# Patient Record
Sex: FEMALE | Race: BLACK OR AFRICAN AMERICAN | Employment: OTHER | URBAN - METROPOLITAN AREA
[De-identification: names, ages, dates, MRNs, and addresses within clinical notes are randomized per-mention and may not be internally consistent; named-entity substitution may affect disease eponyms.]

---

## 2017-01-12 ENCOUNTER — OFFICE VISIT (OUTPATIENT)
Dept: INTERNAL MEDICINE CLINIC | Age: 82
End: 2017-01-12

## 2017-01-12 VITALS
TEMPERATURE: 96.5 F | RESPIRATION RATE: 20 BRPM | HEIGHT: 62 IN | DIASTOLIC BLOOD PRESSURE: 50 MMHG | HEART RATE: 47 BPM | SYSTOLIC BLOOD PRESSURE: 143 MMHG | BODY MASS INDEX: 27.79 KG/M2 | OXYGEN SATURATION: 97 % | WEIGHT: 151 LBS

## 2017-01-12 DIAGNOSIS — J40 BRONCHITIS: Primary | ICD-10-CM

## 2017-01-12 NOTE — PROGRESS NOTES
Patient is in the office today for a urgent care follow up. Do you have an Advance Directive yes - will bring copy      1. Have you been to the ER, urgent care clinic since your last visit? Hospitalized since your last visit? yes, [facility]     2. Have you seen or consulted any other health care providers outside of the [address] [place] [address] since your last visit? Include any pap smears or colon screening.  No

## 2017-01-12 NOTE — PATIENT INSTRUCTIONS
Cough: Care Instructions  Your Care Instructions  A cough is your body's response to something that bothers your throat or airways. Many things can cause a cough. You might cough because of a cold or the flu, bronchitis, or asthma. Smoking, postnasal drip, allergies, and stomach acid that backs up into your throat also can cause coughs. A cough is a symptom, not a disease. Most coughs stop when the cause, such as a cold, goes away. You can take a few steps at home to cough less and feel better. Follow-up care is a key part of your treatment and safety. Be sure to make and go to all appointments, and call your doctor if you are having problems. It's also a good idea to know your test results and keep a list of the medicines you take. How can you care for yourself at home? · Drink lots of water and other fluids. This helps thin the mucus and soothes a dry or sore throat. Honey or lemon juice in hot water or tea may ease a dry cough. · Take cough medicine as directed by your doctor. · Prop up your head on pillows to help you breathe and ease a dry cough. · Try cough drops to soothe a dry or sore throat. Cough drops don't stop a cough. Medicine-flavored cough drops are no better than candy-flavored drops or hard candy. · Do not smoke. Avoid secondhand smoke. If you need help quitting, talk to your doctor about stop-smoking programs and medicines. These can increase your chances of quitting for good. When should you call for help? Call 911 anytime you think you may need emergency care. For example, call if:  · You have severe trouble breathing. Call your doctor now or seek immediate medical care if:  · You cough up blood. · You have new or worse trouble breathing. · You have a new or higher fever. · You have a new rash.   Watch closely for changes in your health, and be sure to contact your doctor if:  · You cough more deeply or more often, especially if you notice more mucus or a change in the color of your mucus. · You have new symptoms, such as a sore throat, an earache, or sinus pain. · You do not get better as expected. Where can you learn more? Go to http://jason-tiera.info/. Enter D279 in the search box to learn more about \"Cough: Care Instructions. \"  Current as of: May 27, 2016  Content Version: 11.1  © 6698-4925 [employer]. Care instructions adapted under license by [employer] (which disclaims liability or warranty for this information). If you have questions about a medical condition or this instruction, always ask your healthcare professional. [address] any warranty or liability for your use of this information.

## 2017-01-12 NOTE — PROGRESS NOTES
Nelly Moses is a 80 y.o.  female and presents with ED Follow-up ([facility])      SUBJECTIVE:  Pt was treated for bronchitis at [facility] with doxycyline. She had chest congestion but minimal cough. She is feeling better after finishing her antibiotics and needs no further medications            Current Outpatient Prescriptions   Medication Sig    hydrALAZINE (APRESOLINE) 50 mg tablet Take 1 Tab by mouth three (3) times daily.  pravastatin (PRAVACHOL) 40 mg tablet     glipiZIDE SR (GLUCOTROL) 2.5 mg CR tablet TAKE 1 TABLET BY MOUTH EVERY DAY    montelukast (SINGULAIR) 10 mg tablet Take 1 Tab by mouth daily.  guaiFENesin (ORGANIDIN) 400 mg tablet Take 1 Tab by mouth nightly as needed for Congestion. Indications: COUGH    chlorthalidone (HYGROTEN) 25 mg tablet Take 1 Tab by mouth daily.  atenolol (TENORMIN) 25 mg tablet Take 0.5 Tabs by mouth daily.  amLODIPine (NORVASC) 10 mg tablet TAKE 1 TABLET BY MOUTH EVERY DAY    losartan (COZAAR) 100 mg tablet TAKE 1 TABLET BY MOUTH DAILY.  COMBIGAN 0.2-0.5 % drop ophthalmic solution Administer 1 Drop to both eyes every twelve (12) hours.  RESTASIS 0.05 % ophthalmic emulsion Administer 1 Drop to both eyes every twelve (12) hours.  ascorbic acid (VITAMIN C) 500 mg tablet Take 1,000 mg by mouth daily.  CALCIUM PO Take 1,000 mg by mouth two (2) times a day.  Blood-Glucose Meter monitoring kit by Does Not Apply route. Use daily as directed    glucose blood VI test strips (BLOOD GLUCOSE TEST) strip by Does Not Apply route. Use daily as directed    Lancets Misc by Does Not Apply route. Use daily as directed    alcohol swabs (ALCOHOL PREP PADS) PadM 1 Dose by Apply Externally route daily as needed (Glucose testing).  nitroglycerin (NITROSTAT) 0.4 mg SL tablet 0.4 mg by SubLINGual route every five (5) minutes as needed.  Cholecalciferol, Vitamin D3, (VITAMIN D) 1,000 unit Cap Take 2,000 Units by mouth daily.     MULTIVITAMINS PO Take 1 Tab by mouth daily.  aspirin 81 mg chewable tablet Take 81 mg by mouth daily.  sulfanilamide (AVC) 15 % vaginal cream Insert 1 Applicator into vagina daily.  alendronate (FOSAMAX) 35 mg tablet Take  by mouth every seven (7) days.  IBUPROFEN (ADVIL PO) Take 1 Tab by mouth as needed. No current facility-administered medications for this visit. OBJECTIVE:  alert, well appearing, and in no distress  Visit Vitals    /50 (BP 1 Location: Left arm, BP Patient Position: Sitting)    Pulse (!) 47    Temp 96.5 °F (35.8 °C) (Oral)    Resp 20    Ht 5' 2\" (1.575 m)    Wt 151 lb (68.5 kg)    SpO2 97%    BMI 27.62 kg/m2      well developed and well nourished  Chest - clear to auscultation, no wheezes, rales or rhonchi, symmetric air entry  Heart - normal rate, regular rhythm, normal S1, S2, no murmurs, rubs, clicks or gallops      Assessment/Plan      ICD-10-CM ICD-9-CM    1. Bronchitis J40 490 Resolved after treating with doxycycline from urgent care. Follow-up Disposition:  Return if symptoms worsen or fail to improve. Reviewed plan of care. Patient has provided input and agrees with goals.

## 2017-01-12 NOTE — MR AVS SNAPSHOT
Visit Information Date & Time Provider Department Dept. Phone Encounter #  
 1/12/2017 11:45 AM Citlalli De Jesus MD [facility] [place] [facility] [address] [id number] Follow-up Instructions Return if symptoms worsen or fail to improve. Your Appointments 3/6/2017  8:20 AM  
LAB with Citlalli De Jesus MD  
[facility] (--) Appt Note: 4 mo f/u lab [address] [address]  
[phone]  
  
   
 [address] [place] [address],[address] [address]  
  
    
 3/13/2017 11:30 AM  
ROUTINE CARE with Citlalli De Jesus MD  
[facility] (--) Appt Note: 4 mo f/u  
 [address] [address]  
[phone]  
  
   
 [address] [place] [address],[address] [address]  
  
    
 4/10/2017  3:00 PM  
Follow Up with Rosibel Rollins DO [facility] ([facility]) Appt Note: 4-6 month f/u  
 [facility] [address] [address] [address] [phone] [address] [place] [address] [address] Upcoming Health Maintenance Date Due DTaP/Tdap/Td series (1 - Tdap) 1/5/1944 ZOSTER VACCINE AGE 60> 1/5/1983 GLAUCOMA SCREENING Q2Y 6/18/2012 EYE EXAM RETINAL OR DILATED Q1 1/30/2017* Pneumococcal 65+ Low/Medium Risk (2 of 2 - PPSV23) 1/31/2017* FOOT EXAM Q1 1/31/2017* HEMOGLOBIN A1C Q6M 5/7/2017 MEDICARE YEARLY EXAM 6/16/2017 LIPID PANEL Q1 7/12/2017 MICROALBUMIN Q1 11/7/2017 *Topic was postponed. The date shown is not the original due date. Allergies as of 1/12/2017  Review Complete On: 1/12/2017 By: Citlalli De Jesus MD  
  
 Severity Noted Reaction Type Reactions Lipitor [Atorvastatin]  08/04/2016    Myalgia Spironolactone  05/09/2014    Other (comments) Dizziness and fatigue Current Immunizations  Reviewed on 11/14/2016 Name Date Influenza Vaccine (Quad) PF 11/14/2016, 11/13/2015 Influenza Vaccine Split 10/5/2010 Pneumococcal Conjugate (PCV-13) 5/27/2015 Pneumococcal Vaccine (Pcv) 1/20/2007 Not reviewed this visit You Were Diagnosed With   
  
 Codes Comments Viral upper respiratory tract infection    -  Primary ICD-10-CM: J06.9, B97.89 ICD-9-CM: 465.9 Vitals BP Pulse Temp Resp Height(growth percentile) Weight(growth percentile) 143/50 (BP 1 Location: Left arm, BP Patient Position: Sitting) (!) 47 96.5 °F (35.8 °C) (Oral) 20 5' 2\" (1.575 m) 151 lb (68.5 kg) SpO2 BMI OB Status Smoking Status 97% 27.62 kg/m2 Hysterectomy Never Smoker Vitals History BMI and BSA Data Body Mass Index Body Surface Area  
 27.62 kg/m 2 1.73 m 2 Preferred Pharmacy Pharmacy Name Phone [id number] [phone] [address] [phone] Your Updated Medication List  
  
   
This list is accurate as of: 1/12/17 11:58 AM.  Always use your most recent med list. ADVIL PO Take 1 Tab by mouth as needed. alcohol swabs Padm Commonly known as:  ALCOHOL PREP PADS  
1 Dose by Apply Externally route daily as needed (Glucose testing). alendronate 35 mg tablet Commonly known as:  FOSAMAX Take  by mouth every seven (7) days. amLODIPine 10 mg tablet Commonly known as:  Tad Kathy TAKE 1 TABLET BY MOUTH EVERY DAY  
  
 aspirin 81 mg chewable tablet Take 81 mg by mouth daily. atenolol 25 mg tablet Commonly known as:  TENORMIN Take 0.5 Tabs by mouth daily. Blood-Glucose Meter monitoring kit  
by Does Not Apply route. Use daily as directed CALCIUM PO Take 1,000 mg by mouth two (2) times a day. chlorthalidone 25 mg tablet Commonly known as:  Nora Maxim Take 1 Tab by mouth daily. cholecalciferol 1,000 unit Cap Commonly known as:  VITAMIN D3 Take 2,000 Units by mouth daily. COMBIGAN 0.2-0.5 % Drop ophthalmic solution Generic drug:  brimonidine-timolol Administer 1 Drop to both eyes every twelve (12) hours. glipiZIDE SR 2.5 mg CR tablet Commonly known as:  GLUCOTROL XL  
TAKE 1 TABLET BY MOUTH EVERY DAY  
  
 glucose blood VI test strips strip Commonly known as:  blood glucose test  
by Does Not Apply route. Use daily as directed  
  
 guaiFENesin 400 mg tablet Commonly known as:  Rolin [place] Take 1 Tab by mouth nightly as needed for Congestion. Indications: COUGH  
  
 hydrALAZINE 50 mg tablet Commonly known as:  APRESOLINE Take 1 Tab by mouth three (3) times daily. Lancets Misc  
by Does Not Apply route. Use daily as directed  
  
 losartan 100 mg tablet Commonly known as:  COZAAR  
TAKE 1 TABLET BY MOUTH DAILY. montelukast 10 mg tablet Commonly known as:  SINGULAIR Take 1 Tab by mouth daily. MULTIVITAMINS PO Take 1 Tab by mouth daily. nitroglycerin 0.4 mg SL tablet Commonly known as:  NITROSTAT  
0.4 mg by SubLINGual route every five (5) minutes as needed. pravastatin 40 mg tablet Commonly known as:  PRAVACHOL  
  
 RESTASIS 0.05 % ophthalmic emulsion Generic drug:  cycloSPORINE Administer 1 Drop to both eyes every twelve (12) hours. sulfanilamide 15 % vaginal cream  
Commonly known as:  AVC Insert 1 Applicator into vagina daily. VITAMIN C 500 mg tablet Generic drug:  ascorbic acid (vitamin C) Take 1,000 mg by mouth daily. Follow-up Instructions Return if symptoms worsen or fail to improve. Patient Instructions Cough: Care Instructions Your Care Instructions A cough is your body's response to something that bothers your throat or airways. Many things can cause a cough. You might cough because of a cold or the flu, bronchitis, or asthma. Smoking, postnasal drip, allergies, and stomach acid that backs up into your throat also can cause coughs. A cough is a symptom, not a disease. Most coughs stop when the cause, such as a cold, goes away. You can take a few steps at home to cough less and feel better. Follow-up care is a key part of your treatment and safety. Be sure to make and go to all appointments, and call your doctor if you are having problems. It's also a good idea to know your test results and keep a list of the medicines you take. How can you care for yourself at home? · Drink lots of water and other fluids. This helps thin the mucus and soothes a dry or sore throat. Honey or lemon juice in hot water or tea may ease a dry cough. · Take cough medicine as directed by your doctor. · Prop up your head on pillows to help you breathe and ease a dry cough. · Try cough drops to soothe a dry or sore throat. Cough drops don't stop a cough. Medicine-flavored cough drops are no better than candy-flavored drops or hard candy. · Do not smoke. Avoid secondhand smoke. If you need help quitting, talk to your doctor about stop-smoking programs and medicines. These can increase your chances of quitting for good. When should you call for help? Call 911 anytime you think you may need emergency care. For example, call if: 
· You have severe trouble breathing. Call your doctor now or seek immediate medical care if: 
· You cough up blood. · You have new or worse trouble breathing. · You have a new or higher fever. · You have a new rash. Watch closely for changes in your health, and be sure to contact your doctor if: 
· You cough more deeply or more often, especially if you notice more mucus or a change in the color of your mucus. · You have new symptoms, such as a sore throat, an earache, or sinus pain. · You do not get better as expected. Where can you learn more? Go to http://jason-tiera.info/. Enter D279 in the search box to learn more about \"Cough: Care Instructions. \" Current as of: May 27, 2016 Content Version: 11.1 © 7736-3859 [employer]. Care instructions adapted under license by [employer] (which disclaims liability or warranty for this information). If you have questions about a medical condition or this instruction, always ask your healthcare professional. [address] any warranty or liability for your use of this information. Introducing [facility]! Flaco Patel introduces [employer] patient portal. Now you can access parts of your medical record, email your doctor's office, and request medication refills online. 1. In your internet browser, go to [email or web address] 2. Click on the First Time User? Click Here link in the Sign In box. You will see the New Member Sign Up page. 3. Enter your [employer] Access Code exactly as it appears below. You will not need to use this code after youve completed the sign-up process. If you do not sign up before the expiration date, you must request a new code. · [employer] Access Code: [email or web address] Expires: 4/12/2017 11:58 AM 
 
4. Enter the last four digits of your Social Security Number (xxxx) and Date of Birth (mm/dd/yyyy) as indicated and click Submit. You will be taken to the next sign-up page. 5. Create a [employer] ID. This will be your [employer] login ID and cannot be changed, so think of one that is secure and easy to remember. 6. Create a [employer] password. You can change your password at any time. 7. Enter your Password Reset Question and Answer. This can be used at a later time if you forget your password. 8. Enter your e-mail address. You will receive e-mail notification when new information is available in [address] 9. Click Sign Up. You can now view and download portions of your medical record. 10. Click the Download Summary menu link to download a portable copy of your medical information.  
 
If you have questions, please visit the Frequently Asked Questions section of the [email or web address]. Remember, [employer] is NOT to be used for urgent needs. For medical emergencies, dial 911. Now available from your iPhone and Android! Please provide this summary of care documentation to your next provider. Your primary care clinician is listed as WALESKA TO. If you have any questions after today's visit, please call [phone].

## 2017-03-01 RX ORDER — PRAVASTATIN SODIUM 40 MG/1
40 TABLET ORAL DAILY
Qty: 30 TAB | Refills: 6 | Status: SHIPPED | OUTPATIENT
Start: 2017-03-01 | End: 2018-05-15 | Stop reason: SDUPTHER

## 2017-03-06 ENCOUNTER — HOSPITAL ENCOUNTER (OUTPATIENT)
Dept: LAB | Age: 82
Discharge: HOME | End: 2017-03-06
Payer: MEDICARE

## 2017-03-06 DIAGNOSIS — I10 ESSENTIAL HYPERTENSION: ICD-10-CM

## 2017-03-06 DIAGNOSIS — E11.9 TYPE 2 DIABETES MELLITUS WITHOUT COMPLICATION, WITHOUT LONG-TERM CURRENT USE OF INSULIN (HCC): ICD-10-CM

## 2017-03-06 DIAGNOSIS — E78.5 DYSLIPIDEMIA: ICD-10-CM

## 2017-03-06 LAB
ALBUMIN SERPL BCP-MCNC: 3.4 G/DL (ref 3.4–5)
ALBUMIN/GLOB SERPL: 1.2 {RATIO} (ref 0.8–1.7)
ALP SERPL-CCNC: 91 U/L (ref 45–117)
ALT SERPL-CCNC: 22 U/L (ref 13–56)
ANION GAP BLD CALC-SCNC: 8 MMOL/L (ref 3–18)
AST SERPL W P-5'-P-CCNC: 25 U/L (ref 15–37)
BILIRUB SERPL-MCNC: 0.3 MG/DL (ref 0.2–1)
BUN SERPL-MCNC: 23 MG/DL (ref 7–18)
BUN/CREAT SERPL: 20 (ref 12–20)
CALCIUM SERPL-MCNC: 8.9 MG/DL (ref 8.5–10.1)
CHLORIDE SERPL-SCNC: 108 MMOL/L (ref 100–108)
CHOLEST SERPL-MCNC: 227 MG/DL
CO2 SERPL-SCNC: 26 MMOL/L (ref 21–32)
CREAT SERPL-MCNC: 1.17 MG/DL (ref 0.6–1.3)
GLOBULIN SER CALC-MCNC: 2.8 G/DL (ref 2–4)
GLUCOSE SERPL-MCNC: 106 MG/DL (ref 74–99)
HBA1C MFR BLD: 6.4 % (ref 4.2–5.6)
HDLC SERPL-MCNC: 60 MG/DL (ref 40–60)
HDLC SERPL: 3.8 {RATIO} (ref 0–5)
LDLC SERPL CALC-MCNC: 147 MG/DL (ref 0–100)
LIPID PROFILE,FLP: ABNORMAL
POTASSIUM SERPL-SCNC: 4.8 MMOL/L (ref 3.5–5.5)
PROT SERPL-MCNC: 6.2 G/DL (ref 6.4–8.2)
SODIUM SERPL-SCNC: 142 MMOL/L (ref 136–145)
TRIGL SERPL-MCNC: 100 MG/DL (ref ?–150)
VLDLC SERPL CALC-MCNC: 20 MG/DL

## 2017-03-06 PROCEDURE — 36415 COLL VENOUS BLD VENIPUNCTURE: CPT | Performed by: INTERNAL MEDICINE

## 2017-03-06 PROCEDURE — 80061 LIPID PANEL: CPT | Performed by: INTERNAL MEDICINE

## 2017-03-06 PROCEDURE — 83036 HEMOGLOBIN GLYCOSYLATED A1C: CPT | Performed by: INTERNAL MEDICINE

## 2017-03-06 PROCEDURE — 80053 COMPREHEN METABOLIC PANEL: CPT | Performed by: INTERNAL MEDICINE

## 2017-03-10 RX ORDER — LOSARTAN POTASSIUM 100 MG/1
TABLET ORAL
Qty: 90 TAB | Refills: 3 | Status: SHIPPED | OUTPATIENT
Start: 2017-03-10 | End: 2018-10-22 | Stop reason: SDUPTHER

## 2017-03-13 ENCOUNTER — OFFICE VISIT (OUTPATIENT)
Dept: INTERNAL MEDICINE CLINIC | Age: 82
End: 2017-03-13

## 2017-03-13 VITALS
OXYGEN SATURATION: 98 % | SYSTOLIC BLOOD PRESSURE: 140 MMHG | HEIGHT: 62 IN | DIASTOLIC BLOOD PRESSURE: 72 MMHG | BODY MASS INDEX: 27.23 KG/M2 | HEART RATE: 52 BPM | TEMPERATURE: 98.6 F | WEIGHT: 148 LBS | RESPIRATION RATE: 18 BRPM

## 2017-03-13 DIAGNOSIS — I10 ESSENTIAL HYPERTENSION: ICD-10-CM

## 2017-03-13 DIAGNOSIS — E11.9 TYPE 2 DIABETES MELLITUS WITHOUT COMPLICATION, WITHOUT LONG-TERM CURRENT USE OF INSULIN (HCC): Primary | ICD-10-CM

## 2017-03-13 DIAGNOSIS — E78.5 DYSLIPIDEMIA: ICD-10-CM

## 2017-03-13 NOTE — PROGRESS NOTES
Subjective:       Chief Complaint  The patient presents for follow up of diabetes, hypertension and high cholesterol. HPI  Lawanda Small is a 80 y.o. female seen for follow up of diabetes. Shealso has hypertension and hyperlipidemia. Diabetes well controlled, no significant medication side effects noted, on glucotrol, hypertension well controlled, no significant medication side effects noted, on current meds, hyperlipidemia, pt now on Pravachol since Lipitor gave her myalgias. Pt may not be taking the Pravachol on a regular basis since her LDL has increased significantly. Daughter will make sure she is taking it and I will recheck in a few months to see if any improvement. Diet and Lifestyle: generally follows a low fat low cholesterol diet, follows a diabetic diet regularly, exercises sporadically    Home BP Monitoring: is well controlled at home. Diabetic Review of Systems - home glucose monitoring: is well controlled at home when she takes it 1x/day    Other symptoms and concerns: pt feels better when she exercises on a regular basis. Pt's CKD stage 3 is stable on current meds. Pt feels unsteady when she uses a cane so she was advised to use rollator but for some reason she does not feel safe with it even though she has not fallen while using it. Pt wants a motorized wheelchair but is currently not a candidate. Discussed the patient's BMI with her. The BMI follow up plan is as follows: BMI is out of normal parameters and plan is as follows: I have counseled this patient on diet and exercise regimens. Current Outpatient Prescriptions   Medication Sig    losartan (COZAAR) 100 mg tablet TAKE ONE TABLET BY MOUTH DIALY    pravastatin (PRAVACHOL) 40 mg tablet Take 1 Tab by mouth daily.  hydrALAZINE (APRESOLINE) 50 mg tablet Take 1 Tab by mouth three (3) times daily.  (Patient taking differently: Take 25 mg by mouth three (3) times daily.)    glipiZIDE SR (GLUCOTROL) 2.5 mg CR tablet TAKE 1 TABLET BY MOUTH EVERY DAY    montelukast (SINGULAIR) 10 mg tablet Take 1 Tab by mouth daily.  atenolol (TENORMIN) 25 mg tablet Take 0.5 Tabs by mouth daily.  amLODIPine (NORVASC) 10 mg tablet TAKE 1 TABLET BY MOUTH EVERY DAY    COMBIGAN 0.2-0.5 % drop ophthalmic solution Administer 1 Drop to both eyes every twelve (12) hours.  RESTASIS 0.05 % ophthalmic emulsion Administer 1 Drop to both eyes every twelve (12) hours.  ascorbic acid (VITAMIN C) 500 mg tablet Take 1,000 mg by mouth daily.  Blood-Glucose Meter monitoring kit by Does Not Apply route. Use daily as directed    glucose blood VI test strips (BLOOD GLUCOSE TEST) strip by Does Not Apply route. Use daily as directed    Lancets Misc by Does Not Apply route. Use daily as directed    alcohol swabs (ALCOHOL PREP PADS) PadM 1 Dose by Apply Externally route daily as needed (Glucose testing).  Cholecalciferol, Vitamin D3, (VITAMIN D) 1,000 unit Cap Take 2,000 Units by mouth daily.  MULTIVITAMINS PO Take 1 Tab by mouth daily.  aspirin 81 mg chewable tablet Take 81 mg by mouth daily.  sulfanilamide (AVC) 15 % vaginal cream Insert 1 Applicator into vagina daily.  guaiFENesin (ORGANIDIN) 400 mg tablet Take 1 Tab by mouth nightly as needed for Congestion. Indications: COUGH    chlorthalidone (HYGROTEN) 25 mg tablet Take 1 Tab by mouth daily.  alendronate (FOSAMAX) 35 mg tablet Take  by mouth every seven (7) days.  IBUPROFEN (ADVIL PO) Take 1 Tab by mouth as needed.  CALCIUM PO Take 1,000 mg by mouth two (2) times a day.  nitroglycerin (NITROSTAT) 0.4 mg SL tablet 0.4 mg by SubLINGual route every five (5) minutes as needed. No current facility-administered medications for this visit.               Review of Systems  Respiratory: negative for dyspnea on exertion  Cardiovascular: negative for chest pain    Objective:     Visit Vitals    /72 (BP 1 Location: Left arm, BP Patient Position: Sitting)    Pulse (!) 52    Temp 98.6 °F (37 °C) (Oral)    Resp 18    Ht 5' 2\" (1.575 m)    Wt 148 lb (67.1 kg)    SpO2 98%    BMI 27.07 kg/m2        General appearance - alert, well appearing, and in no distress  Neck - supple, no significant adenopathy, carotids upstroke normal bilaterally, no bruits  Chest - clear to auscultation, no wheezes, rales or rhonchi, symmetric air entry  Heart - normal rate, regular rhythm, normal S1, S2, no murmurs, rubs, clicks or gallops  Extremities - peripheral pulses normal, no pedal edema, no clubbing or cyanosis  Skin - normal coloration and turgor, no rashes, no suspicious skin lesions noted      Labs:   Lab Results   Component Value Date/Time    Hemoglobin A1c 6.4 03/06/2017 09:28 AM    Hemoglobin A1c 6.1 11/07/2016 08:49 AM    Hemoglobin A1c 6.4 07/12/2016 09:48 AM    Microalbumin/Creat ratio (mg/g creat) 17 11/07/2016 02:30 PM    Microalbumin,urine random 3.00 11/07/2016 02:30 PM    LDL, calculated 147 03/06/2017 09:28 AM    Creatinine 1.17 03/06/2017 09:28 AM      Lab Results   Component Value Date/Time    Cholesterol, total 227 03/06/2017 09:28 AM    HDL Cholesterol 60 03/06/2017 09:28 AM    LDL, calculated 147 03/06/2017 09:28 AM    Triglyceride 100 03/06/2017 09:28 AM    CHOL/HDL Ratio 3.8 03/06/2017 09:28 AM     Lab Results   Component Value Date/Time    AST (SGOT) 25 03/06/2017 09:28 AM    Alk.  phosphatase 91 03/06/2017 09:28 AM    Bilirubin, direct 0.1 03/11/2016 10:45 AM     Lab Results   Component Value Date/Time    GFR est AA 52 03/06/2017 09:28 AM    GFR est non-AA 43 03/06/2017 09:28 AM    Creatinine 1.17 03/06/2017 09:28 AM    BUN 23 03/06/2017 09:28 AM    Sodium 142 03/06/2017 09:28 AM    Potassium 4.8 03/06/2017 09:28 AM    Chloride 108 03/06/2017 09:28 AM    CO2 26 03/06/2017 09:28 AM      Lab Results   Component Value Date/Time    Glucose 106 03/06/2017 09:28 AM            Assessment / Plan     Diabetes well controlled, on glucotrol  Hypertension  well controlled, on current meds,    Hyperlipidemia poorly controlled  On Pravachol, not sure how compliant pt has been, will recheck at next OV once daughter confirms she is taking the statin. ICD-10-CM ICD-9-CM    1. Type 2 diabetes mellitus without complication, without long-term current use of insulin (HCC) E11.9 250.00 HEMOGLOBIN A1C W/O EAG   2. Essential hypertension R79 838.9 METABOLIC PANEL, COMPREHENSIVE   3. Dyslipidemia E78.5 272.4 LIPID PANEL                Diabetic issues reviewed with her: diabetic diet discussed in detail, and low cholesterol diet, weight control and daily exercise discussed. Follow-up Disposition:  Return in about 4 months (around 7/13/2017) for labs 1 week before. Reviewed plan of care. Patient has provided input and agrees with goals.

## 2017-03-13 NOTE — PROGRESS NOTES
Patient is in the office today for a 4 month follow up. Do you have an Advance Directive yes - will bring copy      1. Have you been to the ER, urgent care clinic since your last visit? Hospitalized since your last visit? No    2. Have you seen or consulted any other health care providers outside of the [address] [place] [address] since your last visit? Include any pap smears or colon screening.   No

## 2017-03-13 NOTE — PATIENT INSTRUCTIONS

## 2017-03-13 NOTE — MR AVS SNAPSHOT
Visit Information Date & Time Provider Department Dept. Phone Encounter #  
 3/13/2017 11:30 AM Sangita Costello MD Internists at [facility] [id number] [phone] Follow-up Instructions Return in about 4 months (around 7/13/2017) for labs 1 week before. Your Appointments 4/10/2017  3:00 PM  
Follow Up with Arvilla Lefort, DO [facility] ([facility]) Appt Note: 4-6 month f/u  
 [place] [address] [address] [address] [phone] [address] Upcoming Health Maintenance Date Due  
 FOOT EXAM Q1 1/5/1933 DTaP/Tdap/Td series (1 - Tdap) 1/5/1944 ZOSTER VACCINE AGE 60> 1/5/1983 GLAUCOMA SCREENING Q2Y 6/18/2012 EYE EXAM RETINAL OR DILATED Q1 3/10/2016 Pneumococcal 65+ Low/Medium Risk (2 of 2 - PPSV23) 3/14/2017* MEDICARE YEARLY EXAM 6/16/2017 HEMOGLOBIN A1C Q6M 9/6/2017 MICROALBUMIN Q1 11/7/2017 LIPID PANEL Q1 3/6/2018 *Topic was postponed. The date shown is not the original due date. Allergies as of 3/13/2017  Review Complete On: 3/13/2017 By: Sangita Costello MD  
  
 Severity Noted Reaction Type Reactions Lipitor [Atorvastatin]  08/04/2016    Myalgia Spironolactone  05/09/2014    Other (comments) Dizziness and fatigue Current Immunizations  Reviewed on 11/14/2016 Name Date Influenza Vaccine (Quad) PF 11/14/2016, 11/13/2015 Influenza Vaccine Split 10/5/2010 Pneumococcal Conjugate (PCV-13) 5/27/2015 Pneumococcal Vaccine (Pcv) 1/20/2007 Not reviewed this visit You Were Diagnosed With   
  
 Codes Comments Type 2 diabetes mellitus without complication, without long-term current use of insulin (HCC)    -  Primary ICD-10-CM: E11.9 ICD-9-CM: 250.00 Essential hypertension     ICD-10-CM: I10 
ICD-9-CM: 401.9 Dyslipidemia     ICD-10-CM: E78.5 ICD-9-CM: 272.4 Vitals BP Pulse Temp Resp Height(growth percentile) Weight(growth percentile) 140/72 (BP 1 Location: Left arm, BP Patient Position: Sitting) (!) 52 98.6 °F (37 °C) (Oral) 18 5' 2\" (1.575 m) 148 lb (67.1 kg) SpO2 BMI OB Status Smoking Status 98% 27.07 kg/m2 Hysterectomy Never Smoker Vitals History BMI and BSA Data Body Mass Index Body Surface Area  
 27.07 kg/m 2 1.71 m 2 Preferred Pharmacy Pharmacy Name Phone [id number] Brenda Nieves, [address] [facility],[address] [facility] [phone] Your Updated Medication List  
  
   
This list is accurate as of: 3/13/17 12:12 PM.  Always use your most recent med list. ADVIL PO Take 1 Tab by mouth as needed. alcohol swabs Padm Commonly known as:  ALCOHOL PREP PADS  
1 Dose by Apply Externally route daily as needed (Glucose testing). alendronate 35 mg tablet Commonly known as:  FOSAMAX Take  by mouth every seven (7) days. amLODIPine 10 mg tablet Commonly known as:  Paulette Flores TAKE 1 TABLET BY MOUTH EVERY DAY  
  
 aspirin 81 mg chewable tablet Take 81 mg by mouth daily. atenolol 25 mg tablet Commonly known as:  TENORMIN Take 0.5 Tabs by mouth daily. Blood-Glucose Meter monitoring kit  
by Does Not Apply route. Use daily as directed CALCIUM PO Take 1,000 mg by mouth two (2) times a day. chlorthalidone 25 mg tablet Commonly known as:  [place] Take 1 Tab by mouth daily. cholecalciferol 1,000 unit Cap Commonly known as:  VITAMIN D3 Take 2,000 Units by mouth daily. COMBIGAN 0.2-0.5 % Drop ophthalmic solution Generic drug:  brimonidine-timolol Administer 1 Drop to both eyes every twelve (12) hours. glipiZIDE SR 2.5 mg CR tablet Commonly known as:  GLUCOTROL XL  
TAKE 1 TABLET BY MOUTH EVERY DAY  
  
 glucose blood VI test strips strip Commonly known as:  blood glucose test  
by Does Not Apply route. Use daily as directed guaiFENesin 400 mg tablet Commonly known as:  Katherene Coombe Take 1 Tab by mouth nightly as needed for Congestion. Indications: COUGH  
  
 hydrALAZINE 50 mg tablet Commonly known as:  APRESOLINE Take 1 Tab by mouth three (3) times daily. Lancets Misc  
by Does Not Apply route. Use daily as directed  
  
 losartan 100 mg tablet Commonly known as:  COZAAR  
TAKE ONE TABLET BY MOUTH DIALY montelukast 10 mg tablet Commonly known as:  SINGULAIR Take 1 Tab by mouth daily. MULTIVITAMINS PO Take 1 Tab by mouth daily. nitroglycerin 0.4 mg SL tablet Commonly known as:  NITROSTAT  
0.4 mg by SubLINGual route every five (5) minutes as needed. pravastatin 40 mg tablet Commonly known as:  PRAVACHOL Take 1 Tab by mouth daily. RESTASIS 0.05 % ophthalmic emulsion Generic drug:  cycloSPORINE Administer 1 Drop to both eyes every twelve (12) hours. sulfanilamide 15 % vaginal cream  
Commonly known as:  AVC Insert 1 Applicator into vagina daily. VITAMIN C 500 mg tablet Generic drug:  ascorbic acid (vitamin C) Take 1,000 mg by mouth daily. Follow-up Instructions Return in about 4 months (around 7/13/2017) for labs 1 week before. Patient Instructions DASH Diet: Care Instructions Your Care Instructions The DASH diet is an eating plan that can help lower your blood pressure. DASH stands for Dietary Approaches to Stop Hypertension. Hypertension is high blood pressure. The DASH diet focuses on eating foods that are high in calcium, potassium, and magnesium. These nutrients can lower blood pressure. The foods that are highest in these nutrients are fruits, vegetables, low-fat dairy products, nuts, seeds, and legumes. But taking calcium, potassium, and magnesium supplements instead of eating foods that are high in those nutrients does not have the same effect. The DASH diet also includes whole grains, fish, and poultry. The DASH diet is one of several lifestyle changes your doctor may recommend to lower your high blood pressure. Your doctor may also want you to decrease the amount of sodium in your diet. Lowering sodium while following the DASH diet can lower blood pressure even further than just the DASH diet alone. Follow-up care is a key part of your treatment and safety. Be sure to make and go to all appointments, and call your doctor if you are having problems. It's also a good idea to know your test results and keep a list of the medicines you take. How can you care for yourself at home? Following the DASH diet · Eat 4 to 5 servings of fruit each day. A serving is 1 medium-sized piece of fruit, ½ cup chopped or canned fruit, 1/4 cup dried fruit, or 4 ounces (½ cup) of fruit juice. Choose fruit more often than fruit juice. · Eat 4 to 5 servings of vegetables each day. A serving is 1 cup of lettuce or raw leafy vegetables, ½ cup of chopped or cooked vegetables, or 4 ounces (½ cup) of vegetable juice. Choose vegetables more often than vegetable juice. · Get 2 to 3 servings of low-fat and fat-free dairy each day. A serving is 8 ounces of milk, 1 cup of yogurt, or 1 ½ ounces of cheese. · Eat 6 to 8 servings of grains each day. A serving is 1 slice of bread, 1 ounce of dry cereal, or ½ cup of cooked rice, pasta, or cooked cereal. Try to choose whole-grain products as much as possible. · Limit lean meat, poultry, and fish to 2 servings each day. A serving is 3 ounces, about the size of a deck of cards. · Eat 4 to 5 servings of nuts, seeds, and legumes (cooked dried beans, lentils, and split peas) each week. A serving is 1/3 cup of nuts, 2 tablespoons of seeds, or ½ cup of cooked beans or peas. · Limit fats and oils to 2 to 3 servings each day. A serving is 1 teaspoon of vegetable oil or 2 tablespoons of salad dressing. · Limit sweets and added sugars to 5 servings or less a week.  A serving is 1 tablespoon jelly or jam, ½ cup sorbet, or 1 cup of lemonade. · Eat less than 2,300 milligrams (mg) of sodium a day. If you limit your sodium to 1,500 mg a day, you can lower your blood pressure even more. Tips for success · Start small. Do not try to make dramatic changes to your diet all at once. You might feel that you are missing out on your favorite foods and then be more likely to not follow the plan. Make small changes, and stick with them. Once those changes become habit, add a few more changes. · Try some of the following: ¨ Make it a goal to eat a fruit or vegetable at every meal and at snacks. This will make it easy to get the recommended amount of fruits and vegetables each day. ¨ Try yogurt topped with fruit and nuts for a snack or healthy dessert. ¨ Add lettuce, tomato, cucumber, and onion to sandwiches. ¨ Combine a ready-made pizza crust with low-fat mozzarella cheese and lots of vegetable toppings. Try using tomatoes, squash, spinach, broccoli, carrots, cauliflower, and onions. ¨ Have a variety of cut-up vegetables with a low-fat dip as an appetizer instead of chips and dip. ¨ Sprinkle sunflower seeds or chopped almonds over salads. Or try adding chopped walnuts or almonds to cooked vegetables. ¨ Try some vegetarian meals using beans and peas. Add garbanzo or kidney beans to salads. Make burritos and tacos with mashed guajardo beans or black beans. Where can you learn more? Go to http://jason-tiera.info/. Enter H249 in the search box to learn more about \"DASH Diet: Care Instructions. \" Current as of: March 23, 2016 Content Version: 11.1 © 3584-5201 [employer]. Care instructions adapted under license by [employer] (which disclaims liability or warranty for this information).  If you have questions about a medical condition or this instruction, always ask your healthcare professional. [employer] disclaims any warranty or liability for your use of this information. Introducing [facility] & HEALTH SERVICES! Estephanie Sabillon introduces [email or web address] patient portal. Now you can access parts of your medical record, email your doctor's office, and request medication refills online. 1. In your internet browser, go to [email or web address] 2. Click on the First Time User? Click Here link in the Sign In box. You will see the New Member Sign Up page. 3. Enter your [email or web address] Access Code exactly as it appears below. You will not need to use this code after youve completed the sign-up process. If you do not sign up before the expiration date, you must request a new code. · [email or web address] Access Code: [email or web address] Expires: 4/12/2017 12:58 PM 
 
4. Enter the last four digits of your Social Security Number (xxxx) and Date of Birth (mm/dd/yyyy) as indicated and click Submit. You will be taken to the next sign-up page. 5. Create a [email or web address] ID. This will be your [email or web address] login ID and cannot be changed, so think of one that is secure and easy to remember. 6. Create a Huaban.com password. You can change your password at any time. 7. Enter your Password Reset Question and Answer. This can be used at a later time if you forget your password. 8. Enter your e-mail address. You will receive e-mail notification when new information is available in [address] 9. Click Sign Up. You can now view and download portions of your medical record. 10. Click the Download Summary menu link to download a portable copy of your medical information. If you have questions, please visit the Frequently Asked Questions section of the [email or web address] website. Remember, Huaban.com is NOT to be used for urgent needs. For medical emergencies, dial 911. Now available from your iPhone and Android! Please provide this summary of care documentation to your next provider. Your primary care clinician is listed as WALESKA TO. If you have any questions after today's visit, please call [phone].

## 2017-04-10 ENCOUNTER — OFFICE VISIT (OUTPATIENT)
Dept: CARDIOLOGY CLINIC | Age: 82
End: 2017-04-10

## 2017-04-10 VITALS
DIASTOLIC BLOOD PRESSURE: 68 MMHG | HEIGHT: 62 IN | SYSTOLIC BLOOD PRESSURE: 138 MMHG | BODY MASS INDEX: 27.05 KG/M2 | OXYGEN SATURATION: 97 % | HEART RATE: 50 BPM | WEIGHT: 147 LBS

## 2017-04-10 DIAGNOSIS — I25.10 CORONARY ARTERY DISEASE INVOLVING NATIVE CORONARY ARTERY OF NATIVE HEART WITHOUT ANGINA PECTORIS: ICD-10-CM

## 2017-04-10 DIAGNOSIS — I11.0 HYPERTENSIVE HEART DISEASE WITH HEART FAILURE (HCC): Primary | ICD-10-CM

## 2017-04-10 DIAGNOSIS — I42.9 CARDIOMYOPATHY, SECONDARY (HCC): ICD-10-CM

## 2017-04-10 DIAGNOSIS — E78.5 DYSLIPIDEMIA: ICD-10-CM

## 2017-04-10 RX ORDER — HYDRALAZINE HYDROCHLORIDE 25 MG/1
25 TABLET, FILM COATED ORAL 3 TIMES DAILY
COMMUNITY
End: 2017-12-15

## 2017-04-10 NOTE — PROGRESS NOTES
Review of Systems   Constitutional: Negative for chills, diaphoresis, fever, malaise/fatigue and weight loss. Respiratory: Positive for shortness of breath. Negative for cough, hemoptysis, sputum production and wheezing. Cardiovascular: Negative for chest pain, palpitations, orthopnea, claudication, leg swelling and PND. Gastrointestinal: Negative. Musculoskeletal: Positive for neck pain. Negative for back pain, falls, joint pain and myalgias. Neurological: Positive for weakness.

## 2017-04-10 NOTE — MR AVS SNAPSHOT
Visit Information Date & Time Provider Department Dept. Phone Encounter #  
 4/10/2017  3:00 PM Thais Cook, [address] [facility] [id number] Follow-up Instructions Return in about 6 months (around 10/10/2017), or if symptoms worsen or fail to improve. Your Appointments 7/3/2017  8:20 AM  
LAB with Lajean Halsted, MD  
[facility]ists at [facility] (--) Appt Note: 4 mo f/u lab [address] [address]  
[phone]  
  
   
 [address] [address]  
  
    
 7/10/2017  1:00 PM  
ROUTINE CARE with Lajean Halsted, MD  
[facility]ists at [facility] (--) Appt Note: 4 mo f/u  
 [address] [id number] [phone]  
  
   
 [address] [place] [address],[address] [address] Upcoming Health Maintenance Date Due  
 FOOT EXAM Q1 1/5/1933 DTaP/Tdap/Td series (1 - Tdap) 1/5/1944 ZOSTER VACCINE AGE 60> 1/5/1983 GLAUCOMA SCREENING Q2Y 6/18/2012 EYE EXAM RETINAL OR DILATED Q1 3/10/2016 Pneumococcal 65+ Low/Medium Risk (2 of 2 - PPSV23) 5/27/2016 MEDICARE YEARLY EXAM 6/16/2017 HEMOGLOBIN A1C Q6M 9/6/2017 MICROALBUMIN Q1 11/7/2017 LIPID PANEL Q1 3/6/2018 Allergies as of 4/10/2017  Review Complete On: 4/10/2017 By: Thais Cook, DO Severity Noted Reaction Type Reactions Lipitor [Atorvastatin]  08/04/2016    Myalgia Spironolactone  05/09/2014    Other (comments) Dizziness and fatigue Current Immunizations  Reviewed on 11/14/2016 Name Date Influenza Vaccine (Quad) PF 11/14/2016, 11/13/2015 Influenza Vaccine Split 10/5/2010 Pneumococcal Conjugate (PCV-13) 5/27/2015 Pneumococcal Vaccine (Pcv) 1/20/2007 Not reviewed this visit You Were Diagnosed With   
  
 Codes Comments Hypertensive heart disease with [facility] ([facility]    -  Primary ICD-10-CM: I11.0 ICD-9-CM: 402.91, 428.9 Cardiomyopathy, secondary ([facility]     ICD-10-CM: I42.9 ICD-9-CM: 425.9 Coronary artery disease involving native coronary artery of native heart without angina pectoris     ICD-10-CM: I25.10 ICD-9-CM: 414.01 Dyslipidemia     ICD-10-CM: E78.5 ICD-9-CM: 272.4 Vitals BP Pulse Height(growth percentile) Weight(growth percentile) SpO2 BMI  
 138/68 (!) 50 5' 2\" (1.575 m) 147 lb (66.7 kg) 97% 26.89 kg/m2 OB Status Smoking Status Hysterectomy Never Smoker Vitals History BMI and BSA Data Body Mass Index Body Surface Area  
 26.89 kg/m 2 1.71 m 2 Preferred Pharmacy Pharmacy Name Phone [id number] [address] [facility] [phone] Your Updated Medication List  
  
   
This list is accurate as of: 4/10/17  4:25 PM.  Always use your most recent med list. ADVIL PO Take 1 Tab by mouth as needed. alcohol swabs Padm Commonly known as:  ALCOHOL PREP PADS  
1 Dose by Apply Externally route daily as needed (Glucose testing). alendronate 35 mg tablet Commonly known as:  FOSAMAX Take  by mouth every seven (7) days. amLODIPine 10 mg tablet Commonly known as:  Delphina Peat TAKE 1 TABLET BY MOUTH EVERY DAY  
  
 aspirin 81 mg chewable tablet Take 81 mg by mouth daily. atenolol 25 mg tablet Commonly known as:  TENORMIN Take 0.5 Tabs by mouth daily. Blood-Glucose Meter monitoring kit  
by Does Not Apply route. Use daily as directed CALCIUM PO Take 1,000 mg by mouth two (2) times a day. chlorthalidone 25 mg tablet Commonly known as:  Ellie Eagle Take 1 Tab by mouth daily. cholecalciferol 1,000 unit Cap Commonly known as:  VITAMIN D3 Take 2,000 Units by mouth daily. COMBIGAN 0.2-0.5 % Drop ophthalmic solution Generic drug:  brimonidine-timolol Administer 1 Drop to both eyes every twelve (12) hours. glipiZIDE SR 2.5 mg CR tablet Commonly known as:  GLUCOTROL XL  
TAKE 1 TABLET BY MOUTH EVERY DAY  
  
 glucose blood VI test strips strip Commonly known as:  blood glucose test  
by Does Not Apply route. Use daily as directed  
  
 guaiFENesin 400 mg tablet Commonly known as:  Gayleen Moses Take 1 Tab by mouth nightly as needed for Congestion. Indications: COUGH  
  
 hydrALAZINE 25 mg tablet Commonly known as:  APRESOLINE Take 25 mg by mouth three (3) times daily. Lancets Misc  
by Does Not Apply route. Use daily as directed  
  
 losartan 100 mg tablet Commonly known as:  COZAAR  
TAKE ONE TABLET BY MOUTH DIALY montelukast 10 mg tablet Commonly known as:  SINGULAIR Take 1 Tab by mouth daily. MULTIVITAMINS PO Take 1 Tab by mouth daily. nitroglycerin 0.4 mg SL tablet Commonly known as:  NITROSTAT  
0.4 mg by SubLINGual route every five (5) minutes as needed. pravastatin 40 mg tablet Commonly known as:  PRAVACHOL Take 1 Tab by mouth daily. RESTASIS 0.05 % ophthalmic emulsion Generic drug:  cycloSPORINE Administer 1 Drop to both eyes every twelve (12) hours. sulfanilamide 15 % vaginal cream  
Commonly known as:  AVC Insert 1 Applicator into vagina daily. VITAMIN C 500 mg tablet Generic drug:  ascorbic acid (vitamin C) Take 1,000 mg by mouth daily. We Performed the Following AMB POC EKG ROUTINE W/ 12 LEADS, INTER & REP [03859 CPT(R)] Follow-up Instructions Return in about 6 months (around 10/10/2017), or if symptoms worsen or fail to improve. Introducing [facility] & HEALTH SERVICES! Amanda Chaudhary introduces [employer] patient portal. Now you can access parts of your medical record, email your doctor's office, and request medication refills online. 1. In your internet browser, go to [email or web address] 2. Click on the First Time User? Click Here link in the Sign In box. You will see the New Member Sign Up page. 3. Enter your Ferevo Access Code exactly as it appears below. You will not need to use this code after youve completed the sign-up process. If you do not sign up before the expiration date, you must request a new code. · Ferevo Access Code: [email or web address] Expires: 4/12/2017 12:58 PM 
 
4. Enter the last four digits of your Social Security Number (xxxx) and Date of Birth (mm/dd/yyyy) as indicated and click Submit. You will be taken to the next sign-up page. 5. Create a Ferevo ID. This will be your Ferevo login ID and cannot be changed, so think of one that is secure and easy to remember. 6. Create a Ferevo password. You can change your password at any time. 7. Enter your Password Reset Question and Answer. This can be used at a later time if you forget your password. 8. Enter your e-mail address. You will receive e-mail notification when new information is available in [address] 9. Click Sign Up. You can now view and download portions of your medical record. 10. Click the Download Summary menu link to download a portable copy of your medical information. If you have questions, please visit the Frequently Asked Questions section of the Ferevo website. Remember, Ferevo is NOT to be used for urgent needs. For medical emergencies, dial 911. Now available from your iPhone and Android! Please provide this summary of care documentation to your next provider. Your primary care clinician is listed as WALESKA TO. If you have any questions after today's visit, please call [phone].

## 2017-04-10 NOTE — PROGRESS NOTES
HPI: I saw Pino Lamb in my office today in cardiovascular evaluation regarding her problems with hypertensive heart disease with some diastolic dysfunction of the left ventricle. Ms. Darrian Ontiveros is a very pleasant 80year old  female, who appears to be much younger than her stated age, and who has been followed in the past by my associate former associate Dr. Shankar Reeves for her problems with hypertension, dyslipidemia, and specifically some component of chronic diastolic heart failure. She comes to the office today and relates that she is really doing reasonably well except for problem with very poor appetite which he thinks is related to her medications. She is not having any chest pain although she is having some neck hand and knee pains all of which appear to be related to arthritis issues. She does have some mild shortness of breath from time to time but this is stable and she really denies any other cardiovascular complaints. Encounter Diagnoses   Name Primary?  Hypertensive heart disease with heart failure (HCC) Yes    Cardiomyopathy, secondary ([address]     Coronary artery disease involving native coronary artery of native heart without angina pectoris     Dyslipidemia        Discussion: This lady appears to be doing reasonably well at this juncture except for problems with anorexia and further discussion with her it certainly sounds as if she is taking ibuprofen daily and I suspect that this is the most likely etiology of her problem. I have recommended that she discontinue ibuprofen and use Tylenol arthritis even though that is not an anti-inflammatory medication and will probably not work as well for her arthritis issues. It will however be less likely to cause GI upset and will also be safe for her to take in terms of kidney dysfunction of which she already has a mild degree.   I did also suggest she get some over-the-counter Prilosec or Nexium to take for a couple of weeks which may help.    Her latest lipid profile which was completed on March 6, 2017 demonstrated total cholesterol of 227 with triglycerides of 100, HDL of 60, LDL of 147, and VLDL of 20 which is obviously suboptimal control and she is concerned that the generic form of this medication is not working as well and I told her to discuss this further with Dr. Luis Chilel. She is also concerned that her glipizide which came in a different generic form is not working properly for her and I told her to discuss this with Dr. Raphael Martinez as well. She from a cardiovascular vantage otherwise seems to be doing reasonably well with a stable appearing electrocardiogram and well-controlled blood pressure some simply get a plan to see her again in several months. PCP: Elba Diaz MD      Past Medical History:   Diagnosis Date    Arthritis of right knee     CAD (coronary artery disease), native coronary artery October 2010    mild disease    Cardiac cath 10/21/2010    dLM 20%. mLAD 25%. CX mild. pRCA 30%. EF 30-35%. Anterolateral, apical , diaphrag akin. Hypercontractile basal segments. Severe elev left-sided filling press.  Cardiac echocardiogram 02/02/2011    EF 50-55%. Mild apical hypk. Gr 1 DDfx. RVSP 40-45. Mild LAE.  Cardiac Holter monitor 06/10/2015    Sinus rhythm w/avg HR of 53 bpm (range 40-85 bpm). Occasional PACs. One 3-beat run of nonsustained SVT. Very few PVCs. One 3-beat run of nonsustained VT. Pt's HR was < 50 bpm for 8 hrs of the recorded time. No pauses noted. Two episodes of neck pain corresponded to sinus rhythm w/o ectopy.  Cardiovascular renal duplex 12/03/2012    No renal artery stenosis bilaterally. Bilateral intrinsic/med disease. Patent bilateral renal veins. Multiple cysts on both kidneys.     CKD (chronic kidney disease)     while on diuretics for difficult to control HTN    Diabetes mellitus type II 3/15/2010    Dyslipidemia 3/15/2010    Heart attack ([address]     2010    [facility] failure, diastolic, chronic [facility]     History of heart attack     HTN (hypertension) 3/15/2010    Hypertensive heart disease     Loss of appetite     Osteopenia 7/13/2010    Other dyspnea and respiratory abnormality     Ringing in the ears     Stress-induced cardiomyopathy     EF 35% (LV angio 10/2010), then EF 50-55% (echo, Feb 2011)    Wears glasses        Past Surgical History:   Procedure Laterality Date    HX BLADDER SUSPENSION  2009    HX [facility]       Current Outpatient Rx   Name  Route  Sig  Dispense  Refill    atenolol (TENORMIN) 25 mg tablet    Oral    Take 12.5 mg by mouth two (2) times a day.  alendronate (FOSAMAX) 35 mg tablet    Oral    Take  by mouth every seven (7) days.  amLODIPine (NORVASC) 10 mg tablet        TAKE 1 TABLET BY MOUTH EVERY DAY    90 Tab    3      meclizine (ANTIVERT) 25 mg tablet    Oral    Take 25 mg by mouth three (3) times daily as needed.  losartan (COZAAR) 100 mg tablet        TAKE 1 TABLET BY MOUTH DAILY. 90 Tab    3      glipiZIDE SR (GLUCOTROL) 2.5 mg CR tablet        TAKE 1 TABLET BY MOUTH EVERY DAY    90 Tab    2      hydrALAZINE (APRESOLINE) 50 mg tablet    Oral    Take 1 Tab by mouth three (3) times daily. 270 Tab    3      chlorthalidone (HYGROTEN) 25 mg tablet    Oral    Take 25 mg by mouth daily.  COMBIGAN 0.2-0.5 % drop ophthalmic solution    Both Eyes    Administer 1 Drop to both eyes every twelve (12) hours.  RESTASIS 0.05 % ophthalmic emulsion    Both Eyes    Administer 1 Drop to both eyes every twelve (12) hours.  ascorbic acid (VITAMIN C) 500 mg tablet    Oral    Take 1,000 mg by mouth daily.  IBUPROFEN (ADVIL PO)    Oral    Take 1 Tab by mouth as needed.  CALCIUM PO    Oral    Take 1,000 mg by mouth two (2) times a day.  Blood-Glucose Meter monitoring kit    Does Not Apply    by Does Not Apply route.  Use daily as directed    1 Kit    0      glucose blood VI test strips (BLOOD GLUCOSE TEST) strip    Does Not Apply    by Does Not Apply route. Use daily as directed    3 Package    3      Lancets Misc    Does Not Apply    by Does Not Apply route. Use daily as directed    3 Package    3      alcohol swabs (ALCOHOL PREP PADS) PadM    Apply Externally    1 Dose by Apply Externally route daily as needed (Glucose testing). 3 Package    3      nitroglycerin (NITROSTAT) 0.4 mg SL tablet    SubLINGual    0.4 mg by SubLINGual route every five (5) minutes as needed.  Cholecalciferol, Vitamin D3, (VITAMIN D) 1,000 unit Cap    Oral    Take 2,000 Units by mouth daily. 180 Cap    3      MULTIVITAMINS PO    Oral    Take 1 Tab by mouth daily.  aspirin 81 mg chewable tablet    Oral    Take 81 mg by mouth daily. Allergies   Allergen Reactions    Lipitor [Atorvastatin] Myalgia    Spironolactone Other (comments)     Dizziness and fatigue       Social History:   Social History   Substance Use Topics    Smoking status: Never Smoker    Smokeless tobacco: Never Used    Alcohol use No         Family history: family history includes Arthritis-osteo in an other family member; Hypertension in her mother. Review of Systems:       Constitutional: Negative for chills, diaphoresis, fever, malaise/fatigue and weight loss. Respiratory: Positive for shortness of breath. Negative for cough, hemoptysis, sputum production and wheezing. Cardiovascular: Negative for chest pain, palpitations, orthopnea, claudication, leg swelling and PND. Gastrointestinal: Negative. Musculoskeletal: Positive for neck pain. Negative for back pain, falls, joint pain and myalgias. Neurological: Positive for weakness. Physical Exam:   The patient is an alert, oriented, well developed, well nourished 80 y.o. female who was in no acute distress at the time of my examination.   Visit Vitals    Ht 5' 2\" (1.575 m)      BP Readings from Last 3 Encounters:   03/13/17 140/72   01/12/17 143/50   11/14/16 142/64      Wt Readings from Last 3 Encounters:   03/13/17 67.1 kg (148 lb)   01/12/17 68.5 kg (151 lb)   11/14/16 69.6 kg (153 lb 8 oz)     HEENT: Conjuctiva white, mucosa moist, no pallor or cyanosis. NECK: Supple without masses, tenderness or thyromegaly. There was no jugular venous distention. Carotid are full bilaterally with soft bilateral bruits vs radiation of heart murmur to the neck. CHEST: Symmetrical with good excursion. LUNGS: Clear to auscultation in all fields. HEART: Regular rate and rhythm. The apex is not displaced. There were no lifts, thrills or heaves. There is a normal S1 and S2. There is a grade 2/6 MINI along the LSB with radiation to the base and neck without appreciable diastolic murmurs, rubs, clicks, or gallops auscultated. ABDOMEN: Soft without masses, tenderness or organomegaly. EXTREMITIES: 1 + peripheral pulses without peripheral edema. INTEGUMENT: Skin is warm and dry   NEUROLOGICAL: The patient was oriented x3 with motor function grossly intact. Review of Data: See PMH and Cardiology and Imaging sections for cardiac testing  Lab Results   Component Value Date/Time    Cholesterol, total 227 03/06/2017 09:28 AM    HDL Cholesterol 60 03/06/2017 09:28 AM    LDL, calculated 147 03/06/2017 09:28 AM    Triglyceride 100 03/06/2017 09:28 AM    CHOL/HDL Ratio 3.8 03/06/2017 09:28 AM       Results for orders placed or performed in visit on 11/09/16   AMB POC EKG ROUTINE W/ 12 LEADS, INTER & REP     Status: None    Narrative    Sinus bradycardia rate 52. This EKG is within normal limits and similar to the EKG of July 13, 2016. Cece Clemens D.O., F.A.C.C. [facility]  [facility]  [address] [place] [address].   [address] Robb Ching [facility] [phone]    PLEASE NOTE:  This document has been produced using voice recognition software. Unrecognized errors in transcription may be present.

## 2017-04-10 NOTE — PROGRESS NOTES
1. Have you been to the ER, urgent care clinic since your last visit? Hospitalized since your last visit? No     2. Have you seen or consulted any other health care providers outside of the [address] [place] [address] since your last visit? Include any pap smears or colon screening.  No

## 2017-05-12 ENCOUNTER — OFFICE VISIT (OUTPATIENT)
Dept: INTERNAL MEDICINE CLINIC | Age: 82
End: 2017-05-12

## 2017-05-12 VITALS
HEART RATE: 55 BPM | TEMPERATURE: 98.7 F | RESPIRATION RATE: 20 BRPM | WEIGHT: 149 LBS | HEIGHT: 62 IN | BODY MASS INDEX: 27.42 KG/M2 | SYSTOLIC BLOOD PRESSURE: 158 MMHG | OXYGEN SATURATION: 98 % | DIASTOLIC BLOOD PRESSURE: 64 MMHG

## 2017-05-12 DIAGNOSIS — D50.8 IRON DEFICIENCY ANEMIA SECONDARY TO INADEQUATE DIETARY IRON INTAKE: ICD-10-CM

## 2017-05-12 DIAGNOSIS — E11.9 TYPE 2 DIABETES MELLITUS WITHOUT COMPLICATION, WITHOUT LONG-TERM CURRENT USE OF INSULIN (HCC): Primary | ICD-10-CM

## 2017-05-12 RX ORDER — LATANOPROST 50 UG/ML
SOLUTION/ DROPS OPHTHALMIC
COMMUNITY
Start: 2017-04-06 | End: 2021-05-17

## 2017-05-12 NOTE — PROGRESS NOTES
Patient is in the office today for a medication evaluation. Patient states she is having problems with glipizide. Patient states she took one glipizide and states it was ok, but then she took it again which are colored she is having numbness in had and sometimes it would bend. Patient states she has tried this twice and states she wants to know what is happening. 1. Have you been to the ER, urgent care clinic since your last visit? Hospitalized since your last visit? No    2. Have you seen or consulted any other health care providers outside of the [address] [place] [address] since your last visit? Include any pap smears or colon screening.  No

## 2017-05-12 NOTE — PROGRESS NOTES
Hassie Meigs is a 80 y.o.  female and presents with Medication Evaluation; Numbness (righ hand ); Diabetes; and Anemia      SUBJECTIVE:  Pt feels numbness in her right hand and fatigue when she takes glucotrol XL 2.5 mg. She says it improved when she stopped the med for 2 days. Pt will therefore stop the med altogether since her Hba1c is well controlled. If BP begin to elevated will consider metformin or Januvia. Pt has anemia for unknown reason. Will recheck CBC and consider further eval if it is worsening. Respiratory ROS: negative for - shortness of breath  Cardiovascular ROS: negative for - chest pain    Current Outpatient Prescriptions   Medication Sig    latanoprost (XALATAN) 0.005 % ophthalmic solution     hydrALAZINE (APRESOLINE) 25 mg tablet Take 25 mg by mouth three (3) times daily.  losartan (COZAAR) 100 mg tablet TAKE ONE TABLET BY MOUTH DIALY    pravastatin (PRAVACHOL) 40 mg tablet Take 1 Tab by mouth daily.  sulfanilamide (AVC) 15 % vaginal cream Insert 1 Applicator into vagina daily.  montelukast (SINGULAIR) 10 mg tablet Take 1 Tab by mouth daily.  alendronate (FOSAMAX) 35 mg tablet Take  by mouth every seven (7) days.  atenolol (TENORMIN) 25 mg tablet Take 0.5 Tabs by mouth daily.  amLODIPine (NORVASC) 10 mg tablet TAKE 1 TABLET BY MOUTH EVERY DAY    COMBIGAN 0.2-0.5 % drop ophthalmic solution Administer 1 Drop to both eyes every twelve (12) hours.  RESTASIS 0.05 % ophthalmic emulsion Administer 1 Drop to both eyes every twelve (12) hours.  ascorbic acid (VITAMIN C) 500 mg tablet Take 1,000 mg by mouth daily.  CALCIUM PO Take 1,000 mg by mouth two (2) times a day.  Blood-Glucose Meter monitoring kit by Does Not Apply route. Use daily as directed    glucose blood VI test strips (BLOOD GLUCOSE TEST) strip by Does Not Apply route. Use daily as directed    Lancets Misc by Does Not Apply route.  Use daily as directed    alcohol swabs (ALCOHOL PREP PADS) PadM 1 Dose by Apply Externally route daily as needed (Glucose testing).  Cholecalciferol, Vitamin D3, (VITAMIN D) 1,000 unit Cap Take 2,000 Units by mouth daily.  MULTIVITAMINS PO Take 1 Tab by mouth daily.  aspirin 81 mg chewable tablet Take 81 mg by mouth daily.  guaiFENesin (ORGANIDIN) 400 mg tablet Take 1 Tab by mouth nightly as needed for Congestion. Indications: COUGH    chlorthalidone (HYGROTEN) 25 mg tablet Take 1 Tab by mouth daily.  IBUPROFEN (ADVIL PO) Take 1 Tab by mouth as needed.  nitroglycerin (NITROSTAT) 0.4 mg SL tablet 0.4 mg by SubLINGual route every five (5) minutes as needed. No current facility-administered medications for this visit. OBJECTIVE:  alert, well appearing, and in no distress  Visit Vitals    /64 (BP 1 Location: Left arm, BP Patient Position: Sitting)    Pulse (!) 55    Temp 98.7 °F (37.1 °C) (Oral)    Resp 20    Ht 5' 2\" (1.575 m)    Wt 149 lb (67.6 kg)    SpO2 98%    BMI 27.25 kg/m2      well developed and well nourished      Labs:   Lab Results   Component Value Date/Time    Hemoglobin A1c 6.4 03/06/2017 09:28 AM    Hemoglobin A1c (POC) 6.0 03/16/2015 12:41 PM            Assessment/Plan      ICD-10-CM ICD-9-CM    1. Type 2 diabetes mellitus without complication, without long-term current use of insulin (HCC) E11.9 250.00 Will stop Glucotrol XL and see how pt does. Consider metformin or Januvia if another med is needed    2. Iron deficiency anemia secondary to inadequate dietary iron intake D50.8 280.1 CBC WITH AUTOMATED DIFF     Follow-up Disposition:  Return if symptoms worsen or fail to improve. Reviewed plan of care. Patient has provided input and agrees with goals.

## 2017-05-12 NOTE — PATIENT INSTRUCTIONS
Advance Care Planning: Care Instructions  Your Care Instructions  It can be hard to live with an illness that cannot be cured. But if your health is getting worse, you may want to make decisions about end-of-life care. Planning for the end of your life does not mean that you are giving up. It is a way to make sure that your wishes are met. Clearly stating your wishes can make it easier for your loved ones. Making plans while you are still able may also ease your mind and make your final days less stressful and more meaningful. Follow-up care is a key part of your treatment and safety. Be sure to make and go to all appointments, and call your doctor if you are having problems. It's also a good idea to know your test results and keep a list of the medicines you take. What can you do to plan for the end of life? · You can bring these issues up with your doctor. You do not need to wait until your doctor starts the conversation. You might start with \"I would not be willing to live with . Sulema Francisco \" When you complete this sentence it helps your doctor understand your wishes. · Talk openly and honestly with your doctor. This is the best way to understand the decisions you will need to make as your health changes. Know that you can always change your mind. · Ask your doctor about commonly used life-support measures. These include tube feedings, breathing machines, and fluids given through a vein (IV). Understanding these treatments will help you decide whether you want them. · You may choose to have these life-supporting treatments for a limited time. This allows a trial period to see whether they will help you. You may also decide that you want your doctor to take only certain measures to keep you alive. It is important to spell out these conditions so that your doctor and family understand them. · Talk to your doctor about how long you are likely to live.  He or she may be able to give you an idea of what usually happens with your specific illness. · Think about preparing papers that state your wishes. This way there will not be any confusion about what you want. You can change your instructions at any time. Which papers should you prepare? Advance directives are legal papers that tell doctors how you want to be cared for at the end of your life. You do not need a  to write these papers. Ask your doctor or your state health department for information on how to write your advance directives. They may have the forms for each of these types of papers. Make sure your doctor has a copy of these on file, and give a copy to a family member or close friend. · Consider a do-not-resuscitate order (DNR). This order asks that no extra treatments be done if your heart stops or you stop breathing. Extra treatments may include cardiopulmonary resuscitation (CPR), electrical shock to restart your heart, or a machine to breathe for you. If you decide to have a DNR order, ask your doctor to explain and write it. Place the order in your home where everyone can easily see it. · Consider a living will. A living will explains your wishes about life support and other treatments at the end of your life if you become unable to speak for yourself. Living currie tell doctors to use or not use treatments that would keep you alive. You must have one or two witnesses or a notary present when you sign this form. · Consider a durable power of  for health care. This allows you to name a person to make decisions about your care if you are not able to. Most people ask a close friend or family member. Talk to this person about the kinds of treatments you want and those that you do not want. Make sure this person understands your wishes. These legal papers are simple to change. Tell your doctor what you want to change, and ask him or her to make a note in your medical file. Give your family updated copies of the papers.   Where can you learn more?  Go to http://jason-tiera.info/. Enter P184 in the search box to learn more about \"Advance Care Planning: Care Instructions. \"  Current as of: November 17, 2016  Content Version: 11.2  © 3383-0804 [employer]. Care instructions adapted under license by [employer] (which disclaims liability or warranty for this information). If you have questions about a medical condition or this instruction, always ask your healthcare professional. [address] any warranty or liability for your use of this information.

## 2017-05-12 NOTE — MR AVS SNAPSHOT
Visit Information Date & Time Provider Department Dept. Phone Encounter #  
 5/12/2017  1:00 PM Doris Spring MD [facility] [employer] [phone] Follow-up Instructions Return if symptoms worsen or fail to improve. Your Appointments 7/3/2017  8:20 AM  
LAB with Doris Spring MD  
[facility] [employer] (--) Appt Note: 4 mo f/u lab [address] [address] [phone]  
  
   
 [address] [address]  
  
    
 7/10/2017  1:00 PM  
ROUTINE CARE with Doris Spring MD  
[facility] [employer] (--) Appt Note: 4 mo f/u  
 [address] [address]  
1000 [facility] [address] [address]  
  
    
 11/7/2017 11:20 AM  
Follow Up with Cherilyn Angelucci, DO [facility] [place] [address] ([facility]) Appt Note: 6 month follow up [place] [address] [phone] [address] [place] [address] [address] Upcoming Health Maintenance Date Due  
 FOOT EXAM Q1 1/5/1933 DTaP/Tdap/Td series (1 - Tdap) 1/5/1944 ZOSTER VACCINE AGE 60> 1/5/1983 GLAUCOMA SCREENING Q2Y 6/18/2012 EYE EXAM RETINAL OR DILATED Q1 3/10/2016 Pneumococcal 65+ Low/Medium Risk (2 of 2 - PPSV23) 5/27/2016 MEDICARE YEARLY EXAM 6/16/2017 INFLUENZA AGE 9 TO ADULT 8/1/2017 HEMOGLOBIN A1C Q6M 9/6/2017 MICROALBUMIN Q1 11/7/2017 LIPID PANEL Q1 3/6/2018 Allergies as of 5/12/2017  Review Complete On: 5/12/2017 By: Janey Hughes LPN Severity Noted Reaction Type Reactions Lipitor [Atorvastatin]  08/04/2016    Myalgia Spironolactone  05/09/2014    Other (comments) Dizziness and fatigue Current Immunizations  Reviewed on 11/14/2016 Name Date Influenza Vaccine (Quad) PF 11/14/2016, 11/13/2015 Influenza Vaccine Split 10/5/2010 Pneumococcal Conjugate (PCV-13) 5/27/2015 Pneumococcal Vaccine (Pcv) 1/20/2007 Not reviewed this visit You Were Diagnosed With   
  
 Codes Comments Type 2 diabetes mellitus without complication, without long-term current use of insulin (HCC)    -  Primary ICD-10-CM: E11.9 ICD-9-CM: 250.00 Iron deficiency anemia secondary to inadequate dietary iron intake     ICD-10-CM: D50.8 ICD-9-CM: 280.1 Vitals BP Pulse Temp Resp Height(growth percentile) Weight(growth percentile) 158/64 (BP 1 Location: Left arm, BP Patient Position: Sitting) (!) 55 98.7 °F (37.1 °C) (Oral) 20 5' 2\" (1.575 m) 149 lb (67.6 kg) SpO2 BMI OB Status Smoking Status 98% 27.25 kg/m2 Hysterectomy Never Smoker Vitals History BMI and BSA Data Body Mass Index Body Surface Area  
 27.25 kg/m 2 1.72 m 2 Preferred Pharmacy Pharmacy Name Phone [id number] [address] [place] [phone] Your Updated Medication List  
  
   
This list is accurate as of: 5/12/17  1:25 PM.  Always use your most recent med list. ADVIL PO Take 1 Tab by mouth as needed. alcohol swabs Padm Commonly known as:  ALCOHOL PREP PADS  
1 Dose by Apply Externally route daily as needed (Glucose testing). alendronate 35 mg tablet Commonly known as:  FOSAMAX Take  by mouth every seven (7) days. amLODIPine 10 mg tablet Commonly known as:  Mary Mota TAKE 1 TABLET BY MOUTH EVERY DAY  
  
 aspirin 81 mg chewable tablet Take 81 mg by mouth daily. atenolol 25 mg tablet Commonly known as:  TENORMIN Take 0.5 Tabs by mouth daily. Blood-Glucose Meter monitoring kit  
by Does Not Apply route. Use daily as directed CALCIUM PO Take 1,000 mg by mouth two (2) times a day. chlorthalidone 25 mg tablet Commonly known as:  Carmen Baldy Take 1 Tab by mouth daily. cholecalciferol 1,000 unit Cap Commonly known as:  VITAMIN D3 Take 2,000 Units by mouth daily. COMBIGAN 0.2-0.5 % Drop ophthalmic solution Generic drug:  brimonidine-timolol Administer 1 Drop to both eyes every twelve (12) hours. glucose blood VI test strips strip Commonly known as:  blood glucose test  
by Does Not Apply route. Use daily as directed  
  
 guaiFENesin 400 mg tablet Commonly known as:  Zeinab English Take 1 Tab by mouth nightly as needed for Congestion. Indications: COUGH  
  
 hydrALAZINE 25 mg tablet Commonly known as:  APRESOLINE Take 25 mg by mouth three (3) times daily. Lancets Misc  
by Does Not Apply route. Use daily as directed  
  
 latanoprost 0.005 % ophthalmic solution Commonly known as:  XALATAN  
  
 losartan 100 mg tablet Commonly known as:  COZAAR  
TAKE ONE TABLET BY MOUTH DIALY montelukast 10 mg tablet Commonly known as:  SINGULAIR Take 1 Tab by mouth daily. MULTIVITAMINS PO Take 1 Tab by mouth daily. nitroglycerin 0.4 mg SL tablet Commonly known as:  NITROSTAT  
0.4 mg by SubLINGual route every five (5) minutes as needed. pravastatin 40 mg tablet Commonly known as:  PRAVACHOL Take 1 Tab by mouth daily. RESTASIS 0.05 % ophthalmic emulsion Generic drug:  cycloSPORINE Administer 1 Drop to both eyes every twelve (12) hours. sulfanilamide 15 % vaginal cream  
Commonly known as:  AVC Insert 1 Applicator into vagina daily. VITAMIN C 500 mg tablet Generic drug:  ascorbic acid (vitamin C) Take 1,000 mg by mouth daily. Follow-up Instructions Return if symptoms worsen or fail to improve. To-Do List   
 11/10/2017 Lab:  CBC WITH AUTOMATED DIFF Patient Instructions Advance Care Planning: Care Instructions Your Care Instructions It can be hard to live with an illness that cannot be cured.  But if your health is getting worse, you may want to make decisions about end-of-life care. Planning for the end of your life does not mean that you are giving up. It is a way to make sure that your wishes are met. Clearly stating your wishes can make it easier for your loved ones. Making plans while you are still able may also ease your mind and make your final days less stressful and more meaningful. Follow-up care is a key part of your treatment and safety. Be sure to make and go to all appointments, and call your doctor if you are having problems. It's also a good idea to know your test results and keep a list of the medicines you take. What can you do to plan for the end of life? · You can bring these issues up with your doctor. You do not need to wait until your doctor starts the conversation. You might start with \"I would not be willing to live with . Quan Basket Quan Basket Quan Basket \" When you complete this sentence it helps your doctor understand your wishes. · Talk openly and honestly with your doctor. This is the best way to understand the decisions you will need to make as your health changes. Know that you can always change your mind. · Ask your doctor about commonly used life-support measures. These include tube feedings, breathing machines, and fluids given through a vein (IV). Understanding these treatments will help you decide whether you want them. · You may choose to have these life-supporting treatments for a limited time. This allows a trial period to see whether they will help you. You may also decide that you want your doctor to take only certain measures to keep you alive. It is important to spell out these conditions so that your doctor and family understand them. · Talk to your doctor about how long you are likely to live. He or she may be able to give you an idea of what usually happens with your specific illness. · Think about preparing papers that state your wishes.  This way there will not be any confusion about what you want. You can change your instructions at any time. Which papers should you prepare? Advance directives are legal papers that tell doctors how you want to be cared for at the end of your life. You do not need a  to write these papers. Ask your doctor or your [facility] department for information on how to write your advance directives. They may have the forms for each of these types of papers. Make sure your doctor has a copy of these on file, and give a copy to a family member or close friend. · Consider a do-not-resuscitate order (DNR). This order asks that no extra treatments be done if your heart stops or you stop breathing. Extra treatments may include cardiopulmonary resuscitation (CPR), electrical shock to restart your heart, or a machine to breathe for you. If you decide to have a DNR order, ask your doctor to explain and write it. Place the order in your home where everyone can easily see it. · Consider a living will. A living will explains your wishes about life support and other treatments at the end of your life if you become unable to speak for yourself. Living currie tell doctors to use or not use treatments that would keep you alive. You must have one or two witnesses or a notary present when you sign this form. · Consider a durable power of  for health care. This allows you to name a person to make decisions about your care if you are not able to. Most people ask a close friend or family member. Talk to this person about the kinds of treatments you want and those that you do not want. Make sure this person understands your wishes. These legal papers are simple to change. Tell your doctor what you want to change, and ask him or her to make a note in your medical file. Give your family updated copies of the papers. Where can you learn more? Go to http://jason-tiera.info/. Enter P184 in the search box to learn more about \"Advance Care Planning: Care Instructions. \" Current as of: November 17, 2016 Content Version: 11.2 © 4552-3589 [employer]. Care instructions adapted under license by [employer] (which disclaims liability or warranty for this information). If you have questions about a medical condition or this instruction, always ask your healthcare professional. [address] any warranty or liability for your use of this information. Introducing [facility]! Olivia Sebastian introduces [employer]illingstreet patient portal. Now you can access parts of your medical record, email your doctor's office, and request medication refills online. 1. In your internet browser, go to [email or web address] 2. Click on the First Time User? Click Here link in the Sign In box. You will see the New Member Sign Up page. 3. Enter your [employer]illing[employer] Access Code exactly as it appears below. You will not need to use this code after youve completed the sign-up process. If you do not sign up before the expiration date, you must request a new code. · [employer] Access Code: Aimee Expires: 8/10/2017 12:57 PM 
 
4. Enter the last four digits of your Social Security Number (xxxx) and Date of Birth (mm/dd/yyyy) as indicated and click Submit. You will be taken to the next sign-up page. 5. Create a Billingstreet ID. This will be your [employer]illing[employer] login ID and cannot be changed, so think of one that is secure and easy to remember. 6. Create a [employer] password. You can change your password at any time. 7. Enter your Password Reset Question and Answer. This can be used at a later time if you forget your password. 8. Enter your e-mail address. You will receive e-mail notification when new information is available in [address] 9. Click Sign Up. You can now view and download portions of your medical record. 10. Click the Download Summary menu link to download a portable copy of your medical information. If you have questions, please visit the Frequently Asked Questions section of the Feedback-Machine website. Remember, Feedback-Machine is NOT to be used for urgent needs. For medical emergencies, dial 911. Now available from your iPhone and Android! Please provide this summary of care documentation to your next provider. Your primary care clinician is listed as WALESKA TO. If you have any questions after today's visit, please call [phone].

## 2017-06-05 RX ORDER — MONTELUKAST SODIUM 10 MG/1
10 TABLET ORAL DAILY
Qty: 90 TAB | Refills: 2 | Status: SHIPPED | OUTPATIENT
Start: 2017-06-05 | End: 2018-01-31

## 2017-06-29 RX ORDER — AMLODIPINE BESYLATE 10 MG/1
TABLET ORAL
Qty: 90 TAB | Refills: 3 | Status: SHIPPED | OUTPATIENT
Start: 2017-06-29 | End: 2018-08-08 | Stop reason: SDUPTHER

## 2017-07-03 ENCOUNTER — HOSPITAL ENCOUNTER (OUTPATIENT)
Dept: LAB | Age: 82
Discharge: HOME | End: 2017-07-03
Payer: MEDICARE

## 2017-07-03 DIAGNOSIS — I10 ESSENTIAL HYPERTENSION: ICD-10-CM

## 2017-07-03 DIAGNOSIS — E78.5 DYSLIPIDEMIA: ICD-10-CM

## 2017-07-03 DIAGNOSIS — D50.8 IRON DEFICIENCY ANEMIA SECONDARY TO INADEQUATE DIETARY IRON INTAKE: ICD-10-CM

## 2017-07-03 DIAGNOSIS — E11.9 TYPE 2 DIABETES MELLITUS WITHOUT COMPLICATION, WITHOUT LONG-TERM CURRENT USE OF INSULIN (HCC): ICD-10-CM

## 2017-07-03 LAB
ALBUMIN SERPL BCP-MCNC: 3.3 G/DL (ref 3.4–5)
ALBUMIN/GLOB SERPL: 1.1 {RATIO} (ref 0.8–1.7)
ALP SERPL-CCNC: 82 U/L (ref 45–117)
ALT SERPL-CCNC: 20 U/L (ref 13–56)
ANION GAP BLD CALC-SCNC: 11 MMOL/L (ref 3–18)
AST SERPL W P-5'-P-CCNC: 21 U/L (ref 15–37)
BASOPHILS # BLD AUTO: 0 K/UL (ref 0–0.06)
BASOPHILS # BLD: 1 % (ref 0–2)
BILIRUB SERPL-MCNC: 0.3 MG/DL (ref 0.2–1)
BUN SERPL-MCNC: 21 MG/DL (ref 7–18)
BUN/CREAT SERPL: 18 (ref 12–20)
CALCIUM SERPL-MCNC: 8.9 MG/DL (ref 8.5–10.1)
CHLORIDE SERPL-SCNC: 104 MMOL/L (ref 100–108)
CHOLEST SERPL-MCNC: 233 MG/DL
CO2 SERPL-SCNC: 25 MMOL/L (ref 21–32)
CREAT SERPL-MCNC: 1.19 MG/DL (ref 0.6–1.3)
DIFFERENTIAL METHOD BLD: ABNORMAL
EOSINOPHIL # BLD: 0.1 K/UL (ref 0–0.4)
EOSINOPHIL NFR BLD: 2 % (ref 0–5)
ERYTHROCYTE [DISTWIDTH] IN BLOOD BY AUTOMATED COUNT: 14.5 % (ref 11.6–14.5)
GLOBULIN SER CALC-MCNC: 3 G/DL (ref 2–4)
GLUCOSE SERPL-MCNC: 138 MG/DL (ref 74–99)
HBA1C MFR BLD: 6.8 % (ref 4.2–5.6)
HCT VFR BLD AUTO: 36 % (ref 35–45)
HDLC SERPL-MCNC: 60 MG/DL (ref 40–60)
HDLC SERPL: 3.9 {RATIO} (ref 0–5)
HGB BLD-MCNC: 11.5 G/DL (ref 12–16)
LDLC SERPL CALC-MCNC: 144 MG/DL (ref 0–100)
LIPID PROFILE,FLP: ABNORMAL
LYMPHOCYTES # BLD AUTO: 24 % (ref 21–52)
LYMPHOCYTES # BLD: 1.3 K/UL (ref 0.9–3.6)
MCH RBC QN AUTO: 29 PG (ref 24–34)
MCHC RBC AUTO-ENTMCNC: 31.9 G/DL (ref 31–37)
MCV RBC AUTO: 90.9 FL (ref 74–97)
MONOCYTES # BLD: 0.4 K/UL (ref 0.05–1.2)
MONOCYTES NFR BLD AUTO: 8 % (ref 3–10)
NEUTS SEG # BLD: 3.6 K/UL (ref 1.8–8)
NEUTS SEG NFR BLD AUTO: 65 % (ref 40–73)
PLATELET # BLD AUTO: 270 K/UL (ref 135–420)
PMV BLD AUTO: 10 FL (ref 9.2–11.8)
POTASSIUM SERPL-SCNC: 4.9 MMOL/L (ref 3.5–5.5)
PROT SERPL-MCNC: 6.3 G/DL (ref 6.4–8.2)
RBC # BLD AUTO: 3.96 M/UL (ref 4.2–5.3)
SODIUM SERPL-SCNC: 140 MMOL/L (ref 136–145)
TRIGL SERPL-MCNC: 145 MG/DL (ref ?–150)
VLDLC SERPL CALC-MCNC: 29 MG/DL
WBC # BLD AUTO: 5.5 K/UL (ref 4.6–13.2)

## 2017-07-03 PROCEDURE — 85025 COMPLETE CBC W/AUTO DIFF WBC: CPT | Performed by: INTERNAL MEDICINE

## 2017-07-03 PROCEDURE — 80061 LIPID PANEL: CPT | Performed by: INTERNAL MEDICINE

## 2017-07-03 PROCEDURE — 83036 HEMOGLOBIN GLYCOSYLATED A1C: CPT | Performed by: INTERNAL MEDICINE

## 2017-07-03 PROCEDURE — 80053 COMPREHEN METABOLIC PANEL: CPT | Performed by: INTERNAL MEDICINE

## 2017-07-03 PROCEDURE — 36415 COLL VENOUS BLD VENIPUNCTURE: CPT | Performed by: INTERNAL MEDICINE

## 2017-07-10 ENCOUNTER — OFFICE VISIT (OUTPATIENT)
Dept: INTERNAL MEDICINE CLINIC | Age: 82
End: 2017-07-10

## 2017-07-10 VITALS
OXYGEN SATURATION: 97 % | HEART RATE: 52 BPM | TEMPERATURE: 97.9 F | BODY MASS INDEX: 27.33 KG/M2 | WEIGHT: 148.5 LBS | DIASTOLIC BLOOD PRESSURE: 65 MMHG | HEIGHT: 62 IN | SYSTOLIC BLOOD PRESSURE: 134 MMHG | RESPIRATION RATE: 20 BRPM

## 2017-07-10 DIAGNOSIS — E78.5 DYSLIPIDEMIA: ICD-10-CM

## 2017-07-10 DIAGNOSIS — E11.9 TYPE 2 DIABETES MELLITUS WITHOUT COMPLICATION, WITHOUT LONG-TERM CURRENT USE OF INSULIN (HCC): Primary | ICD-10-CM

## 2017-07-10 DIAGNOSIS — I10 ESSENTIAL HYPERTENSION: ICD-10-CM

## 2017-07-10 DIAGNOSIS — I25.10 CORONARY ARTERY DISEASE INVOLVING NATIVE CORONARY ARTERY OF NATIVE HEART WITHOUT ANGINA PECTORIS: ICD-10-CM

## 2017-07-10 RX ORDER — GLIPIZIDE 2.5 MG/1
2.5 TABLET, EXTENDED RELEASE ORAL DAILY
COMMUNITY
Start: 2017-05-03 | End: 2018-01-24 | Stop reason: SDUPTHER

## 2017-07-10 NOTE — PROGRESS NOTES
Subjective:       Chief Complaint  The patient presents for follow up of diabetes, hypertension and high cholesterol. HPI  Alex Iniguez is a 80 y.o. female seen for follow up of diabetes. Shealso has hypertension and hyperlipidemia. Diabetes well controlled, no significant medication side effects noted, on glucotrol, hypertension well controlled, no significant medication side effects noted, on current meds, hyperlipidemia, poorly controlled on Pravachol, she seems to be taking it 1x/week due to myalgias, both pt and daughter are not sure what is going on with this med, they are not sure if she cut it back due to myalgias,  Lipitor gave her myalgias. Pt will try to take Pravachol 2x/week to see f she can tolerate it. Diet and Lifestyle: generally follows a low fat low cholesterol diet, follows a diabetic diet regularly, exercises sporadically    Home BP Monitoring: is well controlled at home. Diabetic Review of Systems - home glucose monitoring: is well controlled at home when she takes it 1x/day    Other symptoms and concerns: pt feels better when she exercises on a regular basis. Pt's CKD stage 3 is stable on current meds. Discussed the patient's BMI with her. The BMI follow up plan is as follows: BMI is out of normal parameters and plan is as follows: I have counseled this patient on diet and exercise regimens. Current Outpatient Prescriptions   Medication Sig    glipiZIDE SR (GLUCOTROL XL) 2.5 mg CR tablet     amLODIPine (NORVASC) 10 mg tablet TAKE 1 TABLET BY MOUTH EVERY DAY    latanoprost (XALATAN) 0.005 % ophthalmic solution     hydrALAZINE (APRESOLINE) 25 mg tablet Take 25 mg by mouth three (3) times daily.  losartan (COZAAR) 100 mg tablet TAKE ONE TABLET BY MOUTH DIALY    pravastatin (PRAVACHOL) 40 mg tablet Take 1 Tab by mouth daily.  sulfanilamide (AVC) 15 % vaginal cream Insert 1 Applicator into vagina daily.     chlorthalidone (HYGROTEN) 25 mg tablet Take 1 Tab by mouth daily.  alendronate (FOSAMAX) 35 mg tablet Take  by mouth every seven (7) days.  atenolol (TENORMIN) 25 mg tablet Take 0.5 Tabs by mouth daily. (Patient taking differently: Take 12.5 mg by mouth daily. Takes half a tab twice a week.)    COMBIGAN 0.2-0.5 % drop ophthalmic solution Administer 1 Drop to both eyes every twelve (12) hours.  RESTASIS 0.05 % ophthalmic emulsion Administer 1 Drop to both eyes every twelve (12) hours.  ascorbic acid (VITAMIN C) 500 mg tablet Take 1,000 mg by mouth daily.  CALCIUM PO Take 1,000 mg by mouth two (2) times a day.  Blood-Glucose Meter monitoring kit by Does Not Apply route. Use daily as directed    glucose blood VI test strips (BLOOD GLUCOSE TEST) strip by Does Not Apply route. Use daily as directed    Lancets Misc by Does Not Apply route. Use daily as directed    alcohol swabs (ALCOHOL PREP PADS) PadM 1 Dose by Apply Externally route daily as needed (Glucose testing).  Cholecalciferol, Vitamin D3, (VITAMIN D) 1,000 unit Cap Take 2,000 Units by mouth daily.  MULTIVITAMINS PO Take 1 Tab by mouth daily.  aspirin 81 mg chewable tablet Take 81 mg by mouth daily.  montelukast (SINGULAIR) 10 mg tablet Take 1 Tab by mouth daily.  guaiFENesin (ORGANIDIN) 400 mg tablet Take 1 Tab by mouth nightly as needed for Congestion. Indications: COUGH    IBUPROFEN (ADVIL PO) Take 1 Tab by mouth as needed.  nitroglycerin (NITROSTAT) 0.4 mg SL tablet 0.4 mg by SubLINGual route every five (5) minutes as needed. No current facility-administered medications for this visit.               Review of Systems  Respiratory: negative for dyspnea on exertion  Cardiovascular: negative for chest pain    Objective:     Visit Vitals    /65 (BP 1 Location: Left arm, BP Patient Position: Sitting)    Pulse (!) 52    Temp 97.9 °F (36.6 °C) (Oral)    Resp 20    Ht 5' 2\" (1.575 m)    Wt 148 lb 8 oz (67.4 kg)    SpO2 97%    BMI 27.16 kg/m2        General appearance - alert, well appearing, and in no distress  Neck - supple, no significant adenopathy, carotids upstroke normal bilaterally, no bruits  Chest - clear to auscultation, no wheezes, rales or rhonchi, symmetric air entry  Heart - normal rate, regular rhythm, normal S1, S2, no murmurs, rubs, clicks or gallops  Extremities - peripheral pulses normal, no pedal edema, no clubbing or cyanosis  Skin - normal coloration and turgor, no rashes, no suspicious skin lesions noted      Labs:   Lab Results   Component Value Date/Time    Hemoglobin A1c 6.8 07/03/2017 09:18 AM    Hemoglobin A1c 6.4 03/06/2017 09:28 AM    Hemoglobin A1c 6.1 11/07/2016 08:49 AM    Microalbumin/Creat ratio (mg/g creat) 17 11/07/2016 02:30 PM    Microalbumin,urine random 3.00 11/07/2016 02:30 PM    LDL, calculated 144 07/03/2017 09:18 AM    Creatinine 1.19 07/03/2017 09:18 AM      Lab Results   Component Value Date/Time    Cholesterol, total 233 07/03/2017 09:18 AM    HDL Cholesterol 60 07/03/2017 09:18 AM    LDL, calculated 144 07/03/2017 09:18 AM    Triglyceride 145 07/03/2017 09:18 AM    CHOL/HDL Ratio 3.9 07/03/2017 09:18 AM     Lab Results   Component Value Date/Time    AST (SGOT) 21 07/03/2017 09:18 AM    Alk.  phosphatase 82 07/03/2017 09:18 AM    Bilirubin, direct 0.1 03/11/2016 10:45 AM     Lab Results   Component Value Date/Time    GFR est AA 51 07/03/2017 09:18 AM    GFR est non-AA 42 07/03/2017 09:18 AM    Creatinine 1.19 07/03/2017 09:18 AM    BUN 21 07/03/2017 09:18 AM    Sodium 140 07/03/2017 09:18 AM    Potassium 4.9 07/03/2017 09:18 AM    Chloride 104 07/03/2017 09:18 AM    CO2 25 07/03/2017 09:18 AM      Lab Results   Component Value Date/Time    Glucose 138 07/03/2017 09:18 AM            Assessment / Plan     Diabetes well controlled, on glucotrol  Hypertension  well controlled, on current meds,    Hyperlipidemia poorly controlled  On Pravachol, not sure how compliant pt has been, pt will try to take it 2x/week to see if she tolerates it without myalgias. ICD-10-CM ICD-9-CM    1. Type 2 diabetes mellitus without complication, without long-term current use of insulin ([facility]) E11.9 250.00 HEMOGLOBIN A1C W/O EAG   2. Dyslipidemia E78.5 272.4 LIPID PANEL   3. Essential hypertension C48 073.3 METABOLIC PANEL, COMPREHENSIVE   4. Coronary artery disease involving native coronary artery of native heart without angina pectoris I25.10 414.01 Stable. Pt followed by cardiology                  Diabetic issues reviewed with her: diabetic diet discussed in detail, and low cholesterol diet, weight control and daily exercise discussed. Follow-up Disposition:  Return in about 4 months (around 11/10/2017) for OV, and Medicare Wellness Visit, labs 1 week before. Reviewed plan of care. Patient has provided input and agrees with goals.

## 2017-07-10 NOTE — MR AVS SNAPSHOT
Visit Information Date & Time Provider Department Dept. Phone Encounter #  
 7/10/2017  1:00 PM Niall Knight MD [facility] [phone] Follow-up Instructions Return in about 4 months (around 11/10/2017) for OV, and Medicare Wellness Visit, labs 1 week before. Your Appointments 11/7/2017 11:20 AM  
Follow Up with Kimball Cranker, DO [facility] ([facility]) Appt Note: 6 month follow up [facility] [id number]  
[phone] [address] Upcoming Health Maintenance Date Due  
 FOOT EXAM Q1 1/5/1933 DTaP/Tdap/Td series (1 - Tdap) 1/5/1944 ZOSTER VACCINE AGE 60> 1/5/1983 GLAUCOMA SCREENING Q2Y 6/18/2012 EYE EXAM RETINAL OR DILATED Q1 3/10/2016 Pneumococcal 65+ Low/Medium Risk (2 of 2 - PPSV23) 5/27/2016 MEDICARE YEARLY EXAM 6/16/2017 INFLUENZA AGE 9 TO ADULT 8/1/2017 MICROALBUMIN Q1 11/7/2017 HEMOGLOBIN A1C Q6M 1/3/2018 LIPID PANEL Q1 7/3/2018 Allergies as of 7/10/2017  Review Complete On: 7/10/2017 By: Niall Knight MD  
  
 Severity Noted Reaction Type Reactions Lipitor [Atorvastatin]  08/04/2016    Myalgia Spironolactone  05/09/2014    Other (comments) Dizziness and fatigue Current Immunizations  Reviewed on 11/14/2016 Name Date Influenza Vaccine (Quad) PF 11/14/2016, 11/13/2015 Influenza Vaccine Split 10/5/2010 Pneumococcal Conjugate (PCV-13) 5/27/2015 Pneumococcal Vaccine (Pcv) 1/20/2007 Not reviewed this visit You Were Diagnosed With   
  
 Codes Comments Type 2 diabetes mellitus without complication, without long-term current use of insulin (HCC)    -  Primary ICD-10-CM: E11.9 ICD-9-CM: 250.00 Dyslipidemia     ICD-10-CM: E78.5 ICD-9-CM: 272.4 Essential hypertension     ICD-10-CM: I10 
ICD-9-CM: 401.9 Coronary artery disease involving native coronary artery of native heart without angina pectoris     ICD-10-CM: I25.10 ICD-9-CM: 414.01 Vitals BP Pulse Temp Resp Height(growth percentile) Weight(growth percentile) 134/65 (BP 1 Location: Left arm, BP Patient Position: Sitting) (!) 52 97.9 °F (36.6 °C) (Oral) 20 5' 2\" (1.575 m) 148 lb 8 oz (67.4 kg) SpO2 BMI OB Status Smoking Status 97% 27.16 kg/m2 Hysterectomy Never Smoker Vitals History BMI and BSA Data Body Mass Index Body Surface Area  
 27.16 kg/m 2 1.72 m 2 Preferred Pharmacy Pharmacy Name Phone [id number] Jakob Waddell, [address] [facility] [facility] [phone] Your Updated Medication List  
  
   
This list is accurate as of: 7/10/17  1:33 PM.  Always use your most recent med list. ADVIL PO Take 1 Tab by mouth as needed. alcohol swabs Padm Commonly known as:  ALCOHOL PREP PADS  
1 Dose by Apply Externally route daily as needed (Glucose testing). alendronate 35 mg tablet Commonly known as:  FOSAMAX Take  by mouth every seven (7) days. amLODIPine 10 mg tablet Commonly known as:  [place] Bars TAKE 1 TABLET BY MOUTH EVERY DAY  
  
 aspirin 81 mg chewable tablet Take 81 mg by mouth daily. atenolol 25 mg tablet Commonly known as:  TENORMIN Take 0.5 Tabs by mouth daily. Blood-Glucose Meter monitoring kit  
by Does Not Apply route. Use daily as directed CALCIUM PO Take 1,000 mg by mouth two (2) times a day. chlorthalidone 25 mg tablet Commonly known as:  Mittal Pummel Take 1 Tab by mouth daily. cholecalciferol 1,000 unit Cap Commonly known as:  VITAMIN D3 Take 2,000 Units by mouth daily. COMBIGAN 0.2-0.5 % Drop ophthalmic solution Generic drug:  brimonidine-timolol Administer 1 Drop to both eyes every twelve (12) hours. glipiZIDE SR 2.5 mg CR tablet Commonly known as:  GLUCOTROL XL  
  
 glucose blood VI test strips strip Commonly known as:  blood glucose test  
by Does Not Apply route. Use daily as directed  
  
 guaiFENesin 400 mg tablet Commonly known as:  Loralyn Arch Take 1 Tab by mouth nightly as needed for Congestion. Indications: COUGH  
  
 hydrALAZINE 25 mg tablet Commonly known as:  APRESOLINE Take 25 mg by mouth three (3) times daily. Lancets Misc  
by Does Not Apply route. Use daily as directed  
  
 latanoprost 0.005 % ophthalmic solution Commonly known as:  XALATAN  
  
 losartan 100 mg tablet Commonly known as:  COZAAR  
TAKE ONE TABLET BY MOUTH DIALY montelukast 10 mg tablet Commonly known as:  SINGULAIR Take 1 Tab by mouth daily. MULTIVITAMINS PO Take 1 Tab by mouth daily. nitroglycerin 0.4 mg SL tablet Commonly known as:  NITROSTAT  
0.4 mg by SubLINGual route every five (5) minutes as needed. pravastatin 40 mg tablet Commonly known as:  PRAVACHOL Take 1 Tab by mouth daily. RESTASIS 0.05 % ophthalmic emulsion Generic drug:  cycloSPORINE Administer 1 Drop to both eyes every twelve (12) hours. sulfanilamide 15 % vaginal cream  
Commonly known as:  AVC Insert 1 Applicator into vagina daily. VITAMIN C 500 mg tablet Generic drug:  ascorbic acid (vitamin C) Take 1,000 mg by mouth daily. Follow-up Instructions Return in about 4 months (around 11/10/2017) for OV, and Medicare Wellness Visit, labs 1 week before. To-Do List   
 07/19/2017 12:00 PM  
  Appointment with JOEL ELDER 2 at [address] [place] [address] ([phone]) OUTSIDE FILMS  - Any outside films related to the study being scheduled should be brought with you on the day of the exam.  If this cannot be done there may be a delay in the reading of the study.   MEDICATIONS  - Patient must bring a complete list of all medications currently taking to include prescriptions, over-the-counter meds, herbals, vitamins & any dietary supplements  GENERAL INSTRUCTIONS  - On the day of your exam do not use any bath powder, deodorant or lotions on the armpit area. -Tenderness of breasts may cause an increase of discomfort during procedure. If you are experiencing breast tenderness on the day of your appointment and would like to reschedule, please call [phone]. Patient Instructions Advance Directives: Care Instructions Your Care Instructions An advance directive is a legal way to state your wishes at the end of your life. It tells your family and your doctor what to do if you can no longer say what you want. There are two main types of advance directives. You can change them any time that your wishes change. · A living will tells your family and your doctor your wishes about life support and other treatment. · A durable power of  for health care lets you name a person to make treatment decisions for you when you can't speak for yourself. This person is called a health care agent. If you do not have an advance directive, decisions about your medical care may be made by a doctor or a  who doesn't know you. It may help to think of an advance directive as a gift to the people who care for you. If you have one, they won't have to make tough decisions by themselves. Follow-up care is a key part of your treatment and safety. Be sure to make and go to all appointments, and call your doctor if you are having problems. It's also a good idea to know your test results and keep a list of the medicines you take. How can you care for yourself at home? · Discuss your wishes with your loved ones and your doctor. This way, there are no surprises. · Many states have a unique form. Or you might use a universal form that has been approved by many states.  This kind of form can sometimes be completed and stored online. Your electronic copy will then be available wherever you have a connection to the Internet. In most cases, doctors will respect your wishes even if you have a form from a different state. · You don't need a  to do an advance directive. But you may want to get legal advice. · Think about these questions when you prepare an advance directive: ¨ Who do you want to make decisions about your medical care if you are not able to? Many people choose a family member or close friend. ¨ Do you know enough about life support methods that might be used? If not, talk to your doctor so you understand. ¨ What are you most afraid of that might happen? You might be afraid of having pain, losing your independence, or being kept alive by machines. ¨ Where would you prefer to die? Choices include your home, a hospital, or a nursing home. ¨ Would you like to have information about hospice care to support you and your family? ¨ Do you want to donate organs when you die? ¨ Do you want certain [place]din practices performed before you die? If so, put your wishes in the advance directive. · Read your advance directive every year, and make changes as needed. When should you call for help? Be sure to contact your doctor if you have any questions. Where can you learn more? Go to http://jason-tiera.info/. Enter R264 in the search box to learn more about \"Advance Directives: Care Instructions. \" Current as of: November 17, 2016 Content Version: 11.3 © 3866-8323 [employer]. Care instructions adapted under license by [employer] (which disclaims liability or warranty for this information). If you have questions about a medical condition or this instruction, always ask your healthcare professional. [place] [address] any warranty or liability for your use of this information. Introducing [facility]! Mindy Cheng introduces [employer] patient portal. Now you can access parts of your medical record, email your doctor's office, and request medication refills online. 1. In your internet browser, go to [email or web address] 2. Click on the First Time User? Click Here link in the Sign In box. You will see the New Member Sign Up page. 3. Enter your [employer] Access Code exactly as it appears below. You will not need to use this code after youve completed the sign-up process. If you do not sign up before the expiration date, you must request a new code. · [employer] Access Code: Aimee Expires: 8/10/2017 12:57 PM 
 
4. Enter the last four digits of your Social Security Number (xxxx) and Date of Birth (mm/dd/yyyy) as indicated and click Submit. You will be taken to the next sign-up page. 5. Create a [employer] ID. This will be your [employer] login ID and cannot be changed, so think of one that is secure and easy to remember. 6. Create a [employer] password. You can change your password at any time. 7. Enter your Password Reset Question and Answer. This can be used at a later time if you forget your password. 8. Enter your e-mail address. You will receive e-mail notification when new information is available in [address] 9. Click Sign Up. You can now view and download portions of your medical record. 10. Click the Download Summary menu link to download a portable copy of your medical information. If you have questions, please visit the Frequently Asked Questions section of the [employer] website. Remember, [employer] is NOT to be used for urgent needs. For medical emergencies, dial 911. Now available from your iPhone and Android! Please provide this summary of care documentation to your next provider. Your primary care clinician is listed as WALESKA TO. If you have any questions after today's visit, please call [phone].

## 2017-07-10 NOTE — PATIENT INSTRUCTIONS
Advance Directives: Care Instructions  Your Care Instructions  An advance directive is a legal way to state your wishes at the end of your life. It tells your family and your doctor what to do if you can no longer say what you want. There are two main types of advance directives. You can change them any time that your wishes change. · A living will tells your family and your doctor your wishes about life support and other treatment. · A durable power of  for health care lets you name a person to make treatment decisions for you when you can't speak for yourself. This person is called a health care agent. If you do not have an advance directive, decisions about your medical care may be made by a doctor or a  who doesn't know you. It may help to think of an advance directive as a gift to the people who care for you. If you have one, they won't have to make tough decisions by themselves. Follow-up care is a key part of your treatment and safety. Be sure to make and go to all appointments, and call your doctor if you are having problems. It's also a good idea to know your test results and keep a list of the medicines you take. How can you care for yourself at home? · Discuss your wishes with your loved ones and your doctor. This way, there are no surprises. · Many states have a unique form. Or you might use a universal form that has been approved by many states. This kind of form can sometimes be completed and stored online. Your electronic copy will then be available wherever you have a connection to the Internet. In most cases, doctors will respect your wishes even if you have a form from a different state. · You don't need a  to do an advance directive. But you may want to get legal advice. · Think about these questions when you prepare an advance directive:  ¨ Who do you want to make decisions about your medical care if you are not able to?  Many people choose a family member or close friend. ¨ Do you know enough about life support methods that might be used? If not, talk to your doctor so you understand. ¨ What are you most afraid of that might happen? You might be afraid of having pain, losing your independence, or being kept alive by machines. ¨ Where would you prefer to die? Choices include your home, a hospital, or a nursing home. ¨ Would you like to have information about hospice care to support you and your family? ¨ Do you want to donate organs when you die? ¨ Do you want certain Sikhism practices performed before you die? If so, put your wishes in the advance directive. · Read your advance directive every year, and make changes as needed. When should you call for help? Be sure to contact your doctor if you have any questions. Where can you learn more? Go to http://jason-tiera.info/. Enter R264 in the search box to learn more about \"Advance Directives: Care Instructions. \"  Current as of: November 17, 2016  Content Version: 11.3  © 6418-4989 Healthwise, Incorporated. Care instructions adapted under license by [employer] (which disclaims liability or warranty for this information). If you have questions about a medical condition or this instruction, always ask your healthcare professional. [address] any warranty or liability for your use of this information.

## 2017-07-10 NOTE — PROGRESS NOTES
Patient is in the office today for a 4 month follow up. 1. Have you been to the ER, urgent care clinic since your last visit? Hospitalized since your last visit? No    2. Have you seen or consulted any other health care providers outside of the [address] [place] [address] since your last visit? Include any pap smears or colon screening.  No

## 2017-07-19 ENCOUNTER — HOSPITAL ENCOUNTER (OUTPATIENT)
Dept: MAMMOGRAPHY | Age: 82
Discharge: HOME | End: 2017-07-19
Attending: INTERNAL MEDICINE
Payer: MEDICARE

## 2017-07-19 DIAGNOSIS — Z12.31 VISIT FOR SCREENING MAMMOGRAM: ICD-10-CM

## 2017-07-19 PROCEDURE — 77063 BREAST TOMOSYNTHESIS BI: CPT

## 2017-07-27 ENCOUNTER — OFFICE VISIT (OUTPATIENT)
Dept: ORTHOPEDIC SURGERY | Age: 82
End: 2017-07-27

## 2017-07-27 VITALS
DIASTOLIC BLOOD PRESSURE: 69 MMHG | BODY MASS INDEX: 26.87 KG/M2 | TEMPERATURE: 98.5 F | HEART RATE: 58 BPM | HEIGHT: 62 IN | WEIGHT: 146 LBS | SYSTOLIC BLOOD PRESSURE: 154 MMHG

## 2017-07-27 DIAGNOSIS — G89.29 CHRONIC LEFT HIP PAIN: Primary | ICD-10-CM

## 2017-07-27 DIAGNOSIS — M54.42 CHRONIC BILATERAL LOW BACK PAIN WITH BILATERAL SCIATICA: ICD-10-CM

## 2017-07-27 DIAGNOSIS — G89.29 CHRONIC BILATERAL LOW BACK PAIN WITH BILATERAL SCIATICA: ICD-10-CM

## 2017-07-27 DIAGNOSIS — M48.061 SPINAL STENOSIS OF LUMBAR REGION: ICD-10-CM

## 2017-07-27 DIAGNOSIS — M54.41 CHRONIC BILATERAL LOW BACK PAIN WITH BILATERAL SCIATICA: ICD-10-CM

## 2017-07-27 DIAGNOSIS — M25.552 CHRONIC LEFT HIP PAIN: Primary | ICD-10-CM

## 2017-07-27 NOTE — PROGRESS NOTES
Patient: Cale Zapata                MRN: [id number]       SSN: [id number]  YOB: 1923        AGE: 80 y.o. SEX: female  Body mass index is 26.7 kg/(m^2). PCP: Nicholos Gosselin, MD  07/27/17  HISTORY: I had the pleasure of reviewing Angela. I met her a few years ago. She is having a little bit of nebulus type pain that is in the left hip region and low back, and it radiates all the way down towards the knee. It is worse when she first gets up in the morning or if she is standing at the kitchen sink for any period of time. 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 She has had no recent falls and no trauma. She denies fevers, chills, night sweats, or weight loss. She otherwise is feeling well. PHYSICAL EXAMINATION:  She just celebrated her 89th birthday this year and remains very alert and oriented. Her affect is normal. She actually walks fairly well. She holds her back a little bit stiffly. The left wrist is mildly tender. There is minimal tenderness over the trochanter. I can flex the hip up to about 95° with fairly good rotation internally with really not too much in the way of discomfort. She does have some mild neuropathy with mild decrease of sensation at L4 on the left side, no foot drop, and straight leg raise is just equivocal.  The calf is nontender. Denny's sign is negative. She really looks a lot younger than age 80. There is no JVD and no shortness of breath, and she is very sharp-minded. RADIOGRAPHS:  X-rays, including AP of the pelvis and AP and lateral of the hip, I do not see an obvious fracture. She does have moderately advanced arthritis of the left hip. PLAN:  I think some of her pain is radicular in nature, and I am going to obtain an MRI of the lumbar spine. Additionally, I am going to get a technetium, three-phase, whole body bone scan to check on her as well to rule out anything occult.   If all these are negative, I suspect we are going to have significant spinal stenosis on the back on MRI, and we may consider an intra-articular injection for the left hip at some point. From a bursitis point of view today, there was really very little. We will see her back after the MRI of the lumbar spine and after the three-phase, whole body technetium bone scan. REVIEW OF SYSTEMS:      CON: negative for weight loss, fever  EYE: negative for double vision  ENT: negative for hoarseness  RS:   negative for Tb  GI:    negative for blood in stool  :  negative for blood in urine  Other systems reviewed and noted below. Past Medical History:   Diagnosis Date    Arthritis of right knee     CAD (coronary artery disease), native coronary artery October 2010    mild disease    Cardiac cath 10/21/2010    dLM 20%. mLAD 25%. CX mild. pRCA 30%. EF 30-35%. Anterolateral, apical , diaphrag akin. Hypercontractile basal segments. Severe elev left-sided filling press.  Cardiac echocardiogram 02/02/2011    EF 50-55%. Mild apical hypk. Gr 1 DDfx. RVSP 40-45. Mild LAE.  Cardiac Holter monitor 06/10/2015    Sinus rhythm w/avg HR of 53 bpm (range 40-85 bpm). Occasional PACs. One 3-beat run of nonsustained SVT. Very few PVCs. One 3-beat run of nonsustained VT. Pt's HR was < 50 bpm for 8 hrs of the recorded time. No pauses noted. Two episodes of neck pain corresponded to sinus rhythm w/o ectopy.  Cardiovascular renal duplex 12/03/2012    No renal artery stenosis bilaterally. Bilateral intrinsic/med disease. Patent bilateral renal veins. Multiple cysts on both kidneys.     CKD (chronic kidney disease)     while on diuretics for difficult to control HTN    Diabetes mellitus type II 3/15/2010    Dyslipidemia 3/15/2010    Heart attack ([address]     2010    Heart failure, diastolic, chronic ([address]     History of heart attack     HTN (hypertension) 3/15/2010    Hypertensive heart disease     Loss of appetite     Osteopenia 7/13/2010    Other dyspnea and respiratory abnormality     Ringing in the ears     Stress-induced cardiomyopathy     EF 35% (LV angio 10/2010), then EF 50-55% (echo, Feb 2011)    Wears glasses        Family History   Problem Relation Age of Onset    Hypertension Mother     Arthritis-osteo Other        Current Outpatient Prescriptions   Medication Sig Dispense Refill    glipiZIDE SR (GLUCOTROL XL) 2.5 mg CR tablet       amLODIPine (NORVASC) 10 mg tablet TAKE 1 TABLET BY MOUTH EVERY DAY 90 Tab 3    montelukast (SINGULAIR) 10 mg tablet Take 1 Tab by mouth daily. 90 Tab 2    latanoprost (XALATAN) 0.005 % ophthalmic solution       hydrALAZINE (APRESOLINE) 25 mg tablet Take 25 mg by mouth three (3) times daily.  losartan (COZAAR) 100 mg tablet TAKE ONE TABLET BY MOUTH DIALY 90 Tab 3    pravastatin (PRAVACHOL) 40 mg tablet Take 1 Tab by mouth daily. 30 Tab 6    sulfanilamide (AVC) 15 % vaginal cream Insert 1 Applicator into vagina daily. 120 g 0    guaiFENesin (ORGANIDIN) 400 mg tablet Take 1 Tab by mouth nightly as needed for Congestion. Indications: COUGH 15 Tab 1    chlorthalidone (HYGROTEN) 25 mg tablet Take 1 Tab by mouth daily. 30 Tab 3    alendronate (FOSAMAX) 35 mg tablet Take  by mouth every seven (7) days.  atenolol (TENORMIN) 25 mg tablet Take 0.5 Tabs by mouth daily. (Patient taking differently: Take 12.5 mg by mouth daily. Takes half a tab twice a week.) 60 Tab 3    COMBIGAN 0.2-0.5 % drop ophthalmic solution Administer 1 Drop to both eyes every twelve (12) hours.  RESTASIS 0.05 % ophthalmic emulsion Administer 1 Drop to both eyes every twelve (12) hours.  ascorbic acid (VITAMIN C) 500 mg tablet Take 1,000 mg by mouth daily.  IBUPROFEN (ADVIL PO) Take 1 Tab by mouth as needed.  CALCIUM PO Take 1,000 mg by mouth two (2) times a day.  Blood-Glucose Meter monitoring kit by Does Not Apply route.  Use daily as directed 1 Kit 0    glucose blood VI test strips (BLOOD GLUCOSE TEST) strip by Does Not Apply route. Use daily as directed 3 Package 3    Lancets Misc by Does Not Apply route. Use daily as directed 3 Package 3    alcohol swabs (ALCOHOL PREP PADS) PadM 1 Dose by Apply Externally route daily as needed (Glucose testing). 3 Package 3    nitroglycerin (NITROSTAT) 0.4 mg SL tablet 0.4 mg by SubLINGual route every five (5) minutes as needed.  Cholecalciferol, Vitamin D3, (VITAMIN D) 1,000 unit Cap Take 2,000 Units by mouth daily. 180 Cap 3    MULTIVITAMINS PO Take 1 Tab by mouth daily.  aspirin 81 mg chewable tablet Take 81 mg by mouth daily. Allergies   Allergen Reactions    Lipitor [Atorvastatin] Myalgia    Spironolactone Other (comments)     Dizziness and fatigue       Past Surgical History:   Procedure Laterality Date    HX BLADDER SUSPENSION  2009    HX HYSTERECTOMY  1962       Social History     Social History    Marital status:      Spouse name: N/A    Number of children: N/A    Years of education: N/A     Occupational History    Not on file. Social History Main Topics    Smoking status: Never Smoker    Smokeless tobacco: Never Used    Alcohol use No    Drug use: No    Sexual activity: No     Other Topics Concern    Not on file     Social History Narrative       Visit Vitals    /69    Pulse (!) 58    Temp 98.5 °F (36.9 °C) (Oral)    Ht 5' 2\" (1.575 m)    Wt 146 lb (66.2 kg)    BMI 26.7 kg/m2         PHYSICAL EXAMINATION:  GENERAL: Alert and oriented x3, in no acute distress, well-developed, well-nourished, afebrile. HEART: No JVD. EYES: No scleral icterus   NECK: No significant lymphadenopathy   LUNGS: No respiratory compromise or indrawing  ABDOMEN: Soft, non-tender, non-distended. Electronically signed by:  Annie Hazel MD

## 2017-07-27 NOTE — PATIENT INSTRUCTIONS
An MRI or CT has been ordered for you. A [employer] will be contacting you to schedule the appointment as soon as it has been approved with your insurance company. Please schedule an appointment to follow up with the doctor or the physicians assistant after the MRI or CT has been conducted. Patient's blood pressure was elevated at today's office visit. Patient instructed to contact  primary care physician for treatment. Magnetic Resonance Imaging (MRI): About This Test  What is it? Magnetic resonance imaging (MRI) is a test that uses a magnetic field and pulses of radio wave energy to make pictures of organs and structures inside the body. When you have an MRI, you lie on a table and your body is moved into the MRI machine, where an image is taken of the area of the body being studied. Why is this test done? You may have an MRI for many reasons. This test can find problems such as tumors, bleeding, injury, blood vessel disease, and infection. An MRI also may provide more information about a problem seen on an X-ray, ultrasound scan, CT scan, or nuclear medicine exam.  How can you prepare for the test?  Talk to your doctor about all your health conditions before the test. For example, tell your doctor if:  · You are allergic to any medicines. · You are or might be pregnant. · You have a pacemaker, an artificial limb, any metal pins or metal parts in your body, metal heart valves, metal clips in your brain, metal implants in your ears, or any other implanted or prosthetic medical device. · You have an intrauterine device (IUD) in place. · You get nervous in confined spaces. You may need medicine to help you relax. · You wear any patches that contain medicine. · You have kidney disease. What happens before the test?  · You will remove all metal objects from your body. These include hearing aids, dentures, jewelry, watches, and hairpins.   · You will take off all or most of your clothes and then change into a gown. · If you do leave some clothes on, make sure you take everything out of your pockets. · You may have contrast materials (dye) put into your arm through a tube called an IV. Contrast material helps doctors see specific organs, blood vessels, and most tumors. What happens during the test?  · You will lie on your back on a table that is part of the MRI scanner. · The table will slide into the space that contains the magnet. · Inside the scanner you will hear a fan and feel air moving. You may hear tapping, thumping, or snapping noises. You may be given earplugs or headphones to reduce the noise. · You will be asked to hold still during the scan. You may be asked to hold your breath for short periods. · You may be alone in the scanning room, but a technologist will be watching you through a window and talking with you during the test.  What else should you know about the test?  · An MRI does not hurt. · If a dye is used, you may feel a quick sting or pinch and some coolness when the IV is started. The dye may give you a metallic taste in your mouth. Some people feel sick to their stomach or get a headache. · If you breastfeed and are concerned about whether the dye used in this test is safe, talk to your doctor. Most experts believe that very little dye passes into breast milk and even less is passed on to the baby. But if you prefer, you can store some of your breast milk ahead of time and use it for a day or two after the test.  · You may feel warmth in the area being examined. This is normal.  How long does the test take? · The test usually takes 30 to 60 minutes but can take as long as 2 hours. What happens after the test?  · You will probably be able to go home right away, depending on the reason for the test.  Follow-up care is a key part of your treatment and safety. Be sure to make and go to all appointments, and call your doctor if you are having problems.  It's also a good idea to keep a list of the medicines you take. Ask your doctor when you can expect to have your test results. Where can you learn more? Go to http://jason-tiera.info/. Enter V016 in the search box to learn more about \"Magnetic Resonance Imaging (MRI): About This Test.\"  Current as of: October 14, 2016  Content Version: 11.3  © 8560-9230 [employer]. Care instructions adapted under license by [employer] (which disclaims liability or warranty for this information). If you have questions about a medical condition or this instruction, always ask your healthcare professional. [address] any warranty or liability for your use of this information.

## 2017-07-27 NOTE — MR AVS SNAPSHOT
Visit Information Date & Time Provider Department Dept. Phone Encounter #  
 7/27/2017  1:45 PM Sobeida Mayorga MD [facility] [phone] [id number] Your Appointments 11/6/2017  8:20 AM  
LAB with Nicholos Gosselin, MD  
Internists at [facility] (--) Appt Note: 4 mo f/u lab [address] [address]  
[address] [place] [address] [address] [phone]  
  
    
 11/7/2017 11:20 AM  
Follow Up with Greg Ledesma DO [facility] ([address]) Appt Note: 6 month follow up [address] [id number] [phone] [address] [place] [address]  
  
    
 11/13/2017  1:00 PM  
ROUTINE CARE with Nicholos Gosselin, MD  
Internists at [facility] (--) Appt Note: 4 mo f/u  
 [address] [address]  
[phone]  
  
   
 [address] [phone] Upcoming Health Maintenance Date Due  
 FOOT EXAM Q1 1/5/1933 DTaP/Tdap/Td series (1 - Tdap) 1/5/1944 ZOSTER VACCINE AGE 60> 11/5/1982 GLAUCOMA SCREENING Q2Y 6/18/2012 EYE EXAM RETINAL OR DILATED Q1 3/10/2016 Pneumococcal 65+ Low/Medium Risk (2 of 2 - PPSV23) 5/27/2016 MEDICARE YEARLY EXAM 6/16/2017 INFLUENZA AGE 9 TO ADULT 8/1/2017 MICROALBUMIN Q1 11/7/2017 HEMOGLOBIN A1C Q6M 1/3/2018 LIPID PANEL Q1 7/3/2018 Allergies as of 7/27/2017  Review Complete On: 7/27/2017 By: Sobeida Mayorga MD  
  
 Severity Noted Reaction Type Reactions Lipitor [Atorvastatin]  08/04/2016    Myalgia Spironolactone  05/09/2014    Other (comments) Dizziness and fatigue Current Immunizations  Reviewed on 11/14/2016 Name Date Influenza Vaccine (Quad) PF 11/14/2016, 11/13/2015 Influenza Vaccine Split 10/5/2010 Pneumococcal Conjugate (PCV-13) 5/27/2015 Pneumococcal Vaccine (Pcv) 1/20/2007 Not reviewed this visit You Were Diagnosed With   
  
 Codes Comments Chronic left hip pain    -  Primary ICD-10-CM: M25.552, G89.29 ICD-9-CM: 719.45, 338.29 Spinal stenosis of lumbar region     ICD-10-CM: M48.06 
ICD-9-CM: 724.02 Chronic bilateral low back pain with bilateral sciatica     ICD-10-CM: M54.42, M54.41, G89.29 ICD-9-CM: 724.2, 724.3, 338.29 Vitals BP Pulse Temp Height(growth percentile) Weight(growth percentile) BMI  
 154/69 (!) 58 98.5 °F (36.9 °C) (Oral) 5' 2\" (1.575 m) 146 lb (66.2 kg) 26.7 kg/m2 OB Status Smoking Status Hysterectomy Never Smoker BMI and BSA Data Body Mass Index Body Surface Area  
 26.7 kg/m 2 1.7 m 2 Preferred Pharmacy Pharmacy Name Phone [id number] Giovanna Hatch, [address] [place] [address],[address] [facility] [phone] Your Updated Medication List  
  
   
This list is accurate as of: 7/27/17  3:29 PM.  Always use your most recent med list. ADVIL PO Take 1 Tab by mouth as needed. alcohol swabs Padm Commonly known as:  ALCOHOL PREP PADS  
1 Dose by Apply Externally route daily as needed (Glucose testing). alendronate 35 mg tablet Commonly known as:  FOSAMAX Take  by mouth every seven (7) days. amLODIPine 10 mg tablet Commonly known as:  Myra Cee TAKE 1 TABLET BY MOUTH EVERY DAY  
  
 aspirin 81 mg chewable tablet Take 81 mg by mouth daily. atenolol 25 mg tablet Commonly known as:  TENORMIN Take 0.5 Tabs by mouth daily. Blood-Glucose Meter monitoring kit  
by Does Not Apply route. Use daily as directed CALCIUM PO Take 1,000 mg by mouth two (2) times a day. chlorthalidone 25 mg tablet Commonly known as:  iNsreen Savagech Take 1 Tab by mouth daily. cholecalciferol 1,000 unit Cap Commonly known as:  VITAMIN D3 Take 2,000 Units by mouth daily. COMBIGAN 0.2-0.5 % Drop ophthalmic solution Generic drug:  brimonidine-timolol Administer 1 Drop to both eyes every twelve (12) hours. glipiZIDE SR 2.5 mg CR tablet Commonly known as:  GLUCOTROL XL  
  
 glucose blood VI test strips strip Commonly known as:  blood glucose test  
by Does Not Apply route. Use daily as directed  
  
 guaiFENesin 400 mg tablet Commonly known as:  Earna Cane Take 1 Tab by mouth nightly as needed for Congestion. Indications: COUGH  
  
 hydrALAZINE 25 mg tablet Commonly known as:  APRESOLINE Take 25 mg by mouth three (3) times daily. Lancets Misc  
by Does Not Apply route. Use daily as directed  
  
 latanoprost 0.005 % ophthalmic solution Commonly known as:  XALATAN  
  
 losartan 100 mg tablet Commonly known as:  COZAAR  
TAKE ONE TABLET BY MOUTH DIALY montelukast 10 mg tablet Commonly known as:  SINGULAIR Take 1 Tab by mouth daily. MULTIVITAMINS PO Take 1 Tab by mouth daily. nitroglycerin 0.4 mg SL tablet Commonly known as:  NITROSTAT  
0.4 mg by SubLINGual route every five (5) minutes as needed. pravastatin 40 mg tablet Commonly known as:  PRAVACHOL Take 1 Tab by mouth daily. RESTASIS 0.05 % ophthalmic emulsion Generic drug:  cycloSPORINE Administer 1 Drop to both eyes every twelve (12) hours. sulfanilamide 15 % vaginal cream  
Commonly known as:  AVC Insert 1 Applicator into vagina daily. VITAMIN C 500 mg tablet Generic drug:  ascorbic acid (vitamin C) Take 1,000 mg by mouth daily. We Performed the Following AMB POC X-RAY RADEX HIP UNI WITH PELVIS 2-3 VIEWS [81501 CPT(R)] Patient Instructions An MRI or CT has been ordered for you. A [employer] will be contacting you to schedule the appointment as soon as it has been approved with your insurance company. Please schedule an appointment to follow up with the doctor or the physicians assistant after the MRI or CT has been conducted. Patient's blood pressure was elevated at today's office visit. Patient instructed to contact  primary care physician for treatment. Magnetic Resonance Imaging (MRI): About This Test 
What is it? Magnetic resonance imaging (MRI) is a test that uses a magnetic field and pulses of radio wave energy to make pictures of organs and structures inside the body. When you have an MRI, you lie on a table and your body is moved into the MRI machine, where an image is taken of the area of the body being studied. Why is this test done? You may have an MRI for many reasons. This test can find problems such as tumors, bleeding, injury, blood vessel disease, and infection. An MRI also may provide more information about a problem seen on an X-ray, ultrasound scan, CT scan, or nuclear medicine exam. 
How can you prepare for the test? 
Talk to your doctor about all your health conditions before the test. For example, tell your doctor if: 
· You are allergic to any medicines. · You are or might be pregnant. · You have a pacemaker, an artificial limb, any metal pins or metal parts in your body, metal heart valves, metal clips in your brain, metal implants in your ears, or any other implanted or prosthetic medical device. · You have an intrauterine device (IUD) in place. · You get nervous in confined spaces. You may need medicine to help you relax. · You wear any patches that contain medicine. · You have kidney disease. What happens before the test? 
· You will remove all metal objects from your body. These include hearing aids, dentures, jewelry, watches, and hairpins. · You will take off all or most of your clothes and then change into a gown. · If you do leave some clothes on, make sure you take everything out of your pockets. · You may have contrast materials (dye) put into your arm through a tube called an IV. Contrast material helps doctors see specific organs, blood vessels, and most tumors. What happens during the test? 
· You will lie on your back on a table that is part of the MRI scanner. · The table will slide into the space that contains the magnet. · Inside the scanner you will hear a fan and feel air moving. You may hear tapping, thumping, or snapping noises. You may be given earplugs or headphones to reduce the noise. · You will be asked to hold still during the scan. You may be asked to hold your breath for short periods. · You may be alone in the scanning room, but a technologist will be watching you through a window and talking with you during the test. 
What else should you know about the test? 
· An MRI does not hurt. · If a dye is used, you may feel a quick sting or pinch and some coolness when the IV is started. The dye may give you a metallic taste in your mouth. Some people feel sick to their stomach or get a headache. · If you breastfeed and are concerned about whether the dye used in this test is safe, talk to your doctor. Most experts believe that very little dye passes into breast milk and even less is passed on to the baby. But if you prefer, you can store some of your breast milk ahead of time and use it for a day or two after the test. 
· You may feel warmth in the area being examined. This is normal. 
How long does the test take? · The test usually takes 30 to 60 minutes but can take as long as 2 hours. What happens after the test? 
· You will probably be able to go home right away, depending on the reason for the test. 
Follow-up care is a key part of your treatment and safety. Be sure to make and go to all appointments, and call your doctor if you are having problems. It's also a good idea to keep a list of the medicines you take. Ask your doctor when you can expect to have your test results. Where can you learn more? Go to http://jason-tiera.info/.  
Enter V016 in the search box to learn more about \"Magnetic Resonance Imaging (MRI): About This Test.\" Current as of: October 14, 2016 Content Version: 11.3 © 5274-7551 [employer]. Care instructions adapted under license by [employer] (which disclaims liability or warranty for this information). If you have questions about a medical condition or this instruction, always ask your healthcare professional. [address] any warranty or liability for your use of this information. Introducing [facility]! Dany Jefferson introduces JustBook patient portal. Now you can access parts of your medical record, email your doctor's office, and request medication refills online. 1. In your internet browser, go to [email or web address]. [email or web address] 2. Click on the First Time User? Click Here link in the Sign In box. You will see the New Member Sign Up page. 3. Enter your JustBook Access Code exactly as it appears below. You will not need to use this code after youve completed the sign-up process. If you do not sign up before the expiration date, you must request a new code. · JustBook Access Code: Aimee Expires: 8/10/2017 12:57 PM 
 
4. Enter the last four digits of your Social Security Number (xxxx) and Date of Birth (mm/dd/yyyy) as indicated and click Submit. You will be taken to the next sign-up page. 5. Create a JustBook ID. This will be your JustBook login ID and cannot be changed, so think of one that is secure and easy to remember. 6. Create a JustBook password. You can change your password at any time. 7. Enter your Password Reset Question and Answer. This can be used at a later time if you forget your password. 8. Enter your e-mail address. You will receive e-mail notification when new information is available in [address] 9. Click Sign Up. You can now view and download portions of your medical record. 10. Click the Download Summary menu link to download a portable copy of your medical information. If you have questions, please visit the Frequently Asked Questions section of the [employer] website. Remember, [employer] [place] is NOT to be used for urgent needs. For medical emergencies, dial 911. Now available from your iPhone and Android! Please provide this summary of care documentation to your next provider. Your primary care clinician is listed as WALESKA TO. If you have any questions after today's visit, please call [phone].

## 2017-07-31 NOTE — PROGRESS NOTES
Spoke with pt in regards to lab results. Two pt identifier's and permission to release verified. Relayed 's notes. Pt acknowledges understanding and voices no concerns at this time.

## 2017-08-01 ENCOUNTER — TELEPHONE (OUTPATIENT)
Dept: ORTHOPEDIC SURGERY | Age: 82
End: 2017-08-01

## 2017-08-01 NOTE — TELEPHONE ENCOUNTER
Pt's daughter called to request recent X-ray's on a disc. Would like to  today if possible at the [facility] location. Pt has another Dr's appt tomorrow morning and needs the X-rays of her hip for that appt. Daughter can be reached at [phone] when ready.

## 2017-08-01 NOTE — TELEPHONE ENCOUNTER
[facility] CALLED FOR DR. Isidra Frank. MARY SAID THE PATIENT HAS AN APPOINTMENT WITH THEM TOMORROW 08/02/17. MARY SAID THEY NEED A COPY OF THE PATIENT HIP XRAY PLACED ON A [address]. THAT THE PATIENT DAUGHTER EFRAIN MENDOZA WHO IS ON THE HIPPA FORM WILL PICK IT UP.    Kolton Ortega SAID THAT THE XRAY WAS TAKEN AT THE [facility] LOCATION , BUT SHE WAS WONDERING IF THE PATIENT DAUGHTER CAN PICK IT UP FROM THE [facility] LOCATION. IF NOT SHE WILL  FROM [facility] LOCATION. PATIENT DAUGHTER EFRAIN MENDOZA   TEL. [phone]. MARY FROM [facility]  TEL. [phone].

## 2017-08-02 ENCOUNTER — HOSPITAL ENCOUNTER (OUTPATIENT)
Dept: NUCLEAR MEDICINE | Age: 82
Discharge: HOME | End: 2017-08-02
Attending: ORTHOPAEDIC SURGERY
Payer: MEDICARE

## 2017-08-02 DIAGNOSIS — M25.552 CHRONIC LEFT HIP PAIN: ICD-10-CM

## 2017-08-02 DIAGNOSIS — G89.29 CHRONIC LEFT HIP PAIN: ICD-10-CM

## 2017-08-02 PROCEDURE — 78315 BONE IMAGING 3 PHASE: CPT

## 2017-08-04 RX ORDER — ATENOLOL 25 MG/1
TABLET ORAL
Qty: 60 TAB | Refills: 1 | Status: SHIPPED | OUTPATIENT
Start: 2017-08-04 | End: 2017-11-07 | Stop reason: SDUPTHER

## 2017-08-07 ENCOUNTER — APPOINTMENT (OUTPATIENT)
Age: 82
End: 2017-08-07
Attending: ORTHOPAEDIC SURGERY
Payer: MEDICARE

## 2017-10-20 ENCOUNTER — OFFICE VISIT (OUTPATIENT)
Dept: ORTHOPEDIC SURGERY | Age: 82
End: 2017-10-20

## 2017-10-20 VITALS
BODY MASS INDEX: 26.24 KG/M2 | HEIGHT: 62 IN | TEMPERATURE: 97.8 F | DIASTOLIC BLOOD PRESSURE: 54 MMHG | WEIGHT: 142.6 LBS | OXYGEN SATURATION: 98 % | HEART RATE: 53 BPM | SYSTOLIC BLOOD PRESSURE: 135 MMHG

## 2017-10-20 DIAGNOSIS — G89.29 CHRONIC LEFT HIP PAIN: Primary | ICD-10-CM

## 2017-10-20 DIAGNOSIS — M17.12 PRIMARY OSTEOARTHRITIS OF LEFT KNEE: ICD-10-CM

## 2017-10-20 DIAGNOSIS — M25.552 CHRONIC LEFT HIP PAIN: Primary | ICD-10-CM

## 2017-10-20 RX ORDER — BETAMETHASONE SODIUM PHOSPHATE AND BETAMETHASONE ACETATE 3; 3 MG/ML; MG/ML
6 INJECTION, SUSPENSION INTRA-ARTICULAR; INTRALESIONAL; INTRAMUSCULAR; SOFT TISSUE ONCE
Qty: 1 ML | Refills: 0
Start: 2017-10-20 | End: 2017-10-20

## 2017-10-20 NOTE — PROGRESS NOTES
Patient: Kristen Shannon                MRN: [id number]       SSN: [id number]  YOB: 1923        AGE: 80 y.o. SEX: female  Body mass index is 26.08 kg/(m^2). PCP: Benjie Morelos MD  10/20/17    HISTORY: I had the pleasure of reviewing Ms. Yanci Grayson in followup. I sent her for a technetium bone scan. She has been having some migratory pains, and I think most of her problems are really coming from her knees and to a lesser degree the hips and back. I have given her an injection for the right knee in the past, and currently, the pain is fairly episodic. It has actually gotten a fair bit better over the last couple of weeks. The bone scan is negative for any occult lesions or fracture and indicates some arthritis of the knees, which we are well aware of. She also has some arthritis of the left hip and the low lumbar spine. Systems are again reviewed and are negative for fevers, chills, night sweats, or weight loss. She is otherwise feeling well. PHYSICAL EXAMINATION:  On examination today, she is a delightful lady. She is mentally very sharp. Her vitals are stable. The hips actually rotate fairly well, and the low back is only mildly tender. There is mild evidence of neuropathy. There is minimal peripheral edema. The skin looks very good and she appears at least 10 years younger than her stated age. She has no foot drop. Tib/ant and EHL are 5/5. The calf is nontender. The left knee has a slight effusion and a couple degrees fixed flexion deformity and some mild joint line tenderness. BONE SCAN:  Bone scan review is negative for fracture or occult lesion. PROCEDURE:  Under aseptic conditions and after informed, written consent with a time out, the left knee was injected with 1 cc of the Celestone preparation, i.e. 6 mg, which was well tolerated. PLAN:  She is going to return to see us in about three months time or sooner if there is any problem whatsoever.   It has been absolute pleasure to share in her care. They are a very nice family. REVIEW OF SYSTEMS:      CON: negative for weight loss, fever  EYE: negative for double vision  ENT: negative for hoarseness  RS:   negative for Tb  GI:    negative for blood in stool  :  negative for blood in urine  Other systems reviewed and noted below. Past Medical History:   Diagnosis Date    Arthritis of right knee     CAD (coronary artery disease), native coronary artery October 2010    mild disease    Cardiac cath 10/21/2010    dLM 20%. mLAD 25%. CX mild. pRCA 30%. EF 30-35%. Anterolateral, apical , diaphrag akin. Hypercontractile basal segments. Severe elev left-sided filling press.  Cardiac echocardiogram 02/02/2011    EF 50-55%. Mild apical hypk. Gr 1 DDfx. RVSP 40-45. Mild LAE.  Cardiac Holter monitor 06/10/2015    Sinus rhythm w/avg HR of 53 bpm (range 40-85 bpm). Occasional PACs. One 3-beat run of nonsustained SVT. Very few PVCs. One 3-beat run of nonsustained VT. Pt's HR was < 50 bpm for 8 hrs of the recorded time. No pauses noted. Two episodes of neck pain corresponded to sinus rhythm w/o ectopy.  Cardiovascular renal duplex 12/03/2012    No renal artery stenosis bilaterally. Bilateral intrinsic/med disease. Patent bilateral renal veins. Multiple cysts on both kidneys.     CKD (chronic kidney disease)     while on diuretics for difficult to control HTN    Diabetes mellitus type II 3/15/2010    Dyslipidemia 3/15/2010    Heart attack     2010    Heart failure, diastolic, chronic (HCC)     History of heart attack     HTN (hypertension) 3/15/2010    Hypertensive heart disease     Loss of appetite     Osteopenia 7/13/2010    Other dyspnea and respiratory abnormality     Ringing in the ears     Stress-induced cardiomyopathy     EF 35% (LV angio 10/2010), then EF 50-55% (echo, Feb 2011)    Wears glasses        Family History   Problem Relation Age of Onset    Hypertension Mother     Arthritis-osteo Other        Current Outpatient Prescriptions   Medication Sig Dispense Refill    atenolol (TENORMIN) 25 mg tablet TAKE 1/2 TABLET BY MOUTH ONCE DAILY. 60 Tab 1    glipiZIDE SR (GLUCOTROL XL) 2.5 mg CR tablet       amLODIPine (NORVASC) 10 mg tablet TAKE 1 TABLET BY MOUTH EVERY DAY 90 Tab 3    montelukast (SINGULAIR) 10 mg tablet Take 1 Tab by mouth daily. 90 Tab 2    latanoprost (XALATAN) 0.005 % ophthalmic solution       hydrALAZINE (APRESOLINE) 25 mg tablet Take 25 mg by mouth three (3) times daily.  losartan (COZAAR) 100 mg tablet TAKE ONE TABLET BY MOUTH DIALY 90 Tab 3    pravastatin (PRAVACHOL) 40 mg tablet Take 1 Tab by mouth daily. 30 Tab 6    sulfanilamide (AVC) 15 % vaginal cream Insert 1 Applicator into vagina daily. 120 g 0    guaiFENesin (ORGANIDIN) 400 mg tablet Take 1 Tab by mouth nightly as needed for Congestion. Indications: COUGH 15 Tab 1    chlorthalidone (HYGROTEN) 25 mg tablet Take 1 Tab by mouth daily. 30 Tab 3    alendronate (FOSAMAX) 35 mg tablet Take  by mouth every seven (7) days.  COMBIGAN 0.2-0.5 % drop ophthalmic solution Administer 1 Drop to both eyes every twelve (12) hours.  RESTASIS 0.05 % ophthalmic emulsion Administer 1 Drop to both eyes every twelve (12) hours.  ascorbic acid (VITAMIN C) 500 mg tablet Take 1,000 mg by mouth daily.  IBUPROFEN (ADVIL PO) Take 1 Tab by mouth as needed.  CALCIUM PO Take 1,000 mg by mouth two (2) times a day.  Blood-Glucose Meter monitoring kit by Does Not Apply route. Use daily as directed 1 Kit 0    glucose blood VI test strips (BLOOD GLUCOSE TEST) strip by Does Not Apply route. Use daily as directed 3 Package 3    Lancets Misc by Does Not Apply route. Use daily as directed 3 Package 3    alcohol swabs (ALCOHOL PREP PADS) PadM 1 Dose by Apply Externally route daily as needed (Glucose testing).  3 Package 3    nitroglycerin (NITROSTAT) 0.4 mg SL tablet 0.4 mg by SubLINGual route every five (5) minutes as needed.  Cholecalciferol, Vitamin D3, (VITAMIN D) 1,000 unit Cap Take 2,000 Units by mouth daily. 180 Cap 3    MULTIVITAMINS PO Take 1 Tab by mouth daily.  aspirin 81 mg chewable tablet Take 81 mg by mouth daily. Allergies   Allergen Reactions    Lipitor [Atorvastatin] Myalgia    Spironolactone Other (comments)     Dizziness and fatigue       Past Surgical History:   Procedure Laterality Date    HX BLADDER SUSPENSION  2009    HX HYSTERECTOMY  1962       Social History     Social History    Marital status:      Spouse name: N/A    Number of children: N/A    Years of education: N/A     Occupational History    Not on file. Social History Main Topics    Smoking status: Never Smoker    Smokeless tobacco: Never Used    Alcohol use No    Drug use: No    Sexual activity: No     Other Topics Concern    Not on file     Social History Narrative       Visit Vitals    /54    Pulse (!) 53    Temp 97.8 °F (36.6 °C)    Ht 5' 2\" (1.575 m)    Wt 142 lb 9.6 oz (64.7 kg)    SpO2 98%    BMI 26.08 kg/m2         PHYSICAL EXAMINATION:  GENERAL: Alert and oriented x3, in no acute distress, well-developed, well-nourished, afebrile. HEART: No JVD. EYES: No scleral icterus   NECK: No significant lymphadenopathy   LUNGS: No respiratory compromise or indrawing  ABDOMEN: Soft, non-tender, non-distended. Electronically signed by:  Richrd Schirmer, MD

## 2017-11-06 ENCOUNTER — HOSPITAL ENCOUNTER (OUTPATIENT)
Dept: LAB | Age: 82
Discharge: HOME | End: 2017-11-06
Payer: MEDICARE

## 2017-11-06 DIAGNOSIS — E78.5 DYSLIPIDEMIA: ICD-10-CM

## 2017-11-06 DIAGNOSIS — I10 ESSENTIAL HYPERTENSION: ICD-10-CM

## 2017-11-06 DIAGNOSIS — E11.9 TYPE 2 DIABETES MELLITUS WITHOUT COMPLICATION, WITHOUT LONG-TERM CURRENT USE OF INSULIN (HCC): ICD-10-CM

## 2017-11-06 LAB
ALBUMIN SERPL-MCNC: 3.6 G/DL (ref 3.4–5)
ALBUMIN/GLOB SERPL: 1.2 {RATIO} (ref 0.8–1.7)
ALP SERPL-CCNC: 75 U/L (ref 45–117)
ALT SERPL-CCNC: 21 U/L (ref 13–56)
ANION GAP SERPL CALC-SCNC: 9 MMOL/L (ref 3–18)
AST SERPL-CCNC: 19 U/L (ref 15–37)
BILIRUB SERPL-MCNC: 0.4 MG/DL (ref 0.2–1)
BUN SERPL-MCNC: 18 MG/DL (ref 7–18)
BUN/CREAT SERPL: 13 (ref 12–20)
CALCIUM SERPL-MCNC: 9.3 MG/DL (ref 8.5–10.1)
CHLORIDE SERPL-SCNC: 106 MMOL/L (ref 100–108)
CHOLEST SERPL-MCNC: 223 MG/DL
CO2 SERPL-SCNC: 27 MMOL/L (ref 21–32)
CREAT SERPL-MCNC: 1.35 MG/DL (ref 0.6–1.3)
GLOBULIN SER CALC-MCNC: 3 G/DL (ref 2–4)
GLUCOSE SERPL-MCNC: 138 MG/DL (ref 74–99)
HBA1C MFR BLD: 6.3 % (ref 4.2–5.6)
HDLC SERPL-MCNC: 59 MG/DL (ref 40–60)
HDLC SERPL: 3.8 {RATIO} (ref 0–5)
LDLC SERPL CALC-MCNC: 131 MG/DL (ref 0–100)
LIPID PROFILE,FLP: ABNORMAL
POTASSIUM SERPL-SCNC: 4.6 MMOL/L (ref 3.5–5.5)
PROT SERPL-MCNC: 6.6 G/DL (ref 6.4–8.2)
SODIUM SERPL-SCNC: 142 MMOL/L (ref 136–145)
TRIGL SERPL-MCNC: 165 MG/DL (ref ?–150)
VLDLC SERPL CALC-MCNC: 33 MG/DL

## 2017-11-06 PROCEDURE — 83036 HEMOGLOBIN GLYCOSYLATED A1C: CPT | Performed by: INTERNAL MEDICINE

## 2017-11-06 PROCEDURE — 80053 COMPREHEN METABOLIC PANEL: CPT | Performed by: INTERNAL MEDICINE

## 2017-11-06 PROCEDURE — 80061 LIPID PANEL: CPT | Performed by: INTERNAL MEDICINE

## 2017-11-06 PROCEDURE — 36415 COLL VENOUS BLD VENIPUNCTURE: CPT | Performed by: INTERNAL MEDICINE

## 2017-11-07 ENCOUNTER — OFFICE VISIT (OUTPATIENT)
Dept: CARDIOLOGY CLINIC | Age: 82
End: 2017-11-07

## 2017-11-07 VITALS
BODY MASS INDEX: 27.05 KG/M2 | WEIGHT: 147 LBS | HEIGHT: 62 IN | HEART RATE: 45 BPM | DIASTOLIC BLOOD PRESSURE: 70 MMHG | OXYGEN SATURATION: 97 % | SYSTOLIC BLOOD PRESSURE: 130 MMHG

## 2017-11-07 DIAGNOSIS — E78.5 DYSLIPIDEMIA: ICD-10-CM

## 2017-11-07 DIAGNOSIS — I50.32 HEART FAILURE, DIASTOLIC, CHRONIC (HCC): ICD-10-CM

## 2017-11-07 DIAGNOSIS — I10 ESSENTIAL HYPERTENSION: ICD-10-CM

## 2017-11-07 DIAGNOSIS — I25.10 CORONARY ARTERY DISEASE INVOLVING NATIVE CORONARY ARTERY OF NATIVE HEART WITHOUT ANGINA PECTORIS: ICD-10-CM

## 2017-11-07 DIAGNOSIS — I42.9 CARDIOMYOPATHY, SECONDARY (HCC): ICD-10-CM

## 2017-11-07 DIAGNOSIS — R00.1 SINUS BRADYCARDIA: ICD-10-CM

## 2017-11-07 DIAGNOSIS — I11.0 HYPERTENSIVE HEART DISEASE WITH HEART FAILURE (HCC): Primary | ICD-10-CM

## 2017-11-07 RX ORDER — ATENOLOL 25 MG/1
12.5 TABLET ORAL DAILY
Qty: 45 TAB | Refills: 3 | Status: SHIPPED | OUTPATIENT
Start: 2017-11-07 | End: 2017-12-11 | Stop reason: ALTCHOICE

## 2017-11-07 NOTE — PROGRESS NOTES
1. Have you been to the ER, urgent care clinic since your last visit? Hospitalized since your last visit?no    2. Have you seen or consulted any other health care providers outside of the [address] [place] [address] since your last visit? Include any pap smears or colon screening.  no

## 2017-11-07 NOTE — PROGRESS NOTES
Review of Systems   Constitutional: Negative for chills, diaphoresis, fever, malaise/fatigue and weight loss. Respiratory: Positive for cough and shortness of breath. Negative for hemoptysis, sputum production and wheezing. Cardiovascular: Positive for palpitations. Negative for chest pain, orthopnea, claudication, leg swelling and PND. Gastrointestinal: Negative. Musculoskeletal: Positive for joint pain. Negative for back pain, falls, myalgias and neck pain. Neurological: Positive for weakness.

## 2017-11-07 NOTE — MR AVS SNAPSHOT
Hypertensive heart disease with heart failure ([facility]    -  Primary ICD-10-CM: I11.0 ICD-9-CM: 402.91, 428.9 Coronary artery disease involving native coronary artery of native heart without angina pectoris     ICD-10-CM: I25.10 ICD-9-CM: 414.01 Cardiomyopathy, secondary ([facility]     ICD-10-CM: I42.9 ICD-9-CM: 425.9 Heart failure, diastolic, chronic (HCC)     ICD-10-CM: I50.32 
ICD-9-CM: 428.32 Dyslipidemia     ICD-10-CM: E78.5 ICD-9-CM: 272.4 Essential hypertension     ICD-10-CM: I10 
ICD-9-CM: 401.9 Sinus bradycardia     ICD-10-CM: R00.1 ICD-9-CM: 427.89 Vitals BP Pulse Height(growth percentile) Weight(growth percentile) SpO2 BMI  
 130/70 (!) 45 5' 2\" (1.575 m) 147 lb (66.7 kg) 97% 26.89 kg/m2 OB Status Smoking Status Hysterectomy Never Smoker Vitals History BMI and BSA Data Body Mass Index Body Surface Area  
 26.89 kg/m 2 1.71 m 2 Preferred Pharmacy Pharmacy Name Phone [facility], [address] [place] [address] [phone] Your Updated Medication List  
  
   
This list is accurate as of: 11/7/17 12:18 PM.  Always use your most recent med list. ADVIL PO Take 1 Tab by mouth as needed. alcohol swabs Padm Commonly known as:  ALCOHOL PREP PADS  
1 Dose by Apply Externally route daily as needed (Glucose testing). alendronate 35 mg tablet Commonly known as:  FOSAMAX Take  by mouth every seven (7) days. amLODIPine 10 mg tablet Commonly known as:  Arlyss [place] TAKE 1 TABLET BY MOUTH EVERY DAY  
  
 aspirin 81 mg chewable tablet Take 81 mg by mouth daily. atenolol 25 mg tablet Commonly known as:  TENORMIN Take 0.5 Tabs by mouth daily. Blood-Glucose Meter monitoring kit  
by Does Not Apply route. Use daily as directed CALCIUM PO Take 1,000 mg by mouth two (2) times a day. chlorthalidone 25 mg tablet Commonly known as:  Aliza [place]  
 Take 1 Tab by mouth daily. cholecalciferol 1,000 unit Cap Commonly known as:  VITAMIN D3 Take 2,000 Units by mouth daily. COMBIGAN 0.2-0.5 % Drop ophthalmic solution Generic drug:  brimonidine-timolol Administer 1 Drop to both eyes every twelve (12) hours. glipiZIDE SR 2.5 mg CR tablet Commonly known as:  GLUCOTROL XL  
  
 glucose blood VI test strips strip Commonly known as:  blood glucose test  
by Does Not Apply route. Use daily as directed  
  
 guaiFENesin 400 mg tablet Commonly known as:  Lanney Bane Take 1 Tab by mouth nightly as needed for Congestion. Indications: COUGH  
  
 hydrALAZINE 25 mg tablet Commonly known as:  APRESOLINE Take 25 mg by mouth three (3) times daily. Lancets Misc  
by Does Not Apply route. Use daily as directed  
  
 latanoprost 0.005 % ophthalmic solution Commonly known as:  XALATAN  
  
 losartan 100 mg tablet Commonly known as:  COZAAR  
TAKE ONE TABLET BY MOUTH DIALY montelukast 10 mg tablet Commonly known as:  SINGULAIR Take 1 Tab by mouth daily. MULTIVITAMINS PO Take 1 Tab by mouth daily. nitroglycerin 0.4 mg SL tablet Commonly known as:  NITROSTAT  
0.4 mg by SubLINGual route every five (5) minutes as needed. pravastatin 40 mg tablet Commonly known as:  PRAVACHOL Take 1 Tab by mouth daily. RESTASIS 0.05 % ophthalmic emulsion Generic drug:  cycloSPORINE Administer 1 Drop to both eyes every twelve (12) hours. sulfanilamide 15 % vaginal cream  
Commonly known as:  AVC Insert 1 Applicator into vagina daily. VITAMIN C 500 mg tablet Generic drug:  ascorbic acid (vitamin C) Take 1,000 mg by mouth daily. Prescriptions Sent to Pharmacy Refills  
 atenolol (TENORMIN) 25 mg tablet 3 Sig: Take 0.5 Tabs by mouth daily. Class: Normal  
 Pharmacy: [address] #: [phone]  Route: Oral  
  
 We Performed the Following AMB POC EKG ROUTINE W/ 12 LEADS, INTER & REP [67452 CPT(R)] Follow-up Instructions Return in about 6 months (around 5/7/2018), or if symptoms worsen or fail to improve. To-Do List   
 11/07/2017 ECG:  ECG HOLTER MONITOR, UP TO 48 HRS Introducing [facility]! Flaco Patel introduces [employer] patient portal. Now you can access parts of your medical record, email your doctor's office, and request medication refills online. 1. In your internet browser, go to [email or web address] 2. Click on the First Time User? Click Here link in the Sign In box. You will see the New Member Sign Up page. 3. Enter your [employer] Access Code exactly as it appears below. You will not need to use this code after youve completed the sign-up process. If you do not sign up before the expiration date, you must request a new code. · [employer] Access Code: [email or web address] Expires: 11/20/2017  1:20 PM 
 
4. Enter the last four digits of your Social Security Number (xxxx) and Date of Birth (mm/dd/yyyy) as indicated and click Submit. You will be taken to the next sign-up page. 5. Create a [employer] ID. This will be your [employer] login ID and cannot be changed, so think of one that is secure and easy to remember. 6. Create a [employer] password. You can change your password at any time. 7. Enter your Password Reset Question and Answer. This can be used at a later time if you forget your password. 8. Enter your e-mail address. You will receive e-mail notification when new information is available in [address] 9. Click Sign Up. You can now view and download portions of your medical record. 10. Click the Download Summary menu link to download a portable copy of your medical information. If you have questions, please visit the Frequently Asked Questions section of the [employer] website.  Remember, [employer] is NOT to be used for urgent needs. For medical emergencies, dial 911. Now available from your iPhone and Android! Please provide this summary of care documentation to your next provider. Your primary care clinician is listed as WALESKA TO. If you have any questions after today's visit, please call [phone].

## 2017-11-13 ENCOUNTER — OFFICE VISIT (OUTPATIENT)
Dept: FAMILY MEDICINE CLINIC | Age: 82
End: 2017-11-13

## 2017-11-13 ENCOUNTER — HOSPITAL ENCOUNTER (OUTPATIENT)
Dept: NON INVASIVE DIAGNOSTICS | Age: 82
Discharge: HOME | End: 2017-11-13
Attending: INTERNAL MEDICINE

## 2017-11-13 VITALS
OXYGEN SATURATION: 99 % | BODY MASS INDEX: 27.23 KG/M2 | WEIGHT: 148 LBS | DIASTOLIC BLOOD PRESSURE: 70 MMHG | SYSTOLIC BLOOD PRESSURE: 154 MMHG | RESPIRATION RATE: 20 BRPM | HEART RATE: 50 BPM | HEIGHT: 62 IN | TEMPERATURE: 98.2 F

## 2017-11-13 DIAGNOSIS — I10 ESSENTIAL HYPERTENSION: ICD-10-CM

## 2017-11-13 DIAGNOSIS — E11.9 TYPE 2 DIABETES MELLITUS WITHOUT COMPLICATION, WITHOUT LONG-TERM CURRENT USE OF INSULIN (HCC): ICD-10-CM

## 2017-11-13 DIAGNOSIS — E78.5 DYSLIPIDEMIA: ICD-10-CM

## 2017-11-13 DIAGNOSIS — R00.1 SINUS BRADYCARDIA: ICD-10-CM

## 2017-11-13 DIAGNOSIS — Z00.00 MEDICARE ANNUAL WELLNESS VISIT, SUBSEQUENT: Primary | ICD-10-CM

## 2017-11-13 NOTE — PROGRESS NOTES
Subjective:       Chief Complaint  The patient presents for follow up of diabetes, hypertension and high cholesterol. FATEMEH López is a 80 y.o. female seen for follow up of diabetes. Shealso has hypertension and hyperlipidemia. Diabetes well controlled, no significant medication side effects noted, on glucotrol, hypertension fairly well controlled, no significant medication side effects noted, on current meds, hyperlipidemia, poorly controlled on Pravachol, she seems to be taking it 1x/week due to myalgias, both pt and daughter are not sure what is going on with this med, they are not sure if she cut it back due to myalgias,  Lipitor gave her myalgias. Pt will try to take Pravachol 2x/week to see if she can tolerate it. Diet and Lifestyle: generally follows a low fat low cholesterol diet, follows a diabetic diet regularly, exercises sporadically    Home BP Monitoring: is well controlled at home. Diabetic Review of Systems - home glucose monitoring: is well controlled at home when she takes it 1x/day    Other symptoms and concerns: pt feels better when she exercises on a regular basis. Pt's CKD stage 3 is stable on current meds. Discussed the patient's BMI with her. The BMI follow up plan is as follows: BMI is out of normal parameters and plan is as follows: I have counseled this patient on diet and exercise regimens. Current Outpatient Prescriptions   Medication Sig    atenolol (TENORMIN) 25 mg tablet Take 0.5 Tabs by mouth daily.  glipiZIDE SR (GLUCOTROL XL) 2.5 mg CR tablet     amLODIPine (NORVASC) 10 mg tablet TAKE 1 TABLET BY MOUTH EVERY DAY    latanoprost (XALATAN) 0.005 % ophthalmic solution     hydrALAZINE (APRESOLINE) 25 mg tablet Take 25 mg by mouth three (3) times daily.  losartan (COZAAR) 100 mg tablet TAKE ONE TABLET BY MOUTH DIALY    pravastatin (PRAVACHOL) 40 mg tablet Take 1 Tab by mouth daily.     chlorthalidone (HYGROTEN) 25 mg tablet Take 1 Tab by mouth daily.    COMBIGAN 0.2-0.5 % drop ophthalmic solution Administer 1 Drop to both eyes every twelve (12) hours.  RESTASIS 0.05 % ophthalmic emulsion Administer 1 Drop to both eyes every twelve (12) hours.  ascorbic acid (VITAMIN C) 500 mg tablet Take 1,000 mg by mouth daily.  CALCIUM PO Take 1,000 mg by mouth two (2) times a day.  Blood-Glucose Meter monitoring kit by Does Not Apply route. Use daily as directed    glucose blood VI test strips (BLOOD GLUCOSE TEST) strip by Does Not Apply route. Use daily as directed    Lancets Misc by Does Not Apply route. Use daily as directed    alcohol swabs (ALCOHOL PREP PADS) PadM 1 Dose by Apply Externally route daily as needed (Glucose testing).  Cholecalciferol, Vitamin D3, (VITAMIN D) 1,000 unit Cap Take 2,000 Units by mouth daily.  MULTIVITAMINS PO Take 1 Tab by mouth daily.  aspirin 81 mg chewable tablet Take 81 mg by mouth daily.  montelukast (SINGULAIR) 10 mg tablet Take 1 Tab by mouth daily.  sulfanilamide (AVC) 15 % vaginal cream Insert 1 Applicator into vagina daily.  guaiFENesin (ORGANIDIN) 400 mg tablet Take 1 Tab by mouth nightly as needed for Congestion. Indications: COUGH    alendronate (FOSAMAX) 35 mg tablet Take  by mouth every seven (7) days.  IBUPROFEN (ADVIL PO) Take 1 Tab by mouth as needed.  nitroglycerin (NITROSTAT) 0.4 mg SL tablet 0.4 mg by SubLINGual route every five (5) minutes as needed. No current facility-administered medications for this visit.               Review of Systems  Respiratory: negative for dyspnea on exertion  Cardiovascular: negative for chest pain    Objective:     Visit Vitals    /70 (BP 1 Location: Right arm)    Pulse (!) 50    Temp 98.2 °F (36.8 °C) (Oral)    Resp 20    Ht 5' 2\" (1.575 m)    Wt 148 lb (67.1 kg)    SpO2 99%    BMI 27.07 kg/m2        General appearance - alert, well appearing, and in no distress  Neck - supple, no significant adenopathy, carotids upstroke normal bilaterally, no bruits  Chest - clear to auscultation, no wheezes, rales or rhonchi, symmetric air entry  Heart - normal rate, regular rhythm, normal S1, S2, no murmurs, rubs, clicks or gallops  Extremities - peripheral pulses normal, no pedal edema, no clubbing or cyanosis  Skin - normal coloration and turgor, no rashes, no suspicious skin lesions noted      Labs:   Lab Results   Component Value Date/Time    Hemoglobin A1c 6.3 11/06/2017 09:13 AM    Hemoglobin A1c 6.8 07/03/2017 09:18 AM    Hemoglobin A1c 6.4 03/06/2017 09:28 AM    Microalbumin/Creat ratio (mg/g creat) 17 11/07/2016 02:30 PM    Microalbumin,urine random 3.00 11/07/2016 02:30 PM    LDL, calculated 131 11/06/2017 09:13 AM    Creatinine 1.35 11/06/2017 09:13 AM      Lab Results   Component Value Date/Time    Cholesterol, total 223 11/06/2017 09:13 AM    HDL Cholesterol 59 11/06/2017 09:13 AM    LDL, calculated 131 11/06/2017 09:13 AM    Triglyceride 165 11/06/2017 09:13 AM    CHOL/HDL Ratio 3.8 11/06/2017 09:13 AM     Lab Results   Component Value Date/Time    AST (SGOT) 19 11/06/2017 09:13 AM    Alk. phosphatase 75 11/06/2017 09:13 AM    Bilirubin, direct 0.1 03/11/2016 10:45 AM     Lab Results   Component Value Date/Time    GFR est AA 44 11/06/2017 09:13 AM    GFR est non-AA 37 11/06/2017 09:13 AM    Creatinine 1.35 11/06/2017 09:13 AM    BUN 18 11/06/2017 09:13 AM    Sodium 142 11/06/2017 09:13 AM    Potassium 4.6 11/06/2017 09:13 AM    Chloride 106 11/06/2017 09:13 AM    CO2 27 11/06/2017 09:13 AM      Lab Results   Component Value Date/Time    Glucose 138 11/06/2017 09:13 AM            Assessment / Plan     Diabetes well controlled, on glucotrol  Hypertension  Fairly well controlled, on current meds,    Hyperlipidemia poorly controlled  On Pravachol, not sure how compliant pt has been, pt will try to take it 2x/week to see if she tolerates it without myalgias. At age 80 if she decides to stop the statin I would support her. ICD-10-CM ICD-9-CM    1. Medicare annual wellness visit, subsequent Z00.00 V70.0    2. Type 2 diabetes mellitus without complication, without long-term current use of insulin (HCC) E11.9 250.00 MICROALBUMIN, UR, RAND W/ MICROALBUMIN/CREA RATIO      HEMOGLOBIN A1C W/O EAG   3. Essential hypertension H04 638.7 METABOLIC PANEL, COMPREHENSIVE   4. Dyslipidemia E78.5 272.4 LIPID PANEL                Diabetic issues reviewed with her: diabetic diet discussed in detail, and low cholesterol diet, weight control and daily exercise discussed. Follow-up Disposition:  Return in about 4 months (around 3/13/2018) for labs 1 week before. Reviewed plan of care. Patient has provided input and agrees with goals.

## 2017-11-13 NOTE — PROGRESS NOTES
Patient is in the office today for a 4 month follow up, and Medicare Wellness Visit. 1. Have you been to the ER, urgent care clinic since your last visit? Hospitalized since your last visit? No    2. Have you seen or consulted any other health care providers outside of the [address] [place] [address] since your last visit? Include any pap smears or colon screening. No          This is a Subsequent Medicare Annual Wellness Exam (AWV) (Performed 12 months after IPPE or effective date of Medicare Part B enrollment)    I have reviewed the patient's medical history in detail and updated the computerized patient record. History     Past Medical History:   Diagnosis Date    Arthritis of right knee     CAD (coronary artery disease), native coronary artery October 2010    mild disease    Cardiac cath 10/21/2010    dLM 20%. mLAD 25%. CX mild. pRCA 30%. EF 30-35%. Anterolateral, apical , diaphrag akin. Hypercontractile basal segments. Severe elev left-sided filling press.  Cardiac echocardiogram 02/02/2011    EF 50-55%. Mild apical hypk. Gr 1 DDfx. RVSP 40-45. Mild LAE.  Cardiac Holter monitor 06/10/2015    Sinus rhythm w/avg HR of 53 bpm (range 40-85 bpm). Occasional PACs. One 3-beat run of nonsustained SVT. Very few PVCs. One 3-beat run of nonsustained VT. Pt's HR was < 50 bpm for 8 hrs of the recorded time. No pauses noted. Two episodes of neck pain corresponded to sinus rhythm w/o ectopy.  Cardiovascular renal duplex 12/03/2012    No renal artery stenosis bilaterally. Bilateral intrinsic/med disease. Patent bilateral renal veins. Multiple cysts on both kidneys.     CKD (chronic kidney disease)     while on diuretics for difficult to control HTN    Diabetes mellitus type II 3/15/2010    Dyslipidemia 3/15/2010    Heart attack     2010    Heart failure, diastolic, chronic (HCC)     History of heart attack     HTN (hypertension) 3/15/2010    Hypertensive heart disease     Loss of appetite     Osteopenia 7/13/2010    Other dyspnea and respiratory abnormality     Ringing in the ears     Stress-induced cardiomyopathy     EF 35% (LV angio 10/2010), then EF 50-55% (echo, Feb 2011)    Wears glasses       Past Surgical History:   Procedure Laterality Date    HX BLADDER SUSPENSION  2009    HX HYSTERECTOMY  1962     Current Outpatient Prescriptions   Medication Sig Dispense Refill    atenolol (TENORMIN) 25 mg tablet Take 0.5 Tabs by mouth daily. 45 Tab 3    glipiZIDE SR (GLUCOTROL XL) 2.5 mg CR tablet       amLODIPine (NORVASC) 10 mg tablet TAKE 1 TABLET BY MOUTH EVERY DAY 90 Tab 3    latanoprost (XALATAN) 0.005 % ophthalmic solution       hydrALAZINE (APRESOLINE) 25 mg tablet Take 25 mg by mouth three (3) times daily.  losartan (COZAAR) 100 mg tablet TAKE ONE TABLET BY MOUTH DIALY 90 Tab 3    pravastatin (PRAVACHOL) 40 mg tablet Take 1 Tab by mouth daily. 30 Tab 6    chlorthalidone (HYGROTEN) 25 mg tablet Take 1 Tab by mouth daily. 30 Tab 3    COMBIGAN 0.2-0.5 % drop ophthalmic solution Administer 1 Drop to both eyes every twelve (12) hours.  RESTASIS 0.05 % ophthalmic emulsion Administer 1 Drop to both eyes every twelve (12) hours.  ascorbic acid (VITAMIN C) 500 mg tablet Take 1,000 mg by mouth daily.  CALCIUM PO Take 1,000 mg by mouth two (2) times a day.  Blood-Glucose Meter monitoring kit by Does Not Apply route. Use daily as directed 1 Kit 0    glucose blood VI test strips (BLOOD GLUCOSE TEST) strip by Does Not Apply route. Use daily as directed 3 Package 3    Lancets Misc by Does Not Apply route. Use daily as directed 3 Package 3    alcohol swabs (ALCOHOL PREP PADS) PadM 1 Dose by Apply Externally route daily as needed (Glucose testing). 3 Package 3    Cholecalciferol, Vitamin D3, (VITAMIN D) 1,000 unit Cap Take 2,000 Units by mouth daily. 180 Cap 3    MULTIVITAMINS PO Take 1 Tab by mouth daily.       aspirin 81 mg chewable tablet Take 81 mg by mouth daily.  montelukast (SINGULAIR) 10 mg tablet Take 1 Tab by mouth daily. 90 Tab 2    sulfanilamide (AVC) 15 % vaginal cream Insert 1 Applicator into vagina daily. 120 g 0    guaiFENesin (ORGANIDIN) 400 mg tablet Take 1 Tab by mouth nightly as needed for Congestion. Indications: COUGH 15 Tab 1    alendronate (FOSAMAX) 35 mg tablet Take  by mouth every seven (7) days.  IBUPROFEN (ADVIL PO) Take 1 Tab by mouth as needed.  nitroglycerin (NITROSTAT) 0.4 mg SL tablet 0.4 mg by SubLINGual route every five (5) minutes as needed. Allergies   Allergen Reactions    Lipitor [Atorvastatin] Myalgia    Spironolactone Other (comments)     Dizziness and fatigue     Family History   Problem Relation Age of Onset    Hypertension Mother     Arthritis-osteo Other      Social History   Substance Use Topics    Smoking status: Never Smoker    Smokeless tobacco: Never Used    Alcohol use No     Patient Active Problem List   Diagnosis Code    Diabetes mellitus, type 2 ([facility] E11.9    Osteopenia M85.80    Hypertensive heart disease I11.9    CAD (coronary artery disease), native coronary artery I25.10    Cardiomy[facility] I42.9    Heart failure, diastolic, chronic (HCC) F43.04    CKD (chronic kidney disease) N18.9    Dyslipidemia E78.5    Essential hypertension I10    Arthritis of right knee M17.11    ACP (advance care planning) Z71.89       Depression Risk Factor Screening:     PHQ over the last two weeks 7/27/2017   Little interest or pleasure in doing things Not at all   Feeling down, depressed or hopeless Not at all   Total Score PHQ 2 0     Alcohol Risk Factor Screening:   Patient states she does not drink alcohol. Functional Ability and Level of Safety:   Hearing Loss  Hearing is good. The patient needs further evaluation. Activities of Daily Living  The home contains: no safety equipment.   Patient does total self care    Fall Risk  Fall Risk Assessment, last 12 mths 7/27/2017   Able to walk? Yes   Fall in past 12 months? No       Abuse Screen  Patient is not abused    Cognitive Screening   Evaluation of Cognitive Function:  Has your family/caregiver stated any concerns about your memory: yes,  Patient states is forgetful but it is not out of hand. Normal    Patient Care Team   Patient Care Team:  Alejandro Bhat MD as PCP - General (Internal Medicine)  Obi Catherine MD as Physician (Cardiology)  Abraham Miller MD (Ophthalmology)  Viet Barbosa MD (Obstetrics & Gynecology)  Yvrose Messina DO (Cardiology)     Glaucoma Screening-  Dr Janina Escamilla every 4 months  for glaucoma    Pneumonia Vaccine-  UTD   Shingles Vaccine- did not have chicken pox    Tdap Vaccine-  Declines due to cost    Colonoscopy-   ~10 yrs ago had Colonoscopy. Not a candidate currently     Mammogram  7/2017  Advance Directive-  pt due to age does not want to deal with it. She says her daughter takes care of those issues    Assessment/Plan   Education and counseling provided:  Are appropriate based on today's review and evaluation  End-of-Life planning (with patient's consent)    Diagnoses and all orders for this visit:    1. Medicare annual wellness visit, subsequent    2. Type 2 diabetes mellitus without complication, without long-term current use of insulin (HCC)  -     MICROALBUMIN, UR, RAND W/ MICROALBUMIN/CREA RATIO; Future  -     HEMOGLOBIN A1C W/O EAG; Future    3. Essential hypertension  -     METABOLIC PANEL, COMPREHENSIVE; Future    4. Dyslipidemia  -     LIPID PANEL;  Future        Health Maintenance Due   Topic Date Due    FOOT EXAM Q1  01/05/1933    DTaP/Tdap/Td series (1 - Tdap) 01/05/1944    ZOSTER VACCINE AGE 60>  11/05/1982    GLAUCOMA SCREENING Q2Y  06/18/2012    EYE EXAM RETINAL OR DILATED Q1  03/10/2016    Pneumococcal 65+ Low/Medium Risk (2 of 2 - PPSV23) 05/27/2016    Influenza Age 9 to Adult  08/01/2017    MICROALBUMIN Q1  11/07/2017     A comprehensive 5 year plan for medical care and screening exams was reviewed with pt and they received a copy of it.

## 2017-11-13 NOTE — PATIENT INSTRUCTIONS
Medicare Wellness Visit, Female    The best way to live healthy is to have a healthy lifestyle by eating a well-balanced diet, exercising regularly, limiting alcohol and stopping smoking. Regular physical exams and screening tests are another way to keep healthy. Preventive exams provided by your health care provider can find health problems before they become diseases or illnesses. Preventive services including immunizations, screening tests, monitoring and exams can help you take care of your own health. All people over age 72 should have a pneumovax  and and a prevnar shot to prevent pneumonia. These are once in a lifetime unless you and your provider decide differently. All people over 65 should have a yearly flu shot and a tetanus vaccine every 10 years. A bone mass density to screen for osteoporosis or thinning of the bones should be done every 2 years after 65. Screening for diabetes mellitus with a blood sugar test should be done every year. Glaucoma is a disease of the eye due to increased ocular pressure that can lead to blindness and it should be done every year by an eye professional.    Cardiovascular screening tests that check for elevated lipids (fatty part of blood) which can lead to heart disease and strokes should be done every 5 years. Colorectal screening that evaluates for blood or polyps in your colon should be done yearly as a stool test or every five years as a flexible sigmoidoscope or every 10 years as a colonoscopy up to age 76. Breast cancer screening with a mammogram is recommended biennially  for women age 54-69. Screening for cervical cancer with a pap smear and pelvic exam is recommended for women after age 72 years every 2 years up to age 79 or when the provider and patient decide to stop. If there is a history of cervical abnormalities or other increased risk for cancer then the test is recommended yearly.     Hepatitis C screening is also recommended for anyone born between [birth year] through [facility]. A shingles vaccine is also recommended once in a lifetime after age 61. Your Medicare Wellness Exam is recommended annually. Here is a list of your current Health Maintenance items with a due date:  Health Maintenance Due   Topic Date Due    Diabetic Foot Care  01/05/1933    DTaP/Tdap/Td  (1 - Tdap) 01/05/1944    Shingles Vaccine  11/05/1982    Glaucoma Screening   06/18/2012    Eye Exam  03/10/2016    Pneumococcal Vaccine (2 of 2 - PPSV23) 05/27/2016    Annual Well Visit  06/16/2017    Flu Vaccine  08/01/2017    Albumin Urine Test  11/07/2017       Medicare Part B Preventive Services Limitations Recommendation Scheduled   Bone Mass Measurement  (age 72 & older, biennial) Requires diagnosis related to osteoporosis or estrogen deficiency. Biennial benefit unless patient has history of long-term glucocorticoid tx or baseline is needed because initial test was by other method  Bone Scan 8/2017   Cardiovascular Screening Blood Tests (every 5 years)  Total cholesterol, HDL, Triglycerides Order as a panel if possible  11/2017   Colorectal Cancer Screening  -Fecal occult blood test (annual)  -Flexible sigmoidoscopy (5y)  -Screening colonoscopy (10y)  -Barium Enema   NA   Counseling to Prevent Tobacco Use (up to 8 sessions per year)  - Counseling greater than 3 and up to 10 minutes  - Counseling greater than 10 minutes Patients must be asymptomatic of tobacco-related conditions to receive as preventive service  NA   Diabetes Screening Tests (at least every 3 years, Medicare covers annually or at 6-month intervals for prediabetic patients)    Fasting blood sugar (FBS) or glucose tolerance test (GTT) Patient must be diagnosed with one of the following:  -Hypertension, Dyslipidemia, obesity, previous impaired FBS or GTT  Or any two of the following: overweight, FH of diabetes, age ? 72, history of gestational diabetes, birth of baby weighing more than 9 pounds  11/2017 Diabetes Self-Management Training (DSMT) (no USPSTF recommendation) Requires referral by treating physician for patient with diabetes or renal disease. 10 hours of initial DSMT session of no less than 30 minutes each in a continuous 12-month period. 2 hours of follow-up DSMT in subsequent years. 11/2015   Glaucoma Screening (no USPSTF recommendation) Diabetes mellitus, family history, , age 48 or over,  American, age 72 or over  Deferred to Dr. Javier French (HIV) Screening (annually for increased risk patients)  HIV-1 and HIV-2 by EIA, RANDY, rapid antibody test, or oral mucosa transudate Patient must be at increased risk for HIV infection per USPSTF guidelines or pregnant. Tests covered annually for patients at increased risk. Pregnant patients may receive up to 3 test during pregnancy. NA   Medical Nutrition Therapy (MNT) (for diabetes or renal disease not recommended schedule) Requires referral by treating physician for patient with diabetes or renal disease. Can be provided in same year as diabetes self-management training (DSMT), and CMS recommends medical nutrition therapy take place after DSMT. Up to 3 hours for initial year and 2 hours in subsequent years. NA   Shingles Vaccination A shingles vaccine is also recommended once in a lifetime after age 61  6/2012   Seasonal Influenza Vaccination (annually)      Pneumococcal Vaccination (once after 65)   1/2007 and Prevnar 13  5/2015   Hepatitis B Vaccinations (if medium/high risk) Medium/high risk factors:  End-stage renal disease,  Hemophiliacs who received Factor VIII or IX concentrates, Clients of institutions for the mentally retarded, Persons who live in the same house as a HepB virus carrier, Homosexual men, Illicit injectable drug abusers.   NA   Screening Mammography (biennial age 54-69) Annually (age 36 or over)  7/2017   Screening Pap Tests and Pelvic Examination (up to age 79 and after 79 if unknown history or abnormal study last 10 years) Every 24 months except high risk  NA   Ultrasound Screening for Abdominal Aortic Aneurysm (AAA) (once) Patient must be referred through IPPE and not have had a screening for abdominal aortic aneurysm before under Medicare.   Limited to patients who meet one of the following criteria:  - Men who are 73-68 years old and have smoked more than 100 cigarettes in their lifetime.  -Anyone with a FH of AAA  -Anyone recommended for screening by USPSTF  NA

## 2017-11-13 NOTE — MR AVS SNAPSHOT
Visit Information Date & Time Provider Department Dept. Phone Encounter #  
 11/13/2017  1:00 PM Stella Herrmann, [address] [phone] [id number] Follow-up Instructions Return in about 4 months (around 3/13/2018) for labs 1 week before. Your Appointments 1/26/2018 10:45 AM  
Follow Up with Jasson Becerril MD  
[facility] ([facility]) Appt Note: lt hip pain 3 month fu  
 [address] [facility], [address] [facility]  
[phone] [facility], [address] [place] [address] Upcoming Health Maintenance Date Due  
 FOOT EXAM Q1 1/5/1933 DTaP/Tdap/Td series (1 - Tdap) 1/5/1944 ZOSTER VACCINE AGE 60> 11/5/1982 GLAUCOMA SCREENING Q2Y 6/18/2012 EYE EXAM RETINAL OR DILATED Q1 3/10/2016 Pneumococcal 65+ Low/Medium Risk (2 of 2 - PPSV23) 5/27/2016 MEDICARE YEARLY EXAM 6/16/2017 Influenza Age 5 to Adult 8/1/2017 MICROALBUMIN Q1 11/7/2017 HEMOGLOBIN A1C Q6M 5/6/2018 LIPID PANEL Q1 11/6/2018 Allergies as of 11/13/2017  Review Complete On: 11/13/2017 By: Stella Herrmann MD  
  
 Severity Noted Reaction Type Reactions Lipitor [Atorvastatin]  08/04/2016    Myalgia Spironolactone  05/09/2014    Other (comments) Dizziness and fatigue Current Immunizations  Reviewed on 11/14/2016 Name Date Influenza Vaccine (Quad) PF 11/14/2016, 11/13/2015 Influenza Vaccine Split 10/5/2010 Pneumococcal Conjugate (PCV-13) 5/27/2015 Pneumococcal Vaccine (Pcv) 1/20/2007 Not reviewed this visit You Were Diagnosed With   
  
 Codes Comments Medicare annual wellness visit, subsequent    -  Primary ICD-10-CM: Z00.00 ICD-9-CM: V70.0 Type 2 diabetes mellitus without complication, without long-term current use of insulin (HCC)     ICD-10-CM: E11.9 ICD-9-CM: 250.00 Essential hypertension     ICD-10-CM: I10 
ICD-9-CM: 401.9 Dyslipidemia     ICD-10-CM: E78.5 ICD-9-CM: 272.4 Vitals BP Pulse Temp Resp Height(growth percentile) Weight(growth percentile) 154/70 (BP 1 Location: Right arm) (!) 50 98.2 °F (36.8 °C) (Oral) 20 5' 2\" (1.575 m) 148 lb (67.1 kg) SpO2 BMI OB Status Smoking Status 99% 27.07 kg/m2 Hysterectomy Never Smoker BMI and BSA Data Body Mass Index Body Surface Area  
 27.07 kg/m 2 1.71 m 2 Preferred Pharmacy Pharmacy Name Phone [facility], [address] [place] [address] [phone] Your Updated Medication List  
  
   
This list is accurate as of: 11/13/17  1:38 PM.  Always use your most recent med list. ADVIL PO Take 1 Tab by mouth as needed. alcohol swabs Padm Commonly known as:  ALCOHOL PREP PADS  
1 Dose by Apply Externally route daily as needed (Glucose testing). alendronate 35 mg tablet Commonly known as:  FOSAMAX Take  by mouth every seven (7) days. amLODIPine 10 mg tablet Commonly known as:  Breanne Fraction TAKE 1 TABLET BY MOUTH EVERY DAY  
  
 aspirin 81 mg chewable tablet Take 81 mg by mouth daily. atenolol 25 mg tablet Commonly known as:  TENORMIN Take 0.5 Tabs by mouth daily. Blood-Glucose Meter monitoring kit  
by Does Not Apply route. Use daily as directed CALCIUM PO Take 1,000 mg by mouth two (2) times a day. chlorthalidone 25 mg tablet Commonly known as:  Suzen Pay Take 1 Tab by mouth daily. cholecalciferol 1,000 unit Cap Commonly known as:  VITAMIN D3 Take 2,000 Units by mouth daily. COMBIGAN 0.2-0.5 % Drop ophthalmic solution Generic drug:  brimonidine-timolol Administer 1 Drop to both eyes every twelve (12) hours. glipiZIDE SR 2.5 mg CR tablet Commonly known as:  GLUCOTROL XL  
  
 glucose blood VI test strips strip Commonly known as:  blood glucose test  
by Does Not Apply route. Use daily as directed guaiFENesin 400 mg tablet Commonly known as:  Segundo Reeve Take 1 Tab by mouth nightly as needed for Congestion. Indications: COUGH  
  
 hydrALAZINE 25 mg tablet Commonly known as:  APRESOLINE Take 25 mg by mouth three (3) times daily. Lancets Misc  
by Does Not Apply route. Use daily as directed  
  
 latanoprost 0.005 % ophthalmic solution Commonly known as:  XALATAN  
  
 losartan 100 mg tablet Commonly known as:  COZAAR  
TAKE ONE TABLET BY MOUTH DIALY montelukast 10 mg tablet Commonly known as:  SINGULAIR Take 1 Tab by mouth daily. MULTIVITAMINS PO Take 1 Tab by mouth daily. nitroglycerin 0.4 mg SL tablet Commonly known as:  NITROSTAT  
0.4 mg by SubLINGual route every five (5) minutes as needed. pravastatin 40 mg tablet Commonly known as:  PRAVACHOL Take 1 Tab by mouth daily. RESTASIS 0.05 % ophthalmic emulsion Generic drug:  cycloSPORINE Administer 1 Drop to both eyes every twelve (12) hours. sulfanilamide 15 % vaginal cream  
Commonly known as:  AVC Insert 1 Applicator into vagina daily. VITAMIN C 500 mg tablet Generic drug:  ascorbic acid (vitamin C) Take 1,000 mg by mouth daily. Follow-up Instructions Return in about 4 months (around 3/13/2018) for labs 1 week before. To-Do List   
 11/13/2017  3:30 PM  
  Appointment with [facility] HOLTER TECH at [address] [facility] NON-INVASIVE CARD ([phone]) Age Limit for ALL Heart procedures @ [facility] 1 is 18 yrs and older only. Under the age of 25, refer to [address] [place] [address] ([phone]). PATIENTS REPORT TO: Lauri Ignacio ([place]) - 2nd Floor [address]  This study requires a physician's written prescription. Patients may bring the order or it may also be faxed to Central Scheduling at [phone]. Please wear a shirt with buttons down the front. Bring list of medications. Do not have a bath/shower during your 24 hour recording.     Please remind patient they will need to return 24 hours, 48 hours, or 72 hours after monitor is in place to have it removed by the technician.  (example: If patient is scheduled for a 24 hour holter, return 24 hours after monitor is in place to have it removed. If patient is scheduled for a 48 hour holter monitor, then they would return 48 hours after holter is in place, etc.)  Staff is NOT available on Saturdays to remove holter monitor. IMPORTANT:  Once patient has been fitted with a HOLTER MONITOR, they should not have any other exams performed until the Holter has been removed. Patient Instructions Medicare Wellness Visit, Female The best way to live healthy is to have a healthy lifestyle by eating a well-balanced diet, exercising regularly, limiting alcohol and stopping smoking. Regular physical exams and screening tests are another way to keep healthy. Preventive exams provided by your health care provider can find health problems before they become diseases or illnesses. Preventive services including immunizations, screening tests, monitoring and exams can help you take care of your own health. All people over age 72 should have a pneumovax  and and a prevnar shot to prevent pneumonia. These are once in a lifetime unless you and your provider decide differently. All people over 65 should have a yearly flu shot and a tetanus vaccine every 10 years. A bone mass density to screen for osteoporosis or thinning of the bones should be done every 2 years after 65. Screening for diabetes mellitus with a blood sugar test should be done every year. Glaucoma is a disease of the eye due to increased ocular pressure that can lead to blindness and it should be done every year by an eye professional. 
 
Cardiovascular screening tests that check for elevated lipids (fatty part of blood) which can lead to heart disease and strokes should be done every 5 years. Colorectal screening that evaluates for blood or polyps in your colon should be done yearly as a stool test or every five years as a flexible sigmoidoscope or every 10 years as a colonoscopy up to age 76. Breast cancer screening with a mammogram is recommended biennially  for women age 54-69. Screening for cervical cancer with a pap smear and pelvic exam is recommended for women after age 72 years every 2 years up to age 79 or when the provider and patient decide to stop. If there is a history of cervical abnormalities or other increased risk for cancer then the test is recommended yearly. Hepatitis C screening is also recommended for anyone born between 80 through [address]. A shingles vaccine is also recommended once in a lifetime after age 61. Your Medicare Wellness Exam is recommended annually. Here is a list of your current Health Maintenance items with a due date: 
Health Maintenance Due Topic Date Due  
 Diabetic Foot Care  01/05/1933  
 DTaP/Tdap/Td  (1 - Tdap) 01/05/1944  Shingles Vaccine  11/05/1982  Glaucoma Screening   06/18/2012 Hernandezaiyana Perez Eye Exam  03/10/2016  Pneumococcal Vaccine (2 of 2 - PPSV23) 05/27/2016 Hernandez Perez Annual Well Visit  06/16/2017  Flu Vaccine  08/01/2017  Albumin Urine Test  11/07/2017 Medicare Part B Preventive Services Limitations Recommendation Scheduled Bone Mass Measurement 
(age 72 & older, biennial) Requires diagnosis related to osteoporosis or estrogen deficiency. Biennial benefit unless patient has history of long-term glucocorticoid tx or baseline is needed because initial test was by other method  Bone Scan 8/2017 Cardiovascular Screening Blood Tests (every 5 years) Total cholesterol, HDL, Triglycerides Order as a panel if possible  11/2017 Colorectal Cancer Screening 
-Fecal occult blood test (annual) -Flexible sigmoidoscopy (5y) 
-Screening colonoscopy (10y) -Barium Enema   NA  
 Counseling to Prevent Tobacco Use (up to 8 sessions per year) - Counseling greater than 3 and up to 10 minutes - Counseling greater than 10 minutes Patients must be asymptomatic of tobacco-related conditions to receive as preventive service  NA Diabetes Screening Tests (at least every 3 years, Medicare covers annually or at 6-month intervals for prediabetic patients) Fasting blood sugar (FBS) or glucose tolerance test (GTT) Patient must be diagnosed with one of the following: 
-Hypertension, Dyslipidemia, obesity, previous impaired FBS or GTT 
Or any two of the following: overweight, FH of diabetes, age ? 72, history of gestational diabetes, birth of baby weighing more than 9 pounds  11/2017 Diabetes Self-Management Training (DSMT) (no USPSTF recommendation) Requires referral by treating physician for patient with diabetes or renal disease. 10 hours of initial DSMT session of no less than 30 minutes each in a continuous 12-month period. 2 hours of follow-up DSMT in subsequent years. 11/2015 Glaucoma Screening (no USPSTF recommendation) Diabetes mellitus, family history, , age 48 or over,  American, age 72 or over  Deferred to Dr. Ari Rock Human Immunodeficiency Virus (HIV) Screening (annually for increased risk patients) HIV-1 and HIV-2 by EIA, RANDY, rapid antibody test, or oral mucosa transudate Patient must be at increased risk for HIV infection per USPSTF guidelines or pregnant. Tests covered annually for patients at increased risk. Pregnant patients may receive up to 3 test during pregnancy. NA Medical Nutrition Therapy (MNT) (for diabetes or renal disease not recommended schedule) Requires referral by treating physician for patient with diabetes or renal disease. Can be provided in same year as diabetes self-management training (DSMT), and CMS recommends medical nutrition therapy take place after DSMT.   Up to 3 hours for initial year and 2 hours in subsequent years. NA Shingles Vaccination A shingles vaccine is also recommended once in a lifetime after age 61  6/5 Seasonal Influenza Vaccination (annually) Pneumococcal Vaccination (once after 65)   1/2007 and Prevnar 13 
5/2015 Hepatitis B Vaccinations (if medium/high risk) Medium/high risk factors:  End-stage renal disease, Hemophiliacs who received Factor VIII or IX concentrates, Clients of institutions for the mentally retarded, Persons who live in the same house as a HepB virus carrier, Homosexual men, Illicit injectable drug abusers. NA Screening Mammography (biennial age 54-69) Annually (age 36 or over)  7/2017 Screening Pap Tests and Pelvic Examination (up to age 79 and after 79 if unknown history or abnormal study last 10 years) Every 24 months except high risk  NA Ultrasound Screening for Abdominal Aortic Aneurysm (AAA) (once) Patient must be referred through IPPE and not have had a screening for abdominal aortic aneurysm before under Medicare. Limited to patients who meet one of the following criteria: 
- Men who are 73-68 years old and have smoked more than 100 cigarettes in their lifetime. 
-Anyone with a FH of AAA 
-Anyone recommended for screening by USPSTF  NA Introducing [facility]! Steven Gerber introduces [employer] patient portal. Now you can access parts of your medical record, email your doctor's office, and request medication refills online. 1. In your internet browser, go to [email or web address] 2. Click on the First Time User? Click Here link in the Sign In box. You will see the New Member Sign Up page. 3. Enter your [employer] Access Code exactly as it appears below. You will not need to use this code after youve completed the sign-up process. If you do not sign up before the expiration date, you must request a new code. · [employer] Access Code: [email or web address] Expires: 11/20/2017  1:20 PM 
 
 4. Enter the last four digits of your Social Security Number (xxxx) and Date of Birth (mm/dd/yyyy) as indicated and click Submit. You will be taken to the next sign-up page. 5. Create a [employer] ID. This will be your [employer] login ID and cannot be changed, so think of one that is secure and easy to remember. 6. Create a [employer] password. You can change your password at any time. 7. Enter your Password Reset Question and Answer. This can be used at a later time if you forget your password. 8. Enter your e-mail address. You will receive e-mail notification when new information is available in [address] 9. Click Sign Up. You can now view and download portions of your medical record. 10. Click the Download Summary menu link to download a portable copy of your medical information. If you have questions, please visit the Frequently Asked Questions section of the [employer] website. Remember, [employer] is NOT to be used for urgent needs. For medical emergencies, dial 911. Now available from your iPhone and Android! Please provide this summary of care documentation to your next provider. Your primary care clinician is listed as WALESKA TO. If you have any questions after today's visit, please call [phone].

## 2017-12-01 NOTE — PROGRESS NOTES
Per your last note \" This patient appears to be doing reasonably well at this juncture, but she does have a significant sinus bradycardia and she is on a fair amount of atenolol at 12.5 mg twice daily and I have asked her to decrease it to 12.5 mg daily and I am also going to do a Holter monitor study on her to see if she is having significant bradycardia with activity on just the 12.5 mg daily in which case we may discontinue that medication altogether.

## 2017-12-04 ENCOUNTER — TELEPHONE (OUTPATIENT)
Dept: FAMILY MEDICINE CLINIC | Age: 82
End: 2017-12-04

## 2017-12-04 NOTE — TELEPHONE ENCOUNTER
----- Message from Citlalli De Jesus MD sent at 12/2/2017  9:38 AM EST -----  Try to get copy of eye exam from [facility] Dr Kathryn Mcintosh will fax eye exam.

## 2017-12-11 ENCOUNTER — TELEPHONE (OUTPATIENT)
Dept: CARDIOLOGY CLINIC | Age: 82
End: 2017-12-11

## 2017-12-11 NOTE — TELEPHONE ENCOUNTER
----- Message from Jacky Reyes DO sent at 12/10/2017  7:12 PM EST -----  This patient's bradycardia is quite marked with average heart rate of 46. Please check to see if she was taking Atenolol 12.5 mg twice daily or once daily when this was done. If she was taking 12.5 mg once daily which I think was the case I would just DC atenolol. She had no significant pauses so nothing else to do.  ES

## 2017-12-11 NOTE — PROGRESS NOTES
This patient's bradycardia is quite marked with average heart rate of 46. Please check to see if she was taking Atenolol 12.5 mg twice daily or once daily when this was done. If she was taking 12.5 mg once daily which I think was the case I would just DC atenolol. She had no significant pauses so nothing else to do.  ES

## 2017-12-15 ENCOUNTER — TELEPHONE (OUTPATIENT)
Dept: CARDIOLOGY CLINIC | Age: 82
End: 2017-12-15

## 2017-12-15 ENCOUNTER — CLINICAL SUPPORT (OUTPATIENT)
Dept: CARDIOLOGY CLINIC | Age: 82
End: 2017-12-15

## 2017-12-15 VITALS
OXYGEN SATURATION: 96 % | DIASTOLIC BLOOD PRESSURE: 54 MMHG | SYSTOLIC BLOOD PRESSURE: 108 MMHG | WEIGHT: 146 LBS | BODY MASS INDEX: 26.7 KG/M2 | HEART RATE: 39 BPM

## 2017-12-15 DIAGNOSIS — R00.2 PALPITATIONS: ICD-10-CM

## 2017-12-15 DIAGNOSIS — I42.9 CARDIOMYOPATHY, SECONDARY (HCC): ICD-10-CM

## 2017-12-15 DIAGNOSIS — R00.1 BRADYCARDIA: Primary | ICD-10-CM

## 2017-12-15 RX ORDER — NITROGLYCERIN 0.4 MG/1
0.4 TABLET SUBLINGUAL
Qty: 1 BOTTLE | Refills: 1 | Status: SHIPPED | OUTPATIENT
Start: 2017-12-15 | End: 2020-11-18 | Stop reason: SDUPTHER

## 2017-12-15 NOTE — PROGRESS NOTES
Yonatan Belle is a 80 y.o. female that is here for a blood pressure check and EKG. Her current medications are listed below. Current Outpatient Prescriptions   Medication Sig    glipiZIDE SR (GLUCOTROL XL) 2.5 mg CR tablet Take 2.5 mg by mouth daily.  amLODIPine (NORVASC) 10 mg tablet TAKE 1 TABLET BY MOUTH EVERY DAY    latanoprost (XALATAN) 0.005 % ophthalmic solution     losartan (COZAAR) 100 mg tablet TAKE ONE TABLET BY MOUTH DIALY    pravastatin (PRAVACHOL) 40 mg tablet Take 1 Tab by mouth daily.  guaiFENesin (ORGANIDIN) 400 mg tablet Take 1 Tab by mouth nightly as needed for Congestion. Indications: COUGH    COMBIGAN 0.2-0.5 % drop ophthalmic solution Administer 1 Drop to both eyes every twelve (12) hours.  RESTASIS 0.05 % ophthalmic emulsion Administer 1 Drop to both eyes every twelve (12) hours.  ascorbic acid (VITAMIN C) 500 mg tablet Take 1,000 mg by mouth daily.  IBUPROFEN (ADVIL PO) Take 1 Tab by mouth as needed.  CALCIUM PO Take 1,000 mg by mouth two (2) times a day.  Blood-Glucose Meter monitoring kit by Does Not Apply route. Use daily as directed    glucose blood VI test strips (BLOOD GLUCOSE TEST) strip by Does Not Apply route. Use daily as directed    Lancets Misc by Does Not Apply route. Use daily as directed    alcohol swabs (ALCOHOL PREP PADS) PadM 1 Dose by Apply Externally route daily as needed (Glucose testing).  nitroglycerin (NITROSTAT) 0.4 mg SL tablet 0.4 mg by SubLINGual route every five (5) minutes as needed.  Cholecalciferol, Vitamin D3, (VITAMIN D) 1,000 unit Cap Take 2,000 Units by mouth daily.  MULTIVITAMINS PO Take 1 Tab by mouth daily.  aspirin 81 mg chewable tablet Take 81 mg by mouth daily.  montelukast (SINGULAIR) 10 mg tablet Take 1 Tab by mouth daily.  sulfanilamide (AVC) 15 % vaginal cream Insert 1 Applicator into vagina daily. No current facility-administered medications for this visit.               Her   Visit Vitals  /54 (BP 1 Location: Left arm, BP Patient Position: Sitting)    Pulse (!) 39    Wt 66.2 kg (146 lb)    SpO2 96%    BMI 26.7 kg/m2         Patient seen for BP and EKG after calling the office and reporting increased heart rate at night while trying to sleep. She reports palpitations at night that prevent her from sleeping at night. She do not notice the symptoms during the day. Patient denies other cardiac symptoms. I discussed with Dr. Ba Vallejo. EKG reviewed by him as well. Patient discussed symptoms and medical options with Dr. Ba Vallejo. Verbal order and read back per Dr. Ba Vallejo for 30 day event monitor. Dr. Ba Vallejo advised patient that she can wear monitor during the night since that is when palpitations occur.  Patient and daughter informed of instructions, read back and verbalized understanding [employer], LPN

## 2017-12-15 NOTE — TELEPHONE ENCOUNTER
Pt daughter called today states patient needs to see Dr. Gerber Guillen today states she can not sleep at night bc her heart races only at night she is feels ok during the day just tired from not sleeping at night. Patient wore holter monitor recently per Dr. Edwige Maldonado note. ..12/11/17This patient's bradycardia is quite marked with average heart rate of 46. Please check to see if she was taking Atenolol 12.5 mg twice daily or once daily when this was done. If she was taking 12.5 mg once daily which I think was the case I would just DC atenolol. She had no significant pauses so nothing else to do. ES\"    I explained the above to patient's daughter. .. Patient's daughter states patient is confused about her medications states patient had told her she did speak to someone at our office the other day and told her to stop a medication but she was not sure which one. I asked patient's daughter if anyone help patient with her medications daughter states patient does not want anyone to help insisting she can do it herself. Per Samuel Anne ok to add on in procedure room for BP/HR check. Advised pt daughter pt needs to bring all bottle of her medications she is taking daily with her to the appt so patient can verify each medication dosage on bottle and explain how she is currently taking each med so we can make adjustments if needed. Patient's daughter aware verbalized understanding states she will make sure she brings bottles with her to appointment.   Ann Heaton

## 2018-01-08 ENCOUNTER — HOSPITAL ENCOUNTER (OUTPATIENT)
Dept: LAB | Age: 83
Discharge: HOME | End: 2018-01-08
Payer: MEDICARE

## 2018-01-08 ENCOUNTER — OFFICE VISIT (OUTPATIENT)
Dept: FAMILY MEDICINE CLINIC | Age: 83
End: 2018-01-08

## 2018-01-08 VITALS
SYSTOLIC BLOOD PRESSURE: 142 MMHG | TEMPERATURE: 98.8 F | DIASTOLIC BLOOD PRESSURE: 63 MMHG | HEART RATE: 55 BPM | WEIGHT: 146 LBS | BODY MASS INDEX: 26.87 KG/M2 | HEIGHT: 62 IN | OXYGEN SATURATION: 99 % | RESPIRATION RATE: 20 BRPM

## 2018-01-08 DIAGNOSIS — E11.9 TYPE 2 DIABETES MELLITUS WITHOUT COMPLICATION, WITHOUT LONG-TERM CURRENT USE OF INSULIN (HCC): ICD-10-CM

## 2018-01-08 DIAGNOSIS — M25.521 RIGHT ELBOW PAIN: ICD-10-CM

## 2018-01-08 DIAGNOSIS — E78.5 DYSLIPIDEMIA: ICD-10-CM

## 2018-01-08 DIAGNOSIS — R39.11 URINARY HESITANCY: Primary | ICD-10-CM

## 2018-01-08 DIAGNOSIS — I10 ESSENTIAL HYPERTENSION: ICD-10-CM

## 2018-01-08 LAB
ALBUMIN SERPL-MCNC: 3.7 G/DL (ref 3.4–5)
ALBUMIN/GLOB SERPL: 1.2 {RATIO} (ref 0.8–1.7)
ALP SERPL-CCNC: 81 U/L (ref 45–117)
ALT SERPL-CCNC: 21 U/L (ref 13–56)
ANION GAP SERPL CALC-SCNC: 10 MMOL/L (ref 3–18)
AST SERPL-CCNC: 24 U/L (ref 15–37)
BILIRUB SERPL-MCNC: 0.3 MG/DL (ref 0.2–1)
BILIRUB UR QL STRIP: NEGATIVE
BUN SERPL-MCNC: 20 MG/DL (ref 7–18)
BUN/CREAT SERPL: 13 (ref 12–20)
CALCIUM SERPL-MCNC: 9.2 MG/DL (ref 8.5–10.1)
CHLORIDE SERPL-SCNC: 104 MMOL/L (ref 100–108)
CHOLEST SERPL-MCNC: 224 MG/DL
CO2 SERPL-SCNC: 26 MMOL/L (ref 21–32)
CREAT SERPL-MCNC: 1.51 MG/DL (ref 0.6–1.3)
GLOBULIN SER CALC-MCNC: 3.1 G/DL (ref 2–4)
GLUCOSE SERPL-MCNC: 111 MG/DL (ref 74–99)
GLUCOSE UR-MCNC: NEGATIVE MG/DL
HBA1C MFR BLD: 6.4 % (ref 4.2–5.6)
HDLC SERPL-MCNC: 63 MG/DL (ref 40–60)
HDLC SERPL: 3.6 {RATIO} (ref 0–5)
KETONES P FAST UR STRIP-MCNC: NEGATIVE MG/DL
LDLC SERPL CALC-MCNC: 132.4 MG/DL (ref 0–100)
LIPID PROFILE,FLP: ABNORMAL
PH UR STRIP: 5.5 [PH] (ref 4.6–8)
POTASSIUM SERPL-SCNC: 4.5 MMOL/L (ref 3.5–5.5)
PROT SERPL-MCNC: 6.8 G/DL (ref 6.4–8.2)
PROT UR QL STRIP: NEGATIVE
SODIUM SERPL-SCNC: 140 MMOL/L (ref 136–145)
SP GR UR STRIP: NORMAL (ref 1–1.03)
TRIGL SERPL-MCNC: 143 MG/DL (ref ?–150)
UA UROBILINOGEN AMB POC: NORMAL (ref 0.2–1)
URINALYSIS CLARITY POC: CLEAR
URINALYSIS COLOR POC: YELLOW
URINE BLOOD POC: NEGATIVE
URINE LEUKOCYTES POC: NORMAL
URINE NITRITES POC: NEGATIVE
VLDLC SERPL CALC-MCNC: 28.6 MG/DL

## 2018-01-08 PROCEDURE — 80053 COMPREHEN METABOLIC PANEL: CPT | Performed by: INTERNAL MEDICINE

## 2018-01-08 PROCEDURE — 83036 HEMOGLOBIN GLYCOSYLATED A1C: CPT | Performed by: INTERNAL MEDICINE

## 2018-01-08 PROCEDURE — 85025 COMPLETE CBC W/AUTO DIFF WBC: CPT | Performed by: INTERNAL MEDICINE

## 2018-01-08 PROCEDURE — 36415 COLL VENOUS BLD VENIPUNCTURE: CPT | Performed by: INTERNAL MEDICINE

## 2018-01-08 PROCEDURE — 80061 LIPID PANEL: CPT | Performed by: INTERNAL MEDICINE

## 2018-01-08 RX ORDER — CIPROFLOXACIN 500 MG/1
500 TABLET ORAL 2 TIMES DAILY
Qty: 6 TAB | Refills: 0 | Status: SHIPPED | OUTPATIENT
Start: 2018-01-08 | End: 2018-01-11

## 2018-01-08 RX ORDER — HYDRALAZINE HYDROCHLORIDE 50 MG/1
25 TABLET, FILM COATED ORAL 3 TIMES DAILY
COMMUNITY
End: 2018-01-31

## 2018-01-08 NOTE — PATIENT INSTRUCTIONS
Shoulder Pain: Care Instructions  Your Care Instructions    You can hurt your shoulder by using it too much during an activity, such as fishing or baseball. It can also happen as part of the everyday wear and tear of getting older. Shoulder injuries can be slow to heal, but your shoulder should get better with time. Your doctor may recommend a sling to rest your shoulder. If you have injured your shoulder, you may need testing and treatment. Follow-up care is a key part of your treatment and safety. Be sure to make and go to all appointments, and call your doctor if you are having problems. It's also a good idea to know your test results and keep a list of the medicines you take. How can you care for yourself at home? · Take pain medicines exactly as directed. ¨ If the doctor gave you a prescription medicine for pain, take it as prescribed. ¨ If you are not taking a prescription pain medicine, ask your doctor if you can take an over-the-counter medicine. ¨ Do not take two or more pain medicines at the same time unless the doctor told you to. Many pain medicines contain acetaminophen, which is Tylenol. Too much acetaminophen (Tylenol) can be harmful. · If your doctor recommends that you wear a sling, use it as directed. Do not take it off before your doctor tells you to. · Put ice or a cold pack on the sore area for 10 to 20 minutes at a time. Put a thin cloth between the ice and your skin. · If there is no swelling, you can put moist heat, a heating pad, or a warm cloth on your shoulder. Some doctors suggest alternating between hot and cold. · Rest your shoulder for a few days. If your doctor recommends it, you can then begin gentle exercise of the shoulder, but do not lift anything heavy. When should you call for help? Call 911 anytime you think you may need emergency care. For example, call if:  ? · You have chest pain or pressure. This may occur with:  ¨ Sweating.   ¨ Shortness of breath. ¨ Nausea or vomiting. ¨ Pain that spreads from the chest to the neck, jaw, or one or both shoulders or arms. ¨ Dizziness or lightheadedness. ¨ A fast or uneven pulse. After calling 911, chew 1 adult-strength aspirin. Wait for an ambulance. Do not try to drive yourself. ? · Your arm or hand is cool or pale or changes color. ?Call your doctor now or seek immediate medical care if:  ? · You have signs of infection, such as:  ¨ Increased pain, swelling, warmth, or redness in your shoulder. ¨ Red streaks leading from a place on your shoulder. ¨ Pus draining from an area of your shoulder. ¨ Swollen lymph nodes in your neck, armpits, or groin. ¨ A fever. ? Watch closely for changes in your health, and be sure to contact your doctor if:  ? · You cannot use your shoulder. ? · Your shoulder does not get better as expected. Where can you learn more? Go to http://jason-tiera.info/. Enter N968 in the search box to learn more about \"Shoulder Pain: Care Instructions. \"  Current as of: March 21, 2017  Content Version: 11.4  © 0166-3203 [employer]. Care instructions adapted under license by [employer] (which disclaims liability or warranty for this information). If you have questions about a medical condition or this instruction, always ask your healthcare professional. [place] [address] any warranty or liability for your use of this information.

## 2018-01-08 NOTE — PROGRESS NOTES
Patient is in the office today for right shoulder pain and chills. 1. Have you been to the ER, urgent care clinic since your last visit? Hospitalized since your last visit? No    2. Have you seen or consulted any other health care providers outside of the [address] [place] [address] since your last visit? Include any pap smears or colon screening.  No

## 2018-01-08 NOTE — MR AVS SNAPSHOT
Visit Information Date & Time Provider Department Dept. Phone Encounter #  
 1/8/2018  2:45 PM Mihai Ray, [address] [phone] [id number] Follow-up Instructions Return if symptoms worsen or fail to improve. Your Appointments 1/26/2018 10:45 AM  
Follow Up with Celia Alfaro MD  
[facility] - [facility] ([facility]) Appt Note: lt hip pain 3 month fu  
 [address], [address] 200 [facility]  
[phone] 1212 [facility], [address]  
  
    
 3/5/2018  8:15 AM  
LAB with [facility] (CARRIE Martinez) Appt Note: 4 mo f/u lab 129 [facility] [address] [address]  
[phone]  
  
   
 [address], [facility]  
  
    
 3/12/2018  1:00 PM  
Office Visit with Mihai Ray MD  
[address] [place] [address]) Appt Note: 4 mo f/u  
 [address], [facility] [address] [id number] [phone]  
  
   
 [address],  [address] Upcoming Health Maintenance Date Due  
 FOOT EXAM Q1 1/5/1933 DTaP/Tdap/Td series (1 - Tdap) 1/5/1944 ZOSTER VACCINE AGE 60> 11/5/1982 Pneumococcal 65+ Low/Medium Risk (2 of 2 - PPSV23) 5/27/2016 Influenza Age 5 to Adult 8/1/2017 MICROALBUMIN Q1 11/7/2017 HEMOGLOBIN A1C Q6M 5/6/2018 EYE EXAM RETINAL OR DILATED Q1 9/5/2018 LIPID PANEL Q1 11/6/2018 MEDICARE YEARLY EXAM 11/14/2018 GLAUCOMA SCREENING Q2Y 9/5/2019 Allergies as of 1/8/2018  Review Complete On: 1/8/2018 By: Mihai Ray MD  
  
 Severity Noted Reaction Type Reactions Lipitor [Atorvastatin]  08/04/2016    Myalgia Spironolactone  05/09/2014    Other (comments) Dizziness and fatigue Current Immunizations  Reviewed on 11/14/2016 Name Date Influenza Vaccine (Quad) PF 11/14/2016, 11/13/2015 Influenza Vaccine Split 10/5/2010 Pneumococcal Conjugate (PCV-13) 5/27/2015 Pneumococcal Vaccine (Pcv) 1/20/2007 Not reviewed this visit You Were Diagnosed With   
  
 Codes Comments Urinary hesitancy    -  Primary ICD-10-CM: R39.11 ICD-9-CM: 788.64 Right elbow pain     ICD-10-CM: M25.521 ICD-9-CM: 719.42 Vitals BP Pulse Temp Resp Height(growth percentile) Weight(growth percentile) 142/63 (BP 1 Location: Right arm, BP Patient Position: Sitting) (!) 55 98.8 °F (37.1 °C) (Oral) 20 5' 2\" (1.575 m) 146 lb (66.2 kg) SpO2 BMI OB Status Smoking Status 99% 26.7 kg/m2 Hysterectomy Never Smoker Vitals History BMI and BSA Data Body Mass Index Body Surface Area  
 26.7 kg/m 2 1.7 m 2 Preferred Pharmacy Pharmacy Name Phone [facility], [address] [place] [address] [phone] Your Updated Medication List  
  
   
This list is accurate as of: 1/8/18  3:25 PM.  Always use your most recent med list. ADVIL PO Take 1 Tab by mouth as needed. alcohol swabs Padm Commonly known as:  ALCOHOL PREP PADS  
1 Dose by Apply Externally route daily as needed (Glucose testing). amLODIPine 10 mg tablet Commonly known as:  Eileen Hair TAKE 1 TABLET BY MOUTH EVERY DAY  
  
 aspirin 81 mg chewable tablet Take 81 mg by mouth daily. Blood-Glucose Meter monitoring kit  
by Does Not Apply route. Use daily as directed CALCIUM PO Take 1,000 mg by mouth two (2) times a day. cholecalciferol 1,000 unit Cap Commonly known as:  VITAMIN D3 Take 2,000 Units by mouth daily. COMBIGAN 0.2-0.5 % Drop ophthalmic solution Generic drug:  brimonidine-timolol Administer 1 Drop to both eyes every twelve (12) hours. glipiZIDE SR 2.5 mg CR tablet Commonly known as:  GLUCOTROL XL Take 2.5 mg by mouth daily. glucose blood VI test strips strip Commonly known as:  blood glucose test  
 by Does Not Apply route. Use daily as directed  
  
 guaiFENesin 400 mg tablet Commonly known as:  Brittney Gambles Take 1 Tab by mouth nightly as needed for Congestion. Indications: COUGH  
  
 hydrALAZINE 50 mg tablet Commonly known as:  APRESOLINE Take 25 mg by mouth three (3) times daily. Lancets Misc  
by Does Not Apply route. Use daily as directed  
  
 latanoprost 0.005 % ophthalmic solution Commonly known as:  XALATAN  
  
 losartan 100 mg tablet Commonly known as:  COZAAR  
TAKE ONE TABLET BY MOUTH DIALY montelukast 10 mg tablet Commonly known as:  SINGULAIR Take 1 Tab by mouth daily. MULTIVITAMINS PO Take 1 Tab by mouth daily. nitroglycerin 0.4 mg SL tablet Commonly known as:  NITROSTAT  
1 Tab by SubLINGual route every five (5) minutes as needed. pravastatin 40 mg tablet Commonly known as:  PRAVACHOL Take 1 Tab by mouth daily. RESTASIS 0.05 % ophthalmic emulsion Generic drug:  cycloSPORINE Administer 1 Drop to both eyes every twelve (12) hours. sulfanilamide 15 % vaginal cream  
Commonly known as:  AVC Insert 1 Applicator into vagina daily. VITAMIN C 500 mg tablet Generic drug:  ascorbic acid (vitamin C) Take 1,000 mg by mouth daily. Follow-up Instructions Return if symptoms worsen or fail to improve. Patient Instructions Shoulder Pain: Care Instructions Your Care Instructions You can hurt your shoulder by using it too much during an activity, such as fishing or baseball. It can also happen as part of the everyday wear and tear of getting older. Shoulder injuries can be slow to heal, but your shoulder should get better with time. Your doctor may recommend a sling to rest your shoulder. If you have injured your shoulder, you may need testing and treatment. Follow-up care is a key part of your treatment and safety.  Be sure to make and go to all appointments, and call your doctor if you are having problems. It's also a good idea to know your test results and keep a list of the medicines you take. How can you care for yourself at home? · Take pain medicines exactly as directed. ¨ If the doctor gave you a prescription medicine for pain, take it as prescribed. ¨ If you are not taking a prescription pain medicine, ask your doctor if you can take an over-the-counter medicine. ¨ Do not take two or more pain medicines at the same time unless the doctor told you to. Many pain medicines contain acetaminophen, which is Tylenol. Too much acetaminophen (Tylenol) can be harmful. · If your doctor recommends that you wear a sling, use it as directed. Do not take it off before your doctor tells you to. · Put ice or a cold pack on the sore area for 10 to 20 minutes at a time. Put a thin cloth between the ice and your skin. · If there is no swelling, you can put moist heat, a heating pad, or a warm cloth on your shoulder. Some doctors suggest alternating between hot and cold. · Rest your shoulder for a few days. If your doctor recommends it, you can then begin gentle exercise of the shoulder, but do not lift anything heavy. When should you call for help? Call 911 anytime you think you may need emergency care. For example, call if: 
? · You have chest pain or pressure. This may occur with: ¨ Sweating. ¨ Shortness of breath. ¨ Nausea or vomiting. ¨ Pain that spreads from the chest to the neck, jaw, or one or both shoulders or arms. ¨ Dizziness or lightheadedness. ¨ A fast or uneven pulse. After calling 911, chew 1 adult-strength aspirin. Wait for an ambulance. Do not try to drive yourself. ? · Your arm or hand is cool or pale or changes color. ?Call your doctor now or seek immediate medical care if: 
? · You have signs of infection, such as: 
¨ Increased pain, swelling, warmth, or redness in your shoulder. ¨ Red streaks leading from a place on your shoulder. ¨ Pus draining from an area of your shoulder. ¨ Swollen lymph nodes in your neck, armpits, or groin. ¨ A fever. ? Watch closely for changes in your health, and be sure to contact your doctor if: 
? · You cannot use your shoulder. ? · Your shoulder does not get better as expected. Where can you learn more? Go to http://jason-tiera.info/. Enter N323 in the search box to learn more about \"Shoulder Pain: Care Instructions. \" Current as of: March 21, 2017 Content Version: 11.4 © 3028-2521 [employer]. Care instructions adapted under license by [employer] (which disclaims liability or warranty for this information). If you have questions about a medical condition or this instruction, always ask your healthcare professional. [facility] any warranty or liability for your use of this information. Introducing [facility]! [facility] introduces [employer] patient portal. Now you can access parts of your medical record, email your doctor's office, and request medication refills online. 1. In your internet browser, go to [email or web address] 2. Click on the First Time User? Click Here link in the Sign In box. You will see the New Member Sign Up page. 3. Enter your [employer] Access Code exactly as it appears below. You will not need to use this code after youve completed the sign-up process. If you do not sign up before the expiration date, you must request a new code. · [employer] Access Code: [email or web address] Expires: 4/8/2018  3:23 PM 
 
4. Enter the last four digits of your Social Security Number (xxxx) and Date of Birth (mm/dd/yyyy) as indicated and click Submit. You will be taken to the next sign-up page. 5. Create a [employer] ID. This will be your [employer] login ID and cannot be changed, so think of one that is secure and easy to remember. 6. Create a [employer] password. You can change your password at any time. 7. Enter your Password Reset Question and Answer. This can be used at a later time if you forget your password. 8. Enter your e-mail address. You will receive e-mail notification when new information is available in [address] 9. Click Sign Up. You can now view and download portions of your medical record. 10. Click the Download Summary menu link to download a portable copy of your medical information. If you have questions, please visit the Frequently Asked Questions section of the [employer] website. Remember, [employer] is NOT to be used for urgent needs. For medical emergencies, dial 911. Now available from your iPhone and Android! Please provide this summary of care documentation to your next provider. Your primary care clinician is listed as WALESKA TO. If you have any questions after today's visit, please call [phone].

## 2018-01-09 LAB
ALBUMIN SERPL-MCNC: 3.7 G/DL (ref 3.4–5)
ALBUMIN/GLOB SERPL: 1.2 {RATIO} (ref 0.8–1.7)
ALP SERPL-CCNC: 77 U/L (ref 45–117)
ALT SERPL-CCNC: 22 U/L (ref 13–56)
ANION GAP SERPL CALC-SCNC: 9 MMOL/L (ref 3–18)
AST SERPL-CCNC: 27 U/L (ref 15–37)
BASOPHILS # BLD: 0 K/UL (ref 0–0.06)
BASOPHILS NFR BLD: 0 % (ref 0–2)
BILIRUB SERPL-MCNC: 0.3 MG/DL (ref 0.2–1)
BUN SERPL-MCNC: 19 MG/DL (ref 7–18)
BUN/CREAT SERPL: 12 (ref 12–20)
CALCIUM SERPL-MCNC: 9 MG/DL (ref 8.5–10.1)
CHLORIDE SERPL-SCNC: 104 MMOL/L (ref 100–108)
CO2 SERPL-SCNC: 27 MMOL/L (ref 21–32)
CREAT SERPL-MCNC: 1.53 MG/DL (ref 0.6–1.3)
DIFFERENTIAL METHOD BLD: ABNORMAL
EOSINOPHIL # BLD: 0 K/UL (ref 0–0.4)
EOSINOPHIL NFR BLD: 1 % (ref 0–5)
ERYTHROCYTE [DISTWIDTH] IN BLOOD BY AUTOMATED COUNT: 14.4 % (ref 11.6–14.5)
GLOBULIN SER CALC-MCNC: 3 G/DL (ref 2–4)
GLUCOSE SERPL-MCNC: 109 MG/DL (ref 74–99)
HCT VFR BLD AUTO: 38.1 % (ref 35–45)
HGB BLD-MCNC: 12.2 G/DL (ref 12–16)
LYMPHOCYTES # BLD: 1.6 K/UL (ref 0.9–3.6)
LYMPHOCYTES NFR BLD: 23 % (ref 21–52)
MCH RBC QN AUTO: 30 PG (ref 24–34)
MCHC RBC AUTO-ENTMCNC: 32 G/DL (ref 31–37)
MCV RBC AUTO: 93.6 FL (ref 74–97)
MONOCYTES # BLD: 0.4 K/UL (ref 0.05–1.2)
MONOCYTES NFR BLD: 6 % (ref 3–10)
NEUTS SEG # BLD: 5 K/UL (ref 1.8–8)
NEUTS SEG NFR BLD: 70 % (ref 40–73)
PLATELET # BLD AUTO: 279 K/UL (ref 135–420)
PMV BLD AUTO: 10.1 FL (ref 9.2–11.8)
POTASSIUM SERPL-SCNC: 4.5 MMOL/L (ref 3.5–5.5)
PROT SERPL-MCNC: 6.7 G/DL (ref 6.4–8.2)
RBC # BLD AUTO: 4.07 M/UL (ref 4.2–5.3)
SODIUM SERPL-SCNC: 140 MMOL/L (ref 136–145)
WBC # BLD AUTO: 7 K/UL (ref 4.6–13.2)

## 2018-01-09 NOTE — PROGRESS NOTES
Loreto Fraser is a 80 y.o.  female and presents with Chills; Elbow Pain (right); and Urinary Hesitancy      SUBJECTIVE:    Urinary Tract Infection  Patient complains of hesitancy. Onset was 1 week ago, unchanged since that time. Patient complains of chills. Patient denies back pain. There is not any concern of sexual abuse. There is not a history of trauma to the genital area. Patient does not have a history of recurrent UTI. Patient does not have a history of pyelonephritis. Elbow Pain  Patient complains of right elbow pain. Onset of the symptoms was 1 week ago. Inciting event: none known. Current symptoms include no other symptoms. Aggravating symptoms: none. Patient's overall course: gradually improving. Patient has had no prior elbow problems. Previous visits for this problem: none. Evaluation to date: none. Treatment to date: nothing specific. Respiratory ROS: negative for - shortness of breath  Cardiovascular ROS: negative for - chest pain    Current Outpatient Prescriptions   Medication Sig    hydrALAZINE (APRESOLINE) 50 mg tablet Take 25 mg by mouth three (3) times daily.  ciprofloxacin HCl (CIPRO) 500 mg tablet Take 1 Tab by mouth two (2) times a day for 3 days.  glipiZIDE SR (GLUCOTROL XL) 2.5 mg CR tablet Take 2.5 mg by mouth daily.  amLODIPine (NORVASC) 10 mg tablet TAKE 1 TABLET BY MOUTH EVERY DAY    latanoprost (XALATAN) 0.005 % ophthalmic solution     losartan (COZAAR) 100 mg tablet TAKE ONE TABLET BY MOUTH DIALY    pravastatin (PRAVACHOL) 40 mg tablet Take 1 Tab by mouth daily.  COMBIGAN 0.2-0.5 % drop ophthalmic solution Administer 1 Drop to both eyes every twelve (12) hours.  RESTASIS 0.05 % ophthalmic emulsion Administer 1 Drop to both eyes every twelve (12) hours.  ascorbic acid (VITAMIN C) 500 mg tablet Take 1,000 mg by mouth daily.  IBUPROFEN (ADVIL PO) Take 1 Tab by mouth as needed.  CALCIUM PO Take 1,000 mg by mouth two (2) times a day.     Blood-Glucose Meter monitoring kit by Does Not Apply route. Use daily as directed    glucose blood VI test strips (BLOOD GLUCOSE TEST) strip by Does Not Apply route. Use daily as directed    Lancets Misc by Does Not Apply route. Use daily as directed    alcohol swabs (ALCOHOL PREP PADS) PadM 1 Dose by Apply Externally route daily as needed (Glucose testing).  Cholecalciferol, Vitamin D3, (VITAMIN D) 1,000 unit Cap Take 2,000 Units by mouth daily.  MULTIVITAMINS PO Take 1 Tab by mouth daily.  aspirin 81 mg chewable tablet Take 81 mg by mouth daily.  nitroglycerin (NITROSTAT) 0.4 mg SL tablet 1 Tab by SubLINGual route every five (5) minutes as needed.  montelukast (SINGULAIR) 10 mg tablet Take 1 Tab by mouth daily.  sulfanilamide (AVC) 15 % vaginal cream Insert 1 Applicator into vagina daily.  guaiFENesin (ORGANIDIN) 400 mg tablet Take 1 Tab by mouth nightly as needed for Congestion. Indications: COUGH     No current facility-administered medications for this visit. OBJECTIVE:  alert, well appearing, and in no distress  Visit Vitals    /63 (BP 1 Location: Right arm, BP Patient Position: Sitting)    Pulse (!) 55    Temp 98.8 °F (37.1 °C) (Oral)    Resp 20    Ht 5' 2\" (1.575 m)    Wt 146 lb (66.2 kg)    SpO2 99%    BMI 26.7 kg/m2      well developed and well nourished  Musculoskeletal - right elbow without any swelling or pain to palpation.  Good ROM     Labs:   Lab Results   Component Value Date/Time    Sodium 140 01/08/2018 03:53 PM    Potassium 4.5 01/08/2018 03:53 PM    Chloride 104 01/08/2018 03:53 PM    CO2 26 01/08/2018 03:53 PM    Anion gap 10 01/08/2018 03:53 PM    Glucose 111 01/08/2018 03:53 PM    BUN 20 01/08/2018 03:53 PM    Creatinine 1.51 01/08/2018 03:53 PM    BUN/Creatinine ratio 13 01/08/2018 03:53 PM    GFR est AA 39 01/08/2018 03:53 PM    GFR est non-AA 32 01/08/2018 03:53 PM    Calcium 9.2 01/08/2018 03:53 PM    Bilirubin, total 0.3 01/08/2018 03:53 PM ALT (SGPT) 21 01/08/2018 03:53 PM    AST (SGOT) 24 01/08/2018 03:53 PM    Alk. phosphatase 81 01/08/2018 03:53 PM    Protein, total 6.8 01/08/2018 03:53 PM    Albumin 3.7 01/08/2018 03:53 PM    Globulin 3.1 01/08/2018 03:53 PM    A-G Ratio 1.2 01/08/2018 03:53 PM        CBC pending     Assessment/Plan      ICD-10-CM ICD-9-CM    1. Urinary hesitancy R39.11 788.64 AMB POC URINALYSIS DIP STICK AUTO W/O MICRO      Will treat empirically for UTI with ciprofloxacin HCl (CIPRO) 500 mg tablet      CBC WITH AUTOMATED DIFF      METABOLIC PANEL, COMPREHENSIVE   2. Right elbow pain M25.521 719.42 Etiology unclear. If not improving will refer to orthopedics      Follow-up Disposition:  Return if symptoms worsen or fail to improve. Reviewed plan of care. Patient has provided input and agrees with goals.

## 2018-01-11 NOTE — PROGRESS NOTES
I called pt in regards to Lab results. Informed Pt that lab results are good. Patient verbalized understanding.

## 2018-01-19 ENCOUNTER — HOSPITAL ENCOUNTER (EMERGENCY)
Age: 83
Discharge: HOME | End: 2018-01-19
Attending: EMERGENCY MEDICINE
Payer: MEDICARE

## 2018-01-19 VITALS
WEIGHT: 140 LBS | OXYGEN SATURATION: 96 % | HEIGHT: 61 IN | SYSTOLIC BLOOD PRESSURE: 147 MMHG | TEMPERATURE: 98 F | DIASTOLIC BLOOD PRESSURE: 63 MMHG | HEART RATE: 61 BPM | RESPIRATION RATE: 18 BRPM | BODY MASS INDEX: 26.43 KG/M2

## 2018-01-19 DIAGNOSIS — R00.2 PALPITATIONS: Primary | ICD-10-CM

## 2018-01-19 DIAGNOSIS — R00.1 SINUS BRADYCARDIA: ICD-10-CM

## 2018-01-19 LAB
ANION GAP SERPL CALC-SCNC: 9 MMOL/L (ref 3–18)
BASOPHILS # BLD: 0 K/UL (ref 0–0.06)
BASOPHILS NFR BLD: 0 % (ref 0–2)
BUN SERPL-MCNC: 24 MG/DL (ref 7–18)
BUN/CREAT SERPL: 17 (ref 12–20)
CALCIUM SERPL-MCNC: 9.3 MG/DL (ref 8.5–10.1)
CHLORIDE SERPL-SCNC: 106 MMOL/L (ref 100–108)
CO2 SERPL-SCNC: 27 MMOL/L (ref 21–32)
CREAT SERPL-MCNC: 1.4 MG/DL (ref 0.6–1.3)
DIFFERENTIAL METHOD BLD: ABNORMAL
EOSINOPHIL # BLD: 0.2 K/UL (ref 0–0.4)
EOSINOPHIL NFR BLD: 2 % (ref 0–5)
ERYTHROCYTE [DISTWIDTH] IN BLOOD BY AUTOMATED COUNT: 13.9 % (ref 11.6–14.5)
GLUCOSE SERPL-MCNC: 144 MG/DL (ref 74–99)
HCT VFR BLD AUTO: 37.3 % (ref 35–45)
HGB BLD-MCNC: 11.8 G/DL (ref 12–16)
LYMPHOCYTES # BLD: 1.4 K/UL (ref 0.9–3.6)
LYMPHOCYTES NFR BLD: 22 % (ref 21–52)
MAGNESIUM SERPL-MCNC: 1.8 MG/DL (ref 1.6–2.6)
MCH RBC QN AUTO: 28.7 PG (ref 24–34)
MCHC RBC AUTO-ENTMCNC: 31.6 G/DL (ref 31–37)
MCV RBC AUTO: 90.8 FL (ref 74–97)
MONOCYTES # BLD: 0.4 K/UL (ref 0.05–1.2)
MONOCYTES NFR BLD: 6 % (ref 3–10)
NEUTS SEG # BLD: 4.5 K/UL (ref 1.8–8)
NEUTS SEG NFR BLD: 70 % (ref 40–73)
PLATELET # BLD AUTO: 289 K/UL (ref 135–420)
PMV BLD AUTO: 9.9 FL (ref 9.2–11.8)
POTASSIUM SERPL-SCNC: 4.5 MMOL/L (ref 3.5–5.5)
RBC # BLD AUTO: 4.11 M/UL (ref 4.2–5.3)
SODIUM SERPL-SCNC: 142 MMOL/L (ref 136–145)
TSH SERPL DL<=0.05 MIU/L-ACNC: 0.96 UIU/ML (ref 0.36–3.74)
WBC # BLD AUTO: 6.5 K/UL (ref 4.6–13.2)

## 2018-01-19 PROCEDURE — 83735 ASSAY OF MAGNESIUM: CPT | Performed by: EMERGENCY MEDICINE

## 2018-01-19 PROCEDURE — 80048 BASIC METABOLIC PNL TOTAL CA: CPT | Performed by: EMERGENCY MEDICINE

## 2018-01-19 PROCEDURE — 99285 EMERGENCY DEPT VISIT HI MDM: CPT

## 2018-01-19 PROCEDURE — 84443 ASSAY THYROID STIM HORMONE: CPT | Performed by: EMERGENCY MEDICINE

## 2018-01-19 PROCEDURE — 85025 COMPLETE CBC W/AUTO DIFF WBC: CPT | Performed by: EMERGENCY MEDICINE

## 2018-01-19 PROCEDURE — 93005 ELECTROCARDIOGRAM TRACING: CPT

## 2018-01-19 NOTE — ED NOTES
Pt states ready for discharge. Pt states she will follow up with PCP and Cardiology as instructed by provider. Pt appears in NOAD. I have reviewed discharge instructions with the patient. The patient verbalized understanding. Patient seen leaving ED ambulatory without difficulty or need for assistance, with S/O in no sign of distress.  Patient armband removed and shredded    Current Discharge Medication List

## 2018-01-19 NOTE — ED PROVIDER NOTES
EMERGENCY DEPARTMENT HISTORY AND PHYSICAL EXAM    8:16 AM      Date: 1/19/2018  Patient Name: Anmol Bauer    History of Presenting Illness     Chief Complaint   Patient presents with    Rapid Heart Rate         History Provided By: Patient    Chief Complaint: Palpitations   Duration:  Months  Timing:  Intermittent  Location: N/A  Quality: N/A  Severity: N/A  Modifying Factors: 81 mg of aspirin with relief   Associated Symptoms: denies any other associated signs or symptoms      Additional History (Context): Anmol Bauer is a 80 y.o. female with PMHx of HTN, Type II DM, heart failure, and MI presenting to the ED c/o intermittent sensations of rapid heart rate for the past 1-2 months. States symptoms occur \"most nights\" and wakes her up at night. Reports taking 81 mg aspirin every day and \"it quiets it down. \" Pt is asymptomatic at this time. She was evaluated for same symptoms last month by her cardiologist Dr. Kenzie Mckeon and had 24 hour Holter monitor placed. She is supposed to follow-up for results in the coming weeks. Denies chest pain, shortness of breath, dizziness, and any other symptoms or complaints. On previous visits with her cardiologist, patient was noted to have resting HR around 46 BPM, sinus bradycardia. Her Atenolol was discontinue on her last appt on month ago. PCP: Dori Orozco MD    Current Outpatient Prescriptions   Medication Sig Dispense Refill    hydrALAZINE (APRESOLINE) 50 mg tablet Take 25 mg by mouth three (3) times daily.  nitroglycerin (NITROSTAT) 0.4 mg SL tablet 1 Tab by SubLINGual route every five (5) minutes as needed. 1 Bottle 1    glipiZIDE SR (GLUCOTROL XL) 2.5 mg CR tablet Take 2.5 mg by mouth daily.       amLODIPine (NORVASC) 10 mg tablet TAKE 1 TABLET BY MOUTH EVERY DAY 90 Tab 3    latanoprost (XALATAN) 0.005 % ophthalmic solution       losartan (COZAAR) 100 mg tablet TAKE ONE TABLET BY MOUTH DIALY 90 Tab 3    pravastatin (PRAVACHOL) 40 mg tablet Take 1 Tab by mouth daily. 30 Tab 6    COMBIGAN 0.2-0.5 % drop ophthalmic solution Administer 1 Drop to both eyes every twelve (12) hours.  RESTASIS 0.05 % ophthalmic emulsion Administer 1 Drop to both eyes every twelve (12) hours.  ascorbic acid (VITAMIN C) 500 mg tablet Take 1,000 mg by mouth daily.  IBUPROFEN (ADVIL PO) Take 1 Tab by mouth as needed.  CALCIUM PO Take 1,000 mg by mouth two (2) times a day.  Blood-Glucose Meter monitoring kit by Does Not Apply route. Use daily as directed 1 Kit 0    glucose blood VI test strips (BLOOD GLUCOSE TEST) strip by Does Not Apply route. Use daily as directed 3 Package 3    Lancets Misc by Does Not Apply route. Use daily as directed 3 Package 3    alcohol swabs (ALCOHOL PREP PADS) PadM 1 Dose by Apply Externally route daily as needed (Glucose testing). 3 Package 3    Cholecalciferol, Vitamin D3, (VITAMIN D) 1,000 unit Cap Take 2,000 Units by mouth daily. 180 Cap 3    MULTIVITAMINS PO Take 1 Tab by mouth daily.  aspirin 81 mg chewable tablet Take 81 mg by mouth daily.  montelukast (SINGULAIR) 10 mg tablet Take 1 Tab by mouth daily. 90 Tab 2    sulfanilamide (AVC) 15 % vaginal cream Insert 1 Applicator into vagina daily. 120 g 0    guaiFENesin (ORGANIDIN) 400 mg tablet Take 1 Tab by mouth nightly as needed for Congestion. Indications: COUGH 15 Tab 1       Past History     Past Medical History:  Past Medical History:   Diagnosis Date    Arthritis of right knee     CAD (coronary artery disease), native coronary artery October 2010    mild disease    Cardiac cath 10/21/2010    dLM 20%. mLAD 25%. CX mild. pRCA 30%. EF 30-35%. Anterolateral, apical , diaphrag akin. Hypercontractile basal segments. Severe elev left-sided filling press.  Cardiac echocardiogram 02/02/2011    EF 50-55%. Mild apical hypk. Gr 1 DDfx. RVSP 40-45. Mild LAE.  Cardiac Holter monitor 06/10/2015    Sinus rhythm w/avg HR of 53 bpm (range 40-85 bpm). Occasional PACs. One 3-beat run of nonsustained SVT. Very few PVCs. One 3-beat run of nonsustained VT. Pt's HR was < 50 bpm for 8 hrs of the recorded time. No pauses noted. Two episodes of neck pain corresponded to sinus rhythm w/o ectopy.  Cardiovascular renal duplex 12/03/2012    No renal artery stenosis bilaterally. Bilateral intrinsic/med disease. Patent bilateral renal veins. Multiple cysts on both kidneys.  CKD (chronic kidney disease)     while on diuretics for difficult to control HTN    Diabetes mellitus type II 3/15/2010    Dyslipidemia 3/15/2010    Heart attack     2010    Heart failure, diastolic, chronic ([facility] [address]     History of heart attack     HTN (hypertension) 3/15/2010    Hypertensive heart disease     Loss of appetite     Osteopenia 7/13/2010    Other dyspnea and respiratory abnormality     Ringing in the ears     Stress-induced cardiomyopathy     EF 35% (LV angio 10/2010), then EF 50-55% (echo, Feb 2011)    Wears glasses        Past Surgical History:  Past Surgical History:   Procedure Laterality Date    HX BLADDER SUSPENSION  2009    HX HYSTERECTOMY  1962       Family History:  Family History   Problem Relation Age of Onset    Hypertension Mother     Arthritis-osteo Other        Social History:  Social History   Substance Use Topics    Smoking status: Never Smoker    Smokeless tobacco: Never Used    Alcohol use No       Allergies: Allergies   Allergen Reactions    Lipitor [Atorvastatin] Myalgia    Spironolactone Other (comments)     Dizziness and fatigue         Review of Systems     Review of Systems   Constitutional: Negative for chills, fatigue and fever. HENT: Negative for congestion, ear pain, rhinorrhea and sore throat. Eyes: Negative for pain, redness and itching. Respiratory: Negative for cough, chest tightness and shortness of breath. Cardiovascular: Positive for palpitations (rapid heart rate). Negative for chest pain and leg swelling. Gastrointestinal: Negative for abdominal pain, diarrhea, nausea and vomiting. Genitourinary: Negative for decreased urine volume, dysuria, flank pain, hematuria and pelvic pain. Musculoskeletal: Negative for arthralgias, back pain, joint swelling and myalgias. Skin: Negative for color change, pallor and rash. Neurological: Negative for dizziness, weakness and headaches. Hematological: Negative for adenopathy. Does not bruise/bleed easily. Physical Exam     Visit Vitals    /56    Pulse (!) 50    Temp 98 °F (36.7 °C)    Resp 17    Ht 5' 1\" (1.549 m)    Wt 63.5 kg (140 lb)    SpO2 98%    BMI 26.45 kg/m2       Physical Exam   Constitutional: No distress. HENT:   Head: Normocephalic and atraumatic. Mouth/Throat: Oropharynx is clear and moist.   Eyes: Conjunctivae and EOM are normal. Pupils are equal, round, and reactive to light. Neck: Normal range of motion. Neck supple. Cardiovascular: Regular rhythm. Bradycardia present. Murmur heard. Systolic murmur is present with a grade of 3/6   Pulmonary/Chest: Effort normal and breath sounds normal. She has no wheezes. She has no rales. Abdominal: Soft. Bowel sounds are normal. She exhibits no distension. There is no tenderness. Musculoskeletal: Normal range of motion. She exhibits no edema or deformity. Lymphadenopathy:     She has no cervical adenopathy. Neurological: She is alert. She exhibits normal muscle tone. Coordination and gait normal.   Skin: Skin is warm and dry. No rash noted. She is not diaphoretic. No erythema. Psychiatric: She has a normal mood and affect.  Her behavior is normal.       Diagnostic Study Results     Labs -  Recent Results (from the past 12 hour(s))   EKG, 12 LEAD, INITIAL    Collection Time: 01/19/18  8:08 AM   Result Value Ref Range    Ventricular Rate 48 BPM    Atrial Rate 48 BPM    P-R Interval 176 ms    QRS Duration 76 ms    Q-T Interval 440 ms    QTC Calculation (Bezet) 393 ms Calculated P Axis 15 degrees    Calculated R Axis 12 degrees    Calculated T Axis 50 degrees    Diagnosis       Marked sinus bradycardia  Abnormal ECG  When compared with ECG of 23-OCT-2010 03:37,  Vent. rate has decreased BY  23 BPM  Criteria for Inferior infarct are no longer present  ST no longer elevated in Anterior leads  T wave inversion no longer evident in Inferior leads  T wave inversion no longer evident in Anterolateral leads  QT has shortened     METABOLIC PANEL, BASIC    Collection Time: 01/19/18  8:30 AM   Result Value Ref Range    Sodium 142 136 - 145 mmol/L    Potassium 4.5 3.5 - 5.5 mmol/L    Chloride 106 100 - 108 mmol/L    CO2 27 21 - 32 mmol/L    Anion gap 9 3.0 - 18 mmol/L    Glucose 144 (H) 74 - 99 mg/dL    BUN 24 (H) 7.0 - 18 MG/DL    Creatinine 1.40 (H) 0.6 - 1.3 MG/DL    BUN/Creatinine ratio 17 12 - 20      GFR est AA 42 (L) >60 ml/min/1.73m2    GFR est non-AA 35 (L) >60 ml/min/1.73m2    Calcium 9.3 8.5 - 10.1 MG/DL   MAGNESIUM    Collection Time: 01/19/18  8:30 AM   Result Value Ref Range    Magnesium 1.8 1.6 - 2.6 mg/dL   CBC WITH AUTOMATED DIFF    Collection Time: 01/19/18  8:30 AM   Result Value Ref Range    WBC 6.5 4.6 - 13.2 K/uL    RBC 4.11 (L) 4.20 - 5.30 M/uL    HGB 11.8 (L) 12.0 - 16.0 g/dL    HCT 37.3 35.0 - 45.0 %    MCV 90.8 74.0 - 97.0 FL    MCH 28.7 24.0 - 34.0 PG    MCHC 31.6 31.0 - 37.0 g/dL    RDW 13.9 11.6 - 14.5 %    PLATELET 289 619 - 635 K/uL    MPV 9.9 9.2 - 11.8 FL    NEUTROPHILS 70 40 - 73 %    LYMPHOCYTES 22 21 - 52 %    MONOCYTES 6 3 - 10 %    EOSINOPHILS 2 0 - 5 %    BASOPHILS 0 0 - 2 %    ABS. NEUTROPHILS 4.5 1.8 - 8.0 K/UL    ABS. LYMPHOCYTES 1.4 0.9 - 3.6 K/UL    ABS. MONOCYTES 0.4 0.05 - 1.2 K/UL    ABS. EOSINOPHILS 0.2 0.0 - 0.4 K/UL    ABS.  BASOPHILS 0.0 0.0 - 0.06 K/UL    DF AUTOMATED     TSH 3RD GENERATION    Collection Time: 01/19/18  8:30 AM   Result Value Ref Range    TSH 0.96 0.36 - 3.74 uIU/mL       Radiologic Studies -   No orders to display Medical Decision Making   I am the first provider for this patient. I reviewed the vital signs, available nursing notes, past medical history, past surgical history, family history and social history. Vital Signs-Reviewed the patient's vital signs. Pulse Oximetry Analysis -  100% on room air, normal     Cardiac Monitor:  Rate: 46  Rhythm:  Sinus bradycardia    EKG: Interpreted by the EP. Time Interpreted: 08:09   Rate: 48   Rhythm: Sinus bradycardia   Interpretation: Sinus saurabh, no acute ST changes, otherwise normal intervals        Records Reviewed: Nursing Notes and Old Medical Records (Time of Review: 8:20 AM)    ED Course: Progress Notes, Reevaluation, and Consults:  10:35 AM Consult: I discussed care with Milad Rust (Cardiology PA). It was a standard discussion including patient history, chief complaint, available diagnostic results, and predicted treatment course. Will review patient's records and call back. 11:03 AM Consult: I discussed care with Milad Rust (Cardiology PA). Reviewed patient's monitor records. Pt has had no \"events. \" Heart rate has ranged from  bpm, sinus rhythm. Zoila will arrange for outpatient follow-up appointment in the office. 11:20 AM  Patient ambulated around ED with nurse with no assistance. No dizziness/lightheadedness, dyspnea, or CP. HR 60-65 BPM and regular while ambulating. Discussed results and follow-up plan with patient and family. Diagnosis     Clinical Impression:   1. Palpitations    2.  Sinus bradycardia        Disposition: Discharged     Follow-up Information     Follow up With Details Comments 0030 Gold Moore, DO  The office will call you to arrange a follow-up appointment next week [facility] [address] EMERGENCY DEPT  If symptoms worsen [address] [facility]  [phone]           Patient's Medications   Start Taking    No medications on file   Continue Taking    ALCOHOL SWABS (ALCOHOL PREP PADS) PADM    1 Dose by Apply Externally route daily as needed (Glucose testing). AMLODIPINE (NORVASC) 10 MG TABLET    TAKE 1 TABLET BY MOUTH EVERY DAY    ASCORBIC ACID (VITAMIN C) 500 MG TABLET    Take 1,000 mg by mouth daily. ASPIRIN 81 MG CHEWABLE TABLET    Take 81 mg by mouth daily. BLOOD-GLUCOSE METER MONITORING KIT    by Does Not Apply route. Use daily as directed    CALCIUM PO    Take 1,000 mg by mouth two (2) times a day. CHOLECALCIFEROL, VITAMIN D3, (VITAMIN D) 1,000 UNIT CAP    Take 2,000 Units by mouth daily. COMBIGAN 0.2-0.5 % DROP OPHTHALMIC SOLUTION    Administer 1 Drop to both eyes every twelve (12) hours. GLIPIZIDE SR (GLUCOTROL XL) 2.5 MG CR TABLET    Take 2.5 mg by mouth daily. GLUCOSE BLOOD VI TEST STRIPS (BLOOD GLUCOSE TEST) STRIP    by Does Not Apply route. Use daily as directed    GUAIFENESIN (ORGANIDIN) 400 MG TABLET    Take 1 Tab by mouth nightly as needed for Congestion. Indications: COUGH    HYDRALAZINE (APRESOLINE) 50 MG TABLET    Take 25 mg by mouth three (3) times daily. IBUPROFEN (ADVIL PO)    Take 1 Tab by mouth as needed. LANCETS MISC    by Does Not Apply route. Use daily as directed    LATANOPROST (XALATAN) 0.005 % OPHTHALMIC SOLUTION        LOSARTAN (COZAAR) 100 MG TABLET    TAKE ONE TABLET BY MOUTH DIALY    MONTELUKAST (SINGULAIR) 10 MG TABLET    Take 1 Tab by mouth daily. MULTIVITAMINS PO    Take 1 Tab by mouth daily. NITROGLYCERIN (NITROSTAT) 0.4 MG SL TABLET    1 Tab by SubLINGual route every five (5) minutes as needed. PRAVASTATIN (PRAVACHOL) 40 MG TABLET    Take 1 Tab by mouth daily. RESTASIS 0.05 % OPHTHALMIC EMULSION    Administer 1 Drop to both eyes every twelve (12) hours. SULFANILAMIDE (AVC) 15 % VAGINAL CREAM    Insert 1 Applicator into vagina daily.    These Medications have changed    No medications on file   Stop Taking    No medications on file _______________________________    Attestations:  Scribe Attestation     Gabi Chinchilla acting as a scribe for and in the presence of Zuleima Fraser MD      January 19, 2018 at 11:22 AM       Provider Attestation:      I personally performed the services described in the documentation, reviewed the documentation, as recorded by the scribe in my presence, and it accurately and completely records my words and actions.  January 19, 2018 at 65 Josefa Haywood MD    _______________________________

## 2018-01-19 NOTE — ED NOTES
Pt ambulated without difficulties. Pt with noted HR at 60 bpm and oxygen saturation of 96% on the pulse oximeter with ambulation. Pt denies dizziness or SOB.  Pt appears in NOAD

## 2018-01-19 NOTE — DISCHARGE INSTRUCTIONS
IF YOU HAVE NEW OR WORSENING SYMPTOMS, PASSING OUT, FEELING VERY DIZZY OR LIGHTHEADED, CHEST PAIN, SHORTNESS OF BREATH, OR ANY OTHER WORRYING SIGNS THEN RETURN TO THE ER RIGHT AWAY. Palpitations: Care Instructions  Your Care Instructions    Heart palpitations are the uncomfortable sensation that your heart is beating fast or irregularly. You might feel pounding or fluttering in your chest. It might feel like your heart is skipping a beat. Although palpitations may be caused by a heart problem, they also occur because of stress, fatigue, or use of alcohol, caffeine, or nicotine. Many medicines, including diet pills, antihistamines, decongestants, and some herbal products, can cause heart palpitations. Nearly everyone has palpitations from time to time. Depending on your symptoms, your doctor may need to do more tests to try to find the cause of your palpitations. Follow-up care is a key part of your treatment and safety. Be sure to make and go to all appointments, and call your doctor if you are having problems. It's also a good idea to know your test results and keep a list of the medicines you take. How can you care for yourself at home? · Avoid caffeine, nicotine, and excess alcohol. · Do not take illegal drugs, such as methamphetamines and cocaine. · Do not take weight loss or diet medicines unless you talk with your doctor first.  · Get plenty of sleep. · Do not overeat. · If you have palpitations again, take deep breaths and try to relax. This may slow a racing heart. · If you start to feel lightheaded, lie down to avoid injuries that might result if you pass out and fall down. · Keep a record of your palpitations and bring it to your next doctor's appointment. Write down:  ¨ The date and time. ¨ Your pulse. (If your heart is beating fast, it may be hard to count your pulse.)  ¨ What you were doing when the palpitations started. ¨ How long the palpitations lasted. ¨ Any other symptoms.   · If an activity causes palpitations, slow down or stop. Talk to your doctor before you do that activity again. · Take your medicines exactly as prescribed. Call your doctor if you think you are having a problem with your medicine. When should you call for help? Call 911 anytime you think you may need emergency care. For example, call if:  ? · You passed out (lost consciousness). ? · You have symptoms of a heart attack. These may include:  ¨ Chest pain or pressure, or a strange feeling in the chest.  ¨ Sweating. ¨ Shortness of breath. ¨ Pain, pressure, or a strange feeling in the back, neck, jaw, or upper belly or in one or both shoulders or arms. ¨ Lightheadedness or sudden weakness. ¨ A fast or irregular heartbeat. After you call 911, the  may tell you to chew 1 adult-strength or 2 to 4 low-dose aspirin. Wait for an ambulance. Do not try to drive yourself. ? · You have symptoms of a stroke. These may include:  ¨ Sudden numbness, tingling, weakness, or loss of movement in your face, arm, or leg, especially on only one side of your body. ¨ Sudden vision changes. ¨ Sudden trouble speaking. ¨ Sudden confusion or trouble understanding simple statements. ¨ Sudden problems with walking or balance. ¨ A sudden, severe headache that is different from past headaches. ?Call your doctor now or seek immediate medical care if:  ? · You have heart palpitations and:  ¨ Are dizzy or lightheaded, or you feel like you may faint. ¨ Have new or increased shortness of breath. ? Watch closely for changes in your health, and be sure to contact your doctor if:  ? · You continue to have heart palpitations. Where can you learn more? Go to http://jason-tiera.info/. Enter R508 in the search box to learn more about \"Palpitations: Care Instructions. \"  Current as of: September 21, 2016  Content Version: 11.4  © 6507-1634 [employer], [employer].  Care instructions adapted under license by [facility] (which disclaims liability or warranty for this information). If you have questions about a medical condition or this instruction, always ask your healthcare professional. [address] any warranty or liability for your use of this information. Bradycardia: Care Instructions  Your Care Instructions    Bradycardia is a slow heart rate. If your heart beats too slowly, it can't supply your body with enough blood. This can make you weak or dizzy. Or it may make you pass out. Sometimes medicine can cause this problem. If this happens, your doctor may have you adjust one of your medicines. If a medicine is not the problem, your doctor may recommend a pacemaker. It is important to treat bradycardia so that you don't get more serious health problems. Your doctor will want to see you on a routine schedule to make sure that your heartbeat is normal.  Follow-up care is a key part of your treatment and safety. Be sure to make and go to all appointments, and call your doctor if you are having problems. It's also a good idea to know your test results and keep a list of the medicines you take. How can you care for yourself at home? · Take your medicines exactly as prescribed. Call your doctor if you think you are having a problem with your medicine. If your bradycardia is caused by another disease, your doctor will try to treat the disease. If it is caused by heart medicines, he or she will adjust your medicines. · Make lifestyle changes to improve your heart health. ¨ Get regular exercise. Try for 30 minutes on most days of the week. If you do not have other heart problems, you likely do not have limits on the type or level of activity that you can do. You may want to walk, swim, bike, or do other activities. Ask your doctor what level of exercise is safe for you. ¨ To control your cholesterol, avoid foods with a lot of fat, saturated fat, or sodium. Try to eat more fiber.  And if your doctor says it's okay, get some exercise on most days. ¨ Do not smoke. Smoking can make your heart condition worse. If you need help quitting, talk to your doctor about stop-smoking programs and medicines. These can increase your chances of quitting for good. ¨ Limit alcohol to 2 drinks a day for men and 1 drink a day for women. Too much alcohol can cause health problems. Pacemaker  If you have a pacemaker, you will get more specific information about it. Be sure to:  · Check your pulse as your doctor tells you. · Have your pacemaker checked as often as your doctor recommends. You may be able to do this over the phone or computer. · Avoid strong magnetic or electrical fields. These include wand metal detectors used in airports, MRIs, welding equipment, and generators. · You will be checked several times right after you get your pacemaker and when it is time to have the battery changed. Batteries last for 5 to 15 years. · You can talk on a cell phone. But keep it 6 inches away from your pacemaker. · Microwaves, TVs, radios, and kitchen and bathroom appliances won't harm you. When should you call for help? Call 911 anytime you think you may need emergency care. For example, call if:  ? · You have symptoms of sudden heart failure. These may include:  ¨ Severe trouble breathing. ¨ A fast or irregular heartbeat. ¨ Coughing up pink, foamy mucus. ¨ You passed out. ? · You have symptoms of a stroke. These may include:  ¨ Sudden numbness, tingling, weakness, or loss of movement in your face, arm, or leg, especially on only one side of your body. ¨ Sudden vision changes. ¨ Sudden trouble speaking. ¨ Sudden confusion or trouble understanding simple statements. ¨ Sudden problems with walking or balance. ¨ A sudden, severe headache that is different from past headaches.    ?Call your doctor now or seek immediate medical care if:  ? · You have new or changed symptoms of heart failure, such as:  ¨ New or increased shortness of breath. ¨ New or worse swelling in your legs, ankles, or feet. ¨ Sudden weight gain, such as more than 2 to 3 pounds in a day or 5 pounds in a week. (Your doctor may suggest a different range of weight gain.)  ¨ Feeling dizzy or lightheaded or like you may faint. ¨ Feeling so tired or weak that you cannot do your usual activities. ¨ Not sleeping well. Shortness of breath wakes you at night. You need extra pillows to prop yourself up to breathe easier. ? Watch closely for changes in your health, and be sure to contact your doctor if:  ? · You do not get better as expected. Where can you learn more? Go to http://jason-tiera.info/. Enter F964 in the search box to learn more about \"Bradycardia: Care Instructions. \"  Current as of: September 21, 2016  Content Version: 11.4  © 9446-7809 [employer]. Care instructions adapted under license by [employer] (which disclaims liability or warranty for this information). If you have questions about a medical condition or this instruction, always ask your healthcare professional. [place] [address] any warranty or liability for your use of this information.

## 2018-01-20 LAB
ATRIAL RATE: 48 BPM
CALCULATED P AXIS, ECG09: 15 DEGREES
CALCULATED R AXIS, ECG10: 12 DEGREES
CALCULATED T AXIS, ECG11: 50 DEGREES
DIAGNOSIS, 93000: NORMAL
P-R INTERVAL, ECG05: 176 MS
Q-T INTERVAL, ECG07: 440 MS
QRS DURATION, ECG06: 76 MS
QTC CALCULATION (BEZET), ECG08: 393 MS
VENTRICULAR RATE, ECG03: 48 BPM

## 2018-01-22 ENCOUNTER — PATIENT OUTREACH (OUTPATIENT)
Dept: FAMILY MEDICINE CLINIC | Age: 83
End: 2018-01-22

## 2018-01-22 NOTE — PROGRESS NOTES
Attempted to contact patient post [facility] ED visit 1/19/18. Left voice message. Will continue to follow.

## 2018-01-23 ENCOUNTER — TELEPHONE (OUTPATIENT)
Dept: CARDIOLOGY CLINIC | Age: 83
End: 2018-01-23

## 2018-01-23 NOTE — TELEPHONE ENCOUNTER
Spoke with patient to get an appointment scheduled after she was in the ER. She states that she will have her daughter call to make the appointment . Yohannes Morataya As she is her transportation and it has to go by her availability. She needs to see Wendy Olvera within the next 7-10 days. Jose Daniel Polanco  Female, 89 y.o., 01/05/1923  Weight:   63.5 kg (140 lb)  Phone:   [phone]  FYI Nov 2012 - Flowsheet Error  PCP:   Supriya Martinez MD  MRN:   [id number]  [address]:   Pending  Next Appt (With Me)  None  Next Appt (Any Provider)  01/26/2018    f/u    Schedule follow-up appointment Received: 4 days ago       KATTY Hollins                     Patient was in ER with palpitations. Will need close office follow up.  Thanks.

## 2018-01-26 ENCOUNTER — OFFICE VISIT (OUTPATIENT)
Dept: ORTHOPEDIC SURGERY | Age: 83
End: 2018-01-26

## 2018-01-26 VITALS
OXYGEN SATURATION: 99 % | HEART RATE: 49 BPM | BODY MASS INDEX: 27.49 KG/M2 | SYSTOLIC BLOOD PRESSURE: 148 MMHG | TEMPERATURE: 97.5 F | DIASTOLIC BLOOD PRESSURE: 59 MMHG | HEIGHT: 61 IN | RESPIRATION RATE: 18 BRPM | WEIGHT: 145.6 LBS

## 2018-01-26 DIAGNOSIS — M79.601 RIGHT ARM PAIN: Primary | ICD-10-CM

## 2018-01-26 DIAGNOSIS — G89.29 CHRONIC RIGHT SHOULDER PAIN: ICD-10-CM

## 2018-01-26 DIAGNOSIS — M25.511 CHRONIC RIGHT SHOULDER PAIN: ICD-10-CM

## 2018-01-26 PROBLEM — E11.21 TYPE 2 DIABETES MELLITUS WITH NEPHROPATHY (HCC): Status: ACTIVE | Noted: 2018-01-26

## 2018-01-26 RX ORDER — BETAMETHASONE SODIUM PHOSPHATE AND BETAMETHASONE ACETATE 3; 3 MG/ML; MG/ML
6 INJECTION, SUSPENSION INTRA-ARTICULAR; INTRALESIONAL; INTRAMUSCULAR; SOFT TISSUE ONCE
Qty: 1 ML | Refills: 0
Start: 2018-01-26 | End: 2018-01-26

## 2018-01-26 NOTE — PROGRESS NOTES
Patient: Alexis Costello                MRN: [id number]       SSN: [id number]  YOB: 1923        AGE: 80 y.o. SEX: female  Body mass index is 27.51 kg/(m^2). PCP: Jennifer Wyman MD  01/26/18    HISTORY: I had the pleasure of reviewing Angela. Chula Santacruz is complaining of deltoid type pain to the right shoulder, moderate, aching, and it radiates down to about the mid-humerus level. She denies any history of trauma. She has difficulty combing her hair or putting her bra on. She has some discomfort at night as well. She has no fever or chills. She is otherwise feeling well. PHYSICAL EXAMINATION:  She is 89-years-young. She definitely appears younger than her stated age, and she actually walks fairly well. The hips rotate adequately. The wrists and hand are noncontributory. The elbow is noncontributory. There is good supination and pronation. There is just slight evidence of neuropathy in the fingers but good capillary refill. Both hands are warm and well-perfused. Good pulse is present. Alin Lenore' sign is positive. She has about 75° of forward elevation about 90° of abduction. ER strength is somewhat diminished, and Alin Lenore' sign is positive. The biceps tendon is only mildly tender. The [facility] joint is mildly tender. RADIOGRAPHS:  Review of her x-rays, AP and lateral of the humerus show degenerative arthritis involving the humerus itself. She does have some cortical thickening arising at the lateral mid-shaft humerus and the junction of the proximal and middle thirds. PROCEDURE:  Under aseptic conditions and after informed, written consent with a time out, the right shoulder was injected with 1 cc of the Celestone preparation, i.e. 6 mg, which was well tolerated. PLAN:  I am wondering if it is just a reaction from the insertion of the deltoid. Having said this, it does look well corticated, however, the cortication is widened at that level.  For this reason, I am going to obtain an MRI of the humerus. Hopefully, it is just benign from the insertion. We will see her back after the MRI result. Hopefully, it is just negative and benign. REVIEW OF SYSTEMS:      CON: negative for weight loss, fever  EYE: negative for double vision  ENT: negative for hoarseness  RS:   negative for Tb  GI:    negative for blood in stool  :  negative for blood in urine  Other systems reviewed and noted below. Past Medical History:   Diagnosis Date    Arthritis of right knee     CAD (coronary artery disease), native coronary artery October 2010    mild disease    Cardiac cath 10/21/2010    dLM 20%. mLAD 25%. CX mild. pRCA 30%. EF 30-35%. Anterolateral, apical , diaphrag akin. Hypercontractile basal segments. Severe elev left-sided filling press.  Cardiac echocardiogram 02/02/2011    EF 50-55%. Mild apical hypk. Gr 1 DDfx. RVSP 40-45. Mild LAE.  Cardiac Holter monitor 06/10/2015    Sinus rhythm w/avg HR of 53 bpm (range 40-85 bpm). Occasional PACs. One 3-beat run of nonsustained SVT. Very few PVCs. One 3-beat run of nonsustained VT. Pt's HR was < 50 bpm for 8 hrs of the recorded time. No pauses noted. Two episodes of neck pain corresponded to sinus rhythm w/o ectopy.  Cardiovascular renal duplex 12/03/2012    No renal artery stenosis bilaterally. Bilateral intrinsic/med disease. Patent bilateral renal veins. Multiple cysts on both kidneys.     CKD (chronic kidney disease)     while on diuretics for difficult to control HTN    Diabetes mellitus type II 3/15/2010    Dyslipidemia 3/15/2010    Heart attack     2010    Heart failure, diastolic, chronic (HCC)     History of heart attack     HTN (hypertension) 3/15/2010    Hypertensive heart disease     Loss of appetite     Osteopenia 7/13/2010    Other dyspnea and respiratory abnormality     Ringing in the ears     Stress-induced cardiomyopathy     EF 35% (LV angio 10/2010), then EF 50-55% (echo, Feb 2011)    Wears glasses        Family History   Problem Relation Age of Onset    Hypertension Mother     Arthritis-osteo Other        Current Outpatient Prescriptions   Medication Sig Dispense Refill    betamethasone (CELESTONE SOLUSPAN) 6 mg/mL injection 1 mL by Intra artICUlar route once for 1 dose. 1 mL 0    glipiZIDE SR (GLUCOTROL XL) 2.5 mg CR tablet TAKE 1 TABLET BY MOUTH EVERY DAY 90 Tab 1    hydrALAZINE (APRESOLINE) 50 mg tablet Take 25 mg by mouth three (3) times daily.  amLODIPine (NORVASC) 10 mg tablet TAKE 1 TABLET BY MOUTH EVERY DAY 90 Tab 3    latanoprost (XALATAN) 0.005 % ophthalmic solution       losartan (COZAAR) 100 mg tablet TAKE ONE TABLET BY MOUTH DIALY 90 Tab 3    pravastatin (PRAVACHOL) 40 mg tablet Take 1 Tab by mouth daily. 30 Tab 6    COMBIGAN 0.2-0.5 % drop ophthalmic solution Administer 1 Drop to both eyes every twelve (12) hours.  RESTASIS 0.05 % ophthalmic emulsion Administer 1 Drop to both eyes every twelve (12) hours.  ascorbic acid (VITAMIN C) 500 mg tablet Take 1,000 mg by mouth daily.  IBUPROFEN (ADVIL PO) Take 1 Tab by mouth as needed.  CALCIUM PO Take 1,000 mg by mouth two (2) times a day.  Blood-Glucose Meter monitoring kit by Does Not Apply route. Use daily as directed 1 Kit 0    glucose blood VI test strips (BLOOD GLUCOSE TEST) strip by Does Not Apply route. Use daily as directed 3 Package 3    Lancets Misc by Does Not Apply route. Use daily as directed 3 Package 3    alcohol swabs (ALCOHOL PREP PADS) PadM 1 Dose by Apply Externally route daily as needed (Glucose testing). 3 Package 3    Cholecalciferol, Vitamin D3, (VITAMIN D) 1,000 unit Cap Take 2,000 Units by mouth daily. 180 Cap 3    MULTIVITAMINS PO Take 1 Tab by mouth daily.  aspirin 81 mg chewable tablet Take 81 mg by mouth daily.  nitroglycerin (NITROSTAT) 0.4 mg SL tablet 1 Tab by SubLINGual route every five (5) minutes as needed.  1 Bottle 1    montelukast (SINGULAIR) 10 mg tablet Take 1 Tab by mouth daily. 90 Tab 2    sulfanilamide (AVC) 15 % vaginal cream Insert 1 Applicator into vagina daily. 120 g 0    guaiFENesin (ORGANIDIN) 400 mg tablet Take 1 Tab by mouth nightly as needed for Congestion. Indications: COUGH 15 Tab 1       Allergies   Allergen Reactions    Lipitor [Atorvastatin] Myalgia    Spironolactone Other (comments)     Dizziness and fatigue       Past Surgical History:   Procedure Laterality Date    HX BLADDER SUSPENSION  2009    HX HYSTERECTOMY  1962       Social History     Social History    Marital status:      Spouse name: N/A    Number of children: N/A    Years of education: N/A     Occupational History    Not on file. Social History Main Topics    Smoking status: Never Smoker    Smokeless tobacco: Never Used    Alcohol use No    Drug use: No    Sexual activity: No     Other Topics Concern    Not on file     Social History Narrative       Visit Vitals    /59    Pulse (!) 49    Temp 97.5 °F (36.4 °C) (Oral)    Resp 18    Ht 5' 1\" (1.549 m)    Wt 145 lb 9.6 oz (66 kg)    SpO2 99%    BMI 27.51 kg/m2         PHYSICAL EXAMINATION:  GENERAL: Alert and oriented x3, in no acute distress, well-developed, well-nourished, afebrile. HEART: No JVD. EYES: No scleral icterus   NECK: No significant lymphadenopathy   LUNGS: No respiratory compromise or indrawing  ABDOMEN: Soft, non-tender, non-distended. Electronically signed by:  Balaji Mazariegos MD

## 2018-01-29 ENCOUNTER — OFFICE VISIT (OUTPATIENT)
Dept: CARDIOLOGY CLINIC | Age: 83
End: 2018-01-29

## 2018-01-29 VITALS
BODY MASS INDEX: 27.94 KG/M2 | OXYGEN SATURATION: 98 % | WEIGHT: 148 LBS | HEIGHT: 61 IN | SYSTOLIC BLOOD PRESSURE: 148 MMHG | HEART RATE: 44 BPM | DIASTOLIC BLOOD PRESSURE: 78 MMHG

## 2018-01-29 DIAGNOSIS — I25.10 CORONARY ARTERY DISEASE INVOLVING NATIVE CORONARY ARTERY OF NATIVE HEART WITHOUT ANGINA PECTORIS: Primary | ICD-10-CM

## 2018-01-29 DIAGNOSIS — R00.2 PALPITATIONS: ICD-10-CM

## 2018-01-29 DIAGNOSIS — I10 ESSENTIAL HYPERTENSION: ICD-10-CM

## 2018-01-29 DIAGNOSIS — I42.9 CARDIOMYOPATHY, SECONDARY (HCC): ICD-10-CM

## 2018-01-29 NOTE — MR AVS SNAPSHOT
[address] [address] [address] [address] [phone] Patient: Denice Griffin MRN: [id number] CUO:[birth year] Visit Information Date & Time Provider Department Dept. Phone Encounter #  
 1/29/2018 11:30 AM Ritchie Villareal NP [facility] [address] [id number] Your Appointments 1/31/2018  2:15 PM  
Office Visit with Ida Wing MD  
[address]) Appt Note: POST ER FOLLOW-UP,  
 [address], [place] [address] [address]  
[phone]  
  
   
 [address], [facility]  
  
    
 3/5/2018  8:15 AM  
LAB with [facility] (CARRIE Martinez) Appt Note: 4 mo f/u lab [address] [facility] [address] [address]  
[phone]  
  
   
 [address], [facility]  
  
    
 3/12/2018  1:00 PM  
Office Visit with Ida Wing MD  
[address]) Appt Note: 4 mo f/u  
 [address], [place] [address] [address]  
[phone] Upcoming Health Maintenance Date Due  
 FOOT EXAM Q1 1/5/1933 DTaP/Tdap/Td series (1 - Tdap) 1/5/1944 ZOSTER VACCINE AGE 60> 11/5/1982 Pneumococcal 65+ Low/Medium Risk (2 of 2 - PPSV23) 5/27/2016 Influenza Age 5 to Adult 8/1/2017 MICROALBUMIN Q1 11/7/2017 HEMOGLOBIN A1C Q6M 7/8/2018 EYE EXAM RETINAL OR DILATED Q1 9/5/2018 MEDICARE YEARLY EXAM 11/14/2018 LIPID PANEL Q1 1/8/2019 GLAUCOMA SCREENING Q2Y 9/5/2019 Allergies as of 1/29/2018  Review Complete On: 1/29/2018 By: Ritchie Villareal NP Severity Noted Reaction Type Reactions Lipitor [Atorvastatin]  08/04/2016    Myalgia Spironolactone  05/09/2014    Other (comments) Dizziness and fatigue Current Immunizations  Reviewed on 11/14/2016 Name Date Influenza Vaccine (Quad) PF 11/14/2016, 11/13/2015 Influenza Vaccine Split 10/5/2010 Pneumococcal Conjugate (PCV-13) 5/27/2015 Pneumococcal Vaccine (Pcv) 1/20/2007 Not reviewed this visit You Were Diagnosed With   
  
 Codes Comments Coronary artery disease involving native coronary artery of native heart without angina pectoris    -  Primary ICD-10-CM: I25.10 ICD-9-CM: 414.01 Essential hypertension     ICD-10-CM: I10 
ICD-9-CM: 401.9 Cardiomyopathy, secondary ([facility] [address]     ICD-10-CM: I42.9 ICD-9-CM: 425.9 Palpitations     ICD-10-CM: R00.2 ICD-9-CM: 785.1 Vitals BP Pulse Height(growth percentile) Weight(growth percentile) SpO2 BMI  
 148/78 (!) 44 5' 1\" (1.549 m) 148 lb (67.1 kg) 98% 27.96 kg/m2 OB Status Smoking Status Hysterectomy Never Smoker BMI and BSA Data Body Mass Index Body Surface Area  
 27.96 kg/m 2 1.7 m 2 Preferred Pharmacy Pharmacy Name Phone [facility], [address] [place] [address] [phone] Your Updated Medication List  
  
   
This list is accurate as of: 1/29/18 12:28 PM.  Always use your most recent med list. ADVIL PO Take 1 Tab by mouth as needed. alcohol swabs Padm Commonly known as:  ALCOHOL PREP PADS  
1 Dose by Apply Externally route daily as needed (Glucose testing). amLODIPine 10 mg tablet Commonly known as:  Mary Ann Bhat TAKE 1 TABLET BY MOUTH EVERY DAY  
  
 aspirin 81 mg chewable tablet Take 81 mg by mouth daily. Blood-Glucose Meter monitoring kit  
by Does Not Apply route. Use daily as directed CALCIUM PO Take 1,000 mg by mouth two (2) times a day. cholecalciferol 1,000 unit Cap Commonly known as:  VITAMIN D3 Take 2,000 Units by mouth daily. COMBIGAN 0.2-0.5 % Drop ophthalmic solution Generic drug:  brimonidine-timolol Administer 1 Drop to both eyes every twelve (12) hours. glipiZIDE SR 2.5 mg CR tablet Commonly known as:  GLUCOTROL XL  
TAKE 1 TABLET BY MOUTH EVERY DAY  
  
 glucose blood VI test strips strip Commonly known as:  blood glucose test  
by Does Not Apply route. Use daily as directed  
  
 guaiFENesin 400 mg tablet Commonly known as:  Yasmine [place] Take 1 Tab by mouth nightly as needed for Congestion. Indications: COUGH  
  
 hydrALAZINE 50 mg tablet Commonly known as:  APRESOLINE Take 25 mg by mouth three (3) times daily. Lancets Misc  
by Does Not Apply route. Use daily as directed  
  
 latanoprost 0.005 % ophthalmic solution Commonly known as:  XALATAN  
  
 losartan 100 mg tablet Commonly known as:  COZAAR  
TAKE ONE TABLET BY MOUTH DIALY montelukast 10 mg tablet Commonly known as:  SINGULAIR Take 1 Tab by mouth daily. MULTIVITAMINS PO Take 1 Tab by mouth daily. nitroglycerin 0.4 mg SL tablet Commonly known as:  NITROSTAT  
1 Tab by SubLINGual route every five (5) minutes as needed. pravastatin 40 mg tablet Commonly known as:  PRAVACHOL Take 1 Tab by mouth daily. RESTASIS 0.05 % ophthalmic emulsion Generic drug:  cycloSPORINE Administer 1 Drop to both eyes every twelve (12) hours. sulfanilamide 15 % vaginal cream  
Commonly known as:  AVC Insert 1 Applicator into vagina daily. VITAMIN C 500 mg tablet Generic drug:  ascorbic acid (vitamin C) Take 1,000 mg by mouth daily. We Performed the Following AMB POC EKG ROUTINE W/ 12 LEADS, INTER & REP [86591 CPT(R)] Patient Instructions Continue present medication regimen Follow-up with Dr. Chau Teixeira as scheduled and as needed. You are due to see him in May and we have an appointment reminder in your chart. As soon as the calendar opens, you should be hearing from our office to schedule the appointment Introducing [facility]!    
 Kimberli Etienne introduces [employer] patient portal. Now you can access parts of your medical record, email your doctor's office, and request medication refills online. 1. In your internet browser, go to [email or web address] 2. Click on the First Time User? Click Here link in the Sign In box. You will see the New Member Sign Up page. 3. Enter your Boxxet Access Code exactly as it appears below. You will not need to use this code after youve completed the sign-up process. If you do not sign up before the expiration date, you must request a new code. · Boxxet Access Code: [email or web address] Expires: 4/8/2018  3:23 PM 
 
4. Enter the last four digits of your Social Security Number (xxxx) and Date of Birth (mm/dd/yyyy) as indicated and click Submit. You will be taken to the next sign-up page. 5. Create a Boxxet ID. This will be your Boxxet login ID and cannot be changed, so think of one that is secure and easy to remember. 6. Create a Boxxet password. You can change your password at any time. 7. Enter your Password Reset Question and Answer. This can be used at a later time if you forget your password. 8. Enter your e-mail address. You will receive e-mail notification when new information is available in [address] 9. Click Sign Up. You can now view and download portions of your medical record. 10. Click the Download Summary menu link to download a portable copy of your medical information. If you have questions, please visit the Frequently Asked Questions section of the Boxxet website. Remember, Box[employer]et is NOT to be used for urgent needs. For medical emergencies, dial 911. Now available from your iPhone and Android! Please provide this summary of care documentation to your next provider. Your primary care clinician is listed as WALESKA TO. If you have any questions after today's visit, please call [phone].

## 2018-01-29 NOTE — PROGRESS NOTES
1. Have you been to the ER, urgent care clinic since your last visit? Hospitalized since your last visit? yes, 1-19-18 rapid heart rate    2. Have you seen or consulted any other health care providers outside of the [address] [place] [address] since your last visit? Include any pap smears or colon screening.  no

## 2018-01-29 NOTE — PROGRESS NOTES
Alexis Costello presents today for post ER follow-up. She presented to the emergency room on January 19, 2018 with complaints of palpitations. She states that it been intermittent and had been occurring for several months and has been waking her up at night. When seen by Dr. Bronson Meléndez in early November 2017, he ordered a 48 hour Holter monitor which showed sinus bradycardia and 3 episodes of SVT. Her average heart rate was 46 bpm and her atenolol was then discontinued. Her EKG in the ER showed sinus bradycardia. She was monitored for a few hours and then discharged home with instructions to follow-up with us. She is a 80year old Counts include 234 beds at the [facility] female with history of hypertension, diabetes, CHF, CAD (s/p MI in 2010), and chronic kidney disease. She was last seen by Dr. Bronson Meléndez in early November 2017. Her last cardiac catheterization was performed in October 2010 and it showed only mild CAD. Overall, she states that she is feeling well. She is alert and oriented. She continues to live by herself and is very independent. She denies any unusual fatigue or change in level of activity. Denies chest pain, tightness, heaviness, and admits to occasional palpitations. She states that the palpitations do not occur daily and sometimes does not occur for weeks at a time. Denies shortness of breath at rest, dyspnea on exertion, orthopnea and PND. Denies abdominal bloating. Denies lightheadedness, dizziness, and syncope. Denies lower extremity edema and claudication. Denies nausea, vomiting, diarrhea, melena, hematochezia. Denies hematuria, urgency, frequency. Denies fever, chills. PMH:  Past Medical History:   Diagnosis Date    Arthritis of right knee     CAD (coronary artery disease), native coronary artery October 2010    mild disease    Cardiac cath 10/21/2010    dLM 20%. mLAD 25%. CX mild. pRCA 30%. EF 30-35%. Anterolateral, apical , diaphrag akin. Hypercontractile basal segments. Severe elev left-sided filling press.  Cardiac echocardiogram 02/02/2011    EF 50-55%. Mild apical hypk. Gr 1 DDfx. RVSP 40-45. Mild LAE.  Cardiac Holter monitor 06/10/2015    Sinus rhythm w/avg HR of 53 bpm (range 40-85 bpm). Occasional PACs. One 3-beat run of nonsustained SVT. Very few PVCs. One 3-beat run of nonsustained VT. Pt's HR was < 50 bpm for 8 hrs of the recorded time. No pauses noted. Two episodes of neck pain corresponded to sinus rhythm w/o ectopy.  Cardiovascular renal duplex 12/03/2012    No renal artery stenosis bilaterally. Bilateral intrinsic/med disease. Patent bilateral renal veins. Multiple cysts on both kidneys.  CKD (chronic kidney disease)     while on diuretics for difficult to control HTN    Diabetes mellitus type II 3/15/2010    Dyslipidemia 3/15/2010    Heart attack     2010    Heart failure, diastolic, chronic ([address]     History of heart attack     HTN (hypertension) 3/15/2010    Hypertensive heart disease     Loss of appetite     Osteopenia 7/13/2010    Other dyspnea and respiratory abnormality     Ringing in the ears     Stress-induced cardiomyopathy     EF 35% (LV angio 10/2010), then EF 50-55% (echo, Feb 2011)    Wears glasses        PSH:  Past Surgical History:   Procedure Laterality Date    HX BLADDER SUSPENSION  2009    HX HYSTERECTOMY  1962       MEDS:  Current Outpatient Prescriptions   Medication Sig    glipiZIDE SR (GLUCOTROL XL) 2.5 mg CR tablet TAKE 1 TABLET BY MOUTH EVERY DAY    hydrALAZINE (APRESOLINE) 50 mg tablet Take 25 mg by mouth three (3) times daily.  nitroglycerin (NITROSTAT) 0.4 mg SL tablet 1 Tab by SubLINGual route every five (5) minutes as needed.  amLODIPine (NORVASC) 10 mg tablet TAKE 1 TABLET BY MOUTH EVERY DAY    montelukast (SINGULAIR) 10 mg tablet Take 1 Tab by mouth daily.     latanoprost (XALATAN) 0.005 % ophthalmic solution     losartan (COZAAR) 100 mg tablet TAKE ONE TABLET BY MOUTH SABRINA    pravastatin (PRAVACHOL) 40 mg tablet Take 1 Tab by mouth daily.  sulfanilamide (AVC) 15 % vaginal cream Insert 1 Applicator into vagina daily.  guaiFENesin (ORGANIDIN) 400 mg tablet Take 1 Tab by mouth nightly as needed for Congestion. Indications: COUGH    COMBIGAN 0.2-0.5 % drop ophthalmic solution Administer 1 Drop to both eyes every twelve (12) hours.  RESTASIS 0.05 % ophthalmic emulsion Administer 1 Drop to both eyes every twelve (12) hours.  ascorbic acid (VITAMIN C) 500 mg tablet Take 1,000 mg by mouth daily.  IBUPROFEN (ADVIL PO) Take 1 Tab by mouth as needed.  CALCIUM PO Take 1,000 mg by mouth two (2) times a day.  Blood-Glucose Meter monitoring kit by Does Not Apply route. Use daily as directed    Lancets Misc by Does Not Apply route. Use daily as directed    alcohol swabs (ALCOHOL PREP PADS) PadM 1 Dose by Apply Externally route daily as needed (Glucose testing).  Cholecalciferol, Vitamin D3, (VITAMIN D) 1,000 unit Cap Take 2,000 Units by mouth daily.  MULTIVITAMINS PO Take 1 Tab by mouth daily.  aspirin 81 mg chewable tablet Take 81 mg by mouth daily.  glucose blood VI test strips (BLOOD GLUCOSE TEST) strip by Does Not Apply route. Use daily as directed     No current facility-administered medications for this visit. Allergies and Sensitivities:  Allergies   Allergen Reactions    Lipitor [Atorvastatin] Myalgia    Spironolactone Other (comments)     Dizziness and fatigue       Family History:  Family History   Problem Relation Age of Onset    Hypertension Mother     Arthritis-osteo Other        Social History:  She  reports that she has never smoked. She has never used smokeless tobacco.  She  reports that she does not drink alcohol.       Physical:  Visit Vitals    /78    Pulse (!) 44    Ht 5' 1\" (1.549 m)    Wt 67.1 kg (148 lb)    SpO2 98%    BMI 27.96 kg/m2         Exam:  Neck:  Supple, no JVD, no carotid bruits  CV:  Normal S1 and  S2, no murmurs, rubs, or gallops noted  Lungs:  Clear to ausculation throughout, no wheezes or rales  Abd:  Soft, non-tender, non-distended with good bowel sounds. No hepatosplenomegaly  Extremities:  trace edema around the ankles      Data:  EKG:  Sinus bradycardia.  No ST/T abnormalities      LABS:  Lab Results   Component Value Date/Time    Sodium 142 01/19/2018 08:30 AM    Potassium 4.5 01/19/2018 08:30 AM    Chloride 106 01/19/2018 08:30 AM    CO2 27 01/19/2018 08:30 AM    Glucose 144 01/19/2018 08:30 AM    BUN 24 01/19/2018 08:30 AM    Creatinine 1.40 01/19/2018 08:30 AM     Lab Results   Component Value Date/Time    Cholesterol, total 224 01/08/2018 03:53 PM    HDL Cholesterol 63 01/08/2018 03:53 PM    LDL, calculated 132.4 01/08/2018 03:53 PM    Triglyceride 143 01/08/2018 03:53 PM    CHOL/HDL Ratio 3.6 01/08/2018 03:53 PM     Lab Results   Component Value Date/Time    ALT (SGPT) 21 01/08/2018 03:53 PM    ALT (SGPT) 22 01/08/2018 03:53 PM         Impression/Plan:  1. Palpitations, occuring infrequently  2. Sinus bradycardia, asymptomatic  3. Essential hypertension, blood pressure stable  4. CAD, s/p MI in 2010, stable  5. Diabetes mellitus, recommend Hgb A1c less than 7% from cardiac standpoint    Mrs. Huber Henson was seen today for post hospital post ER follow-up. She presented to the emergency room on January 17, 2018 with complaints of palpitations. She told ER provider that it has been occurring for several months but infrequently. She states that it normally occurs while she is sleeping and she wakes up feeling a little fluttering in her heart. She states that it does not last long and it normally resolves on its own within a few seconds and sometimes, she states that if she gets up to use the bathroom to void it stops then. She denies chest pain and shortness of breath. She lives alone but has good family support.   She has not noticed any unusual fatigue or change in level of activity and she states that she tries to stay active. She denies any pre-syncopal or syncopal episodes. She was accompanied by her daughter and I explained the findings of the Holter study that was done in November 2017. It showed sinus bradycardia with a few episodes of brief SVT. There were no pauses and no symptoms recorded. She tells me that she may notice the palpitations one night and it may not occur again for several weeks. I asked that they call the office if she notices increasing fatigue or change in level of activity, increasing frequency of palpitations, or prolonged episodes of palpitations. She will follow-up with Dr. Juvencio Rogers (due in May, to be scheduled) and as needed. Jessica Vaughan MSN, FNP-BC    Please note:  Portions of this chart were created with Dragon medical speech to text program.  Unrecognized errors may be present.

## 2018-01-31 ENCOUNTER — OFFICE VISIT (OUTPATIENT)
Dept: FAMILY MEDICINE CLINIC | Age: 83
End: 2018-01-31

## 2018-01-31 VITALS
HEIGHT: 61 IN | TEMPERATURE: 98.7 F | BODY MASS INDEX: 27.19 KG/M2 | HEART RATE: 48 BPM | DIASTOLIC BLOOD PRESSURE: 83 MMHG | OXYGEN SATURATION: 96 % | RESPIRATION RATE: 18 BRPM | SYSTOLIC BLOOD PRESSURE: 147 MMHG | WEIGHT: 144 LBS

## 2018-01-31 DIAGNOSIS — R00.2 PALPITATIONS: Primary | ICD-10-CM

## 2018-01-31 DIAGNOSIS — I10 ESSENTIAL HYPERTENSION: ICD-10-CM

## 2018-01-31 NOTE — PATIENT INSTRUCTIONS
Preventing Falls: Care Instructions  Your Care Instructions    Getting around your home safely can be a challenge if you have injuries or health problems that make it easy for you to fall. Loose rugs and furniture in walkways are among the dangers for many older people who have problems walking or who have poor eyesight. People who have conditions such as arthritis, osteoporosis, or dementia also have to be careful not to fall. You can make your home safer with a few simple measures. Follow-up care is a key part of your treatment and safety. Be sure to make and go to all appointments, and call your doctor if you are having problems. It's also a good idea to know your test results and keep a list of the medicines you take. How can you care for yourself at home? Taking care of yourself  · You may get dizzy if you do not drink enough water. To prevent dehydration, drink plenty of fluids, enough so that your urine is light yellow or clear like water. Choose water and other caffeine-free clear liquids. If you have kidney, heart, or liver disease and have to limit fluids, talk with your doctor before you increase the amount of fluids you drink. · Exercise regularly to improve your strength, muscle tone, and balance. Walk if you can. Swimming may be a good choice if you cannot walk easily. · Have your vision and hearing checked each year or any time you notice a change. If you have trouble seeing and hearing, you might not be able to avoid objects and could lose your balance. · Know the side effects of the medicines you take. Ask your doctor or pharmacist whether the medicines you take can affect your balance. Sleeping pills or sedatives can affect your balance. · Limit the amount of alcohol you drink. Alcohol can impair your balance and other senses. · Ask your doctor whether calluses or corns on your feet need to be removed.  If you wear loose-fitting shoes because of calluses or corns, you can lose your balance and fall. · Talk to your doctor if you have numbness in your feet. Preventing falls at home  · Remove raised doorway thresholds, throw rugs, and clutter. Repair loose carpet or raised areas in the floor. · Move furniture and electrical cords to keep them out of walking paths. · Use nonskid floor wax, and wipe up spills right away, especially on ceramic tile floors. · If you use a walker or cane, put rubber tips on it. If you use crutches, clean the bottoms of them regularly with an abrasive pad, such as steel wool. · Keep your house well lit, especially [place], and outside walkways. Use night-lights in areas such as hallways and bathrooms. Add extra light switches or use remote switches (such as switches that go on or off when you clap your hands) to make it easier to turn lights on if you have to get up during the night. · Install sturdy handrails on stairways. · Move items in your cabinets so that the things you use a lot are on the lower shelves (about waist level). · Keep a cordless phone and a flashlight with new batteries by your bed. If possible, put a phone in each of the main rooms of your house, or carry a cell phone in case you fall and cannot reach a phone. Or, you can wear a device around your neck or wrist. You push a button that sends a signal for help. · Wear low-heeled shoes that fit well and give your feet good support. Use footwear with nonskid soles. Check the heels and soles of your shoes for wear. Repair or replace worn heels or soles. · Do not wear socks without shoes on wood floors. · Walk on the grass when the sidewalks are slippery. If you live in an area that gets snow and ice in the winter, sprinkle salt on slippery steps and sidewalks. Preventing falls in the bath  · Install grab bars and nonskid mats inside and outside your shower or tub and near the toilet and sinks. · Use shower chairs and bath benches.   · Use a hand-held shower head that will allow you to sit while showering. · Get into a tub or shower by putting the weaker leg in first. Get out of a tub or shower with your strong side first.  · Repair loose toilet seats and consider installing a raised toilet seat to make getting on and off the toilet easier. · Keep your bathroom door unlocked while you are in the shower. Where can you learn more? Go to http://jason-tiera.info/. Enter [id number] in the search box to learn more about \"Preventing Falls: Care Instructions. \"  Current as of: May 12, 2017  Content Version: 11.4  © 3623-9210 [employer]. Care instructions adapted under license by [employer] (which disclaims liability or warranty for this information). If you have questions about a medical condition or this instruction, always ask your healthcare professional. [place] [address] any warranty or liability for your use of this information. Body Mass Index: Care Instructions  Your Care Instructions    Body mass index (BMI) can help you see if your weight is raising your risk for health problems. It uses a formula to compare how much you weigh with how tall you are. · A BMI lower than 18.5 is considered underweight. · A BMI between 18.5 and 24.9 is considered healthy. · A BMI between 25 and 29.9 is considered overweight. A BMI of 30 or higher is considered obese. If your BMI is in the normal range, it means that you have a lower risk for weight-related health problems. If your BMI is in the overweight or obese range, you may be at increased risk for weight-related health problems, such as high blood pressure, heart disease, stroke, arthritis or joint pain, and diabetes. If your BMI is in the underweight range, you may be at increased risk for health problems such as fatigue, lower protection (immunity) against illness, muscle loss, bone loss, hair loss, and hormone problems.   BMI is just one measure of your risk for weight-related health problems. You may be at higher risk for health problems if you are not active, you eat an unhealthy diet, or you drink too much alcohol or use tobacco products. Follow-up care is a key part of your treatment and safety. Be sure to make and go to all appointments, and call your doctor if you are having problems. It's also a good idea to know your test results and keep a list of the medicines you take. How can you care for yourself at home? · Practice healthy eating habits. This includes eating plenty of fruits, vegetables, whole grains, lean protein, and low-fat dairy. · If your doctor recommends it, get more exercise. Walking is a good choice. Bit by bit, increase the amount you walk every day. Try for at least 30 minutes on most days of the week. · Do not smoke. Smoking can increase your risk for health problems. If you need help quitting, talk to your doctor about stop-smoking programs and medicines. These can increase your chances of quitting for good. · Limit alcohol to 2 drinks a day for men and 1 drink a day for women. Too much alcohol can cause health problems. If you have a BMI higher than 25  · Your doctor may do other tests to check your risk for weight-related health problems. This may include measuring the distance around your waist. A waist measurement of more than 40 inches in men or 35 inches in women can increase the risk of weight-related health problems. · Talk with your doctor about steps you can take to stay healthy or improve your health. You may need to make lifestyle changes to lose weight and stay healthy, such as changing your diet and getting regular exercise. If you have a BMI lower than 18.5  · Your doctor may do other tests to check your risk for health problems. · Talk with your doctor about steps you can take to stay healthy or improve your health.  You may need to make lifestyle changes to gain or maintain weight and stay healthy, such as getting more healthy foods in your diet and doing exercises to build muscle. Where can you learn more? Go to http://jason-tiera.info/. Enter S176 in the search box to learn more about \"Body Mass Index: Care Instructions. \"  Current as of: October 13, 2016  Content Version: 11.4  © 1170-5888 [employer]. Care instructions adapted under license by [employer] (which disclaims liability or warranty for this information). If you have questions about a medical condition or this instruction, always ask your healthcare professional. [address] any warranty or liability for your use of this information.

## 2018-01-31 NOTE — PROGRESS NOTES
Brian Farmer is a 80 y.o.  female and presents with ED Follow-up; Palpitations; and Hypertension      SUBJECTIVE:    Palpitations  Patient complains of palpitations. The symptoms are of mild severity, occuring bedtime or during sleep and lasting several minutes per episode. Cardiac risk factors include: dyslipidemia, hypertension. Aggravating factors: none. Relieving factors: ASA 81 mg. Associated signs and symptoms: none. Pt went to ER for the palpitations as well as her cardiologist. Since her HR is low at baseline she is not a candidate for a betablocker. She had a Holter monitor that showed mostly sinus rhythm. Pt was advised by me and cardiology to take ASA with dinner or right after if it is controlling the palpitations. She should not take ASA on empty stomach. Pt's HTN remains fairly well controlled on Cozaar and Norvasc. Her recent electrolytes were all WNL. Respiratory ROS: negative for - shortness of breath  Cardiovascular ROS: negative for - chest pain    Current Outpatient Prescriptions   Medication Sig    glipiZIDE SR (GLUCOTROL XL) 2.5 mg CR tablet TAKE 1 TABLET BY MOUTH EVERY DAY    amLODIPine (NORVASC) 10 mg tablet TAKE 1 TABLET BY MOUTH EVERY DAY    latanoprost (XALATAN) 0.005 % ophthalmic solution     losartan (COZAAR) 100 mg tablet TAKE ONE TABLET BY MOUTH DIALY    pravastatin (PRAVACHOL) 40 mg tablet Take 1 Tab by mouth daily.  sulfanilamide (AVC) 15 % vaginal cream Insert 1 Applicator into vagina daily.  COMBIGAN 0.2-0.5 % drop ophthalmic solution Administer 1 Drop to both eyes every twelve (12) hours.  RESTASIS 0.05 % ophthalmic emulsion Administer 1 Drop to both eyes every twelve (12) hours.  ascorbic acid (VITAMIN C) 500 mg tablet Take 1,000 mg by mouth daily.  CALCIUM PO Take 1,000 mg by mouth two (2) times a day.  Blood-Glucose Meter monitoring kit by Does Not Apply route.  Use daily as directed    glucose blood VI test strips (BLOOD GLUCOSE TEST) strip by Does Not Apply route. Use daily as directed    Lancets Misc by Does Not Apply route. Use daily as directed    Cholecalciferol, Vitamin D3, (VITAMIN D) 1,000 unit Cap Take 2,000 Units by mouth daily.  MULTIVITAMINS PO Take 1 Tab by mouth daily.  aspirin 81 mg chewable tablet Take 81 mg by mouth daily.  nitroglycerin (NITROSTAT) 0.4 mg SL tablet 1 Tab by SubLINGual route every five (5) minutes as needed.  IBUPROFEN (ADVIL PO) Take 1 Tab by mouth as needed.  alcohol swabs (ALCOHOL PREP PADS) PadM 1 Dose by Apply Externally route daily as needed (Glucose testing). No current facility-administered medications for this visit. OBJECTIVE:  alert, well appearing, and in no distress  Visit Vitals    /83 (BP 1 Location: Left arm, BP Patient Position: Sitting)    Pulse (!) 48    Temp 98.7 °F (37.1 °C) (Oral)    Resp 18    Ht 5' 1\" (1.549 m)    Wt 144 lb (65.3 kg)    SpO2 96%    BMI 27.21 kg/m2      well developed and well nourished      Labs:   Lab Results   Component Value Date/Time    GFR est non-AA 35 01/19/2018 08:30 AM    GFR est AA 42 01/19/2018 08:30 AM    Creatinine 1.40 01/19/2018 08:30 AM    BUN 24 01/19/2018 08:30 AM    Sodium 142 01/19/2018 08:30 AM    Potassium 4.5 01/19/2018 08:30 AM    Chloride 106 01/19/2018 08:30 AM    CO2 27 01/19/2018 08:30 AM    Magnesium 1.8 01/19/2018 08:30 AM       Assessment/Plan      ICD-10-CM ICD-9-CM    1. Palpitations R00.2 785. 1 Will see if ASA in PM will improve the discomfort she has at night from the palpitations. 2. Essential hypertension I10 401.9 Fairly well controlled on current emds. Follow-up Disposition:  Return if symptoms worsen or fail to improve. Reviewed plan of care. Patient has provided input and agrees with goals. Discussed the patient's BMI with her.   The BMI follow up plan is as follows:     dietary management education, guidance, and counseling  encourage exercise  monitor weight  prescribed dietary intake    An After Visit Summary was printed and given to the patient.

## 2018-01-31 NOTE — PROGRESS NOTES
Patient is in the office today for  Emergency room follow up for palpitations, sinus bradycardia. 1. Have you been to the ER, urgent care clinic since your last visit? Hospitalized since your last visit? yes, [facility]    2. Have you seen or consulted any other health care providers outside of the [facility] since your last visit? Include any pap smears or colon screening.  No

## 2018-01-31 NOTE — MR AVS SNAPSHOT
[address] [facility] 
 
 
 [address], [place] [address] [address] 
[phone] Patient: Marco Lynch MRN: [id number] ADJ:[birth year] Visit Information Date & Time Provider Department Dept. Phone Encounter #  
 1/31/2018  2:15 PM Elizabeth Garner, [address] [place] [address] [phone] [id number] Follow-up Instructions Return if symptoms worsen or fail to improve. Your Appointments 3/5/2018  8:15 AM  
LAB with [facility] (CARRIE Martinez) Appt Note: 4 mo f/u lab [address] [facility] [address] [address]  
[phone]  
  
   
 [address]  
  
    
 3/12/2018  1:00 PM  
Office Visit with Elizabeth Garner MD  
[address] [facility]) Appt Note: 4 mo f/u  
 [address] [facility] [address], [facility] [address] [address]  
[phone]  
  
   
 [address],  [address] Upcoming Health Maintenance Date Due  
 FOOT EXAM Q1 1/5/1933 DTaP/Tdap/Td series (1 - Tdap) 1/5/1944 ZOSTER VACCINE AGE 60> 11/5/1982 Pneumococcal 65+ Low/Medium Risk (2 of 2 - PPSV23) 5/27/2016 Influenza Age 5 to Adult 8/1/2017 MICROALBUMIN Q1 11/7/2017 HEMOGLOBIN A1C Q6M 7/8/2018 EYE EXAM RETINAL OR DILATED Q1 9/5/2018 MEDICARE YEARLY EXAM 11/14/2018 LIPID PANEL Q1 1/8/2019 GLAUCOMA SCREENING Q2Y 9/5/2019 Allergies as of 1/31/2018  Review Complete On: 1/31/2018 By: Sari Stafford LPN Severity Noted Reaction Type Reactions Lipitor [Atorvastatin]  08/04/2016    Myalgia Spironolactone  05/09/2014    Other (comments) Dizziness and fatigue Current Immunizations  Reviewed on 11/14/2016 Name Date Influenza Vaccine (Quad) PF 11/14/2016, 11/13/2015 Influenza Vaccine Split 10/5/2010 Pneumococcal Conjugate (PCV-13) 5/27/2015 Pneumococcal Vaccine (Pcv) 1/20/2007 Not reviewed this visit You Were Diagnosed With   
  
 Codes Comments Palpitations    -  Primary ICD-10-CM: R00.2 ICD-9-CM: 785.1 Essential hypertension     ICD-10-CM: I10 
ICD-9-CM: 401.9 Vitals BP Pulse Temp Resp Height(growth percentile) Weight(growth percentile) 147/83 (BP 1 Location: Left arm, BP Patient Position: Sitting) (!) 48 98.7 °F (37.1 °C) (Oral) 18 5' 1\" (1.549 m) 144 lb (65.3 kg) SpO2 BMI OB Status Smoking Status 96% 27.21 kg/m2 Hysterectomy Never Smoker BMI and BSA Data Body Mass Index Body Surface Area  
 27.21 kg/m 2 1.68 m 2 Preferred Pharmacy Pharmacy Name Phone [facility], [address] [place] [address] [phone] Your Updated Medication List  
  
   
This list is accurate as of: 1/31/18  2:43 PM.  Always use your most recent med list. ADVIL PO Take 1 Tab by mouth as needed. alcohol swabs Padm Commonly known as:  ALCOHOL PREP PADS  
1 Dose by Apply Externally route daily as needed (Glucose testing). amLODIPine 10 mg tablet Commonly known as:  Rosalia Zepeda TAKE 1 TABLET BY MOUTH EVERY DAY  
  
 aspirin 81 mg chewable tablet Take 81 mg by mouth daily. Blood-Glucose Meter monitoring kit  
by Does Not Apply route. Use daily as directed CALCIUM PO Take 1,000 mg by mouth two (2) times a day. cholecalciferol 1,000 unit Cap Commonly known as:  VITAMIN D3 Take 2,000 Units by mouth daily. COMBIGAN 0.2-0.5 % Drop ophthalmic solution Generic drug:  brimonidine-timolol Administer 1 Drop to both eyes every twelve (12) hours. glipiZIDE SR 2.5 mg CR tablet Commonly known as:  GLUCOTROL XL  
TAKE 1 TABLET BY MOUTH EVERY DAY  
  
 glucose blood VI test strips strip Commonly known as:  blood glucose test  
by Does Not Apply route. Use daily as directed Lancets Misc  
by Does Not Apply route. Use daily as directed  
  
 latanoprost 0.005 % ophthalmic solution Commonly known as:  Bety Fitzpatrick  
  
 losartan 100 mg tablet Commonly known as:  COZAAR  
TAKE ONE TABLET BY MOUTH DIALY MULTIVITAMINS PO Take 1 Tab by mouth daily. nitroglycerin 0.4 mg SL tablet Commonly known as:  NITROSTAT  
1 Tab by SubLINGual route every five (5) minutes as needed. pravastatin 40 mg tablet Commonly known as:  PRAVACHOL Take 1 Tab by mouth daily. RESTASIS 0.05 % ophthalmic emulsion Generic drug:  cycloSPORINE Administer 1 Drop to both eyes every twelve (12) hours. sulfanilamide 15 % vaginal cream  
Commonly known as:  AVC Insert 1 Applicator into vagina daily. VITAMIN C 500 mg tablet Generic drug:  ascorbic acid (vitamin C) Take 1,000 mg by mouth daily. Follow-up Instructions Return if symptoms worsen or fail to improve. To-Do List   
 02/07/2018 1:30 PM  
  Appointment with [facility] MRI RM 1 at [address] [place] [address] ([phone]) GENERAL INSTRUCTIONS  Bring information (ID card) if you have any medically implanted devices. You will be required to lie still while the procedure is being performed. Remove any jewelry (including body piercing, hairpins) prior to MRI. If you have had a creatinine level drawn within the past 30 days, please bring most recent results to your appt. Bring any films, CD's, and reports related to your study with you on the day of your exam.  This only includes studies done outside of [address] [place] [address], [facility], [place], and [facility]. Bring a complete list of all medications you are currently taking to include prescriptions, over-the-counter meds, herbals, vitamins & any dietary supplements. If you were given medications for claustrophobia or anxiety, please arrange to have someone drive you to your appointment. QUESTIONS  Notify the MRI Department if you have any questions concerning your study. [place] - [phone] [address] [phone] Patient Instructions Preventing Falls: Care Instructions Your Care Instructions Getting around your home safely can be a challenge if you have injuries or health problems that make it easy for you to fall. Loose rugs and furniture in walkways are among the dangers for many older people who have problems walking or who have poor eyesight. People who have conditions such as arthritis, osteoporosis, or dementia also have to be careful not to fall. You can make your home safer with a few simple measures. Follow-up care is a key part of your treatment and safety. Be sure to make and go to all appointments, and call your doctor if you are having problems. It's also a good idea to know your test results and keep a list of the medicines you take. How can you care for yourself at home? Taking care of yourself · You may get dizzy if you do not drink enough water. To prevent dehydration, drink plenty of fluids, enough so that your urine is light yellow or clear like water. Choose water and other caffeine-free clear liquids. If you have kidney, heart, or liver disease and have to limit fluids, talk with your doctor before you increase the amount of fluids you drink. · Exercise regularly to improve your strength, muscle tone, and balance. Walk if you can. Swimming may be a good choice if you cannot walk easily. · Have your vision and hearing checked each year or any time you notice a change. If you have trouble seeing and hearing, you might not be able to avoid objects and could lose your balance. · Know the side effects of the medicines you take. Ask your doctor or pharmacist whether the medicines you take can affect your balance. Sleeping pills or sedatives can affect your balance. · Limit the amount of alcohol you drink. Alcohol can impair your balance and other senses. · Ask your doctor whether calluses or corns on your feet need to be removed.  If you wear loose-fitting shoes because of calluses or corns, you can lose your balance and fall. · Talk to your doctor if you have numbness in your feet. Preventing falls at home · Remove raised doorway thresholds, throw rugs, and clutter. Repair loose carpet or raised areas in the floor. · Move furniture and electrical cords to keep them out of walking paths. · Use nonskid floor wax, and wipe up spills right away, especially on ceramic tile floors. · If you use a walker or cane, put rubber tips on it. If you use crutches, clean the bottoms of them regularly with an abrasive pad, such as steel wool. · Keep your house well lit, especially Karen Catching, and outside walkways. Use night-lights in areas such as hallways and bathrooms. Add extra light switches or use remote switches (such as switches that go on or off when you clap your hands) to make it easier to turn lights on if you have to get up during the night. · Install sturdy handrails on stairways. · Move items in your cabinets so that the things you use a lot are on the lower shelves (about waist level). · Keep a cordless phone and a flashlight with new batteries by your bed. If possible, put a phone in each of the main rooms of your house, or carry a cell phone in case you fall and cannot reach a phone. Or, you can wear a device around your neck or wrist. You push a button that sends a signal for help. · Wear low-heeled shoes that fit well and give your feet good support. Use footwear with nonskid soles. Check the heels and soles of your shoes for wear. Repair or replace worn heels or soles. · Do not wear socks without shoes on wood floors. · Walk on the grass when the sidewalks are slippery. If you live in an area that gets snow and ice in the winter, sprinkle salt on slippery steps and sidewalks. Preventing falls in the bath · Install grab bars and nonskid mats inside and outside your shower or tub and near the toilet and sinks. · Use shower chairs and bath benches. · Use a hand-held shower head that will allow you to sit while showering. · Get into a tub or shower by putting the weaker leg in first. Get out of a tub or shower with your strong side first. 
· Repair loose toilet seats and consider installing a raised toilet seat to make getting on and off the toilet easier. · Keep your bathroom door unlocked while you are in the shower. Where can you learn more? Go to http://jason-tiera.info/. Enter [phone] in the search box to learn more about \"Preventing Falls: Care Instructions. \" Current as of: May 12, 2017 Content Version: 11.4 © 9996-8447 [employer]. Care instructions adapted under license by [employer] (which disclaims liability or warranty for this information). If you have questions about a medical condition or this instruction, always ask your healthcare professional. [address] any warranty or liability for your use of this information. Introducing [facility]! Renita Dover introduces [employer] patient portal. Now you can access parts of your medical record, email your doctor's office, and request medication refills online. 1. In your internet browser, go to [email or web address] 2. Click on the First Time User? Click Here link in the Sign In box. You will see the New Member Sign Up page. 3. Enter your [employer] Access Code exactly as it appears below. You will not need to use this code after youve completed the sign-up process. If you do not sign up before the expiration date, you must request a new code. · [employer] Access Code: [email or web address] Expires: 4/8/2018  3:23 PM 
 
4. Enter the last four digits of your Social Security Number (xxxx) and Date of Birth (mm/dd/yyyy) as indicated and click Submit. You will be taken to the next sign-up page. 5. Create a [employer] Revenue ID.  This will be your [employer] login ID and cannot be changed, so think of one that is secure and easy to remember. 6. Create a Hycrete password. You can change your password at any time. 7. Enter your Password Reset Question and Answer. This can be used at a later time if you forget your password. 8. Enter your e-mail address. You will receive e-mail notification when new information is available in [address] 9. Click Sign Up. You can now view and download portions of your medical record. 10. Click the Download Summary menu link to download a portable copy of your medical information. If you have questions, please visit the Frequently Asked Questions section of the [employer] website. Remember, [employer] is NOT to be used for urgent needs. For medical emergencies, dial 911. Now available from your iPhone and Android! Please provide this summary of care documentation to your next provider. Your primary care clinician is listed as WALESKA TO. If you have any questions after today's visit, please call [phone].

## 2018-02-07 ENCOUNTER — HOSPITAL ENCOUNTER (OUTPATIENT)
Age: 83
Discharge: HOME | End: 2018-02-07
Attending: ORTHOPAEDIC SURGERY
Payer: MEDICARE

## 2018-02-07 DIAGNOSIS — M79.601 RIGHT ARM PAIN: ICD-10-CM

## 2018-02-07 PROCEDURE — 73218 MRI UPPER EXTREMITY W/O DYE: CPT

## 2018-03-08 ENCOUNTER — OFFICE VISIT (OUTPATIENT)
Dept: ORTHOPEDIC SURGERY | Age: 83
End: 2018-03-08

## 2018-03-08 VITALS
SYSTOLIC BLOOD PRESSURE: 153 MMHG | BODY MASS INDEX: 27.64 KG/M2 | HEART RATE: 56 BPM | TEMPERATURE: 97.5 F | HEIGHT: 61 IN | DIASTOLIC BLOOD PRESSURE: 61 MMHG | WEIGHT: 146.4 LBS | OXYGEN SATURATION: 99 % | RESPIRATION RATE: 16 BRPM

## 2018-03-08 DIAGNOSIS — G89.29 CHRONIC RIGHT SHOULDER PAIN: Primary | ICD-10-CM

## 2018-03-08 DIAGNOSIS — M25.511 CHRONIC RIGHT SHOULDER PAIN: Primary | ICD-10-CM

## 2018-03-08 NOTE — PROGRESS NOTES
[address] [place] [address]  [phone]           Patient: Jean Mills                MRN: [id number]       SSN: [id number]  YOB: 1923        AGE: 80 y.o. SEX: female  Body mass index is 27.66 kg/(m^2). PCP: Samuel Fraser MD  03/08/18      This office note has been dictated. REVIEW OF SYSTEMS:  Constitutional: Negative for fever, chills, weight loss and malaise/fatigue. HENT: Negative. Eyes: Negative. Respiratory: Negative. Cardiovascular: Negative. Gastrointestinal: No bowel incontinence or constipation. Genitourinary: No bladder incontinence or saddle anesthesia. Skin: Negative. Neurological: Negative. Endo/Heme/Allergies: Negative. Psychiatric/Behavioral: Negative. Musculoskeletal: As per HPI above. Past Medical History:   Diagnosis Date    Arthritis of right knee     CAD (coronary artery disease), native coronary artery October 2010    mild disease    Cardiac cath 10/21/2010    dLM 20%. mLAD 25%. CX mild. pRCA 30%. EF 30-35%. Anterolateral, apical , diaphrag akin. Hypercontractile basal segments. Severe elev left-sided filling press.  Cardiac echocardiogram 02/02/2011    EF 50-55%. Mild apical hypk. Gr 1 DDfx. RVSP 40-45. Mild LAE.  Cardiac Holter monitor 06/10/2015    Sinus rhythm w/avg HR of 53 bpm (range 40-85 bpm). Occasional PACs. One 3-beat run of nonsustained SVT. Very few PVCs. One 3-beat run of nonsustained VT. Pt's HR was < 50 bpm for 8 hrs of the recorded time. No pauses noted. Two episodes of neck pain corresponded to sinus rhythm w/o ectopy.  Cardiovascular renal duplex 12/03/2012    No renal artery stenosis bilaterally. Bilateral intrinsic/med disease. Patent bilateral renal veins. Multiple cysts on both kidneys.     CKD (chronic kidney disease)     while on diuretics for difficult to control HTN    Diabetes mellitus type II 3/15/2010  Dyslipidemia 3/15/2010    Heart attack     2010    Heart failure, diastolic, chronic (HCC)     History of heart attack     HTN (hypertension) 3/15/2010    Hypertensive heart disease     Loss of appetite     Osteopenia 7/13/2010    Other dyspnea and respiratory abnormality     Ringing in the ears     Stress-induced cardiomyopathy     EF 35% (LV angio 10/2010), then EF 50-55% (echo, Feb 2011)    Wears glasses          Current Outpatient Prescriptions:     glipiZIDE SR (GLUCOTROL XL) 2.5 mg CR tablet, TAKE 1 TABLET BY MOUTH EVERY DAY, Disp: 90 Tab, Rfl: 1    nitroglycerin (NITROSTAT) 0.4 mg SL tablet, 1 Tab by SubLINGual route every five (5) minutes as needed. , Disp: 1 Bottle, Rfl: 1    amLODIPine (NORVASC) 10 mg tablet, TAKE 1 TABLET BY MOUTH EVERY DAY, Disp: 90 Tab, Rfl: 3    latanoprost (XALATAN) 0.005 % ophthalmic solution, , Disp: , Rfl:     losartan (COZAAR) 100 mg tablet, TAKE ONE TABLET BY MOUTH DIALY, Disp: 90 Tab, Rfl: 3    pravastatin (PRAVACHOL) 40 mg tablet, Take 1 Tab by mouth daily. , Disp: 30 Tab, Rfl: 6    COMBIGAN 0.2-0.5 % drop ophthalmic solution, Administer 1 Drop to both eyes every twelve (12) hours. , Disp: , Rfl:     RESTASIS 0.05 % ophthalmic emulsion, Administer 1 Drop to both eyes every twelve (12) hours. , Disp: , Rfl:     ascorbic acid (VITAMIN C) 500 mg tablet, Take 1,000 mg by mouth daily. , Disp: , Rfl:     IBUPROFEN (ADVIL PO), Take 1 Tab by mouth as needed. , Disp: , Rfl:     CALCIUM PO, Take 1,000 mg by mouth two (2) times a day., Disp: , Rfl:     Blood-Glucose Meter monitoring kit, by Does Not Apply route. Use daily as directed, Disp: 1 Kit, Rfl: 0    glucose blood VI test strips (BLOOD GLUCOSE TEST) strip, by Does Not Apply route. Use daily as directed, Disp: 3 Package, Rfl: 3    Lancets Misc, by Does Not Apply route.  Use daily as directed, Disp: 3 Package, Rfl: 3    alcohol swabs (ALCOHOL PREP PADS) PadM, 1 Dose by Apply Externally route daily as needed (Glucose testing). , Disp: 3 Package, Rfl: 3    Cholecalciferol, Vitamin D3, (VITAMIN D) 1,000 unit Cap, Take 2,000 Units by mouth daily. , Disp: 180 Cap, Rfl: 3    MULTIVITAMINS PO, Take 1 Tab by mouth daily. , Disp: , Rfl:     aspirin 81 mg chewable tablet, Take 81 mg by mouth daily. , Disp: , Rfl:     sulfanilamide (AVC) 15 % vaginal cream, Insert 1 Applicator into vagina daily. , Disp: 120 g, Rfl: 0    Allergies   Allergen Reactions    Lipitor [Atorvastatin] Myalgia    Spironolactone Other (comments)     Dizziness and fatigue       Social History     Social History    Marital status:      Spouse name: N/A    Number of children: N/A    Years of education: N/A     Occupational History    Not on file. Social History Main Topics    Smoking status: Never Smoker    Smokeless tobacco: Never Used    Alcohol use No    Drug use: No    Sexual activity: No     Other Topics Concern    Not on file     Social History Narrative       Past Surgical History:   Procedure Laterality Date    HX BLADDER SUSPENSION  2009    HX HYSTERECTOMY  1962             We did see Ms. Marcy Talley for followup with regards to her right shoulder. She was seen by Dr. Roe Garcia back in January and had a cortisone injection for the shoulder, which helped her considerably, and the shoulder is feeling much better. I also sent her for an MRI for further evaluation to rule out occult fractures. The patient states she is able to sleep at night. She has a little bit more mobility of the shoulder. She still has some stiffness in the morning, but overall, she has mostly good days and some bad days. She denies any recent fevers, chills, systemic changes, or injuries to report and no chest pain or shortness of breath. PHYSICAL EXAMINATION:  In general, the patient is alert and oriented x 3 in no acute distress. The patient is well-developed, well-nourished, with a normal affect. The patient is afebrile.  HEENT:  Head is normocephalic and atraumatic. Pupils are equally round and reactive to light and accommodation. Extraocular eye movements are intact. Neck is supple. Trachea is midline. No JVD is present. Breathing is nonlabored. Examination of the right shoulder reveals the skin is intact. There is no ecchymosis, no warmth, and no signs of infection or cellulitis present. Range of motion is approximately 90° of flexion and 80° of abduction. He does have decreased strength with external rotation. There is some discomfort with Brownlee and has a negative drop-arm, Speed's, and Concord's. Radial pulse is 2+. RADIOGRAPHS:  Review of previous radiographs shows moderate arthritis of the shoulder. Review of the MRI of the right humerus shows no humeral fracture, marrow edema, or abnormal cortical thickening. It does show a complete full thickness tear of the supraspinatus and infraspinatus tendons, as well as subacromial, subdeltoid bursitis. ASSESSMENT:      1. Right shoulder rotator cuff pathology. 2. Bursitis, right shoulder. PLAN:  She is doing quite well with the conservative treatment. We did discuss therapy. She declines. We will plan on doing injections every three months, as long as it gives her relief. She will call with any questions or concerns that shall arise.                   JR Mk WISE, DRE, ATC

## 2018-05-15 DIAGNOSIS — E78.5 DYSLIPIDEMIA: Primary | ICD-10-CM

## 2018-05-16 RX ORDER — PRAVASTATIN SODIUM 40 MG/1
TABLET ORAL
Qty: 30 TAB | Refills: 6 | Status: SHIPPED | OUTPATIENT
Start: 2018-05-16 | End: 2018-11-02 | Stop reason: SDUPTHER

## 2018-06-26 ENCOUNTER — HOSPITAL ENCOUNTER (OUTPATIENT)
Dept: LAB | Age: 83
Discharge: HOME | End: 2018-06-26
Payer: MEDICARE

## 2018-06-26 ENCOUNTER — LAB ONLY (OUTPATIENT)
Dept: FAMILY MEDICINE CLINIC | Age: 83
End: 2018-06-26

## 2018-06-26 DIAGNOSIS — E78.5 DYSLIPIDEMIA: Primary | ICD-10-CM

## 2018-06-26 DIAGNOSIS — R39.11 URINARY HESITANCY: ICD-10-CM

## 2018-06-26 LAB
ALBUMIN SERPL-MCNC: 3.5 G/DL (ref 3.4–5)
ALBUMIN/GLOB SERPL: 1.3 {RATIO} (ref 0.8–1.7)
ALP SERPL-CCNC: 66 U/L (ref 45–117)
ALT SERPL-CCNC: 20 U/L (ref 13–56)
ANION GAP SERPL CALC-SCNC: 7 MMOL/L (ref 3–18)
AST SERPL-CCNC: 20 U/L (ref 15–37)
BILIRUB SERPL-MCNC: 0.4 MG/DL (ref 0.2–1)
BUN SERPL-MCNC: 31 MG/DL (ref 7–18)
BUN/CREAT SERPL: 21 (ref 12–20)
CALCIUM SERPL-MCNC: 9.4 MG/DL (ref 8.5–10.1)
CHLORIDE SERPL-SCNC: 107 MMOL/L (ref 100–108)
CO2 SERPL-SCNC: 28 MMOL/L (ref 21–32)
CREAT SERPL-MCNC: 1.51 MG/DL (ref 0.6–1.3)
GLOBULIN SER CALC-MCNC: 2.7 G/DL (ref 2–4)
GLUCOSE SERPL-MCNC: 115 MG/DL (ref 74–99)
POTASSIUM SERPL-SCNC: 4.8 MMOL/L (ref 3.5–5.5)
PROT SERPL-MCNC: 6.2 G/DL (ref 6.4–8.2)
SODIUM SERPL-SCNC: 142 MMOL/L (ref 136–145)

## 2018-06-26 PROCEDURE — 80053 COMPREHEN METABOLIC PANEL: CPT | Performed by: INTERNAL MEDICINE

## 2018-06-26 PROCEDURE — 36415 COLL VENOUS BLD VENIPUNCTURE: CPT | Performed by: INTERNAL MEDICINE

## 2018-06-26 PROCEDURE — 80061 LIPID PANEL: CPT | Performed by: INTERNAL MEDICINE

## 2018-06-27 LAB
CHOLEST SERPL-MCNC: 197 MG/DL
HDLC SERPL-MCNC: 55 MG/DL (ref 40–60)
HDLC SERPL: 3.6 {RATIO} (ref 0–5)
LDLC SERPL CALC-MCNC: 123.4 MG/DL (ref 0–100)
LIPID PROFILE,FLP: ABNORMAL
TRIGL SERPL-MCNC: 93 MG/DL (ref ?–150)
VLDLC SERPL CALC-MCNC: 18.6 MG/DL

## 2018-07-02 ENCOUNTER — OFFICE VISIT (OUTPATIENT)
Dept: FAMILY MEDICINE CLINIC | Age: 83
End: 2018-07-02

## 2018-07-02 VITALS
TEMPERATURE: 98.3 F | HEIGHT: 61 IN | OXYGEN SATURATION: 98 % | DIASTOLIC BLOOD PRESSURE: 66 MMHG | HEART RATE: 40 BPM | SYSTOLIC BLOOD PRESSURE: 147 MMHG | BODY MASS INDEX: 27.23 KG/M2 | RESPIRATION RATE: 17 BRPM | WEIGHT: 144.2 LBS

## 2018-07-02 DIAGNOSIS — E11.21 TYPE 2 DIABETES MELLITUS WITH NEPHROPATHY (HCC): Primary | ICD-10-CM

## 2018-07-02 DIAGNOSIS — R00.1 BRADYCARDIA: ICD-10-CM

## 2018-07-02 DIAGNOSIS — I10 ESSENTIAL HYPERTENSION: ICD-10-CM

## 2018-07-02 DIAGNOSIS — I50.32 HEART FAILURE, DIASTOLIC, CHRONIC (HCC): ICD-10-CM

## 2018-07-02 DIAGNOSIS — R00.2 PALPITATIONS: ICD-10-CM

## 2018-07-02 DIAGNOSIS — E78.5 DYSLIPIDEMIA: ICD-10-CM

## 2018-07-02 LAB — HBA1C MFR BLD HPLC: 6.1 %

## 2018-07-02 NOTE — PROGRESS NOTES
Subjective:       Chief Complaint  The patient presents for follow up of diabetes, hypertension and high cholesterol. FATEMEH Smith is a 80 y.o. female seen for follow up of diabetes. Shealso has hypertension and hyperlipidemia. Diabetes well controlled, no significant medication side effects noted, on glucotrol, hypertension fairly well controlled, no significant medication side effects noted, on current meds, hyperlipidemia, poorly controlled on Pravachol, she seems to be taking it 1x/week due to myalgias, both pt and daughter are not sure what is going on with this med, they are not sure if she cut it back due to myalgias,  Lipitor gave her myalgias. Pt will try to take Pravachol 2x/week to see if she can tolerate it. Patient was advised along with daughter to bring in all of her medications next office visit so we can review them and see exactly what medication she is taking. Diet and Lifestyle: generally follows a low fat low cholesterol diet, follows a diabetic diet regularly, exercises sporadically    Home BP Monitoring: is well controlled at home. Diabetic Review of Systems - home glucose monitoring: is well controlled at home when she takes it 1x/day    Other symptoms and concerns: pt feels better when she exercises on a regular basis. Pt's CKD stage 3 is stable on current meds. Patient has palpitations that bother her at times. She has been taken off of beta-blockers by her cardiologist because of low heart rate. Her heart rate today is in the 40s. Patient has been unable to get in with a cardiologist will try and refer her and get her in sooner to make sure that bradycardia does not need further evaluation. Has chronic diastolic heart failure that is followed by cardiology. Current Outpatient Prescriptions   Medication Sig    pravastatin (PRAVACHOL) 40 mg tablet TAKE 1 TAB BY MOUTH DAILY.     glipiZIDE SR (GLUCOTROL XL) 2.5 mg CR tablet TAKE 1 TABLET BY MOUTH EVERY DAY  nitroglycerin (NITROSTAT) 0.4 mg SL tablet 1 Tab by SubLINGual route every five (5) minutes as needed.  amLODIPine (NORVASC) 10 mg tablet TAKE 1 TABLET BY MOUTH EVERY DAY    latanoprost (XALATAN) 0.005 % ophthalmic solution     losartan (COZAAR) 100 mg tablet TAKE ONE TABLET BY MOUTH DIALY    COMBIGAN 0.2-0.5 % drop ophthalmic solution Administer 1 Drop to both eyes every twelve (12) hours.  RESTASIS 0.05 % ophthalmic emulsion Administer 1 Drop to both eyes every twelve (12) hours.  ascorbic acid (VITAMIN C) 500 mg tablet Take 1,000 mg by mouth daily.  IBUPROFEN (ADVIL PO) Take 1 Tab by mouth as needed.  CALCIUM PO Take 1,000 mg by mouth two (2) times a day.  Blood-Glucose Meter monitoring kit by Does Not Apply route. Use daily as directed    glucose blood VI test strips (BLOOD GLUCOSE TEST) strip by Does Not Apply route. Use daily as directed    Lancets Misc by Does Not Apply route. Use daily as directed    alcohol swabs (ALCOHOL PREP PADS) PadM 1 Dose by Apply Externally route daily as needed (Glucose testing).  Cholecalciferol, Vitamin D3, (VITAMIN D) 1,000 unit Cap Take 2,000 Units by mouth daily.  MULTIVITAMINS PO Take 1 Tab by mouth daily.  aspirin 81 mg chewable tablet Take 81 mg by mouth daily.  sulfanilamide (AVC) 15 % vaginal cream Insert 1 Applicator into vagina daily. No current facility-administered medications for this visit.               Review of Systems  Respiratory: negative for dyspnea on exertion  Cardiovascular: negative for chest pain    Objective:     Visit Vitals    /66 (BP 1 Location: Left arm, BP Patient Position: Sitting)    Pulse (!) 40    Temp 98.3 °F (36.8 °C) (Oral)    Resp 17    Ht 5' 1\" (1.549 m)    Wt 144 lb 3.2 oz (65.4 kg)    SpO2 98%    BMI 27.25 kg/m2        General appearance - alert, well appearing, and in no distress  Neck - supple, no significant adenopathy, carotids upstroke normal bilaterally, no bruits  Chest - clear to auscultation, no wheezes, rales or rhonchi, symmetric air entry  Heart - normal rate, regular rhythm, normal S1, S2, no murmurs, rubs, clicks or gallops  Extremities - peripheral pulses normal, no pedal edema, no clubbing or cyanosis  Skin - normal coloration and turgor, no rashes, no suspicious skin lesions noted      Labs:   Lab Results   Component Value Date/Time    Hemoglobin A1c 6.4 (H) 01/08/2018 03:53 PM    Hemoglobin A1c 6.3 (H) 11/06/2017 09:13 AM    Hemoglobin A1c 6.8 (H) 07/03/2017 09:18 AM    Microalbumin/Creat ratio (mg/g creat) 17 11/07/2016 02:30 PM    Microalbumin,urine random 3.00 11/07/2016 02:30 PM    LDL, calculated 123.4 (H) 06/26/2018 09:20 AM    Creatinine 1.51 (H) 06/26/2018 09:20 AM      Lab Results   Component Value Date/Time    Cholesterol, total 197 06/26/2018 09:20 AM    HDL Cholesterol 55 06/26/2018 09:20 AM    LDL, calculated 123.4 (H) 06/26/2018 09:20 AM    Triglyceride 93 06/26/2018 09:20 AM    CHOL/HDL Ratio 3.6 06/26/2018 09:20 AM     Lab Results   Component Value Date/Time    AST (SGOT) 20 06/26/2018 09:20 AM    Alk. phosphatase 66 06/26/2018 09:20 AM    Bilirubin, direct 0.1 03/11/2016 10:45 AM     Lab Results   Component Value Date/Time    GFR est AA 39 (L) 06/26/2018 09:20 AM    GFR est non-AA 32 (L) 06/26/2018 09:20 AM    Creatinine 1.51 (H) 06/26/2018 09:20 AM    BUN 31 (H) 06/26/2018 09:20 AM    Sodium 142 06/26/2018 09:20 AM    Potassium 4.8 06/26/2018 09:20 AM    Chloride 107 06/26/2018 09:20 AM    CO2 28 06/26/2018 09:20 AM      Lab Results   Component Value Date/Time    Glucose 115 (H) 06/26/2018 09:20 AM            Assessment / Plan     Diabetes well controlled, on glucotrol  Hypertension  Fairly well controlled, on current meds,    Hyperlipidemia poorly controlled  On Pravachol, not sure how compliant pt has been, pt will try to take it 2x/week to see if she tolerates it without myalgias. At age 80 if she decides to stop the statin I would support her. ICD-10-CM ICD-9-CM    1. Type 2 diabetes mellitus with nephropathy (HCC) E11.21 250.40 MICROALBUMIN, UR, RAND W/ MICROALB/CREAT RATIO     583.81 AMB POC HEMOGLOBIN A1C      HEMOGLOBIN A1C W/O EAG   2. Essential hypertension K27 240.6 METABOLIC PANEL, COMPREHENSIVE   3. Dyslipidemia E78.5 272.4 LIPID PANEL   4. Heart failure, diastolic, chronic (HCC) Q38.79 428.32  stable on current medications are followed by cardiology. 5. Palpitations R00.2 785.1 REFERRAL TO CARDIOLOGY   6. Bradycardia R00.1 427.89 REFERRAL TO CARDIOLOGY                Diabetic issues reviewed with her: diabetic diet discussed in detail, and low cholesterol diet, weight control and daily exercise discussed. Follow-up Disposition:  Return in about 4 months (around 11/2/2018) for labs 1 week before. Reviewed plan of care. Patient has provided input and agrees with goals.

## 2018-07-02 NOTE — PROGRESS NOTES
Jose A Chen is a 80 y.o. female presents in office for    Chief Complaint   Patient presents with    Hypertension     4 month f/u    Palpitations        1. Have you been to the ER, urgent care clinic since your last visit? Hospitalized since your last visit? No    2. Have you seen or consulted any other health care providers outside of the [facility] since your last visit? Include any pap smears or colon screening.  No

## 2018-07-12 ENCOUNTER — OFFICE VISIT (OUTPATIENT)
Dept: ORTHOPEDIC SURGERY | Age: 83
End: 2018-07-12

## 2018-07-12 VITALS
OXYGEN SATURATION: 98 % | RESPIRATION RATE: 18 BRPM | HEART RATE: 62 BPM | BODY MASS INDEX: 27.38 KG/M2 | WEIGHT: 145 LBS | HEIGHT: 61 IN | TEMPERATURE: 97.8 F | SYSTOLIC BLOOD PRESSURE: 144 MMHG | DIASTOLIC BLOOD PRESSURE: 63 MMHG

## 2018-07-12 DIAGNOSIS — M75.51 SUBACROMIAL BURSITIS OF RIGHT SHOULDER JOINT: ICD-10-CM

## 2018-07-12 DIAGNOSIS — M25.511 RIGHT SHOULDER PAIN, UNSPECIFIED CHRONICITY: ICD-10-CM

## 2018-07-12 DIAGNOSIS — M25.512 LEFT SHOULDER PAIN, UNSPECIFIED CHRONICITY: Primary | ICD-10-CM

## 2018-07-12 DIAGNOSIS — M75.121 COMPLETE ROTATOR CUFF TEAR OR RUPTURE OF RIGHT SHOULDER, NOT SPECIFIED AS TRAUMATIC: ICD-10-CM

## 2018-07-12 DIAGNOSIS — M75.52 SUBACROMIAL BURSITIS OF LEFT SHOULDER JOINT: ICD-10-CM

## 2018-07-12 RX ORDER — BETAMETHASONE SODIUM PHOSPHATE AND BETAMETHASONE ACETATE 3; 3 MG/ML; MG/ML
6 INJECTION, SUSPENSION INTRA-ARTICULAR; INTRALESIONAL; INTRAMUSCULAR; SOFT TISSUE ONCE
Qty: 1 ML | Refills: 0
Start: 2018-07-12 | End: 2018-07-12

## 2018-07-12 NOTE — PROGRESS NOTES
Patient: Aiden López                MRN: [id number]       SSN: [id number] YOB: 1923        AGE: 89 y.o. SEX: female Body mass index is 27.4 kg/(m^2). PCP: Elder Bennett MD 
07/12/18 HISTORY: López Jacobsen returns in follow up with her daughter regarding her right shoulder. The left shoulder is hurting her somewhat. She has had some injections in the past.  A previous MRI confirmed a rotator cuff tear. She has some night pain. She describes it as deltoid, nonradicular. On further questioning, the pain is moderate depending on the day and activity level. She is a very sharp 42-year-old and knows her age and remembered me. PHYSICAL EXAMINATION:  On examination today, she moves the head and neck adequately. She certainly looks younger than 89. She looks like she is in her early [de-identified]. The left shoulder moves a little bit better than the right, and the right has about 105° of forward elevation and ° abduction. Court Fret' sign is positive. ER strength is 4+/5 on the right and -5/5 on the left. The [facility] joint is only mildly tender. There is good  strength, and there is good capillary refill. Both hands are warm and well perfused. The elbow is noncontributory. The compartment is soft. RADIOGRAPHS:  Review of her x-rays, AP, lateral, and Y views, confirm a moderate glenohumeral arthropathy and rotator cuff arthropathy, and type 2.5 hooked acromion. PROCEDURE:  Under aseptic conditions and after informed, written consent with a time out, the right shoulder was injected with 1 cc of the Celestone preparation, i.e. 6 mg, which was well tolerated. PLAN:  She is going to return to see us in about three or four months time as needed. It has been an absolute pleasure to share in this pleasant patients care. REVIEW OF SYSTEMS:   
 
CON: negative for weight loss, fever EYE: negative for double vision ENT: negative for hoarseness RS:   negative for Tb 
GI:    negative for blood in stool :  negative for blood in urine Other systems reviewed and noted below. Past Medical History:  
Diagnosis Date  Arthritis of right knee  CAD (coronary artery disease), native coronary artery October 2010  
 mild disease  Cardiac cath 10/21/2010  
 dLM 20%. mLAD 25%. CX mild. pRCA 30%. EF 30-35%. Anterolateral, apical , diaphrag akin. Hypercontractile basal segments. Severe elev left-sided filling press.  Cardiac echocardiogram 02/02/2011 EF 50-55%. Mild apical hypk. Gr 1 DDfx. RVSP 40-45. Mild LAE.  Cardiac Holter monitor 06/10/2015 Sinus rhythm w/avg HR of 53 bpm (range 40-85 bpm). Occasional PACs. One 3-beat run of nonsustained SVT. Very few PVCs. One 3-beat run of nonsustained VT. Pt's HR was < 50 bpm for 8 hrs of the recorded time. No pauses noted. Two episodes of neck pain corresponded to sinus rhythm w/o ectopy.  Cardiovascular renal duplex 12/03/2012 No renal artery stenosis bilaterally. Bilateral intrinsic/med disease. Patent bilateral renal veins. Multiple cysts on both kidneys.  CKD (chronic kidney disease)   
 while on diuretics for difficult to control HTN  
 Diabetes mellitus type II 3/15/2010  Dyslipidemia 3/15/2010  
 Heart attack ([address] 2010  
 Heart failure, diastolic, chronic ([address]  History of heart attack  HTN (hypertension) 3/15/2010  Hypertensive heart disease  Loss of appetite  Osteopenia 7/13/2010  Other dyspnea and respiratory abnormality  Ringing in the ears  Stress-induced cardiomyopathy EF 35% (LV angio 10/2010), then EF 50-55% (echo, Feb 2011)  Wears glasses Family History Problem Relation Age of Onset  Hypertension Mother  Arthritis-osteo Other Current Outpatient Prescriptions Medication Sig Dispense Refill  betamethasone (CELESTONE SOLUSPAN) 6 mg/mL injection 1 mL by Intra artICUlar route once for 1 dose.  1 mL 0  
 pravastatin (PRAVACHOL) 40 mg tablet TAKE 1 TAB BY MOUTH DAILY. 30 Tab 6  
 glipiZIDE SR (GLUCOTROL XL) 2.5 mg CR tablet TAKE 1 TABLET BY MOUTH EVERY DAY 90 Tab 1  
 nitroglycerin (NITROSTAT) 0.4 mg SL tablet 1 Tab by SubLINGual route every five (5) minutes as needed. 1 Bottle 1  
 amLODIPine (NORVASC) 10 mg tablet TAKE 1 TABLET BY MOUTH EVERY DAY 90 Tab 3  
 latanoprost (XALATAN) 0.005 % ophthalmic solution  losartan (COZAAR) 100 mg tablet TAKE ONE TABLET BY MOUTH DIALY 90 Tab 3  
 sulfanilamide (AVC) 15 % vaginal cream Insert 1 Applicator into vagina daily. 120 g 0  
 COMBIGAN 0.2-0.5 % drop ophthalmic solution Administer 1 Drop to both eyes every twelve (12) hours.  RESTASIS 0.05 % ophthalmic emulsion Administer 1 Drop to both eyes every twelve (12) hours.  ascorbic acid (VITAMIN C) 500 mg tablet Take 1,000 mg by mouth daily.  IBUPROFEN (ADVIL PO) Take 1 Tab by mouth as needed.  CALCIUM PO Take 1,000 mg by mouth two (2) times a day.  Blood-Glucose Meter monitoring kit by Does Not Apply route. Use daily as directed 1 Kit 0  
 glucose blood VI test strips (BLOOD GLUCOSE TEST) strip by Does Not Apply route. Use daily as directed 3 Package 3  Lancets Misc by Does Not Apply route. Use daily as directed 3 Package 3  
 alcohol swabs (ALCOHOL PREP PADS) PadM 1 Dose by Apply Externally route daily as needed (Glucose testing). 3 Package 3  Cholecalciferol, Vitamin D3, (VITAMIN D) 1,000 unit Cap Take 2,000 Units by mouth daily. 180 Cap 3  
 MULTIVITAMINS PO Take 1 Tab by mouth daily.  aspirin 81 mg chewable tablet Take 81 mg by mouth daily. Allergies Allergen Reactions  Lipitor [Atorvastatin] Myalgia  Spironolactone Other (comments) Dizziness and fatigue Past Surgical History:  
Procedure Laterality Date  HX BLADDER SUSPENSION  2009 [facility] 9306 Social History Social History  Marital status:    Spouse name: N/A  
 Number of children: N/A  
 Years of education: N/A Occupational History  Not on file. Social History Main Topics  Smoking status: Never Smoker  Smokeless tobacco: Never Used  Alcohol use No  
 Drug use: No  
 Sexual activity: No  
 
Other Topics Concern  Not on file Social History Narrative Visit Vitals  /63 (BP 1 Location: Left arm, BP Patient Position: Sitting)  Pulse 62  Temp 97.8 °F (36.6 °C) (Oral)  Resp 18  Ht 5' 1\" (1.549 m)  Wt 145 lb (65.8 kg)  SpO2 98%  BMI 27.4 kg/m2 PHYSICAL EXAMINATION: 
GENERAL: Alert and oriented x3, in no acute distress, well-developed, well-nourished, afebrile. HEART: No JVD. EYES: No scleral icterus NECK: No significant lymphadenopathy LUNGS: No respiratory compromise or indrawing ABDOMEN: Soft, non-tender, non-distended. Electronically signed by:  Griselda Handing, MD

## 2018-07-20 ENCOUNTER — HOSPITAL ENCOUNTER (OUTPATIENT)
Dept: MAMMOGRAPHY | Age: 83
Discharge: HOME | End: 2018-07-20
Attending: INTERNAL MEDICINE
Payer: MEDICARE

## 2018-07-20 DIAGNOSIS — Z12.39 SCREENING BREAST EXAMINATION: ICD-10-CM

## 2018-07-20 PROCEDURE — 77067 SCR MAMMO BI INCL CAD: CPT

## 2018-07-23 ENCOUNTER — TELEPHONE (OUTPATIENT)
Dept: CARDIOLOGY CLINIC | Age: 83
End: 2018-07-23

## 2018-07-23 NOTE — TELEPHONE ENCOUNTER
Left message for patient to call and schedule followup w/Dr. Dylan Crockett. There is a tickler in for May.

## 2018-07-29 RX ORDER — GLIPIZIDE 2.5 MG/1
TABLET, EXTENDED RELEASE ORAL
Qty: 90 TAB | Refills: 1 | Status: SHIPPED | OUTPATIENT
Start: 2018-07-29 | End: 2019-01-29 | Stop reason: SDUPTHER

## 2018-08-08 ENCOUNTER — TELEPHONE (OUTPATIENT)
Dept: FAMILY MEDICINE CLINIC | Age: 83
End: 2018-08-08

## 2018-08-08 DIAGNOSIS — H92.03 OTALGIA OF BOTH EARS: Primary | ICD-10-CM

## 2018-08-08 RX ORDER — AMLODIPINE BESYLATE 10 MG/1
TABLET ORAL
Qty: 90 TAB | Refills: 3 | Status: SHIPPED | OUTPATIENT
Start: 2018-08-08 | End: 2019-10-09 | Stop reason: SDUPTHER

## 2018-08-08 NOTE — TELEPHONE ENCOUNTER
The pt wants a referral to ENT she is having ear problems [address] [facility] [phone].

## 2018-08-09 NOTE — TELEPHONE ENCOUNTER
Aware ENT referral has been placed. Requesting appt for possible urinary issues in stating Pt c/o not urinationg enough. Advised to moniter Pt's fluid intake as she may not be taking in enough fluids. Ms Hayley Murphy requests appt for Pt on Tuesday but states she will moniter intake & cancel appt if intake & output improve over the weekend.

## 2018-09-05 ENCOUNTER — OFFICE VISIT (OUTPATIENT)
Dept: CARDIOLOGY CLINIC | Age: 83
End: 2018-09-05

## 2018-09-05 VITALS
SYSTOLIC BLOOD PRESSURE: 145 MMHG | OXYGEN SATURATION: 97 % | HEART RATE: 62 BPM | DIASTOLIC BLOOD PRESSURE: 70 MMHG | BODY MASS INDEX: 27.19 KG/M2 | HEIGHT: 61 IN | WEIGHT: 144 LBS

## 2018-09-05 DIAGNOSIS — I25.10 CORONARY ARTERY DISEASE INVOLVING NATIVE CORONARY ARTERY OF NATIVE HEART WITHOUT ANGINA PECTORIS: Primary | ICD-10-CM

## 2018-09-05 DIAGNOSIS — I10 ESSENTIAL HYPERTENSION: ICD-10-CM

## 2018-09-05 DIAGNOSIS — E78.5 DYSLIPIDEMIA: ICD-10-CM

## 2018-09-05 DIAGNOSIS — E11.21 TYPE 2 DIABETES MELLITUS WITH NEPHROPATHY (HCC): ICD-10-CM

## 2018-09-05 NOTE — PROGRESS NOTES
HPI:  I saw Alicia Morales in my office today in cardiovascular evaluation regarding her problems with hypertensive heart disease with some diastolic dysfunction of the left ventricle. Ms. Nicole Cuevas is a very pleasant 80 year old  female, who appears to be much younger than her stated age, and who has been followed in the past in our practice for her problems with hypertension, dyslipidemia, and specifically some component of chronic diastolic heart failure. 
  
When I saw her last in January 2018 she had a significant sinus bradycardia in the 40s and I ultimately discontinued her atenolol 12.5 mg daily and she comes into the office today with a heart rate in the 60s without any cardiovascular complaints. Encounter Diagnoses Name Primary?  Coronary artery disease involving native coronary artery of native heart without angina pectoris Yes  Dyslipidemia  Type 2 diabetes mellitus with nephropathy ([address]  Essential hypertension Discussion: This lady appears to be doing about as well as we could hope and I really have no recommendations for change at this time. She does have a few palpitations at night from time to time described as a rapid heart beating for a few minutes, but these are not frequent and since she is otherwise doing well off of beta-blocker therapy I am not going to consider reinitiation of beta blockers. Her latest lipid profile which was completed on June 26, 2018 showed total cholesterol 197 with triglycerides of 93, HDL of 55, LDL of 123.4, and VLDL of 18.6 which I think is certainly suboptimal control pravastatin 40 mg daily but in view of her advanced age I am not going to consider changing that dosage. Her blood pressure when checked by my staff today was somewhat elevated 148/74 and I checked again myself and got 145/70.   She apparently has been taking both her blood pressure medications today so best to take both her amlodipine and her losartan in the morning and have her follow-up with her family physician Dr. Stephy Price for further adjustment of her blood pressure medication moving forward. Since patient is otherwise doing well I am simply going to continue her on her current medications and see her again in several months. PCP: Virginia Carroll MD 
 
 
Past Medical History:  
Diagnosis Date  Arthritis of right knee  CAD (coronary artery disease), native coronary artery October 2010  
 mild disease  Cardiac cath 10/21/2010  
 dLM 20%. mLAD 25%. CX mild. pRCA 30%. EF 30-35%. Anterolateral, apical , diaphrag akin. Hypercontractile basal segments. Severe elev left-sided filling press.  Cardiac echocardiogram 02/02/2011 EF 50-55%. Mild apical hypk. Gr 1 DDfx. RVSP 40-45. Mild LAE.  Cardiac Holter monitor 06/10/2015 Sinus rhythm w/avg HR of 53 bpm (range 40-85 bpm). Occasional PACs. One 3-beat run of nonsustained SVT. Very few PVCs. One 3-beat run of nonsustained VT. Pt's HR was < 50 bpm for 8 hrs of the recorded time. No pauses noted. Two episodes of neck pain corresponded to sinus rhythm w/o ectopy.  Cardiovascular renal duplex 12/03/2012 No renal artery stenosis bilaterally. Bilateral intrinsic/med disease. Patent bilateral renal veins. Multiple cysts on both kidneys.  CKD (chronic kidney disease)   
 while on diuretics for difficult to control HTN  
 Diabetes mellitus type II 3/15/2010  Dyslipidemia 3/15/2010  
 Heart attack ([address] 2010  
 Heart failure, diastolic, chronic ([address]  History of heart attack  HTN (hypertension) 3/15/2010  Hypertensive heart disease  Loss of appetite  Osteopenia 7/13/2010  Other dyspnea and respiratory abnormality  Ringing in the ears  Stress-induced cardiomyopathy EF 35% (LV angio 10/2010), then EF 50-55% (echo, Feb 2011)  Wears glasses Past Surgical History:  
Procedure Laterality Date  HX BLADDER SUSPENSION  2009 [facility] 6508 Current Outpatient Rx Name  Route  Sig  Dispense  Refill  atenolol (TENORMIN) 25 mg tablet Oral 
  Take 12.5 mg by mouth two (2) times a day.  alendronate (FOSAMAX) 35 mg tablet Oral 
  Take  by mouth every seven (7) days.  amLODIPine (NORVASC) 10 mg tablet TAKE 1 TABLET BY MOUTH EVERY DAY 
  90 Tab 
  3 
  
 meclizine (ANTIVERT) 25 mg tablet Oral 
  Take 25 mg by mouth three (3) times daily as needed.  losartan (COZAAR) 100 mg tablet TAKE 1 TABLET BY MOUTH DAILY. 90 Tab 
  3 
  
 glipiZIDE SR (GLUCOTROL) 2.5 mg CR tablet TAKE 1 TABLET BY MOUTH EVERY DAY 
  90 Tab 
  2 
  
 hydrALAZINE (APRESOLINE) 50 mg tablet Oral 
  Take 1 Tab by mouth three (3) times daily. 270 Tab 
  3  chlorthalidone (HYGROTEN) 25 mg tablet Oral 
  Take 25 mg by mouth daily.  COMBIGAN 0.2-0.5 % drop ophthalmic solution Both Eyes Administer 1 Drop to both eyes every twelve (12) hours.  RESTASIS 0.05 % ophthalmic emulsion Both Eyes Administer 1 Drop to both eyes every twelve (12) hours.  ascorbic acid (VITAMIN C) 500 mg tablet Oral 
  Take 1,000 mg by mouth daily.  IBUPROFEN (ADVIL PO) Oral 
  Take 1 Tab by mouth as needed.  CALCIUM PO Oral 
  Take 1,000 mg by mouth two (2) times a day.  Blood-Glucose Meter monitoring kit Does Not Apply 
  by Does Not Apply route. Use daily as directed 1 Kit 
  0 
  
 glucose blood VI test strips (BLOOD GLUCOSE TEST) strip Does Not Apply 
  by Does Not Apply route. Use daily as directed 3 Package 3  Lancets Misc Does Not Apply 
  by Does Not Apply route. Use daily as directed 3 Package 3 
  
 alcohol swabs (ALCOHOL PREP PADS) PadM Apply Externally 1 Dose by Apply Externally route daily as needed (Glucose testing). 3 Package 3 
  
 nitroglycerin (NITROSTAT) 0.4 mg SL tablet SubLINGual 
  0.4 mg by SubLINGual route every five (5) minutes as needed.  Cholecalciferol, Vitamin D3, (VITAMIN D) 1,000 unit Cap Oral 
  Take 2,000 Units by mouth daily. 180 Cap 
  3 
  
 MULTIVITAMINS PO 
  Oral 
  Take 1 Tab by mouth daily.  aspirin 81 mg chewable tablet Oral 
  Take 81 mg by mouth daily. Allergies Allergen Reactions  Lipitor [Atorvastatin] Myalgia  Spironolactone Other (comments) Dizziness and fatigue Social History:  
Social History Substance Use Topics  Smoking status: Never Smoker  Smokeless tobacco: Never Used  Alcohol use No  
   
 
Family history: family history includes Arthritis-osteo in an other family member; Hypertension in her mother. Review of Systems: 
Constitutional: Negative for chills, fever, malaise/fatigue and weight loss. Respiratory: Negative for cough, hemoptysis, shortness of breath and wheezing. Cardiovascular: Positive for palpitations. Negative for chest pain, orthopnea and leg swelling. Gastrointestinal: Negative for abdominal pain, blood in stool, constipation, diarrhea, heartburn, melena, nausea and vomiting. Musculoskeletal: Positive for joint pain. Negative for falls and myalgias. Neurological: Negative for dizziness. Physical Exam:  
The patient is an alert, oriented, well developed, well nourished 80 y.o. female who was in no acute distress at the time of my examination. Visit Vitals  /70 (BP 1 Location: Left arm, BP Patient Position: Sitting)  Pulse 62  Ht 5' 1\" (1.549 m)  Wt 65.3 kg (144 lb)  SpO2 97%  BMI 27.21 kg/m2 BP Readings from Last 3 Encounters:  
09/05/18 145/70  
07/12/18 144/63  
07/02/18 147/66 Wt Readings from Last 3 Encounters:  
09/05/18 65.3 kg (144 lb)  
07/12/18 65.8 kg (145 lb) 07/02/18 65.4 kg (144 lb 3.2 oz) HEENT: Conjuctiva white, mucosa moist, no pallor or cyanosis. NECK: Supple without masses, tenderness or thyromegaly. There was no jugular venous distention. Carotid are full bilaterally with soft bilateral bruits vs radiation of heart murmur to the neck. CHEST: Symmetrical with good excursion. LUNGS: Clear to auscultation in all fields. HEART: Regular rate and rhythm. The apex is not displaced. There were no lifts, thrills or heaves. There is a normal S1 and S2. There is a grade 2/6 MINI along the LSB with radiation to the base and neck without appreciable diastolic murmurs, rubs, clicks, or gallops auscultated. ABDOMEN: Soft without masses, tenderness or organomegaly. EXTREMITIES: 1 + peripheral pulses without peripheral edema. INTEGUMENT: Skin is warm and dry NEUROLOGICAL: The patient was oriented x3 with motor function grossly intact. Review of Data: See PMH and Cardiology and Imaging sections for cardiac testing Lab Results Component Value Date/Time Cholesterol, total 197 06/26/2018 09:20 AM  
 HDL Cholesterol 55 06/26/2018 09:20 AM  
 LDL, calculated 123.4 (H) 06/26/2018 09:20 AM  
 Triglyceride 93 06/26/2018 09:20 AM  
 CHOL/HDL Ratio 3.6 06/26/2018 09:20 AM  
 
 
Results for orders placed or performed in visit on 09/05/18 AMB POC EKG ROUTINE W/ 12 LEADS, INTER & REP     Status: None Narrative Normal sinus rhythm rate 62. Diffuse ST-T flattening. Compared to the EKG of January 29, 2018 the heart rate is much faster being 44 at that time. Rosibel Rollins D.O., F.A.C.C. [facility] [place] [address] [address] [facility] [place] [address] Anny Aniwa [phone]   [phone] PLEASE NOTE:  This document has been produced using voice recognition software. Unrecognized errors in transcription may be present.

## 2018-09-05 NOTE — MR AVS SNAPSHOT
[address] [place] [address] [address] Judy Coleman [id number] [phone] Patient: Yonatan Belle MRN: [id number] KFQ:[id number] Visit Information Date & Time Provider Department Dept. Phone Encounter #  
 9/5/2018 12:40 PM Chris Osborne, [address] [facility] [id number] Follow-up Instructions Return in about 6 months (around 3/5/2019), or if symptoms worsen or fail to improve. Your Appointments 9/25/2018  9:30 AM  
New Patient with Becka Post MD  
[facility] ([facility]) Appt Note: NP ANNUAL/PHOTO ID INS CRD MED LIST ARRIVE 15MINS PRIOR LOCATION CONFIRMED  
 [address], [address] [facility] [address]  
[facility] [address], [address] [address]  
  
    
 10/12/2018 10:45 AM  
Follow Up with Mya Dan MD  
[facility] ([facility]) Appt Note: rt shoulder f/u  
 [address], [address] [facility]  
[phone], [address]  
  
    
 11/1/2018  8:20 AM  
LAB with [facility] (CARRIE Martinez) Appt Note: 4mo f/u lab [address] [facility] [address] [address]  
[phone]  
  
   
 [address] [facility]  
  
    
 11/8/2018  9:45 AM  
Office Visit with Twin Wiseman MD  
[place] [address] [facility]) Appt Note: 4 mo f/u  
 [address], [facility] [address] [id number] [phone]  
  
   
 [address] Upcoming Health Maintenance Date Due  
 FOOT EXAM Q1 1/5/1933 DTaP/Tdap/Td series (1 - Tdap) 1/5/1944 ZOSTER VACCINE AGE 60> 11/5/1982 Pneumococcal 65+ Low/Medium Risk (2 of 2 - PPSV23) 5/27/2016 MICROALBUMIN Q1 11/7/2017 Influenza Age 5 to Adult 8/1/2018 MEDICARE YEARLY EXAM 11/14/2018 HEMOGLOBIN A1C Q6M 1/2/2019 EYE EXAM RETINAL OR DILATED Q1 6/4/2019 LIPID PANEL Q1 6/26/2019 GLAUCOMA SCREENING Q2Y 6/4/2020 Allergies as of 9/5/2018  Review Complete On: 9/5/2018 By: Hina Landrum DO Severity Noted Reaction Type Reactions Lipitor [Atorvastatin]  08/04/2016    Myalgia Spironolactone  05/09/2014    Other (comments) Dizziness and fatigue Current Immunizations  Reviewed on 11/14/2016 Name Date Influenza Vaccine (Quad) PF 11/14/2016, 11/13/2015 Influenza Vaccine Split 10/5/2010 Pneumococcal Conjugate (PCV-13) 5/27/2015 Pneumococcal Vaccine (Pcv) 1/20/2007 Not reviewed this visit You Were Diagnosed With   
  
 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 
ICD-9-CM: 250.40, 583.81 Essential hypertension     ICD-10-CM: I10 
ICD-9-CM: 401.9 Vitals BP Pulse Height(growth percentile) Weight(growth percentile) SpO2 BMI  
 145/70 (BP 1 Location: Left arm, BP Patient Position: Sitting) 62 5' 1\" (1.549 m) 144 lb (65.3 kg) 97% 27.21 kg/m2 OB Status Smoking Status Hysterectomy Never Smoker Vitals History BMI and BSA Data Body Mass Index Body Surface Area  
 27.21 kg/m 2 1.68 m 2 Preferred Pharmacy Pharmacy Name Phone [facility], [address] [place] [address] [phone] Your Updated Medication List  
  
   
This list is accurate as of 9/5/18  1:30 PM.  Always use your most recent med list. ADVIL PO Take 1 Tab by mouth as needed. alcohol swabs Padm Commonly known as:  ALCOHOL PREP PADS  
1 Dose by Apply Externally route daily as needed (Glucose testing). amLODIPine 10 mg tablet Commonly known as:  Malika Lim TAKE 1 TABLET BY MOUTH EVERY DAY  
  
 aspirin 81 mg chewable tablet Take 81 mg by mouth daily. Blood-Glucose Meter monitoring kit  
by Does Not Apply route. Use daily as directed CALCIUM PO Take 1,000 mg by mouth two (2) times a day. cholecalciferol 1,000 unit Cap Commonly known as:  VITAMIN D3 Take 2,000 Units by mouth daily. COMBIGAN 0.2-0.5 % Drop ophthalmic solution Generic drug:  brimonidine-timolol Administer 1 Drop to both eyes every twelve (12) hours. glipiZIDE SR 2.5 mg CR tablet Commonly known as:  GLUCOTROL XL  
TAKE 1 TABLET BY MOUTH EVERY DAY  
  
 glucose blood VI test strips strip Commonly known as:  blood glucose test  
by Does Not Apply route. Use daily as directed Lancets Misc  
by Does Not Apply route. Use daily as directed  
  
 latanoprost 0.005 % ophthalmic solution Commonly known as:  XALATAN  
  
 losartan 100 mg tablet Commonly known as:  COZAAR  
TAKE ONE TABLET BY MOUTH DIALY MULTIVITAMINS PO Take 1 Tab by mouth daily. nitroglycerin 0.4 mg SL tablet Commonly known as:  NITROSTAT  
1 Tab by SubLINGual route every five (5) minutes as needed. pravastatin 40 mg tablet Commonly known as:  PRAVACHOL  
TAKE 1 TAB BY MOUTH DAILY. RESTASIS 0.05 % ophthalmic emulsion Generic drug:  cycloSPORINE Administer 1 Drop to both eyes every twelve (12) hours. sulfanilamide 15 % vaginal cream  
Commonly known as:  AVC Insert 1 Applicator into vagina daily. VITAMIN C 500 mg tablet Generic drug:  ascorbic acid (vitamin C) Take 1,000 mg by mouth daily. We Performed the Following AMB POC EKG ROUTINE W/ 12 LEADS, INTER & REP [63592 CPT(R)] Follow-up Instructions Return in about 6 months (around 3/5/2019), or if symptoms worsen or fail to improve. Introducing [facility]! [employer] introduces ASYM III patient portal. Now you can access parts of your medical record, email your doctor's office, and request medication refills online. 1. In your internet browser, go to [email or web address] 2. Click on the First Time User? Click Here link in the Sign In box. You will see the New Member Sign Up page. 3. Enter your ASYM III Access Code exactly as it appears below. You will not need to use this code after youve completed the sign-up process. If you do not sign up before the expiration date, you must request a new code. · ASYM III Access Code: [email or web address] Expires: 10/17/2018  2:24 PM 
 
4. Enter the last four digits of your Social Security Number (xxxx) and Date of Birth (mm/dd/yyyy) as indicated and click Submit. You will be taken to the next sign-up page. 5. Create a ASYM III ID. This will be your ASYM III login ID and cannot be changed, so think of one that is secure and easy to remember. 6. Create a ASYM III password. You can change your password at any time. 7. Enter your Password Reset Question and Answer. This can be used at a later time if you forget your password. 8. Enter your e-mail address. You will receive e-mail notification when new information is available in [address] 9. Click Sign Up. You can now view and download portions of your medical record. 10. Click the Download Summary menu link to download a portable copy of your medical information. If you have questions, please visit the Frequently Asked Questions section of the ASYM III website. Remember, ASYM III is NOT to be used for urgent needs. For medical emergencies, dial 911. Now available from your iPhone and Android! Please provide this summary of care documentation to your next provider. Your primary care clinician is listed as WALESKA TO. If you have any questions after today's visit, please call [phone].

## 2018-09-25 ENCOUNTER — OFFICE VISIT (OUTPATIENT)
Dept: OBGYN CLINIC | Age: 83
End: 2018-09-25

## 2018-09-25 VITALS
OXYGEN SATURATION: 99 % | RESPIRATION RATE: 16 BRPM | WEIGHT: 144.3 LBS | HEIGHT: 61 IN | TEMPERATURE: 97.3 F | DIASTOLIC BLOOD PRESSURE: 67 MMHG | BODY MASS INDEX: 27.24 KG/M2 | SYSTOLIC BLOOD PRESSURE: 153 MMHG

## 2018-09-25 DIAGNOSIS — Z01.419 WELL WOMAN EXAM WITH ROUTINE GYNECOLOGICAL EXAM: Primary | ICD-10-CM

## 2018-09-25 NOTE — PROGRESS NOTES
Subjective:  
80 y.o. female for Well Woman Check. No LMP recorded. Patient has had a hysterectomy. Social History: not sexually active. Pertinent past medical history: hypertension, no history of  DVT, CAD, DM, liver disease, migraines or smoking. ROS:  Feeling well. No dyspnea or chest pain on exertion. No abdominal pain, change in bowel habits, black or bloody stools. No urinary tract symptoms. GYN ROS: no breast pain or new or enlarging lumps on self exam, no vaginal bleeding, no discharge or pelvic pain. No neurological complaints. Objective:  
 
Visit Vitals  /67 (BP 1 Location: Left arm, BP Patient Position: Sitting) Comment: take the medications as Rxed  Temp 97.3 °F (36.3 °C) (Oral)  Resp 16  
 Ht 5' 1\" (1.549 m)  Wt 144 lb 4.8 oz (65.5 kg)  SpO2 99%  BMI 27.27 kg/m2 The patient appears well, alert, oriented x 3, in no distress. ENT normal.  Neck supple. No adenopathy or thyromegaly. JENNI. Lungs are clear, good air entry, no wheezes, rhonchi or rales. S1 and S2 normal, no murmurs, regular rate and rhythm. Abdomen soft without tenderness, guarding, mass or organomegaly. Extremities show no edema, normal peripheral pulses. Neurological is normal, no focal findings. BREAST EXAM: breasts appear normal, no suspicious masses, no skin or nipple changes or axillary nodes PELVIC EXAM: VULVA: normal appearing vulva with no masses, tenderness or lesions, VAGINA: normal appearing vagina with normal color and discharge, no lesions, RECTAL: normal rectal, no masses Assessment/Plan:  
well woman 
return annually or prn Aleksander Reasoner

## 2018-09-25 NOTE — MR AVS SNAPSHOT
[address] [place] [address] 424, [address] [facility] [address] 
[phone] Patient: Armaan Barillas MRN: [id number] [id number] Visit Information Date & Time Provider Department Dept. Phone Encounter #  
 2018  9:30 AM Tito Gillespie, 709 [facility] [phone] [id number] Follow-up Instructions Return in about 1 year (around 2019) for q 2 years. Your Appointments 10/12/2018 10:45 AM  
Follow Up with Abdoulaye Barron MD  
[facility] 1 ([address] [place] [address]) Appt Note: rt shoulder f/u  
 27 Pallavi Peters, [facility] 100 [address] [place] [address]  
[phone] [address]  
  
    
 2018  8:20 AM  
LAB with [facility] ([place]) Appt Note: 4mo f/u lab 129 [facility] [address] [address]  
[phone]  
  
   
 [address] [facility]  
  
    
 2018  9:45 AM  
Office Visit with Forrest Dietz MD  
[place] [address] [facility] [address] [place] [address] Appt Note: 4 mo f/u  
 [address], [facility] 201 [address] [address]  
[phone]  
  
   
 [address] [facility]  
  
    
 2019  2:20 PM  
Follow Up with Khadar Magallon DO [facility] 1 ([address] [place] [address]) Appt Note: 6 mo f/u  
 [address] [place] [facility] [address] [phone] [address] [place] [address] [address] Upcoming Health Maintenance Date Due Influenza Age 5 to Adult 2018 Allergies as of 2018  Review Complete On: 2018 By: Tito Gillespie MD  
  
 Severity Noted Reaction Type Reactions Lipitor [Atorvastatin]  2016    Myalgia Spironolactone  2014    Other (comments) Dizziness and fatigue Current Immunizations  Reviewed on 2016 Name Date Influenza Vaccine (Quad) PF 11/14/2016, 11/13/2015 Influenza Vaccine Split 10/5/2010 Pneumococcal Conjugate (PCV-13) 5/27/2015 Pneumococcal Vaccine (Pcv) 1/20/2007 Not reviewed this visit You Were Diagnosed With   
  
 Codes Comments Well woman exam with routine gynecological exam    -  Primary ICD-10-CM: S90.553 ICD-9-CM: V72.31 [V72.31] Vitals BP Temp Resp Height(growth percentile) Weight(growth percentile) SpO2  
 153/67 (BP 1 Location: Left arm, BP Patient Position: Sitting) 97.3 °F (36.3 °C) (Oral) 16 5' 1\" (1.549 m) 144 lb 4.8 oz (65.5 kg) 99% BMI OB Status Smoking Status 27.27 kg/m2 Hysterectomy Never Smoker Vitals History BMI and BSA Data Body Mass Index Body Surface Area  
 27.27 kg/m 2 1.68 m 2 Preferred Pharmacy Pharmacy Name Phone [facility], [address] [place] [address] [phone] Your Updated Medication List  
  
   
This list is accurate as of 9/25/18 12:30 PM.  Always use your most recent med list. ADVIL PO Take 1 Tab by mouth as needed. alcohol swabs Padm Commonly known as:  ALCOHOL PREP PADS  
1 Dose by Apply Externally route daily as needed (Glucose testing). amLODIPine 10 mg tablet Commonly known as:  Iola Citron TAKE 1 TABLET BY MOUTH EVERY DAY  
  
 aspirin 81 mg chewable tablet Take 81 mg by mouth daily. Blood-Glucose Meter monitoring kit  
by Does Not Apply route. Use daily as directed CALCIUM PO Take 1,000 mg by mouth two (2) times a day. cholecalciferol 1,000 unit Cap Commonly known as:  VITAMIN D3 Take 2,000 Units by mouth daily. COMBIGAN 0.2-0.5 % Drop ophthalmic solution Generic drug:  brimonidine-timolol Administer 1 Drop to both eyes every twelve (12) hours. glipiZIDE SR 2.5 mg CR tablet Commonly known as:  GLUCOTROL XL  
TAKE 1 TABLET BY MOUTH EVERY DAY  
  
 glucose blood VI test strips strip Commonly known as:  blood glucose test  
by Does Not Apply route. Use daily as directed Lancets Misc  
by Does Not Apply route. Use daily as directed  
  
 latanoprost 0.005 % ophthalmic solution Commonly known as:  XALATAN  
  
 losartan 100 mg tablet Commonly known as:  COZAAR  
TAKE ONE TABLET BY MOUTH DIALY MULTIVITAMINS PO Take 1 Tab by mouth daily. nitroglycerin 0.4 mg SL tablet Commonly known as:  NITROSTAT  
1 Tab by SubLINGual route every five (5) minutes as needed. pravastatin 40 mg tablet Commonly known as:  PRAVACHOL  
TAKE 1 TAB BY MOUTH DAILY. RESTASIS 0.05 % ophthalmic emulsion Generic drug:  cycloSPORINE Administer 1 Drop to both eyes every twelve (12) hours. sulfanilamide 15 % vaginal cream  
Commonly known as:  AVC Insert 1 Applicator into vagina daily. VITAMIN C 500 mg tablet Generic drug:  ascorbic acid (vitamin C) Take 1,000 mg by mouth daily. Follow-up Instructions Return in about 1 year (around 9/25/2019) for q 2 years. Patient Instructions Pelvic Exam: Care Instructions Your Care Instructions When your doctor examines all of your pelvic organs, it's called a pelvic exam. Two good reasons to have this kind of exam are to check for sexually transmitted infections (STIs) and to get a Pap test. A Pap test is also called a Pap smear. It checks for early changes that can lead to cancer of the cervix. Sometimes a pelvic exam is part of a regular checkup. In this case, you can do some things to make your test results as accurate as possible. · Try to schedule the exam when you don't have your period. · Don't use douches, tampons, or vaginal medicines, sprays, or powders for 24 hours before your exam. 
· Don't have sex for 24 hours before your exam. 
Other times, women have this kind of exam at any time of the month.  This is because they have pelvic pain, bleeding, or discharge. Or they may have another pelvic problem. Before your exam, it's important to share some information with your doctor. For example, if you are a survivor of rape or sexual abuse, you can talk about any concerns you may have. Your doctor will also want to know if you are pregnant or use birth control. And he or she will want to hear about any problems, surgeries, or procedures you have had in your pelvic area. You will also need to tell your doctor when your last period was. Follow-up care is a key part of your treatment and safety. Be sure to make and go to all appointments, and call your doctor if you are having problems. It's also a good idea to know your test results and keep a list of the medicines you take. How is a pelvic exam done? · During a pelvic exam, you will: ¨ Take off your clothes below the waist. You will get a paper or cloth cover to put over the lower half of your body. Rowan Settle on your back on an exam table. Your feet will be raised above you. Stirrups will support your feet. · The doctor will: ¨ Ask you to relax your knees. Your knees need to lean out, toward the walls. ¨ Check the opening of your vagina for sores or swelling. ¨ Gently put a tool called a speculum into your vagina. It opens the vagina a little bit. You will feel some pressure. But if you are relaxed, it will not hurt. It lets your doctor see inside the vagina. ¨ Use a small brush, spatula, or swab to get a sample of cells, if you are having a Pap test or culture. The doctor then removes the speculum. ¨ Put on gloves and put one or two fingers of one hand into your vagina. The other hand goes on your lower belly. This lets your doctor feel your pelvic organs. You will probably feel some pressure. Try to stay relaxed. ¨ Put one gloved finger into your rectum and one into your vagina, if needed. This can also help check your pelvic organs. This exam takes about 10 minutes. At the end, you will get a washcloth or tissue to clean your vaginal area. It's normal to have some discharge after this exam. You can then get dressed. Some test results may be ready right away. But results from a culture or a Pap test may take several days or a few weeks. Why should you have a pelvic exam? 
· You want to have recommended screening tests. This includes a Pap test. 
· You think you have a vaginal infection. Signs include itching, burning, or unusual discharge. · You might have been exposed to a sexually transmitted infection (STI), such as chlamydia or herpes. · You have vaginal bleeding that is not part of your normal menstrual period. · You have pain in your belly or pelvis. · You have been sexually assaulted. A pelvic exam lets your doctor collect evidence and check for STIs. · You are pregnant. · You are having trouble getting pregnant. What are the risks of a pelvic exam? 
There are no risks from a pelvic exam. 
When should you call for help? Watch closely for changes in your health, and be sure to contact your doctor if you have any problems. Where can you learn more? Go to http://jason-tiera.info/. Enter M535 in the search box to learn more about \"Pelvic Exam: Care Instructions. \" Current as of: October 6, 2017 Content Version: 11.7 © 2937-8007 [employer]wise, [employer]orporated. Care instructions adapted under license by [employer] (which disclaims liability or warranty for this information). If you have questions about a medical condition or this instruction, always ask your healthcare professional. [place] [address] any warranty or liability for your use of this information. Well Visit, Over 72: Care Instructions Your Care Instructions Physical exams can help you stay healthy. Your doctor has checked your overall health and may have suggested ways to take good care of yourself. He or she also may have recommended tests. At home, you can help prevent illness with healthy eating, regular exercise, and other steps. Follow-up care is a key part of your treatment and safety. Be sure to make and go to all appointments, and call your doctor if you are having problems. It's also a good idea to know your test results and keep a list of the medicines you take. How can you care for yourself at home? · Reach and stay at a healthy weight. This will lower your risk for many problems, such as obesity, diabetes, heart disease, and high blood pressure. · Get at least 30 minutes of exercise on most days of the week. Walking is a good choice. You also may want to do other activities, such as running, swimming, cycling, or playing tennis or team sports. · Do not smoke. Smoking can make health problems worse. If you need help quitting, talk to your doctor about stop-smoking programs and medicines. These can increase your chances of quitting for good. · Protect your skin from too much sun. When you're outdoors from 10 a.m. to 4 p.m., stay in the shade or cover up with clothing and a hat with a wide brim. Wear sunglasses that block UV rays. Even when it's cloudy, put broad-spectrum sunscreen (SPF 30 or higher) on any exposed skin. · See a dentist one or two times a year for checkups and to have your teeth cleaned. · Wear a seat belt in the car. · Limit alcohol to 2 drinks a day for men and 1 drink a day for women. Too much alcohol can cause health problems. Follow your doctor's advice about when to have certain tests. These tests can spot problems early. For men and women · Cholesterol. Your doctor will tell you how often to have this done based on your overall health and other things that can increase your risk for heart attack and stroke. · Blood pressure. Have your blood pressure checked during a routine doctor visit.  Your doctor will tell you how often to check your blood pressure based on your age, your blood pressure results, and other factors. · Diabetes. Ask your doctor whether you should have tests for diabetes. · Vision. Experts recommend that you have yearly exams for glaucoma and other age-related eye problems. · Hearing. Tell your doctor if you notice any change in your hearing. You can have tests to find out how well you hear. · Colon cancer tests. Keep having colon cancer tests as your doctor recommends. You can have one of several types of tests. · Heart attack and stroke risk. At least every 4 to 6 years, you should have your risk for heart attack and stroke assessed. Your doctor uses factors such as your age, blood pressure, cholesterol, and whether you smoke or have diabetes to show what your risk for a heart attack or stroke is over the next 10 years. · Osteoporosis. Talk to your doctor about whether you should have a bone density test to find out whether you have thinning bones. Also ask your doctor about whether you should take calcium and vitamin D supplements. For women · Pap test and pelvic exam. You may no longer need a Pap test. Talk with your doctor about whether to stop or continue to have Pap tests. · Breast exam and mammogram. Ask how often you should have a mammogram, which is an X-ray of your breasts. A mammogram can spot breast cancer before it can be felt and when it is easiest to treat. · Thyroid disease. Talk to your doctor about whether to have your thyroid checked as part of a regular physical exam. Women have an increased chance of a thyroid problem. For men · Prostate exam. Talk to your doctor about whether you should have a blood test (called a PSA test) for prostate cancer. Experts disagree on whether men should have this test. Some experts recommend that you discuss the benefits and risks of the test with your doctor. · Abdominal aortic aneurysm.  Ask your doctor whether you should have a test to check for an aneurysm. You may need a test if you ever smoked or if your parent, brother, sister, or child has had an aneurysm. When should you call for help? Watch closely for changes in your health, and be sure to contact your doctor if you have any problems or symptoms that concern you. Where can you learn more? Go to http://jason-tiera.info/. Enter W358 in the search box to learn more about \"Well Visit, Over 65: Care Instructions. \" Current as of: May 16, 2017 Content Version: 11.7 © 2795-0181 [employer]. Care instructions adapted under license by [employer] (which disclaims liability or warranty for this information). If you have questions about a medical condition or this instruction, always ask your healthcare professional. [address] any warranty or liability for your use of this information. Introducing [facility]! Meng Flores introduces [employer] patient portal. Now you can access parts of your medical record, email your doctor's office, and request medication refills online. 1. In your internet browser, go to [email or web address] 2. Click on the First Time User? Click Here link in the Sign In box. You will see the New Member Sign Up page. 3. Enter your [employer] Access Code exactly as it appears below. You will not need to use this code after youve completed the sign-up process. If you do not sign up before the expiration date, you must request a new code. · [employer] Access Code: [email or web address] Expires: 10/17/2018  2:24 PM 
 
4. Enter the last four digits of your Social Security Number (xxxx) and Date of Birth (mm/dd/yyyy) as indicated and click Submit. You will be taken to the next sign-up page. 5. Create a [employer] ID. This will be your [employer] login ID and cannot be changed, so think of one that is secure and easy to remember. 6. Create a Simple.TV password. You can change your password at any time. 7. Enter your Password Reset Question and Answer. This can be used at a later time if you forget your password. 8. Enter your e-mail address. You will receive e-mail notification when new information is available in [address] 9. Click Sign Up. You can now view and download portions of your medical record. 10. Click the Download Summary menu link to download a portable copy of your medical information. If you have questions, please visit the Frequently Asked Questions section of the Simple[employer]TV website. Remember, Simple[employer]TV is NOT to be used for urgent needs. For medical emergencies, dial 911. Now available from your iPhone and Android! Please provide this summary of care documentation to your next provider. Your primary care clinician is listed as WALES[place] TO. If you have any questions after today's visit, please call [phone].

## 2018-09-25 NOTE — PATIENT INSTRUCTIONS
Pelvic Exam: Care Instructions Your Care Instructions When your doctor examines all of your pelvic organs, it's called a pelvic exam. Two good reasons to have this kind of exam are to check for sexually transmitted infections (STIs) and to get a Pap test. A Pap test is also called a Pap smear. It checks for early changes that can lead to cancer of the cervix. Sometimes a pelvic exam is part of a regular checkup. In this case, you can do some things to make your test results as accurate as possible. · Try to schedule the exam when you don't have your period. · Don't use douches, tampons, or vaginal medicines, sprays, or powders for 24 hours before your exam. 
· Don't have sex for 24 hours before your exam. 
Other times, women have this kind of exam at any time of the month. This is because they have pelvic pain, bleeding, or discharge. Or they may have another pelvic problem. Before your exam, it's important to share some information with your doctor. For example, if you are a survivor of rape or sexual abuse, you can talk about any concerns you may have. Your doctor will also want to know if you are pregnant or use birth control. And he or she will want to hear about any problems, surgeries, or procedures you have had in your pelvic area. You will also need to tell your doctor when your last period was. Follow-up care is a key part of your treatment and safety. Be sure to make and go to all appointments, and call your doctor if you are having problems. It's also a good idea to know your test results and keep a list of the medicines you take. How is a pelvic exam done? · During a pelvic exam, you will: ¨ Take off your clothes below the waist. You will get a paper or cloth cover to put over the lower half of your body. Tanner Maw on your back on an exam table. Your feet will be raised above you. Stirrups will support your feet. · The doctor will: ¨ Ask you to relax your knees. Your knees need to lean out, toward the walls. ¨ Check the opening of your vagina for sores or swelling. ¨ Gently put a tool called a speculum into your vagina. It opens the vagina a little bit. You will feel some pressure. But if you are relaxed, it will not hurt. It lets your doctor see inside the vagina. ¨ Use a small brush, spatula, or swab to get a sample of cells, if you are having a Pap test or culture. The doctor then removes the speculum. ¨ Put on gloves and put one or two fingers of one hand into your vagina. The other hand goes on your lower belly. This lets your doctor feel your pelvic organs. You will probably feel some pressure. Try to stay relaxed. ¨ Put one gloved finger into your rectum and one into your vagina, if needed. This can also help check your pelvic organs. This exam takes about 10 minutes. At the end, you will get a washcloth or tissue to clean your vaginal area. It's normal to have some discharge after this exam. You can then get dressed. Some test results may be ready right away. But results from a culture or a Pap test may take several days or a few weeks. Why should you have a pelvic exam? 
· You want to have recommended screening tests. This includes a Pap test. 
· You think you have a vaginal infection. Signs include itching, burning, or unusual discharge. · You might have been exposed to a sexually transmitted infection (STI), such as chlamydia or herpes. · You have vaginal bleeding that is not part of your normal menstrual period. · You have pain in your belly or pelvis. · You have been sexually assaulted. A pelvic exam lets your doctor collect evidence and check for STIs. · You are pregnant. · You are having trouble getting pregnant. What are the risks of a pelvic exam? 
There are no risks from a pelvic exam. 
When should you call for help?  
Watch closely for changes in your health, and be sure to contact your doctor if you have any problems. Where can you learn more? Go to http://jason-tiera.info/. Enter N602 in the search box to learn more about \"Pelvic Exam: Care Instructions. \" Current as of: October 6, 2017 Content Version: 11.7 © 2125-4071 [employer]. Care instructions adapted under license by [employer] (which disclaims liability or warranty for this information). If you have questions about a medical condition or this instruction, always ask your healthcare professional. [place] [address] any warranty or liability for your use of this information. Well Visit, Over 72: Care Instructions Your Care Instructions Physical exams can help you stay healthy. Your doctor has checked your overall health and may have suggested ways to take good care of yourself. He or she also may have recommended tests. At home, you can help prevent illness with healthy eating, regular exercise, and other steps. Follow-up care is a key part of your treatment and safety. Be sure to make and go to all appointments, and call your doctor if you are having problems. It's also a good idea to know your test results and keep a list of the medicines you take. How can you care for yourself at home? · Reach and stay at a healthy weight. This will lower your risk for many problems, such as obesity, diabetes, heart disease, and high blood pressure. · Get at least 30 minutes of exercise on most days of the week. Walking is a good choice. You also may want to do other activities, such as running, swimming, cycling, or playing tennis or team sports. · Do not smoke. Smoking can make health problems worse. If you need help quitting, talk to your doctor about stop-smoking programs and medicines. These can increase your chances of quitting for good. · Protect your skin from too much sun. When you're outdoors from 10 a.m. to 4 p.m., stay in the shade or cover up with clothing and a hat with a wide brim. Wear sunglasses that block UV rays. Even when it's cloudy, put broad-spectrum sunscreen (SPF 30 or higher) on any exposed skin. · See a dentist one or two times a year for checkups and to have your teeth cleaned. · Wear a seat belt in the car. · Limit alcohol to 2 drinks a day for men and 1 drink a day for women. Too much alcohol can cause health problems. Follow your doctor's advice about when to have certain tests. These tests can spot problems early. For men and women · Cholesterol. Your doctor will tell you how often to have this done based on your overall health and other things that can increase your risk for heart attack and stroke. · Blood pressure. Have your blood pressure checked during a routine doctor visit. Your doctor will tell you how often to check your blood pressure based on your age, your blood pressure results, and other factors. · Diabetes. Ask your doctor whether you should have tests for diabetes. · Vision. Experts recommend that you have yearly exams for glaucoma and other age-related eye problems. · Hearing. Tell your doctor if you notice any change in your hearing. You can have tests to find out how well you hear. · Colon cancer tests. Keep having colon cancer tests as your doctor recommends. You can have one of several types of tests. · Heart attack and stroke risk. At least every 4 to 6 years, you should have your risk for heart attack and stroke assessed. Your doctor uses factors such as your age, blood pressure, cholesterol, and whether you smoke or have diabetes to show what your risk for a heart attack or stroke is over the next 10 years. · Osteoporosis. Talk to your doctor about whether you should have a bone density test to find out whether you have thinning bones. Also ask your doctor about whether you should take calcium and vitamin D supplements. For women · Pap test and pelvic exam. You may no longer need a Pap test. Talk with your doctor about whether to stop or continue to have Pap tests. · Breast exam and mammogram. Ask how often you should have a mammogram, which is an X-ray of your breasts. A mammogram can spot breast cancer before it can be felt and when it is easiest to treat. · Thyroid disease. Talk to your doctor about whether to have your thyroid checked as part of a regular physical exam. Women have an increased chance of a thyroid problem. For men · Prostate exam. Talk to your doctor about whether you should have a blood test (called a PSA test) for prostate cancer. Experts disagree on whether men should have this test. Some experts recommend that you discuss the benefits and risks of the test with your doctor. · Abdominal aortic aneurysm. Ask your doctor whether you should have a test to check for an aneurysm. You may need a test if you ever smoked or if your parent, brother, sister, or child has had an aneurysm. When should you call for help? Watch closely for changes in your health, and be sure to contact your doctor if you have any problems or symptoms that concern you. Where can you learn more? Go to http://jason-tiera.info/. Enter S374 in the search box to learn more about \"Well Visit, Over 65: Care Instructions. \" Current as of: May 16, 2017 Content Version: 11.7 © 2319-9948 [employer]. Care instructions adapted under license by "[employer] (which disclaims liability or warranty for this information). If you have questions about a medical condition or this instruction, always ask your healthcare professional. [place] [address] any warranty or liability for your use of this information.

## 2018-10-12 ENCOUNTER — OFFICE VISIT (OUTPATIENT)
Dept: ORTHOPEDIC SURGERY | Age: 83
End: 2018-10-12

## 2018-10-12 VITALS
HEIGHT: 61 IN | WEIGHT: 145 LBS | DIASTOLIC BLOOD PRESSURE: 76 MMHG | HEART RATE: 40 BPM | SYSTOLIC BLOOD PRESSURE: 142 MMHG | OXYGEN SATURATION: 97 % | BODY MASS INDEX: 27.38 KG/M2 | RESPIRATION RATE: 16 BRPM | TEMPERATURE: 98 F

## 2018-10-12 DIAGNOSIS — M25.512 CHRONIC LEFT SHOULDER PAIN: Primary | ICD-10-CM

## 2018-10-12 DIAGNOSIS — M19.012 PRIMARY OSTEOARTHRITIS OF LEFT SHOULDER: ICD-10-CM

## 2018-10-12 DIAGNOSIS — G89.29 CHRONIC LEFT SHOULDER PAIN: Primary | ICD-10-CM

## 2018-10-12 RX ORDER — DICLOFENAC SODIUM 10 MG/G
GEL TOPICAL 4 TIMES DAILY
Qty: 100 G | Refills: 4 | Status: SHIPPED | OUTPATIENT
Start: 2018-10-12 | End: 2020-03-10 | Stop reason: SDUPTHER

## 2018-10-12 NOTE — PROGRESS NOTES
Patient: Kristina Merlin                MRN: [id number]       SSN: [id number] YOB: 1923        AGE: 89 y.o. SEX: female Body mass index is 27.4 kg/(m^2). PCP: Adair Sher MD 
10/12/18 HISTORY: Raymundo Hernandes is here today in follow-up. We injected her right shoulder previously, and she is very grateful. It helped a lot. She still has a little bit of achy pain. She likes to rub herself down with alcohol but it just is not working as well as it used to. She gets a little bit thigh discomfort as well. She has known arthritis in the knees. The pain is moderate and again, she is very appreciative of the previous injections. PHYSICAL EXAMINATION:  Today, she is quite sharp mentally at age 80. Forward elevation is about 110°, abduction about 95°. Brownlee sign is borderline positive. Mild ER weakness as well bilaterally. Good  strength. She has some ulnar deviation at the MCPs of the fingers. Good pulse is present. No evidence for infection or DVT. No significant peripheral edema. The hips rotate adequately and each knee has a couple-degree fixed flexion deformity and some patellofemoral arthritis. RADIOGRAPHS:  Previous x-rays confirm advanced-to-severe arthritis involving the shoulders. PLAN:  She would like to try for Voltaren cream and we will try this. I would recommend still an injection or 2 a year as needed and she is again, very grateful for the previous injections. It has been an absolute pleasure to share in her care, and I will report back to you when I see her next. Adair Sher MD  
 
 
 
REVIEW OF SYSTEMS:   
 
CON: negative for weight loss, fever EYE: negative for double vision ENT: negative for hoarseness RS:   negative for Tb 
GI:    negative for blood in stool :  negative for blood in urine Other systems reviewed and noted below. Past Medical History:  
Diagnosis Date  Arthritis of right knee  CAD (coronary artery disease), native coronary artery October 2010  
 mild disease  Cardiac cath 10/21/2010  
 dLM 20%. mLAD 25%. CX mild. pRCA 30%. EF 30-35%. Anterolateral, apical , diaphrag akin. Hypercontractile basal segments. Severe elev left-sided filling press.  Cardiac echocardiogram 02/02/2011 EF 50-55%. Mild apical hypk. Gr 1 DDfx. RVSP 40-45. Mild LAE.  Cardiac Holter monitor 06/10/2015 Sinus rhythm w/avg HR of 53 bpm (range 40-85 bpm). Occasional PACs. One 3-beat run of nonsustained SVT. Very few PVCs. One 3-beat run of nonsustained VT. Pt's HR was < 50 bpm for 8 hrs of the recorded time. No pauses noted. Two episodes of neck pain corresponded to sinus rhythm w/o ectopy.  Cardiovascular renal duplex 12/03/2012 No renal artery stenosis bilaterally. Bilateral intrinsic/med disease. Patent bilateral renal veins. Multiple cysts on both kidneys.  CKD (chronic kidney disease)   
 while on diuretics for difficult to control HTN  
 Diabetes mellitus type II 3/15/2010  Dyslipidemia 3/15/2010  
 Heart attack ([address] 2010  
 Heart failure, diastolic, chronic ([address]  History of heart attack  HTN (hypertension) 3/15/2010  Hypertensive heart disease  Loss of appetite  Osteopenia 7/13/2010  Other dyspnea and respiratory abnormality  Ringing in the ears  Stress-induced cardiomyopathy EF 35% (LV angio 10/2010), then EF 50-55% (echo, Feb 2011)  Wears glasses Family History Problem Relation Age of Onset  Hypertension Mother  Arthritis-osteo Other Current Outpatient Prescriptions Medication Sig Dispense Refill  diclofenac (VOLTAREN) 1 % gel Apply  to affected area four (4) times daily.  100 g 4  
 amLODIPine (NORVASC) 10 mg tablet TAKE 1 TABLET BY MOUTH EVERY DAY 90 Tab 3  
 glipiZIDE SR (GLUCOTROL XL) 2.5 mg CR tablet TAKE 1 TABLET BY MOUTH EVERY DAY 90 Tab 1  
  pravastatin (PRAVACHOL) 40 mg tablet TAKE 1 TAB BY MOUTH DAILY. 30 Tab 6  
 nitroglycerin (NITROSTAT) 0.4 mg SL tablet 1 Tab by SubLINGual route every five (5) minutes as needed. 1 Bottle 1  
 latanoprost (XALATAN) 0.005 % ophthalmic solution  losartan (COZAAR) 100 mg tablet TAKE ONE TABLET BY MOUTH DIALY 90 Tab 3  
 sulfanilamide (AVC) 15 % vaginal cream Insert 1 Applicator into vagina daily. 120 g 0  
 COMBIGAN 0.2-0.5 % drop ophthalmic solution Administer 1 Drop to both eyes every twelve (12) hours.  RESTASIS 0.05 % ophthalmic emulsion Administer 1 Drop to both eyes every twelve (12) hours.  ascorbic acid (VITAMIN C) 500 mg tablet Take 1,000 mg by mouth daily.  IBUPROFEN (ADVIL PO) Take 1 Tab by mouth as needed.  CALCIUM PO Take 1,000 mg by mouth two (2) times a day.  Blood-Glucose Meter monitoring kit by Does Not Apply route. Use daily as directed 1 Kit 0  
 glucose blood VI test strips (BLOOD GLUCOSE TEST) strip by Does Not Apply route. Use daily as directed 3 Package 3  Lancets Misc by Does Not Apply route. Use daily as directed 3 Package 3  
 alcohol swabs (ALCOHOL PREP PADS) PadM 1 Dose by Apply Externally route daily as needed (Glucose testing). 3 Package 3  Cholecalciferol, Vitamin D3, (VITAMIN D) 1,000 unit Cap Take 2,000 Units by mouth daily. 180 Cap 3  
 MULTIVITAMINS PO Take 1 Tab by mouth daily.  aspirin 81 mg chewable tablet Take 81 mg by mouth daily. Allergies Allergen Reactions  Lipitor [Atorvastatin] Myalgia  Spironolactone Other (comments) Dizziness and fatigue Past Surgical History:  
Procedure Laterality Date  HX BLADDER SUSPENSION  2009 [facility] 3672 Social History Social History  Marital status:  Spouse name: N/A  
 Number of children: N/A  
 Years of education: N/A Occupational History  Not on file. Social History Main Topics  Smoking status: Never Smoker  Smokeless tobacco: Never Used  Alcohol use No  
 Drug use: No  
 Sexual activity: No  
 
Other Topics Concern  Not on file Social History Narrative Visit Vitals  /76  Pulse (!) 40  Temp 98 °F (36.7 °C) (Oral)  Resp 16  
 Ht 5' 1\" (1.549 m)  Wt 145 lb (65.8 kg)  SpO2 97%  BMI 27.4 kg/m2 PHYSICAL EXAMINATION: 
GENERAL: Alert and oriented x3, in no acute distress, well-developed, well-nourished, afebrile. HEART: No JVD. EYES: No scleral icterus NECK: No significant lymphadenopathy LUNGS: No respiratory compromise or indrawing ABDOMEN: Soft, non-tender, non-distended. Electronically signed by:  Saturnino Delaney MD

## 2018-10-23 RX ORDER — LOSARTAN POTASSIUM 100 MG/1
TABLET ORAL
Qty: 90 TAB | Refills: 1 | Status: SHIPPED | OUTPATIENT
Start: 2018-10-23 | End: 2019-01-29 | Stop reason: SDUPTHER

## 2018-11-01 ENCOUNTER — HOSPITAL ENCOUNTER (OUTPATIENT)
Dept: LAB | Age: 83
Discharge: HOME | End: 2018-11-01
Payer: MEDICARE

## 2018-11-01 LAB
ALBUMIN SERPL-MCNC: 3.7 G/DL (ref 3.4–5)
ALBUMIN/GLOB SERPL: 1.2 {RATIO} (ref 0.8–1.7)
ALP SERPL-CCNC: 82 U/L (ref 45–117)
ALT SERPL-CCNC: 19 U/L (ref 13–56)
ANION GAP SERPL CALC-SCNC: 9 MMOL/L (ref 3–18)
AST SERPL-CCNC: 25 U/L (ref 15–37)
BILIRUB SERPL-MCNC: 0.5 MG/DL (ref 0.2–1)
BUN SERPL-MCNC: 21 MG/DL (ref 7–18)
BUN/CREAT SERPL: 13 (ref 12–20)
CALCIUM SERPL-MCNC: 8.8 MG/DL (ref 8.5–10.1)
CHLORIDE SERPL-SCNC: 110 MMOL/L (ref 100–108)
CHOLEST SERPL-MCNC: 206 MG/DL
CO2 SERPL-SCNC: 24 MMOL/L (ref 21–32)
CREAT SERPL-MCNC: 1.62 MG/DL (ref 0.6–1.3)
GLOBULIN SER CALC-MCNC: 3.1 G/DL (ref 2–4)
GLUCOSE SERPL-MCNC: 117 MG/DL (ref 74–99)
HBA1C MFR BLD: 6.3 % (ref 4.2–5.6)
HDLC SERPL-MCNC: 59 MG/DL (ref 40–60)
HDLC SERPL: 3.5 {RATIO} (ref 0–5)
LDLC SERPL CALC-MCNC: 122.4 MG/DL (ref 0–100)
LIPID PROFILE,FLP: ABNORMAL
POTASSIUM SERPL-SCNC: 4.5 MMOL/L (ref 3.5–5.5)
PROT SERPL-MCNC: 6.8 G/DL (ref 6.4–8.2)
SODIUM SERPL-SCNC: 143 MMOL/L (ref 136–145)
TRIGL SERPL-MCNC: 123 MG/DL (ref ?–150)
VLDLC SERPL CALC-MCNC: 24.6 MG/DL

## 2018-11-01 PROCEDURE — 36415 COLL VENOUS BLD VENIPUNCTURE: CPT | Performed by: INTERNAL MEDICINE

## 2018-11-01 PROCEDURE — 80053 COMPREHEN METABOLIC PANEL: CPT | Performed by: INTERNAL MEDICINE

## 2018-11-01 PROCEDURE — 80061 LIPID PANEL: CPT | Performed by: INTERNAL MEDICINE

## 2018-11-01 PROCEDURE — 83036 HEMOGLOBIN GLYCOSYLATED A1C: CPT | Performed by: INTERNAL MEDICINE

## 2018-11-02 RX ORDER — PRAVASTATIN SODIUM 40 MG/1
TABLET ORAL
Qty: 30 TAB | Refills: 6 | Status: SHIPPED | OUTPATIENT
Start: 2018-11-02 | End: 2019-04-29 | Stop reason: SDUPTHER

## 2018-11-08 ENCOUNTER — OFFICE VISIT (OUTPATIENT)
Dept: FAMILY MEDICINE CLINIC | Age: 83
End: 2018-11-08

## 2018-11-08 VITALS
OXYGEN SATURATION: 95 % | WEIGHT: 143 LBS | TEMPERATURE: 98 F | HEART RATE: 47 BPM | SYSTOLIC BLOOD PRESSURE: 115 MMHG | DIASTOLIC BLOOD PRESSURE: 56 MMHG | RESPIRATION RATE: 18 BRPM | BODY MASS INDEX: 27 KG/M2 | HEIGHT: 61 IN

## 2018-11-08 DIAGNOSIS — E11.9 TYPE 2 DIABETES MELLITUS WITHOUT COMPLICATION, WITHOUT LONG-TERM CURRENT USE OF INSULIN (HCC): ICD-10-CM

## 2018-11-08 DIAGNOSIS — E78.5 DYSLIPIDEMIA: ICD-10-CM

## 2018-11-08 DIAGNOSIS — Z23 ENCOUNTER FOR IMMUNIZATION: ICD-10-CM

## 2018-11-08 DIAGNOSIS — Z00.00 MEDICARE ANNUAL WELLNESS VISIT, SUBSEQUENT: Primary | ICD-10-CM

## 2018-11-08 DIAGNOSIS — I10 ESSENTIAL HYPERTENSION: ICD-10-CM

## 2018-11-08 DIAGNOSIS — I50.32 HEART FAILURE, DIASTOLIC, CHRONIC (HCC): ICD-10-CM

## 2018-11-08 RX ORDER — BIMATOPROST 0.1 MG/ML
SOLUTION/ DROPS OPHTHALMIC
COMMUNITY
Start: 2018-08-16 | End: 2021-05-17

## 2018-11-08 NOTE — PROGRESS NOTES
This is the Subsequent Medicare Annual Wellness Exam, performed 12 months or more after the Initial AWV or the last Subsequent AWV I have reviewed the patient's medical history in detail and updated the computerized patient record. History Past Medical History:  
Diagnosis Date  Arthritis of right knee  CAD (coronary artery disease), native coronary artery October 2010  
 mild disease  Cardiac cath 10/21/2010  
 dLM 20%. mLAD 25%. CX mild. pRCA 30%. EF 30-35%. Anterolateral, apical , diaphrag akin. Hypercontractile basal segments. Severe elev left-sided filling press.  Cardiac echocardiogram 02/02/2011 EF 50-55%. Mild apical hypk. Gr 1 DDfx. RVSP 40-45. Mild LAE.  Cardiac Holter monitor 06/10/2015 Sinus rhythm w/avg HR of 53 bpm (range 40-85 bpm). Occasional PACs. One 3-beat run of nonsustained SVT. Very few PVCs. One 3-beat run of nonsustained VT. Pt's HR was < 50 bpm for 8 hrs of the recorded time. No pauses noted. Two episodes of neck pain corresponded to sinus rhythm w/o ectopy.  Cardiovascular renal duplex 12/03/2012 No renal artery stenosis bilaterally. Bilateral intrinsic/med disease. Patent bilateral renal veins. Multiple cysts on both kidneys.  CKD (chronic kidney disease)   
 while on diuretics for difficult to control HTN  
 Diabetes mellitus type II 3/15/2010  Dyslipidemia 3/15/2010  
 Heart attack ([facility] 2010  
 Heart failure, diastolic, chronic ([address]  History of heart attack  HTN (hypertension) 3/15/2010  Hypertensive heart disease  Loss of appetite  Osteopenia 7/13/2010  Other dyspnea and respiratory abnormality  Ringing in the ears  Stress-induced cardiomyopathy EF 35% (LV angio 10/2010), then EF 50-55% (echo, Feb 2011)  Wears glasses Past Surgical History:  
Procedure Laterality Date  HX BLADDER SUSPENSION  2009 [facility] [address] Current Outpatient Medications Medication Sig Dispense Refill  LUMIGAN 0.01 % ophthalmic drops  pravastatin (PRAVACHOL) 40 mg tablet TAKE 1 TAB BY MOUTH DAILY. 30 Tab 6  
 losartan (COZAAR) 100 mg tablet TAKE ONE TABLET BY MOUTH DAILY 90 Tab 1  
 amLODIPine (NORVASC) 10 mg tablet TAKE 1 TABLET BY MOUTH EVERY DAY 90 Tab 3  
 glipiZIDE SR (GLUCOTROL XL) 2.5 mg CR tablet TAKE 1 TABLET BY MOUTH EVERY DAY 90 Tab 1  
 nitroglycerin (NITROSTAT) 0.4 mg SL tablet 1 Tab by SubLINGual route every five (5) minutes as needed. 1 Bottle 1  
 latanoprost (XALATAN) 0.005 % ophthalmic solution  sulfanilamide (AVC) 15 % vaginal cream Insert 1 Applicator into vagina daily. 120 g 0  
 COMBIGAN 0.2-0.5 % drop ophthalmic solution Administer 1 Drop to both eyes every twelve (12) hours.  RESTASIS 0.05 % ophthalmic emulsion Administer 1 Drop to both eyes every twelve (12) hours.  ascorbic acid (VITAMIN C) 500 mg tablet Take 1,000 mg by mouth daily.  IBUPROFEN (ADVIL PO) Take 1 Tab by mouth as needed.  CALCIUM PO Take 1,000 mg by mouth two (2) times a day.  Blood-Glucose Meter monitoring kit by Does Not Apply route. Use daily as directed 1 Kit 0  
 glucose blood VI test strips (BLOOD GLUCOSE TEST) strip by Does Not Apply route. Use daily as directed 3 Package 3  Lancets Misc by Does Not Apply route. Use daily as directed 3 Package 3  
 alcohol swabs (ALCOHOL PREP PADS) PadM 1 Dose by Apply Externally route daily as needed (Glucose testing). 3 Package 3  Cholecalciferol, Vitamin D3, (VITAMIN D) 1,000 unit Cap Take 2,000 Units by mouth daily. 180 Cap 3  
 MULTIVITAMINS PO Take 1 Tab by mouth daily.  aspirin 81 mg chewable tablet Take 81 mg by mouth daily.  diclofenac (VOLTAREN) 1 % gel Apply  to affected area four (4) times daily. 100 g 4 Allergies Allergen Reactions  Lipitor [Atorvastatin] Myalgia  Spironolactone Other (comments) Dizziness and fatigue Family History Problem Relation Age of Onset  Hypertension Mother  Arthritis-osteo Other Social History Tobacco Use  Smoking status: Never Smoker  Smokeless tobacco: Never Used Substance Use Topics  Alcohol use: No  
 
Patient Active Problem List  
Diagnosis Code  Diabetes mellitus, type 2 ([facility] E11.9  Osteopenia M85.80  Hypertensive heart disease I11.9  
 CAD (coronary artery disease), native coronary artery I25.10  Cardiomyopathy, secondary ([facility] I42.9  Heart failure, diastolic, chronic (HCC) A16.02  
 CKD (chronic kidney disease) N18.9  Dyslipidemia E78.5  Essential hypertension I10  
 Arthritis of right knee M17.11  
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 Functional Ability and Level of Safety:  
Hearing Loss Hearing is good. Activities of Daily Living The home contains: no safety equipment. Patient does total self care Fall Risk Fall Risk Assessment, last 12 mths 10/12/2018 Able to walk? Yes Fall in past 12 months? No  
 
 
Abuse Screen Patient is not abused Cognitive Screening Evaluation of Cognitive Function: 
Has your family/caregiver stated any concerns about your memory: no 
Normal 
 
Patient Care Team  
Patient Care Team: 
Svetlana Mcleod MD as PCP - General (Internal Medicine) Shawanda Esposito MD as Physician (Cardiology) Erich Mcleod MD (Ophthalmology) Lulú Flores MD (Obstetrics & Gynecology) Lamine Sky DO (Cardiology) Ignacia Sepulveda RN  
 
Glaucoma Screening-  Dr Ana Zaragoza every 4 months  for glaucoma   
Pneumonia Vaccine-  [facility] Shingles Vaccine- did not have chicken pox   
Tdap Vaccine-  Declines due to cost   
Colonoscopy-   ~10 yrs ago had Colonoscopy. Not a candidate currently due to age Mammogram  7/2018 Advance Directive-  pt due to age does not want to deal with it. She says her daughter takes care of those issues Assessment/Plan Education and counseling provided: 
Are appropriate based on today's review and evaluation Diagnoses and all orders for this visit: 
 
1. Medicare annual wellness visit, subsequent 2. Type 2 diabetes mellitus without complication, without long-term current use of insulin ([facility] -     MICROALBUMIN, UR, RAND W/ MICROALB/CREAT RATIO; Future 
-     HEMOGLOBIN A1C W/O EAG; Future 3. Dyslipidemia -     LIPID PANEL; Future 4. Essential hypertension -     METABOLIC PANEL, COMPREHENSIVE; Future 5. Heart failure, diastolic, chronic ([facility] 6. Encounter for immunization 
-     INFLUENZA VACCINE INACTIVATED (IIV), SUBUNIT, ADJUVANTED, IM 
-     ADMIN INFLUENZA VIRUS VAC Health Maintenance Due Topic Date Due  
 FOOT EXAM Q1  01/05/1933  
 DTaP/Tdap/Td series (1 - Tdap) 01/05/1944  Shingrix Vaccine Age 50> (1 of 2) 01/05/1973  Pneumococcal 65+ Low/Medium Risk (2 of 2 - PPSV23) 05/27/2016  MICROALBUMIN Q1  11/07/2017 A comprehensive 5 year plan for medical care and screening exams was reviewed with pt and they received a copy of it.

## 2018-11-08 NOTE — PROGRESS NOTES
Verbal order read back per Dr. Blayne Ramirez flu vaccine. Patient received flu vaccine in right deltoid. Patient was observed and no signs or symptoms of allergic reaction noted at this time. Patient tolerated well and left without complaints. Patient received flu VIS.

## 2018-11-08 NOTE — PATIENT INSTRUCTIONS
Medicare Part B Preventive Services Limitations Recommendation Scheduled Bone Mass Measurement 
(age 72 & older, biennial) A bone mass density test is recommended when a woman turns 65 to screen for osteoporosis. This test is only recommended one time, as a screening. Some providers will use this same test as a disease monitoring tool if you already have osteoporosis. complete Cardiovascular Screening Blood Tests (every 5 years) Total cholesterol, HDL, Triglycerides and ECG Order blood work  as a panel if possible and adults with routine risk  an electrocardiogram (ECG) at intervals determined by your doctor. Lipid 11/18 
cmp 11/18 Colorectal Cancer Screening 
-Fecal occult blood test (annual) -Flexible sigmoidoscopy (5y) 
-Screening colonoscopy (10y) -Barium Enema Colorectal cancer screenings should be done for adults age 54-65 with no increased risk factors for colorectal cancer. There are a number of acceptable methods of screening for this type of cancer. Each test has its own benefits and drawbacks. Discuss with your doctor what is most appropriate for you during your annual wellness visit. The different tests include: colonoscopy (considered the best screening method), a fecal occult blood test, a fecal DNA test, and sigmoidoscopy. n/a Counseling to Prevent Tobacco Use (up to 8 sessions per year) - Counseling greater than 3 and up to 10 minutes - Counseling greater than 10 minutes Patients must be asymptomatic of tobacco-related conditions to receive as preventive service Diabetes Screening Tests (at least every 3 years, Medicare covers annually or at 6-month intervals for prediabetic patients) Fasting blood sugar (FBS) or glucose tolerance test (GTT) -All adults age 38-68 who are overweight should have a diabetes screening test once every three years.  
-Other screening tests and preventive services for persons with diabetes include: an eye exam to screen for diabetic retinopathy, a kidney function test, a foot exam, and stricter control over your cholesterol. Diabetes Self-Management Training (DSMT) (no USPSTF recommendation) Requires referral by treating physician for patient with diabetes or renal disease. 10 hours of initial DSMT session of no less than 30 minutes each in a continuous 12-month period. 2 hours of follow-up DSMT in subsequent years. Glaucoma Screening (no USPSTF recommendation) Diabetes mellitus, family history, , age 48 or over,  American, age 72 or over  6/18 Human Immunodeficiency Virus (HIV) Screening (annually for increased risk patients) HIV-1 and HIV-2 by EIA, RANDY, rapid antibody test, or oral mucosa transudate Patient must be at increased risk for HIV infection per USPSTF guidelines or pregnant. Tests covered annually for patients at increased risk. Pregnant patients may receive up to 3 test during pregnancy. Medical Nutrition Therapy (MNT) (for diabetes or renal disease not recommended schedule) Requires referral by treating physician for patient with diabetes or renal disease. Can be provided in same year as diabetes self-management training (DSMT), and CMS recommends medical nutrition therapy take place after DSMT. Up to 3 hours for initial year and 2 hours in subsequent years. Shingles Vaccination A shingles vaccine is also recommended once in a lifetime after age 61 Seasonal Influenza Vaccination (annually) All adults should have a flu vaccine yearly Pneumococcal Vaccination (once after 72) All adults over the age of 72 should receive the recommended pneumonia vaccines. Current USPSTF guidelines recommend a series of two vaccines for the best pneumonia protection. P23 5/15 P13 5/17 Hepatitis B Vaccinations (if medium/high risk) Medium/high risk factors:  End-stage renal disease, 
 Hemophiliacs who received Factor VIII or IX concentrates, Clients of institutions for the mentally retarded, Persons who live in the same house as a HepB virus carrier, Homosexual men, Illicit injectable drug abusers. Screening Mammography (biennial age 54-69) Breast cancer screenings are recommended every other year for women of normal risk, age 54-69. Screening Pap Tests and Pelvic Examination (up to age 79 and after 79 if unknown history or abnormal study last 10 years) Cervical cancer screenings for women over age 72 are only recommended with certain risk factors Hepatitis C All adults born between 80 and 1965 should be screened once Tetanus  All adults should have a tetanus vaccine every 10 years Learning About Diabetes Food Guidelines Your Care Instructions Meal planning is important to manage diabetes. It helps keep your blood sugar at a target level (which you set with your doctor). You don't have to eat special foods. You can eat what your family eats, including sweets once in a while. But you do have to pay attention to how often you eat and how much you eat of certain foods. You may want to work with a dietitian or a certified diabetes educator (CDE) to help you plan meals and snacks. A dietitian or CDE can also help you lose weight if that is one of your goals. What should you know about eating carbs? Managing the amount of carbohydrate (carbs) you eat is an important part of healthy meals when you have diabetes. Carbohydrate is found in many foods. · Learn which foods have carbs. And learn the amounts of carbs in different foods. ? Bread, cereal, pasta, and rice have about 15 grams of carbs in a serving. A serving is 1 slice of bread (1 ounce), ½ cup of cooked cereal, or 1/3 cup of cooked pasta or rice. ? Fruits have 15 grams of carbs in a serving.  A serving is 1 small fresh fruit, such as an apple or orange; ½ of a banana; ½ cup of cooked or canned fruit; ½ cup of fruit juice; 1 cup of melon or raspberries; or 2 tablespoons of dried fruit. ? Milk and no-sugar-added yogurt have 15 grams of carbs in a serving. A serving is 1 cup of milk or 2/3 cup of no-sugar-added yogurt. ? Starchy vegetables have 15 grams of carbs in a serving. A serving is ½ cup of mashed potatoes or sweet potato; 1 cup winter squash; ½ of a small baked potato; ½ cup of cooked beans; or ½ cup cooked corn or green peas. · Learn how much carbs to eat each day and at each meal. A dietitian or CDE can teach you how to keep track of the amount of carbs you eat. This is called carbohydrate counting. · If you are not sure how to count carbohydrate grams, use the Plate Method to plan meals. It is a good, quick way to make sure that you have a balanced meal. It also helps you spread carbs throughout the day. ? Divide your plate by types of foods. Put non-starchy vegetables on half the plate, meat or other protein food on one-quarter of the plate, and a grain or starchy vegetable in the final quarter of the plate. To this you can add a small piece of fruit and 1 cup of milk or yogurt, depending on how many carbs you are supposed to eat at a meal. 
· Try to eat about the same amount of carbs at each meal. Do not \"save up\" your daily allowance of carbs to eat at one meal. 
· Proteins have very little or no carbs per serving. Examples of proteins are beef, chicken, turkey, fish, eggs, tofu, cheese, cottage cheese, and peanut butter. A serving size of meat is 3 ounces, which is about the size of a deck of cards. Examples of meat substitute serving sizes (equal to 1 ounce of meat) are 1/4 cup of cottage cheese, 1 egg, 1 tablespoon of peanut butter, and ½ cup of tofu. How can you eat out and still eat healthy? · Learn to estimate the serving sizes of foods that have carbohydrate. If you measure food at home, it will be easier to estimate the amount in a serving of restaurant food. · If the meal you order has too much carbohydrate (such as potatoes, corn, or baked beans), ask to have a low-carbohydrate food instead. Ask for a salad or green vegetables. · If you use insulin, check your blood sugar before and after eating out to help you plan how much to eat in the future. · If you eat more carbohydrate at a meal than you had planned, take a walk or do other exercise. This will help lower your blood sugar. What else should you know? · Limit saturated fat, such as the fat from meat and dairy products. This is a healthy choice because people who have diabetes are at higher risk of heart disease. So choose lean cuts of meat and nonfat or low-fat dairy products. Use olive or canola oil instead of butter or shortening when cooking. · Don't skip meals. Your blood sugar may drop too low if you skip meals and take insulin or certain medicines for diabetes. · Check with your doctor before you drink alcohol. Alcohol can cause your blood sugar to drop too low. Alcohol can also cause a bad reaction if you take certain diabetes medicines. Follow-up care is a key part of your treatment and safety. Be sure to make and go to all appointments, and call your doctor if you are having problems. It's also a good idea to know your test results and keep a list of the medicines you take. Where can you learn more? Go to http://jason-tiera.info/. Enter C461 in the search box to learn more about \"Learning About Diabetes Food Guidelines. \" Current as of: December 7, 2017 Content Version: 11.8 © 2221-7469 [employer]. Care instructions adapted under license by [employer] (which disclaims liability or warranty for this information). If you have questions about a medical condition or this instruction, always ask your healthcare professional. [address] any warranty or liability for your use of this information.

## 2018-11-08 NOTE — PROGRESS NOTES
Subjective: Chief Complaint The patient presents for follow up of diabetes, hypertension and high cholesterol. HPI Tawanna Lawson is a 80 y.o. female seen for follow up of diabetes. Shealso has hypertension and hyperlipidemia. Diabetes well controlled, no significant medication side effects noted, on glucotrol, hypertension fairly well controlled, no significant medication side effects noted, on current meds, hyperlipidemia, poorly controlled on Pravachol, she seems to be taking it 1x/week due to myalgias, both pt and daughter are not sure what is going on with this med, they are not sure if she cut it back due to myalgias,  Lipitor gave her myalgias. Pt will try to take Pravachol 2x/week to see if she can tolerate it. Patient was advised along with daughter to bring in all of her medications next office visit so we can review them and see exactly what medication she is taking. Diet and Lifestyle: generally follows a low fat low cholesterol diet, follows a diabetic diet regularly, exercises sporadically Home BP Monitoring: is well controlled at home. Diabetic Review of Systems - home glucose monitoring: is well controlled at home when she takes it 1x/day Other symptoms and concerns: pt feels better when she exercises on a regular basis. Pt's CKD stage 3 is stable on current meds. Patient has palpitations that bother her at times. She has been taken off of beta-blockers by her cardiologist because of low heart rate. Her heart rate today is in the 40s. Heart rate interestingly was in the 60s when she was seen by her cardiologist about 2 months ago. Due to patient's advanced age and her doing fairly well there is reluctance to do any significant evaluation or workup at this time unless patient has any kind of syncopal episodes severe fatigue issues. Has chronic diastolic heart failure that is followed by cardiology. Current Outpatient Medications Medication Sig  
  LUMIGAN 0.01 % ophthalmic drops  pravastatin (PRAVACHOL) 40 mg tablet TAKE 1 TAB BY MOUTH DAILY.  losartan (COZAAR) 100 mg tablet TAKE ONE TABLET BY MOUTH DAILY  amLODIPine (NORVASC) 10 mg tablet TAKE 1 TABLET BY MOUTH EVERY DAY  glipiZIDE SR (GLUCOTROL XL) 2.5 mg CR tablet TAKE 1 TABLET BY MOUTH EVERY DAY  nitroglycerin (NITROSTAT) 0.4 mg SL tablet 1 Tab by SubLINGual route every five (5) minutes as needed.  latanoprost (XALATAN) 0.005 % ophthalmic solution  sulfanilamide (AVC) 15 % vaginal cream Insert 1 Applicator into vagina daily.  COMBIGAN 0.2-0.5 % drop ophthalmic solution Administer 1 Drop to both eyes every twelve (12) hours.  RESTASIS 0.05 % ophthalmic emulsion Administer 1 Drop to both eyes every twelve (12) hours.  ascorbic acid (VITAMIN C) 500 mg tablet Take 1,000 mg by mouth daily.  IBUPROFEN (ADVIL PO) Take 1 Tab by mouth as needed.  CALCIUM PO Take 1,000 mg by mouth two (2) times a day.  Blood-Glucose Meter monitoring kit by Does Not Apply route. Use daily as directed  glucose blood VI test strips (BLOOD GLUCOSE TEST) strip by Does Not Apply route. Use daily as directed  Lancets Misc by Does Not Apply route. Use daily as directed  alcohol swabs (ALCOHOL PREP PADS) PadM 1 Dose by Apply Externally route daily as needed (Glucose testing).  Cholecalciferol, Vitamin D3, (VITAMIN D) 1,000 unit Cap Take 2,000 Units by mouth daily.  MULTIVITAMINS PO Take 1 Tab by mouth daily.  aspirin 81 mg chewable tablet Take 81 mg by mouth daily.  diclofenac (VOLTAREN) 1 % gel Apply  to affected area four (4) times daily. No current facility-administered medications for this visit. Review of Systems Respiratory: negative for dyspnea on exertion Cardiovascular: negative for chest pain Objective:  
 
Visit Vitals /56 (BP 1 Location: Left arm, BP Patient Position: Sitting) Pulse (!) 47 Temp 98 °F (36.7 °C) (Oral) Resp 18 Ht 5' 1\" (1.549 m) Wt 143 lb (64.9 kg) SpO2 95% BMI 27.02 kg/m² General appearance - alert, well appearing, and in no distress Neck - supple, no significant adenopathy, carotids upstroke normal bilaterally, no bruits Chest - clear to auscultation, no wheezes, rales or rhonchi, symmetric air entry Heart -bradycardia, normal S1, S2, no murmurs, rubs, clicks or gallops Extremities - peripheral pulses normal, no pedal edema, no clubbing or cyanosis Skin - normal coloration and turgor, no rashes, no suspicious skin lesions noted Labs:  
Lab Results Component Value Date/Time Hemoglobin A1c 6.3 (H) 11/01/2018 09:32 AM  
 Hemoglobin A1c 6.4 (H) 01/08/2018 03:53 PM  
 Hemoglobin A1c 6.3 (H) 11/06/2017 09:13 AM  
 Microalbumin/Creat ratio (mg/g creat) 17 11/07/2016 02:30 PM  
 Microalbumin,urine random 3.00 11/07/2016 02:30 PM  
 LDL, calculated 122.4 (H) 11/01/2018 09:32 AM  
 Creatinine 1.62 (H) 11/01/2018 09:32 AM  
  
Lab Results Component Value Date/Time Cholesterol, total 206 (H) 11/01/2018 09:32 AM  
 HDL Cholesterol 59 11/01/2018 09:32 AM  
 LDL, calculated 122.4 (H) 11/01/2018 09:32 AM  
 Triglyceride 123 11/01/2018 09:32 AM  
 CHOL/HDL Ratio 3.5 11/01/2018 09:32 AM  
 
Lab Results Component Value Date/Time AST (SGOT) 25 11/01/2018 09:32 AM  
 Alk. phosphatase 82 11/01/2018 09:32 AM  
 Bilirubin, direct 0.1 03/11/2016 10:45 AM  
 
Lab Results Component Value Date/Time GFR est AA 36 (L) 11/01/2018 09:32 AM  
 GFR est non-AA 30 (L) 11/01/2018 09:32 AM  
 Creatinine 1.62 (H) 11/01/2018 09:32 AM  
 BUN 21 (H) 11/01/2018 09:32 AM  
 Sodium 143 11/01/2018 09:32 AM  
 Potassium 4.5 11/01/2018 09:32 AM  
 Chloride 110 (H) 11/01/2018 09:32 AM  
 CO2 24 11/01/2018 09:32 AM  
  
Lab Results Component Value Date/Time Glucose 117 (H) 11/01/2018 09:32 AM  
  
 
 
 
Assessment / Plan Diabetes well controlled, on glucotrol Hypertension  well controlled, on current meds,   
 Hyperlipidemia poorly controlled  On Pravachol, not sure how compliant pt has been, pt will try to take it 2x/week to see if she tolerates it without myalgias. At age 80 if she decides to stop the statin I would support her. ICD-10-CM ICD-9-CM 1. Medicare annual wellness visit, subsequent Z00.00 V70.0 2. Type 2 diabetes mellitus without complication, without long-term current use of insulin (HCC) E11.9 250.00 MICROALBUMIN, UR, RAND W/ MICROALB/CREAT RATIO  
   HEMOGLOBIN A1C W/O EAG 3. Dyslipidemia E78.5 272.4 LIPID PANEL 4. Essential hypertension V72 604.6 METABOLIC PANEL, COMPREHENSIVE 5. Heart failure, diastolic, chronic (HCC) X76.75 428.32  stable on current medications. Patient is followed by cardiology 6. Encounter for immunization Z23 V03.89 INFLUENZA VACCINE INACTIVATED (IIV), SUBUNIT, ADJUVANTED, IM  
   ADMIN INFLUENZA VIRUS VAC Diabetic issues reviewed with her: diabetic diet discussed in detail, and low cholesterol diet, weight control and daily exercise discussed. Follow-up Disposition: 
Return in about 4 months (around 3/8/2019) for labs 1 week before. Reviewed plan of care. Patient has provided input and agrees with goals.

## 2018-11-27 NOTE — PROGRESS NOTES
HPI:  I saw Louisa Sacks in my office today in cardiovascular evaluation regarding her problems with hypertensive heart disease with some diastolic dysfunction of the left ventricle. Ms. Josie George is a very pleasant 80year old  female, who appears to be much younger than her stated age, and who has been followed in the past in our practice for her problems with hypertension, dyslipidemia, and specifically some component of chronic diastolic heart failure. She comes the office today and relates that she is really doing very well. She is somewhat short of breath with activity and a little fatigued with occasional dizziness but really denies any other cardiovascular complaints. Encounter Diagnoses   Name Primary?  Hypertensive heart disease with heart failure ([address] Yes    Coronary artery disease involving native coronary artery of native heart without angina pectoris     Cardiomyopathy, secondary ([address]     Heart failure, diastolic, chronic (HCC)     Dyslipidemia     Essential hypertension     Sinus bradycardia        Discussion: This patient appears to be doing reasonably well at this juncture, but she does have a significant sinus bradycardia and she is on a fair amount of atenolol at 12.5 mg twice daily and I have asked her to decrease it to 12.5 mg daily and I am also going to do a Holter monitor study on her to see if she is having significant bradycardia with activity on just the 12.5 mg daily in which case we may discontinue that medication altogether. Her latest lipid profile which was completed on July 3, 2017 showed total cholesterol 233 with triglycerides 145, HDL of 60, LDL of 144, and VLDL of 29 which I think is obviously poor control Pravachol 40 mg daily, but she has been intolerant to other statin drugs particularly Lipitor and I do not think that at her age adjusting her statins is really something we need to consider so I would not make any changes at this time.     Her Order pended for approval.  LOV 9/21/18  RTC: 3 months (12/21/18) blood pressure is adequately controlled today and she otherwise seems to be doing well from a cardiovascular vantage some sublingual plan to see her again in several months or sooner if any new cardia vascular symptoms surface in the interim. PCP: Evangelista Rivera MD      Past Medical History:   Diagnosis Date    Arthritis of right knee     CAD (coronary artery disease), native coronary artery October 2010    mild disease    Cardiac cath 10/21/2010    dLM 20%. mLAD 25%. CX mild. pRCA 30%. EF 30-35%. Anterolateral, apical , diaphrag akin. Hypercontractile basal segments. Severe elev left-sided filling press.  Cardiac echocardiogram 02/02/2011    EF 50-55%. Mild apical hypk. Gr 1 DDfx. RVSP 40-45. Mild LAE.  Cardiac Holter monitor 06/10/2015    Sinus rhythm w/avg HR of 53 bpm (range 40-85 bpm). Occasional PACs. One 3-beat run of nonsustained SVT. Very few PVCs. One 3-beat run of nonsustained VT. Pt's HR was < 50 bpm for 8 hrs of the recorded time. No pauses noted. Two episodes of neck pain corresponded to sinus rhythm w/o ectopy.  Cardiovascular renal duplex 12/03/2012    No renal artery stenosis bilaterally. Bilateral intrinsic/med disease. Patent bilateral renal veins. Multiple cysts on both kidneys.     CKD (chronic kidney disease)     while on diuretics for difficult to control HTN    Diabetes mellitus type II 3/15/2010    Dyslipidemia 3/15/2010    Heart attack     2010    Heart failure, diastolic, chronic ([address]     History of heart attack     HTN (hypertension) 3/15/2010    Hypertensive heart disease     Loss of appetite     Osteopenia 7/13/2010    Other dyspnea and respiratory abnormality     Ringing in the ears     Stress-induced cardiomyopathy     EF 35% (LV angio 10/2010), then EF 50-55% (echo, Feb 2011)    Wears glasses        Past Surgical History:   Procedure Laterality Date    HX BLADDER SUSPENSION  2009    HX HYSTERECTOMY  1962       Current Outpatient Rx Name  Route  Sig  Dispense  Refill    atenolol (TENORMIN) 25 mg tablet    Oral    Take 12.5 mg by mouth two (2) times a day.  alendronate (FOSAMAX) 35 mg tablet    Oral    Take  by mouth every seven (7) days.  amLODIPine (NORVASC) 10 mg tablet        TAKE 1 TABLET BY MOUTH EVERY DAY    90 Tab    3      meclizine (ANTIVERT) 25 mg tablet    Oral    Take 25 mg by mouth three (3) times daily as needed.  losartan (COZAAR) 100 mg tablet        TAKE 1 TABLET BY MOUTH DAILY. 90 Tab    3      glipiZIDE SR (GLUCOTROL) 2.5 mg CR tablet        TAKE 1 TABLET BY MOUTH EVERY DAY    90 Tab    2      hydrALAZINE (APRESOLINE) 50 mg tablet    Oral    Take 1 Tab by mouth three (3) times daily. 270 Tab    3      chlorthalidone (HYGROTEN) 25 mg tablet    Oral    Take 25 mg by mouth daily.  COMBIGAN 0.2-0.5 % drop ophthalmic solution    Both Eyes    Administer 1 Drop to both eyes every twelve (12) hours.  RESTASIS 0.05 % ophthalmic emulsion    Both Eyes    Administer 1 Drop to both eyes every twelve (12) hours.  ascorbic acid (VITAMIN C) 500 mg tablet    Oral    Take 1,000 mg by mouth daily.  IBUPROFEN (ADVIL PO)    Oral    Take 1 Tab by mouth as needed.  CALCIUM PO    Oral    Take 1,000 mg by mouth two (2) times a day.  Blood-Glucose Meter monitoring kit    Does Not Apply    by Does Not Apply route. Use daily as directed    1 Kit    0      glucose blood VI test strips (BLOOD GLUCOSE TEST) strip    Does Not Apply    by Does Not Apply route. Use daily as directed    3 Package    3      Lancets Misc    Does Not Apply    by Does Not Apply route. Use daily as directed    3 Package    3      alcohol swabs (ALCOHOL PREP PADS) PadM    Apply Externally    1 Dose by Apply Externally route daily as needed (Glucose testing).     3 Package    3      nitroglycerin (NITROSTAT) 0.4 mg SL tablet    SubLINGual    0.4 mg by SubLINGual route every five (5) minutes as needed.  Cholecalciferol, Vitamin D3, (VITAMIN D) 1,000 unit Cap    Oral    Take 2,000 Units by mouth daily. 180 Cap    3      MULTIVITAMINS PO    Oral    Take 1 Tab by mouth daily.  aspirin 81 mg chewable tablet    Oral    Take 81 mg by mouth daily. Allergies   Allergen Reactions    Lipitor [Atorvastatin] Myalgia    Spironolactone Other (comments)     Dizziness and fatigue       Social History:   Social History   Substance Use Topics    Smoking status: Never Smoker    Smokeless tobacco: Never Used    Alcohol use No         Family history: family history includes Arthritis-osteo in an other family member; Hypertension in her mother. Review of Systems:  Constitutional: Negative for chills, diaphoresis, fever, malaise/fatigue and weight loss. Respiratory: Positive for cough and shortness of breath. Negative for hemoptysis, sputum production and wheezing. Cardiovascular: Positive for palpitations. Negative for chest pain, orthopnea, claudication, leg swelling and PND. Gastrointestinal: Negative. Musculoskeletal: Positive for joint pain. Negative for back pain, falls, myalgias and neck pain. Neurological: Positive for weakness. Physical Exam:   The patient is an alert, oriented, well developed, well nourished 80 y.o. female who was in no acute distress at the time of my examination. Visit Vitals    /70    Pulse (!) 45    Ht 5' 2\" (1.575 m)    Wt 66.7 kg (147 lb)    SpO2 97%    BMI 26.89 kg/m2      BP Readings from Last 3 Encounters:   11/07/17 130/70   10/20/17 135/54   07/27/17 154/69      Wt Readings from Last 3 Encounters:   11/07/17 66.7 kg (147 lb)   10/20/17 64.7 kg (142 lb 9.6 oz)   07/27/17 66.2 kg (146 lb)     HEENT: Conjuctiva white, mucosa moist, no pallor or cyanosis. NECK: Supple without masses, tenderness or thyromegaly. There was no jugular venous distention.  Carotid are full bilaterally with soft bilateral bruits vs radiation of heart murmur to the neck. CHEST: Symmetrical with good excursion. LUNGS: Clear to auscultation in all fields. HEART: Regular rate and rhythm. The apex is not displaced. There were no lifts, thrills or heaves. There is a normal S1 and S2. There is a grade 2/6 MINI along the LSB with radiation to the base and neck without appreciable diastolic murmurs, rubs, clicks, or gallops auscultated. ABDOMEN: Soft without masses, tenderness or organomegaly. EXTREMITIES: 1 + peripheral pulses without peripheral edema. INTEGUMENT: Skin is warm and dry   NEUROLOGICAL: The patient was oriented x3 with motor function grossly intact. Review of Data: See PMH and Cardiology and Imaging sections for cardiac testing  Lab Results   Component Value Date/Time    Cholesterol, total 223 11/06/2017 09:13 AM    HDL Cholesterol 59 11/06/2017 09:13 AM    LDL, calculated 131 11/06/2017 09:13 AM    Triglyceride 165 11/06/2017 09:13 AM    CHOL/HDL Ratio 3.8 11/06/2017 09:13 AM       Results for orders placed or performed in visit on 11/07/17   AMB POC EKG ROUTINE W/ 12 LEADS, INTER & REP     Status: None    Narrative    Sinus bradycardia with rate 45. There are some minor nonspecific ST-T flattening diffusely, but otherwise this tracing is within normal limits. Compared to the EKG of April 10, 2017 there was little interval change except at that time the heart rate was slightly faster at 50. Lindy Chung D.O., F.A.C.C. [facility]  [facility] [place]  [address] [place] [address]. [address] [place] [address]  [phone]    PLEASE NOTE:  This document has been produced using voice recognition software. Unrecognized errors in transcription may be present.

## 2019-01-29 ENCOUNTER — OFFICE VISIT (OUTPATIENT)
Dept: FAMILY MEDICINE CLINIC | Age: 84
End: 2019-01-29

## 2019-01-29 VITALS
RESPIRATION RATE: 20 BRPM | TEMPERATURE: 98.9 F | DIASTOLIC BLOOD PRESSURE: 75 MMHG | HEART RATE: 69 BPM | HEIGHT: 61 IN | OXYGEN SATURATION: 96 % | WEIGHT: 142 LBS | BODY MASS INDEX: 26.81 KG/M2 | SYSTOLIC BLOOD PRESSURE: 148 MMHG

## 2019-01-29 DIAGNOSIS — R35.1 NOCTURIA: Primary | ICD-10-CM

## 2019-01-29 RX ORDER — GLIPIZIDE 2.5 MG/1
TABLET, EXTENDED RELEASE ORAL
Qty: 90 TAB | Refills: 1 | Status: SHIPPED | OUTPATIENT
Start: 2019-01-29 | End: 2019-08-01 | Stop reason: SDUPTHER

## 2019-01-29 RX ORDER — LOSARTAN POTASSIUM 100 MG/1
TABLET ORAL
Qty: 90 TAB | Refills: 1 | Status: SHIPPED | OUTPATIENT
Start: 2019-01-29 | End: 2019-08-05 | Stop reason: ALTCHOICE

## 2019-01-29 NOTE — PATIENT INSTRUCTIONS
Arm Pain: Care Instructions Your Care Instructions You can hurt your arm by using it too much or by injuring it. Biking, wrestling, and home repair projects are examples of activities that can lead to arm pain. Everyday wear and tear, especially as you get older, can cause arm pain. Your forearms, wrists, hands, and fingers are the parts of your arm that are most likely to become painful. A minor arm injury usually will heal on its own with home treatment to relieve swelling and pain. If you have a more serious injury, you may need tests and treatment. Follow-up care is a key part of your treatment and safety. Be sure to make and go to all appointments, and call your doctor if you are having problems. It's also a good idea to know your test results and keep a list of the medicines you take. How can you care for yourself at home? · Take pain medicines exactly as directed. ? If the doctor gave you a prescription medicine for pain, take it as prescribed. ? If you are not taking a prescription pain medicine, ask your doctor if you can take an over-the-counter medicine. · Rest and protect your arm. Take a break from any activity that may cause pain. · Put ice or a cold pack on your arm for 10 to 20 minutes at a time. Put a thin cloth between the ice and your skin. · Prop up the sore arm on a pillow when you ice it or anytime you sit or lie down during the next 3 days. Try to keep it above the level of your heart. This will help reduce swelling. · If your doctor recommends a sling to support your arm, wear it as directed. When should you call for help? Call 911 anytime you think you may need emergency care. For example, call if: 
  · Your arm or hand is cool or pale or changes color.  
 Call your doctor now or seek immediate medical care if: 
  · You cannot use your arm.  
  · You have signs of infection, such as: 
? Increased pain, swelling, warmth, or redness. ? Red streaks running up or down your arm. ? Pus draining from an area of your arm. ? A fever.  
  · You have tingling, weakness, or numbness in your arm.  
 Watch closely for changes in your health, and be sure to contact your doctor if: 
  · You do not get better as expected. Where can you learn more? Go to http://jason-tiera.info/. Enter B641 in the search box to learn more about \"Arm Pain: Care Instructions. \" Current as of: September 23, 2018 Content Version: 11.9 © 7329-9564 [employer]. Care instructions adapted under license by [employer] (which disclaims liability or warranty for this information). If you have questions about a medical condition or this instruction, always ask your healthcare professional. [place] [address] any warranty or liability for your use of this information.

## 2019-01-29 NOTE — PROGRESS NOTES
Patient is in the office today for arm pain accompanied with urinary frequency at night. 1. Have you been to the ER, urgent care clinic since your last visit? Hospitalized since your last visit? No 
 
2. Have you seen or consulted any other health care providers outside of the [address] [place] [address] since your last visit? Include any pap smears or colon screening.  No

## 2019-01-30 ENCOUNTER — HOSPITAL ENCOUNTER (OUTPATIENT)
Dept: LAB | Age: 84
Discharge: HOME | End: 2019-01-30
Payer: MEDICARE

## 2019-01-30 DIAGNOSIS — E11.21 TYPE 2 DIABETES MELLITUS WITH NEPHROPATHY (HCC): ICD-10-CM

## 2019-01-30 PROCEDURE — 82043 UR ALBUMIN QUANTITATIVE: CPT

## 2019-01-31 ENCOUNTER — TELEPHONE (OUTPATIENT)
Dept: FAMILY MEDICINE CLINIC | Age: 84
End: 2019-01-31

## 2019-01-31 LAB
CREAT UR-MCNC: 231 MG/DL (ref 30–125)
MICROALBUMIN UR-MCNC: 10.1 MG/DL (ref 0–3)
MICROALBUMIN/CREAT UR-RTO: 44 MG/G (ref 0–30)

## 2019-01-31 RX ORDER — CIPROFLOXACIN 500 MG/1
500 TABLET ORAL 2 TIMES DAILY
Qty: 6 TAB | Refills: 0 | Status: SHIPPED | OUTPATIENT
Start: 2019-01-31 | End: 2019-02-03

## 2019-01-31 NOTE — TELEPHONE ENCOUNTER
Pt aware urine was mistakenly sent for urine micro albumin and not UA. She continues to have nocturia and chills at night so will treat empirically with cipro 500 mg BID for 3 days

## 2019-01-31 NOTE — PROGRESS NOTES
King Thelma is a 80 y.o.  female and presents with Arm Pain; Urinary Frequency; and Back Pain SUBJECTIVE: 
Patient complaining of urinary frequency and nocturia as well as pain in her left back which she believes comes up to her left arm. Patient at age 80 is not the best historian. Symptoms have been going on for about a week. Patient unable to give us a urine today in the office. Current Outpatient Medications Medication Sig  
 losartan (COZAAR) 100 mg tablet TAKE ONE TABLET BY MOUTH DAILY  glipiZIDE SR (GLUCOTROL XL) 2.5 mg CR tablet TAKE 1 TABLET BY MOUTH EVERY DAY  LUMIGAN 0.01 % ophthalmic drops  pravastatin (PRAVACHOL) 40 mg tablet TAKE 1 TAB BY MOUTH DAILY.  diclofenac (VOLTAREN) 1 % gel Apply  to affected area four (4) times daily.  amLODIPine (NORVASC) 10 mg tablet TAKE 1 TABLET BY MOUTH EVERY DAY  nitroglycerin (NITROSTAT) 0.4 mg SL tablet 1 Tab by SubLINGual route every five (5) minutes as needed.  latanoprost (XALATAN) 0.005 % ophthalmic solution  sulfanilamide (AVC) 15 % vaginal cream Insert 1 Applicator into vagina daily.  COMBIGAN 0.2-0.5 % drop ophthalmic solution Administer 1 Drop to both eyes every twelve (12) hours.  RESTASIS 0.05 % ophthalmic emulsion Administer 1 Drop to both eyes every twelve (12) hours.  ascorbic acid (VITAMIN C) 500 mg tablet Take 1,000 mg by mouth daily.  IBUPROFEN (ADVIL PO) Take 1 Tab by mouth as needed.  CALCIUM PO Take 1,000 mg by mouth two (2) times a day.  Blood-Glucose Meter monitoring kit by Does Not Apply route. Use daily as directed  glucose blood VI test strips (BLOOD GLUCOSE TEST) strip by Does Not Apply route. Use daily as directed  Lancets Misc by Does Not Apply route. Use daily as directed  alcohol swabs (ALCOHOL PREP PADS) PadM 1 Dose by Apply Externally route daily as needed (Glucose testing).   
 Cholecalciferol, Vitamin D3, (VITAMIN D) 1,000 unit Cap Take 2,000 Units by mouth daily.  MULTIVITAMINS PO Take 1 Tab by mouth daily.  aspirin 81 mg chewable tablet Take 81 mg by mouth daily. No current facility-administered medications for this visit. OBJECTIVE: 
alert, well appearing, and in no distress Visit Vitals /75 (BP 1 Location: Left arm, BP Patient Position: Sitting) Pulse 69 Temp 98.9 °F (37.2 °C) (Oral) Resp 20 Ht 5' 1\" (1.549 m) Wt 142 lb (64.4 kg) SpO2 96% BMI 26.83 kg/m²  
  
well developed and well nourished Assessment/Plan ICD-10-CM ICD-9-CM 1. Nocturia R35.1 788. 43  patient given a specimen cup to get a urine and bring back to the office. If symptoms worsen patient to go to the emergency room. Follow-up Disposition: 
Return if symptoms worsen or fail to improve. Reviewed plan of care. Patient has provided input and agrees with goals.

## 2019-02-26 ENCOUNTER — HOSPITAL ENCOUNTER (OUTPATIENT)
Dept: LAB | Age: 84
Discharge: HOME | End: 2019-02-26
Payer: MEDICARE

## 2019-02-26 ENCOUNTER — OFFICE VISIT (OUTPATIENT)
Dept: ORTHOPEDIC SURGERY | Age: 84
End: 2019-02-26

## 2019-02-26 VITALS
HEIGHT: 61 IN | TEMPERATURE: 96 F | OXYGEN SATURATION: 96 % | WEIGHT: 142 LBS | SYSTOLIC BLOOD PRESSURE: 156 MMHG | RESPIRATION RATE: 16 BRPM | HEART RATE: 50 BPM | BODY MASS INDEX: 26.81 KG/M2 | DIASTOLIC BLOOD PRESSURE: 61 MMHG

## 2019-02-26 DIAGNOSIS — M17.12 PRIMARY OSTEOARTHRITIS OF LEFT KNEE: ICD-10-CM

## 2019-02-26 DIAGNOSIS — G89.29 CHRONIC BILATERAL LOW BACK PAIN WITHOUT SCIATICA: ICD-10-CM

## 2019-02-26 DIAGNOSIS — M25.562 CHRONIC PAIN OF LEFT KNEE: Primary | ICD-10-CM

## 2019-02-26 DIAGNOSIS — M54.50 CHRONIC BILATERAL LOW BACK PAIN WITHOUT SCIATICA: ICD-10-CM

## 2019-02-26 DIAGNOSIS — G89.29 CHRONIC PAIN OF LEFT KNEE: Primary | ICD-10-CM

## 2019-02-26 LAB
ALBUMIN SERPL-MCNC: 3.6 G/DL (ref 3.4–5)
ALBUMIN/GLOB SERPL: 1.4 {RATIO} (ref 0.8–1.7)
ALP SERPL-CCNC: 75 U/L (ref 45–117)
ALT SERPL-CCNC: 19 U/L (ref 13–56)
ANION GAP SERPL CALC-SCNC: 5 MMOL/L (ref 3–18)
AST SERPL-CCNC: 21 U/L (ref 15–37)
BILIRUB SERPL-MCNC: 0.4 MG/DL (ref 0.2–1)
BUN SERPL-MCNC: 29 MG/DL (ref 7–18)
BUN/CREAT SERPL: 20 (ref 12–20)
CALCIUM SERPL-MCNC: 9.3 MG/DL (ref 8.5–10.1)
CHLORIDE SERPL-SCNC: 108 MMOL/L (ref 100–108)
CHOLEST SERPL-MCNC: 198 MG/DL
CO2 SERPL-SCNC: 28 MMOL/L (ref 21–32)
CREAT SERPL-MCNC: 1.44 MG/DL (ref 0.6–1.3)
GLOBULIN SER CALC-MCNC: 2.5 G/DL (ref 2–4)
GLUCOSE SERPL-MCNC: 141 MG/DL (ref 74–99)
HBA1C MFR BLD: 5.9 % (ref 4.2–5.6)
HDLC SERPL-MCNC: 60 MG/DL (ref 40–60)
HDLC SERPL: 3.3 {RATIO} (ref 0–5)
LDLC SERPL CALC-MCNC: 111.2 MG/DL (ref 0–100)
LIPID PROFILE,FLP: ABNORMAL
POTASSIUM SERPL-SCNC: 4.6 MMOL/L (ref 3.5–5.5)
PROT SERPL-MCNC: 6.1 G/DL (ref 6.4–8.2)
SODIUM SERPL-SCNC: 141 MMOL/L (ref 136–145)
TRIGL SERPL-MCNC: 134 MG/DL (ref ?–150)
VLDLC SERPL CALC-MCNC: 26.8 MG/DL

## 2019-02-26 PROCEDURE — 83036 HEMOGLOBIN GLYCOSYLATED A1C: CPT

## 2019-02-26 PROCEDURE — 80053 COMPREHEN METABOLIC PANEL: CPT

## 2019-02-26 PROCEDURE — 36415 COLL VENOUS BLD VENIPUNCTURE: CPT

## 2019-02-26 PROCEDURE — 80061 LIPID PANEL: CPT

## 2019-02-26 RX ORDER — BETAMETHASONE SODIUM PHOSPHATE AND BETAMETHASONE ACETATE 3; 3 MG/ML; MG/ML
6 INJECTION, SUSPENSION INTRA-ARTICULAR; INTRALESIONAL; INTRAMUSCULAR; SOFT TISSUE ONCE
Qty: 1 ML | Refills: 0
Start: 2019-02-26 | End: 2019-02-26

## 2019-02-26 NOTE — PROGRESS NOTES
[address], [address] [place] [address] 
[phone] Patient: Aashish Lanier                MRN: [id number]       SSN: [id number] YOB: 1923        AGE: 80 y.o. SEX: female Body mass index is 26.83 kg/m². PCP: Willie Hickey MD 
02/26/19 This office note has been dictated. REVIEW OF SYSTEMS: 
Constitutional: Negative for fever, chills, weight loss and malaise/fatigue. HENT: Negative. Eyes: Negative. Respiratory: Negative. Cardiovascular: Negative. Gastrointestinal: No bowel incontinence or constipation. Genitourinary: No bladder incontinence or saddle anesthesia. Skin: Negative. Neurological: Negative. Endo/Heme/Allergies: Negative. Psychiatric/Behavioral: Negative. Musculoskeletal: As per HPI above. Past Medical History:  
Diagnosis Date  Arthritis of right knee  CAD (coronary artery disease), native coronary artery October 2010  
 mild disease  Cardiac cath 10/21/2010  
 dLM 20%. mLAD 25%. CX mild. pRCA 30%. EF 30-35%. Anterolateral, apical , diaphrag akin. Hypercontractile basal segments. Severe elev left-sided filling press.  Cardiac echocardiogram 02/02/2011 EF 50-55%. Mild apical hypk. Gr 1 DDfx. RVSP 40-45. Mild LAE.  Cardiac Holter monitor 06/10/2015 Sinus rhythm w/avg HR of 53 bpm (range 40-85 bpm). Occasional PACs. One 3-beat run of nonsustained SVT. Very few PVCs. One 3-beat run of nonsustained VT. Pt's HR was < 50 bpm for 8 hrs of the recorded time. No pauses noted. Two episodes of neck pain corresponded to sinus rhythm w/o ectopy.  Cardiovascular renal duplex 12/03/2012 No renal artery stenosis bilaterally. Bilateral intrinsic/med disease. Patent bilateral renal veins. Multiple cysts on both kidneys.  CKD (chronic kidney disease)   
 while on diuretics for difficult to control HTN  
 Diabetes mellitus type II 3/15/2010  Dyslipidemia 3/15/2010  
 Heart attack ([facility] [address] 2010  
 Heart failure, diastolic, chronic ([facility] [address]  History of heart attack  HTN (hypertension) 3/15/2010  Hypertensive heart disease  Loss of appetite  Osteopenia 7/13/2010  Other dyspnea and respiratory abnormality  Ringing in the ears  Stress-induced cardiomyopathy EF 35% (LV angio 10/2010), then EF 50-55% (echo, Feb 2011)  Wears glasses Current Outpatient Medications:  
  betamethasone (CELESTONE SOLUSPAN) 6 mg/mL injection, 1 mL by Intra artICUlar route once for 1 dose., Disp: 1 mL, Rfl: 0 
  losartan (COZAAR) 100 mg tablet, TAKE ONE TABLET BY MOUTH DAILY, Disp: 90 Tab, Rfl: 1 
  glipiZIDE SR (GLUCOTROL XL) 2.5 mg CR tablet, TAKE 1 TABLET BY MOUTH EVERY DAY, Disp: 90 Tab, Rfl: 1 
  LUMIGAN 0.01 % ophthalmic drops, , Disp: , Rfl:  
  pravastatin (PRAVACHOL) 40 mg tablet, TAKE 1 TAB BY MOUTH DAILY. , Disp: 30 Tab, Rfl: 6 
  amLODIPine (NORVASC) 10 mg tablet, TAKE 1 TABLET BY MOUTH EVERY DAY, Disp: 90 Tab, Rfl: 3 
  nitroglycerin (NITROSTAT) 0.4 mg SL tablet, 1 Tab by SubLINGual route every five (5) minutes as needed. , Disp: 1 Bottle, Rfl: 1 
  latanoprost (XALATAN) 0.005 % ophthalmic solution, , Disp: , Rfl:  
  sulfanilamide (AVC) 15 % vaginal cream, Insert 1 Applicator into vagina daily. , Disp: 120 g, Rfl: 0 
  COMBIGAN 0.2-0.5 % drop ophthalmic solution, Administer 1 Drop to both eyes every twelve (12) hours. , Disp: , Rfl:  
  RESTASIS 0.05 % ophthalmic emulsion, Administer 1 Drop to both eyes every twelve (12) hours. , Disp: , Rfl:  
  ascorbic acid (VITAMIN C) 500 mg tablet, Take 1,000 mg by mouth daily. , Disp: , Rfl:  
  IBUPROFEN (ADVIL PO), Take 1 Tab by mouth as needed. , Disp: , Rfl:  
  CALCIUM PO, Take 1,000 mg by mouth two (2) times a day., Disp: , Rfl:   Blood-Glucose Meter monitoring kit, by Does Not Apply route.  Use daily as directed, Disp: 1 Kit, Rfl: 0 
   glucose blood VI test strips (BLOOD GLUCOSE TEST) strip, by Does Not Apply route. Use daily as directed, Disp: 3 Package, Rfl: 3 
  Lancets Misc, by Does Not Apply route. Use daily as directed, Disp: 3 Package, Rfl: 3 
  alcohol swabs (ALCOHOL PREP PADS) PadM, 1 Dose by Apply Externally route daily as needed (Glucose testing). , Disp: 3 Package, Rfl: 3   Cholecalciferol, Vitamin D3, (VITAMIN D) 1,000 unit Cap, Take 2,000 Units by mouth daily. , Disp: 180 Cap, Rfl: 3 
  MULTIVITAMINS PO, Take 1 Tab by mouth daily. , Disp: , Rfl:  
  aspirin 81 mg chewable tablet, Take 81 mg by mouth daily. , Disp: , Rfl:  
  diclofenac (VOLTAREN) 1 % gel, Apply  to affected area four (4) times daily. , Disp: 100 g, Rfl: 4 Allergies Allergen Reactions  Lipitor [Atorvastatin] Myalgia  Spironolactone Other (comments) Dizziness and fatigue Social History Socioeconomic History  Marital status:  Spouse name: Not on file  Number of children: Not on file  Years of education: Not on file  Highest education level: Not on file Social Needs  Financial resource strain: Not on file  Food insecurity - worry: Not on file  Food insecurity - inability: Not on file  Transportation needs - medical: Not on file  Transportation needs - non-medical: Not on file Occupational History  Not on file Tobacco Use  Smoking status: Never Smoker  Smokeless tobacco: Never Used Substance and Sexual Activity  Alcohol use: No  
 Drug use: No  
 Sexual activity: No  
Other Topics Concern  Not on file Social History Narrative  Not on file Past Surgical History:  
Procedure Laterality Date  HX BLADDER SUSPENSION  2009 [facility] * Patient was identified by name and date of birth * Agreement on procedure being performed was verified * Risks and Benefits explained to the patient * Procedure site verified and marked as necessary * Patient was positioned for comfort * Consent was signed and verified 11:19 AM 
 
The patient was instructed on post injection care. We did see Ms. Lucy García today in the office with complaints of left lower extremity pain. The patient has a history of right knee arthritis. She had an injection, which worked extremely well for her. She is having no right knee pain. She reports over the past couple of months, she is having some left thigh pain. It is present throughout the day. It is worse when she lies in bed at night. She denies any groin pain. She denies any radiating pain past the knee most of the time. She has had no change in her bowel or bladder habits and no loss of control. She denies any back pain. PHYSICAL EXAMINATION:  In general, the patient is alert and oriented x 3 in no acute distress. The patient is well-developed, well-nourished, with a normal affect. The patient is afebrile. HEENT:  Head is normocephalic and atraumatic. Pupils are equally round and reactive to light and accommodation. Extraocular eye movements are intact. Neck is supple. Trachea is midline. No JVD is present. Breathing is nonlabored. The patient does not appear her stated age of 80years old. Examination of the back reveals the skin is intact. There is no ecchymosis and no warmth. There are no signs for infection or cellulitis present. There is no pain with rotation of either hip. There is a little stiffness on the left side but pretty well maintained range of motion. There is negative straight leg raise. There is negative calf tenderness. There is negative Dennys. There is no evidence of DVT present. She does have slightly decreased sensation to the L3-4 area of the left lower extremity. The left knee reveals the skin is intact. There is no ecchymosis and no warmth and no signs for infection or cellulitis present.   She does have some discomfort to palpation to the medial joint line, as well as patellofemoral grind and crepitus anteriorly with range of motion activities noted. RADIOGRAPHS:  Radiographs in the office today, including AP and lateral of the lumbar spine show multilevel degenerative changes with grade one spondylolisthesis of L4 on L5, as well as L5 on S1. Degenerative disc disease is noted with spondylosis. AP, tunnel, lateral, and skyline of the left knee shows moderately advanced arthritis. She has about 2 millimeters of joint space remaining. No acute bony abnormalities are noted. ASSESSMENT:   
 
1. Lumbar radiculopathy. 2. Left knee osteoarthritis. PLAN:  At this point, I think that most of her discomfort is coming from her back. We are going to move forward with an MRI of her low back for further evaluation. With regards to the knee, we are going to move forward with a cortisone injection for the left knee today. After informed and written consent, and appropriate time out performed, under sterile conditions, with ultrasound-guided assistance, the left knee was prepped with Betadine and 6 mg of Celestone was injected without complications. The patient tolerated the injection well. The patient was instructed on post injection care. We will see her back in the office after the MRI for further evaluation. I will likely refer her over to The [address] [place] [address] for further treatment options.    
 
 
 
 
 
 
JR Mk WISE, PAJacquelineC, ATC

## 2019-03-05 ENCOUNTER — OFFICE VISIT (OUTPATIENT)
Dept: FAMILY MEDICINE CLINIC | Age: 84
End: 2019-03-05

## 2019-03-05 VITALS
DIASTOLIC BLOOD PRESSURE: 69 MMHG | OXYGEN SATURATION: 98 % | RESPIRATION RATE: 20 BRPM | TEMPERATURE: 98.2 F | HEIGHT: 61 IN | WEIGHT: 144 LBS | HEART RATE: 50 BPM | BODY MASS INDEX: 27.19 KG/M2 | SYSTOLIC BLOOD PRESSURE: 149 MMHG

## 2019-03-05 DIAGNOSIS — I10 ESSENTIAL HYPERTENSION: ICD-10-CM

## 2019-03-05 DIAGNOSIS — E11.9 TYPE 2 DIABETES MELLITUS WITHOUT COMPLICATION, WITHOUT LONG-TERM CURRENT USE OF INSULIN (HCC): Primary | ICD-10-CM

## 2019-03-05 DIAGNOSIS — I50.32 HEART FAILURE, DIASTOLIC, CHRONIC (HCC): ICD-10-CM

## 2019-03-05 DIAGNOSIS — E78.5 DYSLIPIDEMIA: ICD-10-CM

## 2019-03-05 NOTE — PROGRESS NOTES
Subjective:       Chief Complaint  The patient presents for follow up of diabetes, hypertension and high cholesterol. FATEMEH Peterson is a 80 y.o. female seen for follow up of diabetes. Khurramo has hypertension and hyperlipidemia. Diabetes well controlled, no significant medication side effects noted, on glucotrol, hypertension fairly well controlled, no significant medication side effects noted, on current meds, hyperlipidemia, fairly well controlled on Pravachol. Diet and Lifestyle: generally follows a low fat low cholesterol diet, follows a diabetic diet regularly, exercises sporadically    Home BP Monitoring: is well controlled at home. Diabetic Review of Systems - home glucose monitoring: is well controlled at home when she takes it 1x/day    Other symptoms and concerns: pt feels better when she exercises on a regular basis. Pt's CKD stage 3 is stable on current meds. Patient has palpitations that bother her at times. She has been taken off of beta-blockers by her cardiologist because of low heart rate. Her heart rate today is in the 50s. Due to patient's advanced age and her doing fairly well there is reluctance to do any significant evaluation or workup at this time unless patient has any kind of syncopal episodes or severe fatigue issues. Has chronic diastolic heart failure that is followed by cardiology and controlled on current meds. Current Outpatient Medications   Medication Sig    losartan (COZAAR) 100 mg tablet TAKE ONE TABLET BY MOUTH DAILY    glipiZIDE SR (GLUCOTROL XL) 2.5 mg CR tablet TAKE 1 TABLET BY MOUTH EVERY DAY    LUMIGAN 0.01 % ophthalmic drops     pravastatin (PRAVACHOL) 40 mg tablet TAKE 1 TAB BY MOUTH DAILY.  amLODIPine (NORVASC) 10 mg tablet TAKE 1 TABLET BY MOUTH EVERY DAY    nitroglycerin (NITROSTAT) 0.4 mg SL tablet 1 Tab by SubLINGual route every five (5) minutes as needed.     latanoprost (XALATAN) 0.005 % ophthalmic solution     sulfanilamide (AVC) 15 % vaginal cream Insert 1 Applicator into vagina daily.  COMBIGAN 0.2-0.5 % drop ophthalmic solution Administer 1 Drop to both eyes every twelve (12) hours.  RESTASIS 0.05 % ophthalmic emulsion Administer 1 Drop to both eyes every twelve (12) hours.  ascorbic acid (VITAMIN C) 500 mg tablet Take 1,000 mg by mouth daily.  IBUPROFEN (ADVIL PO) Take 1 Tab by mouth as needed.  CALCIUM PO Take 1,000 mg by mouth two (2) times a day.  Blood-Glucose Meter monitoring kit by Does Not Apply route. Use daily as directed    glucose blood VI test strips (BLOOD GLUCOSE TEST) strip by Does Not Apply route. Use daily as directed    Lancets Misc by Does Not Apply route. Use daily as directed    alcohol swabs (ALCOHOL PREP PADS) PadM 1 Dose by Apply Externally route daily as needed (Glucose testing).  Cholecalciferol, Vitamin D3, (VITAMIN D) 1,000 unit Cap Take 2,000 Units by mouth daily.  MULTIVITAMINS PO Take 1 Tab by mouth daily.  aspirin 81 mg chewable tablet Take 81 mg by mouth daily.  diclofenac (VOLTAREN) 1 % gel Apply  to affected area four (4) times daily. No current facility-administered medications for this visit.               Review of Systems  Respiratory: negative for dyspnea on exertion  Cardiovascular: negative for chest pain    Objective:     Visit Vitals  /69 (BP 1 Location: Left arm, BP Patient Position: Sitting)   Pulse (!) 50   Temp 98.2 °F (36.8 °C) (Oral)   Resp 20   Ht 5' 1\" (1.549 m)   Wt 144 lb (65.3 kg)   SpO2 98%   BMI 27.21 kg/m²        General appearance - alert, well appearing, and in no distress  Neck - supple, no significant adenopathy, carotids upstroke normal bilaterally, no bruits  Chest - clear to auscultation, no wheezes, rales or rhonchi, symmetric air entry  Heart -bradycardia, normal S1, S2, no murmurs, rubs, clicks or gallops  Extremities - peripheral pulses normal, no pedal edema, no clubbing or cyanosis  Skin - normal coloration and turgor, no rashes, no suspicious skin lesions noted      Labs:   Lab Results   Component Value Date/Time    Hemoglobin A1c 5.9 (H) 02/26/2019 09:21 AM    Hemoglobin A1c 6.3 (H) 11/01/2018 09:32 AM    Hemoglobin A1c 6.4 (H) 01/08/2018 03:53 PM    Microalbumin/Creat ratio (mg/g creat) 44 (H) 01/30/2019 01:00 PM    Microalbumin,urine random 10.10 (H) 01/30/2019 01:00 PM    LDL, calculated 111.2 (H) 02/26/2019 09:21 AM    Creatinine 1.44 (H) 02/26/2019 09:21 AM      Lab Results   Component Value Date/Time    Cholesterol, total 198 02/26/2019 09:21 AM    HDL Cholesterol 60 02/26/2019 09:21 AM    LDL, calculated 111.2 (H) 02/26/2019 09:21 AM    Triglyceride 134 02/26/2019 09:21 AM    CHOL/HDL Ratio 3.3 02/26/2019 09:21 AM     Lab Results   Component Value Date/Time    AST (SGOT) 21 02/26/2019 09:21 AM    Alk. phosphatase 75 02/26/2019 09:21 AM    Bilirubin, direct 0.1 03/11/2016 10:45 AM     Lab Results   Component Value Date/Time    GFR est AA 41 (L) 02/26/2019 09:21 AM    GFR est non-AA 34 (L) 02/26/2019 09:21 AM    Creatinine 1.44 (H) 02/26/2019 09:21 AM    BUN 29 (H) 02/26/2019 09:21 AM    Sodium 141 02/26/2019 09:21 AM    Potassium 4.6 02/26/2019 09:21 AM    Chloride 108 02/26/2019 09:21 AM    CO2 28 02/26/2019 09:21 AM      Lab Results   Component Value Date/Time    Glucose 141 (H) 02/26/2019 09:21 AM            Assessment / Plan     Diabetes well controlled, on glucotrol  Hypertension  well controlled, on current meds,    Hyperlipidemia fairly well controlled  On Pravachol,        ICD-10-CM ICD-9-CM    1. Type 2 diabetes mellitus without complication, without long-term current use of insulin (HCC) E11.9 250.00 HEMOGLOBIN A1C W/O EAG   2. Dyslipidemia E78.5 272.4 LIPID PANEL   3. Essential hypertension C91 003.2 METABOLIC PANEL, COMPREHENSIVE   4. Heart failure, diastolic, chronic (HCC) T64.74 428.32 Stable on current meds.  Pt is followed by Cardiology               Diabetic issues reviewed with her: diabetic diet discussed in detail, and low cholesterol diet, weight control and daily exercise discussed. Follow-up Disposition:  Return in about 4 months (around 7/5/2019) for labs 1 week before. Reviewed plan of care. Patient has provided input and agrees with goals.

## 2019-03-05 NOTE — PROGRESS NOTES
Patient is in the office today for 4 month follow up. 1. Have you been to the ER, urgent care clinic since your last visit? Hospitalized since your last visit? No    2. Have you seen or consulted any other health care providers outside of the [address] [place] [address] since your last visit? Include any pap smears or colon screening.  No

## 2019-03-05 NOTE — PATIENT INSTRUCTIONS
High Blood Pressure: Care Instructions  Overview    It's normal for blood pressure to go up and down throughout the day. But if it stays up, you have high blood pressure. Another name for high blood pressure is hypertension. Despite what a lot of people think, high blood pressure usually doesn't cause headaches or make you feel dizzy or lightheaded. It usually has no symptoms. But it does increase your risk of stroke, heart attack, and other problems. You and your doctor will talk about your risks of these problems based on your blood pressure. Your doctor will give you a goal for your blood pressure. Your goal will be based on your health and your age. Lifestyle changes, such as eating healthy and being active, are always important to help lower blood pressure. You might also take medicine to reach your blood pressure goal.  Follow-up care is a key part of your treatment and safety. Be sure to make and go to all appointments, and call your doctor if you are having problems. It's also a good idea to know your test results and keep a list of the medicines you take. How can you care for yourself at home? Medical treatment  · If you stop taking your medicine, your blood pressure will go back up. You may take one or more types of medicine to lower your blood pressure. Be safe with medicines. Take your medicine exactly as prescribed. Call your doctor if you think you are having a problem with your medicine. · Talk to your doctor before you start taking aspirin every day. Aspirin can help certain people lower their risk of a heart attack or stroke. But taking aspirin isn't right for everyone, because it can cause serious bleeding. · See your doctor regularly. You may need to see the doctor more often at first or until your blood pressure comes down. · If you are taking blood pressure medicine, talk to your doctor before you take decongestants or anti-inflammatory medicine, such as ibuprofen.  Some of these medicines can raise blood pressure. · Learn how to check your blood pressure at home. Lifestyle changes  · Stay at a healthy weight. This is especially important if you put on weight around the waist. Losing even 10 pounds can help you lower your blood pressure. · If your doctor recommends it, get more exercise. Walking is a good choice. Bit by bit, increase the amount you walk every day. Try for at least 30 minutes on most days of the week. You also may want to swim, bike, or do other activities. · Avoid or limit alcohol. Talk to your doctor about whether you can drink any alcohol. · Try to limit how much sodium you eat to less than 2,300 milligrams (mg) a day. Your doctor may ask you to try to eat less than 1,500 mg a day. · Eat plenty of fruits (such as bananas and oranges), vegetables, legumes, whole grains, and low-fat dairy products. · Lower the amount of saturated fat in your diet. Saturated fat is found in animal products such as milk, cheese, and meat. Limiting these foods may help you lose weight and also lower your risk for heart disease. · Do not smoke. Smoking increases your risk for heart attack and stroke. If you need help quitting, talk to your doctor about stop-smoking programs and medicines. These can increase your chances of quitting for good. When should you call for help? Call 911 anytime you think you may need emergency care. This may mean having symptoms that suggest that your blood pressure is causing a serious heart or blood vessel problem. Your blood pressure may be over 180/120.   For example, call 911 if:    · You have symptoms of a heart attack. These may include:  ? Chest pain or pressure, or a strange feeling in the chest.  ? Sweating. ? Shortness of breath. ? Nausea or vomiting. ? Pain, pressure, or a strange feeling in the back, neck, jaw, or upper belly or in one or both shoulders or arms. ? Lightheadedness or sudden weakness.   ? A fast or irregular heartbeat.     · You have symptoms of a stroke. These may include:  ? Sudden numbness, tingling, weakness, or loss of movement in your face, arm, or leg, especially on only one side of your body. ? Sudden vision changes. ? Sudden trouble speaking. ? Sudden confusion or trouble understanding simple statements. ? Sudden problems with walking or balance. ? A sudden, severe headache that is different from past headaches.     · You have severe back or belly pain.    Do not wait until your blood pressure comes down on its own. Get help right away.   Call your doctor now or seek immediate care if:    · Your blood pressure is much higher than normal (such as 180/120 or higher), but you don't have symptoms.     · You think high blood pressure is causing symptoms, such as:  ? Severe headache.  ? Blurry vision.    Watch closely for changes in your health, and be sure to contact your doctor if:    · Your blood pressure measures higher than your doctor recommends at least 2 times. That means the top number is higher or the bottom number is higher, or both.     · You think you may be having side effects from your blood pressure medicine. Where can you learn more? Go to http://jason-tiera.info/. Enter O563 in the search box to learn more about \"High Blood Pressure: Care Instructions. \"  Current as of: July 22, 2018  Content Version: 11.9  © 9493-1197 [employer]. Care instructions adapted under license by [employer] (which disclaims liability or warranty for this information). If you have questions about a medical condition or this instruction, always ask your healthcare professional. [place] [address] any warranty or liability for your use of this information.

## 2019-03-14 PROBLEM — R00.2 PALPITATIONS: Status: ACTIVE | Noted: 2019-03-14

## 2019-03-20 ENCOUNTER — OFFICE VISIT (OUTPATIENT)
Dept: FAMILY MEDICINE CLINIC | Age: 84
End: 2019-03-20

## 2019-03-20 VITALS
WEIGHT: 142 LBS | DIASTOLIC BLOOD PRESSURE: 67 MMHG | OXYGEN SATURATION: 95 % | TEMPERATURE: 98.7 F | HEIGHT: 60 IN | HEART RATE: 78 BPM | BODY MASS INDEX: 27.88 KG/M2 | SYSTOLIC BLOOD PRESSURE: 135 MMHG | RESPIRATION RATE: 20 BRPM

## 2019-03-20 DIAGNOSIS — R00.2 PALPITATIONS: Primary | ICD-10-CM

## 2019-03-20 NOTE — PROGRESS NOTES
Patient is in the office today for hospital follow up for heart palpitation. 1. Have you been to the ER, urgent care clinic since your last visit? Hospitalized since your last visit? yes [facility] 3/14/19 
 
2. Have you seen or consulted any other health care providers outside of the [address] [place] [address] since your last visit? Include any pap smears or colon screening.  No

## 2019-03-20 NOTE — PATIENT INSTRUCTIONS
Palpitations: Care Instructions Your Care Instructions Heart palpitations are the uncomfortable sensation that your heart is beating fast or irregularly. You might feel pounding or fluttering in your chest. It might feel like your heart is skipping a beat. Although palpitations may be caused by a heart problem, they also occur because of stress, fatigue, or use of alcohol, caffeine, or nicotine. Many medicines, including diet pills, antihistamines, decongestants, and some herbal products, can cause heart palpitations. Nearly everyone has palpitations from time to time. Depending on your symptoms, your doctor may need to do more tests to try to find the cause of your palpitations. Follow-up care is a key part of your treatment and safety. Be sure to make and go to all appointments, and call your doctor if you are having problems. It's also a good idea to know your test results and keep a list of the medicines you take. How can you care for yourself at home? · Avoid caffeine, nicotine, and excess alcohol. · Do not take illegal drugs, such as methamphetamines and cocaine. · Do not take weight loss or diet medicines unless you talk with your doctor first. 
· Get plenty of sleep. · Do not overeat. · If you have palpitations again, take deep breaths and try to relax. This may slow a racing heart. · If you start to feel lightheaded, lie down to avoid injuries that might result if you pass out and fall down. · Keep a record of your palpitations and bring it to your next doctor's appointment. Write down: ? The date and time. ? Your pulse. (If your heart is beating fast, it may be hard to count your pulse.) ? What you were doing when the palpitations started. ? How long the palpitations lasted. ? Any other symptoms. · If an activity causes palpitations, slow down or stop. Talk to your doctor before you do that activity again. · Take your medicines exactly as prescribed.  Call your doctor if you think you are having a problem with your medicine. When should you call for help? Call 911 anytime you think you may need emergency care. For example, call if: 
  · You passed out (lost consciousness).  
  · You have symptoms of a heart attack. These may include: 
? Chest pain or pressure, or a strange feeling in the chest. 
? Sweating. ? Shortness of breath. ? Pain, pressure, or a strange feeling in the back, neck, jaw, or upper belly or in one or both shoulders or arms. ? Lightheadedness or sudden weakness. ? A fast or irregular heartbeat. After you call 911, the  may tell you to chew 1 adult-strength or 2 to 4 low-dose aspirin. Wait for an ambulance. Do not try to drive yourself.  
  · You have symptoms of a stroke. These may include: 
? Sudden numbness, tingling, weakness, or loss of movement in your face, arm, or leg, especially on only one side of your body. ? Sudden vision changes. ? Sudden trouble speaking. ? Sudden confusion or trouble understanding simple statements. ? Sudden problems with walking or balance. ? A sudden, severe headache that is different from past headaches.  
 Call your doctor now or seek immediate medical care if: 
  · You have heart palpitations and: ? Are dizzy or lightheaded, or you feel like you may faint. ? Have new or increased shortness of breath.  
 Watch closely for changes in your health, and be sure to contact your doctor if: 
  · You continue to have heart palpitations. Where can you learn more? Go to http://jason-tiera.info/. Enter R508 in the search box to learn more about \"Palpitations: Care Instructions. \" Current as of: July 22, 2018 Content Version: 11.9 © 3622-3301 [employer]. Care instructions adapted under license by [employer] (which disclaims liability or warranty for this information).  If you have questions about a medical condition or this instruction, always ask your healthcare professional. [place] [address] any warranty or liability for your use of this information.

## 2019-03-21 ENCOUNTER — PATIENT OUTREACH (OUTPATIENT)
Dept: FAMILY MEDICINE CLINIC | Age: 84
End: 2019-03-21

## 2019-03-21 NOTE — PROGRESS NOTES
Nurys Osborn is a 80 y.o.  female and presents with Hospital Follow Up SUBJECTIVE: 
 
Patient was admitted to [facility] from 3/14/2019 to 3/16/2019 for palpitations. Patient's evaluation was negative for any abnormal arrhythmias such as A. fib or SVT. It was thought she most likely had PVCs. She tells me that these bother her mostly when she has to urinate and then they go away. It appears she is currently not on a beta-blocker. Patient's followed closely by cardiology. Due to her age at 80 and the fact that she is actually doing extremely well for age would not pursue any aggressive workup. Respiratory ROS: negative for - shortness of breath Cardiovascular ROS: negative for - chest pain Current Outpatient Medications Medication Sig  
 losartan (COZAAR) 100 mg tablet TAKE ONE TABLET BY MOUTH DAILY  glipiZIDE SR (GLUCOTROL XL) 2.5 mg CR tablet TAKE 1 TABLET BY MOUTH EVERY DAY  LUMIGAN 0.01 % ophthalmic drops  pravastatin (PRAVACHOL) 40 mg tablet TAKE 1 TAB BY MOUTH DAILY.  diclofenac (VOLTAREN) 1 % gel Apply  to affected area four (4) times daily.  latanoprost (XALATAN) 0.005 % ophthalmic solution  COMBIGAN 0.2-0.5 % drop ophthalmic solution Administer 1 Drop to both eyes every twelve (12) hours.  ascorbic acid (VITAMIN C) 500 mg tablet Take 1,000 mg by mouth daily.  IBUPROFEN (ADVIL PO) Take 1 Tab by mouth as needed.  CALCIUM PO Take 1,000 mg by mouth two (2) times a day.  Blood-Glucose Meter monitoring kit by Does Not Apply route. Use daily as directed  glucose blood VI test strips (BLOOD GLUCOSE TEST) strip by Does Not Apply route. Use daily as directed  Lancets Misc by Does Not Apply route. Use daily as directed  alcohol swabs (ALCOHOL PREP PADS) PadM 1 Dose by Apply Externally route daily as needed (Glucose testing).  Cholecalciferol, Vitamin D3, (VITAMIN D) 1,000 unit Cap Take 2,000 Units by mouth daily.  MULTIVITAMINS PO Take 1 Tab by mouth daily.  aspirin 81 mg chewable tablet Take 81 mg by mouth daily.  amLODIPine (NORVASC) 10 mg tablet TAKE 1 TABLET BY MOUTH EVERY DAY  nitroglycerin (NITROSTAT) 0.4 mg SL tablet 1 Tab by SubLINGual route every five (5) minutes as needed.  RESTASIS 0.05 % ophthalmic emulsion Administer 1 Drop to both eyes every twelve (12) hours. No current facility-administered medications for this visit. OBJECTIVE: 
alert, well appearing, and in no distress Visit Vitals /67 (BP 1 Location: Left arm, BP Patient Position: Sitting) Pulse 78 Temp 98.7 °F (37.1 °C) (Oral) Resp 20 Ht 5' (1.524 m) Wt 142 lb (64.4 kg) SpO2 95% BMI 27.73 kg/m²  
  
well developed and well nourished Chest - clear to auscultation, no wheezes, rales or rhonchi, symmetric air entry Heart - normal rate, regular rhythm, normal S1, S2, no murmurs, rubs, clicks or gallops Extremities - no pedal edema noted Assessment/Plan ICD-10-CM ICD-9-CM 1. Palpitations R00.2 785.1 Controlled currently off beta blocker. Follow-up and Dispositions · Return if symptoms worsen or fail to improve. Reviewed plan of care. Patient has provided input and agrees with goals.

## 2019-03-21 NOTE — PROGRESS NOTES
Patient attended PCP follow up appointment with Dr. Osvaldo Bryan on 3-20-19. Hospital Discharge Follow-Up Date/Time:  3/21/2019 9:00 AM 
 
Patient was admitted to [facility] on 3/14/19 and discharged on 3/16/19  For:  
 
Discharge Diagnoses 1. Palpitation, likely due to PVC, non-frequent. Tele did not catch Afib or SVT 2. PEGGY on CKD III 3. HTN 4. HLD 5. DM, II 
6. dCHF 7. Anemia, iron deficiency * Per Discharge Summary of 3/16/19 The physician discharge summary was available for review. Top Challenges reviewed with provider None at this time. Method of communication with provider : None Inpatient RRAT score: 32 Was this a readmission? no  
 
Disease Specific:   N/A Home Health orders at discharge: 
Yes  
- Nursing - PT 
- OT Home Health company: - [address] [facility]) Date of initial visit:  
Scheduled for 3-21-19 Durable Medical Equipment ordered/company:  
- None noted Durable Medical Equipment received: N/a Barriers to care? To Be Determined Advance Care Planning:  
Does patient have an Advance Directive:   
Per EMR notation: on file Medication(s):  
New Medications at Discharge: - None noted Changed Medications at Discharge: - None noted Discontinued Medications at Discharge: None noted Medication reconciliation was performed with By staff with [facility] . Referral to Pharm D needed: To Be Determined. Current Outpatient Medications Medication Sig  
 losartan (COZAAR) 100 mg tablet TAKE ONE TABLET BY MOUTH DAILY  glipiZIDE SR (GLUCOTROL XL) 2.5 mg CR tablet TAKE 1 TABLET BY MOUTH EVERY DAY  LUMIGAN 0.01 % ophthalmic drops  pravastatin (PRAVACHOL) 40 mg tablet TAKE 1 TAB BY MOUTH DAILY.  diclofenac (VOLTAREN) 1 % gel Apply  to affected area four (4) times daily.   
 amLODIPine (NORVASC) 10 mg tablet TAKE 1 TABLET BY MOUTH EVERY DAY  
  nitroglycerin (NITROSTAT) 0.4 mg SL tablet 1 Tab by SubLINGual route every five (5) minutes as needed.  latanoprost (XALATAN) 0.005 % ophthalmic solution  COMBIGAN 0.2-0.5 % drop ophthalmic solution Administer 1 Drop to both eyes every twelve (12) hours.  RESTASIS 0.05 % ophthalmic emulsion Administer 1 Drop to both eyes every twelve (12) hours.  ascorbic acid (VITAMIN C) 500 mg tablet Take 1,000 mg by mouth daily.  IBUPROFEN (ADVIL PO) Take 1 Tab by mouth as needed.  CALCIUM PO Take 1,000 mg by mouth two (2) times a day.  Blood-Glucose Meter monitoring kit by Does Not Apply route. Use daily as directed  glucose blood VI test strips (BLOOD GLUCOSE TEST) strip by Does Not Apply route. Use daily as directed  Lancets Misc by Does Not Apply route. Use daily as directed  alcohol swabs (ALCOHOL PREP PADS) PadM 1 Dose by Apply Externally route daily as needed (Glucose testing).  Cholecalciferol, Vitamin D3, (VITAMIN D) 1,000 unit Cap Take 2,000 Units by mouth daily.  MULTIVITAMINS PO Take 1 Tab by mouth daily.  aspirin 81 mg chewable tablet Take 81 mg by mouth daily. No current facility-administered medications for this visit. There are no discontinued medications. [facility] follow up appointment(s):  
Future Appointments Date Time Provider Jean Marie Sorenson 4/17/2019  2:20 PM Yisel Huertas  [facility]  
7/11/2019  9:00 AM [facility] NURSE WBMIKE HELM  
7/18/2019 10:00 AM Liudmila Canchola MD [facility] follow up appointment(s): None noted [facility]:  out of service area Goals  Supportive resources in place to maintain patient in the community (ie. Home Health, DME equipment, refer to, medication assistant plan, etc.) [employer] Date:  4/16/19 
 
3/21/2019 [address] [facility] to provide skilled home care visits to include: - Nursing, PT, and OT

## 2019-03-26 ENCOUNTER — PATIENT OUTREACH (OUTPATIENT)
Dept: FAMILY MEDICINE CLINIC | Age: 84
End: 2019-03-26

## 2019-03-26 NOTE — PROGRESS NOTES
Hospital Discharge Follow-Up Date/Time:  3/26/2019 11:43 AM 
 
Patient was admitted to [facility] on 3/14/19 and discharged on 3/16/19 for Palpitation, PEGYG, DM, ,HTN, HLD, CHF. The physician discharge summary was available at the time of outreach. Multiple attempts to contact patient via telephone on 3/26/19 for post hospital follow up. Unable to reach. Left message on voicemail with office contact information. No Patient medical information left on message.   
 
 Noted Patient attended PCP follow up appointment with Dr. Osvaldo Bryan on 3-20-19.

## 2019-03-27 ENCOUNTER — PATIENT OUTREACH (OUTPATIENT)
Dept: FAMILY MEDICINE CLINIC | Age: 84
End: 2019-03-27

## 2019-03-27 NOTE — PROGRESS NOTES
Hospital Discharge Follow-Up Date/Time:  3/27/2019 1:16 PM 
 
Patient was admitted to [facility] on 3/14/19 and discharged on 3/16/19 for Palpitation, PEGGY, DM, ,HTN, HLD, CHF. Top Challenges reviewed with the provider None at this time. Method of communication with provider :none Inpatient RRAT score: 32 Was this a readmission? no  
Patient stated reason for the readmission: n/a Nurse Navigator (NN) contacted the patient by telephone to perform post hospital discharge assessment. Verified name and  with patient as identifiers. Provided introduction to self, and explanation of the Nurse Navigator role. Patient states that she is doing well. Patient denied chest pain, shortness of breath, palpitation, fever, chills, abdominal swelling, leg swelling and S/S of hypoglcemia and hyperglycemia. Patient states that she has three children which one of them live nearby her apartment. Patient states that her daughter checks on her daily and help her with household chores and grocery shopping. Patient states that when she is not able to live alone, Pt. States that her son offered his home to her. Reviewed discharge instructions and red flags with patient who verbalized understanding. Patient given an opportunity to ask questions and does not have any further questions or concerns at this time. The patient agrees to contact the PCP office for questions related to their healthcare. NN provided contact information for future reference. Disease Specific:   N/A Summary of patient's top problems: 1. Palpitation - stable. Patient denied any palpitation at this time. 2. CHF- stable. Denied chest pain, shortness of breath, abdominal swelling and legs edema. Pt. Is on Cozaar and Aspirin. 3. DM- stable as per Pt. Patient states that Bg has been running good. A1C 5.9 on 19. Pt. Is on Glipizide and checking BG. Home Health orders at discharge:Verified with [address]: [address]   
Date of initial visit: 3/21/19 Durable Medical Equipment ordered/company: n/a Durable Medical Equipment received: n/a Barriers to care? none noted at this time. Medication(s):  
New Medications at Discharge: none noted Changed Medications at Discharge: none noted Discontinued Medications at Discharge: none noted Medication reconciliation was performed with patient, who verbalizes understanding of administration of home medications. There were no barriers to obtaining medications identified at this time. Referral to Pharm D needed: no  
 
Current Outpatient Medications Medication Sig  
 losartan (COZAAR) 100 mg tablet TAKE ONE TABLET BY MOUTH DAILY  glipiZIDE SR (GLUCOTROL XL) 2.5 mg CR tablet TAKE 1 TABLET BY MOUTH EVERY DAY  LUMIGAN 0.01 % ophthalmic drops  pravastatin (PRAVACHOL) 40 mg tablet TAKE 1 TAB BY MOUTH DAILY.  diclofenac (VOLTAREN) 1 % gel Apply  to affected area four (4) times daily.  amLODIPine (NORVASC) 10 mg tablet TAKE 1 TABLET BY MOUTH EVERY DAY  nitroglycerin (NITROSTAT) 0.4 mg SL tablet 1 Tab by SubLINGual route every five (5) minutes as needed.  latanoprost (XALATAN) 0.005 % ophthalmic solution  COMBIGAN 0.2-0.5 % drop ophthalmic solution Administer 1 Drop to both eyes every twelve (12) hours.  RESTASIS 0.05 % ophthalmic emulsion Administer 1 Drop to both eyes every twelve (12) hours.  ascorbic acid (VITAMIN C) 500 mg tablet Take 1,000 mg by mouth daily.  IBUPROFEN (ADVIL PO) Take 1 Tab by mouth as needed.  CALCIUM PO Take 1,000 mg by mouth two (2) times a day.  Blood-Glucose Meter monitoring kit by Does Not Apply route. Use daily as directed  glucose blood VI test strips (BLOOD GLUCOSE TEST) strip by Does Not Apply route. Use daily as directed  Lancets Misc by Does Not Apply route. Use daily as directed  alcohol swabs (ALCOHOL PREP PADS) PadM 1 Dose by Apply Externally route daily as needed (Glucose testing).  Cholecalciferol, Vitamin D3, (VITAMIN D) 1,000 unit Cap Take 2,000 Units by mouth daily.  MULTIVITAMINS PO Take 1 Tab by mouth daily.  aspirin 81 mg chewable tablet Take 81 mg by mouth daily. No current facility-administered medications for this visit. There are no discontinued medications. [facility] follow up appointment(s):  
Future Appointments Date Time Provider Jean Marie Sorenson 4/17/2019  2:20 PM Elier Siu  [facility]  
7/11/2019  9:00 AM [facility] NURSE [facility] CARRIE HELM  
7/18/2019 10:00 AM Lolita Canchola MD [facility] follow up appointment(s): n/a [facility]:  n/a  
 
 
Goals  Attends follow-up appointments as directed. Patient attended follow up appt with PCP on 3/20/19. Patient will attend follow up appt with Cardiology on 4/17/19.  Prevent complications post hospitalization. Patient will continue to take all medications as prescribed. Patient will attend follow up appointments as scheduled. Patient will call NN/PCP office for any questions or concerns.  Supportive resources in place to maintain patient in the community (ie. Home Health, DME equipment, refer to, medication assistant plan, etc.) [employer] Date:  4/16/19 
 
3/21/2019 [address] [facility] to provide skilled home care visits to include: - Nursing, PT, and OT

## 2019-03-28 DIAGNOSIS — E11.9 TYPE 2 DIABETES MELLITUS WITHOUT COMPLICATION, WITHOUT LONG-TERM CURRENT USE OF INSULIN (HCC): Primary | ICD-10-CM

## 2019-03-28 RX ORDER — LANCETS
EACH MISCELLANEOUS
Qty: 100 EACH | Refills: 11 | Status: SHIPPED | OUTPATIENT
Start: 2019-03-28 | End: 2020-05-14 | Stop reason: ALTCHOICE

## 2019-04-12 ENCOUNTER — PATIENT OUTREACH (OUTPATIENT)
Dept: FAMILY MEDICINE CLINIC | Age: 84
End: 2019-04-12

## 2019-04-12 NOTE — PROGRESS NOTES
Hospital Discharge Follow-Up Date/Time:  2019 3:48 PM 
 
Patient was admitted to [facility] on 3/14/19 and discharged on 3/16/19 for Palpitation, PEGGY, DM, ,HTN, HLD, CHF. Nurse Navigator (NN) contacted the patient by telephone to perform post hospital discharge assessment. Verified name and  with patient as identifiers. Provided introduction to self, and explanation of the Nurse Navigator role. Patient states that she is doing well. Patient denied chest pain, shortness of breath, fever, chills,legs swelling and abdominal swelling. 1. Palpitation - stable. Patient denied any palpitation at this time. 2. CHF- stable. Denied chest pain, shortness of breath, abdominal swelling and legs edema. Pt. Is on Cozaar and Aspirin. 3. DM- stable as per Pt. Patient states that Bg has been running good. A1C 5.9 on 19. Pt. Is on Glipizide and checking BG 
4. HTN- BP running good per Pt. Patient is checking her BP at home. Pt. Is on Amlodipine and cozaar. Denied S/S of hypertension and hypotension. 5. Generalized weakness - Home PT following. Patient states that she is getting better each day. Patient states that she will follow up with her cardiologist.  
 
No further questions, concerns and needs at this time as per Pt.

## 2019-04-17 ENCOUNTER — HOSPITAL ENCOUNTER (OUTPATIENT)
Dept: LAB | Age: 84
Discharge: HOME | End: 2019-04-17
Payer: MEDICARE

## 2019-04-17 ENCOUNTER — OFFICE VISIT (OUTPATIENT)
Dept: CARDIOLOGY CLINIC | Age: 84
End: 2019-04-17

## 2019-04-17 VITALS
DIASTOLIC BLOOD PRESSURE: 62 MMHG | SYSTOLIC BLOOD PRESSURE: 134 MMHG | WEIGHT: 143 LBS | BODY MASS INDEX: 28.07 KG/M2 | HEIGHT: 60 IN | HEART RATE: 66 BPM | OXYGEN SATURATION: 98 %

## 2019-04-17 DIAGNOSIS — I10 ESSENTIAL HYPERTENSION: ICD-10-CM

## 2019-04-17 DIAGNOSIS — R00.2 PALPITATIONS: ICD-10-CM

## 2019-04-17 DIAGNOSIS — E78.5 DYSLIPIDEMIA: ICD-10-CM

## 2019-04-17 DIAGNOSIS — I25.10 CORONARY ARTERY DISEASE INVOLVING NATIVE CORONARY ARTERY OF NATIVE HEART WITHOUT ANGINA PECTORIS: ICD-10-CM

## 2019-04-17 DIAGNOSIS — R06.02 SOB (SHORTNESS OF BREATH): Primary | ICD-10-CM

## 2019-04-17 LAB
ANION GAP SERPL CALC-SCNC: 5 MMOL/L (ref 3–18)
BUN SERPL-MCNC: 25 MG/DL (ref 7–18)
BUN/CREAT SERPL: 17 (ref 12–20)
CALCIUM SERPL-MCNC: 9.7 MG/DL (ref 8.5–10.1)
CHLORIDE SERPL-SCNC: 111 MMOL/L (ref 100–108)
CO2 SERPL-SCNC: 28 MMOL/L (ref 21–32)
CREAT SERPL-MCNC: 1.43 MG/DL (ref 0.6–1.3)
GLUCOSE SERPL-MCNC: 87 MG/DL (ref 74–99)
POTASSIUM SERPL-SCNC: 4.7 MMOL/L (ref 3.5–5.5)
SODIUM SERPL-SCNC: 144 MMOL/L (ref 136–145)

## 2019-04-17 PROCEDURE — 80048 BASIC METABOLIC PNL TOTAL CA: CPT

## 2019-04-17 PROCEDURE — 36415 COLL VENOUS BLD VENIPUNCTURE: CPT

## 2019-04-17 NOTE — PROGRESS NOTES
HPI:  I saw Angely Haq in my office today in cardiovascular evaluation regarding her problems with hypertensive heart disease with some diastolic dysfunction of the left ventricle. Ms. Sobeida Greenfield is a very pleasant 80year old  female, who appears to be much younger than her stated age, and who has been followed in the past in our practice for her problems with hypertension, dyslipidemia, and specifically some component of chronic diastolic heart failure. When I saw her last in January 2018 she had a significant sinus bradycardia in the 40s and I ultimately discontinued her atenolol 12.5 mg daily and she comes into the office today with a heart rate in the 60s without any cardiovascular complaints. She comes in today with her daughter and relates that she was recently in the hospital over [facility] due to some weakness in her legs and while there she was found to have some renal insufficiency with BUN/creatinine of 30 and 2.3. She apparently received some fluids and also had an echocardiogram which demonstrated completely normal left ventricular systolic function with moderate left atrial enlargement and moderate pulmonary hypertension with an estimated pulmonary artery pressure of 50 mmHg. She relates that currently she is feeling reasonably well and her only complaint is little mild shortness of breath with exertion which she has occasionally. Encounter Diagnoses Name Primary?  Coronary artery disease involving native coronary artery of native heart without angina pectoris  Palpitations  Essential hypertension  Dyslipidemia  SOB (shortness of breath) Yes Discussion: This lady appears to be doing reasonably well currently and is not complaining of any specific problems except for mild shortness of breath on exertion.   It would appear that she must of been dehydrated and was given fluids because her BUN and creatinine on March 16, 2019 was 18 and 1.1 which was markedly improved from 3 days prior when it was 30 and 2.3. However, she was discharged on Cozaar which is usually protective to the kidneys but could potentially be an issue in some situations so I think it would be good for us to recheck her renal function one more time and I am going to get a BMP on her. Her latest lipid profile which was completed on March 16, 2019 showed total cholesterol of 191, HDL of 58, L DL of 113 and triglycerides of 102 which is reasonable level and certainly at her age I would not consider changing her statin therapy. She has been on pravastatin 40 mg daily for this problem for some time. Her blood pressure is adequately controlled today and her EKG appears to be stable so I am simply going to plan to see her again in several months. PCP: Jessika Cooper MD 
 
 
Past Medical History:  
Diagnosis Date  Arthritis of right knee  CAD (coronary artery disease), native coronary artery October 2010  
 mild disease  Cardiac cath 10/21/2010  
 dLM 20%. mLAD 25%. CX mild. pRCA 30%. EF 30-35%. Anterolateral, apical , diaphrag akin. Hypercontractile basal segments. Severe elev left-sided filling press.  Cardiac echocardiogram 02/02/2011 EF 50-55%. Mild apical hypk. Gr 1 DDfx. RVSP 40-45. Mild LAE.  Cardiac Holter monitor 06/10/2015 Sinus rhythm w/avg HR of 53 bpm (range 40-85 bpm). Occasional PACs. One 3-beat run of nonsustained SVT. Very few PVCs. One 3-beat run of nonsustained VT. Pt's HR was < 50 bpm for 8 hrs of the recorded time. No pauses noted. Two episodes of neck pain corresponded to sinus rhythm w/o ectopy.  Cardiovascular renal duplex 12/03/2012 No renal artery stenosis bilaterally. Bilateral intrinsic/med disease. Patent bilateral renal veins. Multiple cysts on both kidneys.  CKD (chronic kidney disease) while on diuretics for difficult to control HTN  
 Diabetes mellitus type II 3/15/2010  Dyslipidemia 3/15/2010  
 Heart attack ([facility] [address] 2010  
 Heart failure, diastolic, chronic ([facility] [address]  History of heart attack  HTN (hypertension) 3/15/2010  Hypertensive heart disease  Loss of appetite  Osteopenia 7/13/2010  Other dyspnea and respiratory abnormality  Ringing in the ears  Stress-induced cardiomyopathy EF 35% (LV angio 10/2010), then EF 50-55% (echo, Feb 2011)  Wears glasses Past Surgical History:  
Procedure Laterality Date  HX BLADDER SUSPENSION  2009 [facility] Current Outpatient Medications Medication Sig  
 glucose blood VI test strips (ASCENSIA CONTOUR) strip Check BS 1x/day E11.9  
 lancets misc Check BA 1x/day E 11.9  
 losartan (COZAAR) 100 mg tablet TAKE ONE TABLET BY MOUTH DAILY  glipiZIDE SR (GLUCOTROL XL) 2.5 mg CR tablet TAKE 1 TABLET BY MOUTH EVERY DAY  LUMIGAN 0.01 % ophthalmic drops  pravastatin (PRAVACHOL) 40 mg tablet TAKE 1 TAB BY MOUTH DAILY.  diclofenac (VOLTAREN) 1 % gel Apply  to affected area four (4) times daily.  amLODIPine (NORVASC) 10 mg tablet TAKE 1 TABLET BY MOUTH EVERY DAY  nitroglycerin (NITROSTAT) 0.4 mg SL tablet 1 Tab by SubLINGual route every five (5) minutes as needed.  latanoprost (XALATAN) 0.005 % ophthalmic solution  COMBIGAN 0.2-0.5 % drop ophthalmic solution Administer 1 Drop to both eyes every twelve (12) hours.  RESTASIS 0.05 % ophthalmic emulsion Administer 1 Drop to both eyes every twelve (12) hours.  ascorbic acid (VITAMIN C) 500 mg tablet Take 1,000 mg by mouth daily.  IBUPROFEN (ADVIL PO) Take 1 Tab by mouth as needed.  CALCIUM PO Take 1,000 mg by mouth two (2) times a day.  Blood-Glucose Meter monitoring kit by Does Not Apply route. Use daily as directed  glucose blood VI test strips (BLOOD GLUCOSE TEST) strip by Does Not Apply route. Use daily as directed  Lancets Misc by Does Not Apply route. Use daily as directed  alcohol swabs (ALCOHOL PREP PADS) PadM 1 Dose by Apply Externally route daily as needed (Glucose testing).  Cholecalciferol, Vitamin D3, (VITAMIN D) 1,000 unit Cap Take 2,000 Units by mouth daily.  MULTIVITAMINS PO Take 1 Tab by mouth daily.  aspirin 81 mg chewable tablet Take 81 mg by mouth daily. No current facility-administered medications for this visit. Allergies Allergen Reactions  Lipitor [Atorvastatin] Myalgia  Spironolactone Other (comments) Dizziness and fatigue Social History:  
Social History Tobacco Use  Smoking status: Never Smoker  Smokeless tobacco: Never Used Substance Use Topics  Alcohol use: No  
   
 
Family history: family history includes Arthritis-osteo in an other family member; Hypertension in her mother. Review of Systems: 
Constitutional: Negative. Respiratory: Positive for shortness of breath. Negative for cough, hemoptysis and wheezing. Cardiovascular: Negative. Gastrointestinal: Negative. Musculoskeletal: Positive for joint pain and myalgias. Negative for falls. Neurological: Negative for dizziness. Physical Exam:  
The patient is an alert, oriented, well developed, well nourished 80 y.o. female who was in no acute distress at the time of my examination. Visit Vitals /62 Pulse 66 Ht 5' (1.524 m) Wt 64.9 kg (143 lb) SpO2 98% BMI 27.93 kg/m² BP Readings from Last 3 Encounters:  
04/17/19 134/62  
03/20/19 135/67  
03/16/19 139/66 Wt Readings from Last 3 Encounters:  
04/17/19 64.9 kg (143 lb)  
03/20/19 64.4 kg (142 lb)  
03/16/19 66.3 kg (146 lb 2.6 oz) HEENT: Conjuctiva white, mucosa moist, no pallor or cyanosis. NECK: Supple without masses, tenderness or thyromegaly. There was no jugular venous distention.  Carotid are full bilaterally with soft bilateral bruits vs radiation of heart murmur to the neck. CHEST: Symmetrical with good excursion. LUNGS: Clear to auscultation in all fields. HEART: Regular rate and rhythm. The apex is not displaced. There were no lifts, thrills or heaves. There is a normal S1 and S2. There is a grade 2/6 MINI along the LSB with radiation to the base and neck without appreciable diastolic murmurs, rubs, clicks, or gallops auscultated. ABDOMEN: Soft without masses, tenderness or organomegaly. EXTREMITIES: 1 + peripheral pulses without peripheral edema. INTEGUMENT: Skin is warm and dry NEUROLOGICAL: The patient was oriented x3 with motor function grossly intact. Review of Data: See PMH and Cardiology and Imaging sections for cardiac testing Lab Results Component Value Date/Time Cholesterol, total 191 03/16/2019 02:50 AM  
 HDL Cholesterol 58 03/16/2019 02:50 AM  
 LDL, calculated 113 03/16/2019 02:50 AM  
 Triglyceride 102 03/16/2019 02:50 AM  
 CHOL/HDL Ratio 3.3 03/16/2019 02:50 AM  
 
 
Results for orders placed or performed in visit on 04/17/19 AMB POC EKG ROUTINE W/ 12 LEADS, INTER & REP     Status: None Narrative Normal sinus rhythm rate 66. Are nonspecific diffuse ST-T flattening. This EKG is quite similar to the EKG of March 14, 2019. Arvilla Lefort, D.O., F.A.C.C. [facility] [address] [place] [address] and [facility] [address] [place] [address] [place] [facility] [id number] [address] [phone]   [phone] PLEASE NOTE:  This document has been produced using voice recognition software. Unrecognized errors in transcription may be present.

## 2019-04-19 NOTE — PROGRESS NOTES
Per your last office note: This lady appears to be doing reasonably well currently and is not complaining of any specific problems except for mild shortness of breath on exertion. It would appear that she must of been dehydrated and was given fluids because her BUN and creatinine on March 16, 2019 was 18 and 1.1 which was markedly improved from 3 days prior when it was 30 and 2.3. However, she was discharged on Cozaar which is usually protective to the kidneys but could potentially be an issue in some situations so I think it would be good for us to recheck her renal function one more time and I am going to get a BMP on her.

## 2019-04-19 NOTE — PROGRESS NOTES
Her creatinine is a little elevated, but not that much so I wouldn't make any change for now, but I would repeat it within 3-4 weeks just to be sure it is stable. Please let the patient know.  ES

## 2019-04-23 ENCOUNTER — TELEPHONE (OUTPATIENT)
Dept: CARDIOLOGY CLINIC | Age: 84
End: 2019-04-23

## 2019-04-23 DIAGNOSIS — E78.5 DYSLIPIDEMIA: Primary | ICD-10-CM

## 2019-04-23 NOTE — TELEPHONE ENCOUNTER
----- Message from Isamar Blair, DO sent at 4/19/2019  4:48 PM EDT ----- Her creatinine is a little elevated, but not that much so I wouldn't make any change for now, but I would repeat it within 3-4 weeks just to be sure it is stable. Please let the patient know.  ES

## 2019-04-23 NOTE — TELEPHONE ENCOUNTER
Patient informed of results; order placed for BMP and mailed to patient to have completed in 3 weeks. Patient verbalized understanding.

## 2019-04-29 ENCOUNTER — PATIENT OUTREACH (OUTPATIENT)
Dept: FAMILY MEDICINE CLINIC | Age: 84
End: 2019-04-29

## 2019-04-29 NOTE — PROGRESS NOTES
Per EMR Review Patient attended Cardiology office visit on  
4-17-19. Patient to follow up in several months. Next follow up appointment is with DRE Desir, Orthopedics, scheduled for 5/10/19. Nurse Navigator to evaluate for closure of Transition of Care Services.

## 2019-04-30 RX ORDER — PRAVASTATIN SODIUM 40 MG/1
TABLET ORAL
Qty: 30 TAB | Refills: 11 | Status: SHIPPED | OUTPATIENT
Start: 2019-04-30 | End: 2020-01-22 | Stop reason: SDUPTHER

## 2019-05-01 ENCOUNTER — PATIENT OUTREACH (OUTPATIENT)
Dept: FAMILY MEDICINE CLINIC | Age: 84
End: 2019-05-01

## 2019-05-01 NOTE — PROGRESS NOTES
Nurse Navigator spoke with patient via telephone call. Patient denies: - A funny feeling in her chest (palpitations) - Chest Pain - Shortness of Breath Patient states that she may get short of breath if she walks too long. Patient states that after 20 minutes she will quit walking. Patient Reports:  
- She had cramps in her legs yesterday. She took Advil and that was helpful. Patient feels better today. Patient encouraged to follow up with physician office for any further episodes of leg cramps. Patient has graduated from the Transitions of Care Coordination  program on 5-1-19. Patient's symptoms are stable at this time. Patient/family has the ability to self-manage. Care management goals have been completed at this time. No further nurse navigator follow up scheduled. Goals Addressed This Visit's Progress  COMPLETED: Attends follow-up appointments as directed. Patient attended follow up appt with PCP on 3/20/19. Patient will attend follow up appt with Cardiology on 4/17/19.  
 
4/29/2019 Patient attended cardiology follow up on 4-17-19 Patient to attend ortho. Follow up on 5/10/19.   
 
5/1/2019 Next PCP follow up is scheduled for 7-18-19 Patient encouraged to call PCP office for any concerns or if she feels she would like an office visit before that date.  COMPLETED: Prevent complications post hospitalization. Patient will continue to take all medications as prescribed. Patient will attend follow up appointments as scheduled. Patient will call NN/PCP office for any questions or concerns. 5/1/2019 Per review of EMR patient has not been admitted to the acute setting since discharge date of 3/16/19.  COMPLETED: Supportive resources in place to maintain patient in the community (ie. Home Health, DME equipment, refer to, medication assistant plan, etc.) Target Date:  4/16/19 
 
3/21/2019 [facility] to provide skilled home care visits to include: - Nursing, PT, and OT  
 
5/1/2019 Per discussion with [facility] staff member, Brice Martin, plan is to discontinue services approximately 5-8-19. Per Discussion with patient on 5-1-19, home health nurse was out to visit today. Home nurse visit every Wednesday. Patient was alerted to the availability of physician or other advanced practioners after hours, weekends, and holidays. Please call the PCP office phone number and speak with the answering service for assistance. Patients upcoming visits:   
Future Appointments Date Time Provider Jean Marie Sorenson 5/10/2019  3:20 PM DRE Collazo [address]  
7/11/2019  9:00 AM [facility] NURSE [facility] CARRIE HELM  
7/18/2019 10:00 AM Huy Pedroza MD [address] [facility]  
10/22/2019 11:00 AM Yanci Delacruz  [facility]

## 2019-05-10 ENCOUNTER — OFFICE VISIT (OUTPATIENT)
Dept: ORTHOPEDIC SURGERY | Age: 84
End: 2019-05-10

## 2019-05-10 VITALS
WEIGHT: 145.4 LBS | HEART RATE: 79 BPM | TEMPERATURE: 97.5 F | SYSTOLIC BLOOD PRESSURE: 196 MMHG | HEIGHT: 60 IN | BODY MASS INDEX: 28.54 KG/M2 | DIASTOLIC BLOOD PRESSURE: 86 MMHG | OXYGEN SATURATION: 98 % | RESPIRATION RATE: 16 BRPM

## 2019-05-10 DIAGNOSIS — M75.51 BURSITIS OF RIGHT SHOULDER: Primary | ICD-10-CM

## 2019-05-10 DIAGNOSIS — M25.511 RIGHT SHOULDER PAIN, UNSPECIFIED CHRONICITY: ICD-10-CM

## 2019-05-10 RX ORDER — BETAMETHASONE SODIUM PHOSPHATE AND BETAMETHASONE ACETATE 3; 3 MG/ML; MG/ML
6 INJECTION, SUSPENSION INTRA-ARTICULAR; INTRALESIONAL; INTRAMUSCULAR; SOFT TISSUE ONCE
Qty: 1 ML | Refills: 0
Start: 2019-05-10 | End: 2019-05-10

## 2019-05-10 NOTE — PROGRESS NOTES
[address], [address] [place] [id number] 
[phone] Patient: Pradeep Ayala                MRN: [id number]       SSN: [id number] YOB: 1923        AGE: 80 y.o. SEX: female Body mass index is 28.4 kg/m². PCP: Beatirs Vaughan MD 
05/10/19 This office note has been dictated. REVIEW OF SYSTEMS: 
Constitutional: Negative for fever, chills, weight loss and malaise/fatigue. HENT: Negative. Eyes: Negative. Respiratory: Negative. Cardiovascular: Negative. Gastrointestinal: No bowel incontinence or constipation. Genitourinary: No bladder incontinence or saddle anesthesia. Skin: Negative. Neurological: Negative. Endo/Heme/Allergies: Negative. Psychiatric/Behavioral: Negative. Musculoskeletal: As per HPI above. Past Medical History:  
Diagnosis Date  Arthritis of right knee  CAD (coronary artery disease), native coronary artery October 2010  
 mild disease  Cardiac cath 10/21/2010  
 dLM 20%. mLAD 25%. CX mild. pRCA 30%. EF 30-35%. Anterolateral, apical , diaphrag akin. Hypercontractile basal segments. Severe elev left-sided filling press.  Cardiac echocardiogram 02/02/2011 EF 50-55%. Mild apical hypk. Gr 1 DDfx. RVSP 40-45. Mild LAE.  Cardiac Holter monitor 06/10/2015 Sinus rhythm w/avg HR of 53 bpm (range 40-85 bpm). Occasional PACs. One 3-beat run of nonsustained SVT. Very few PVCs. One 3-beat run of nonsustained VT. Pt's HR was < 50 bpm for 8 hrs of the recorded time. No pauses noted. Two episodes of neck pain corresponded to sinus rhythm w/o ectopy.  Cardiovascular renal duplex 12/03/2012 No renal artery stenosis bilaterally. Bilateral intrinsic/med disease. Patent bilateral renal veins. Multiple cysts on both kidneys.  CKD (chronic kidney disease)   
 while on diuretics for difficult to control HTN  
 Diabetes mellitus type II 3/15/2010  Dyslipidemia 3/15/2010  
 Heart attack ([facility] [address] 2010  
 Heart failure, diastolic, chronic ([facility] [address]  History of heart attack  HTN (hypertension) 3/15/2010  Hypertensive heart disease  Loss of appetite  Osteopenia 7/13/2010  Other dyspnea and respiratory abnormality  Ringing in the ears  Stress-induced cardiomyopathy EF 35% (LV angio 10/2010), then EF 50-55% (echo, Feb 2011)  Wears glasses Current Outpatient Medications:  
  betamethasone (CELESTONE SOLUSPAN) 6 mg/mL injection, 1 mL by IntraBURSal route once for 1 dose., Disp: 1 mL, Rfl: 0 
  pravastatin (PRAVACHOL) 40 mg tablet, TAKE 1 TABLET BY MOUTH EVERY DAY, Disp: 30 Tab, Rfl: 11 
  glucose blood VI test strips (ASCENSIA CONTOUR) strip, Check BS 1x/day E11.9, Disp: 100 Strip, Rfl: 3 
  lancets misc, Check BA 1x/day E 11.9, Disp: 100 Each, Rfl: 11 
  losartan (COZAAR) 100 mg tablet, TAKE ONE TABLET BY MOUTH DAILY, Disp: 90 Tab, Rfl: 1 
  glipiZIDE SR (GLUCOTROL XL) 2.5 mg CR tablet, TAKE 1 TABLET BY MOUTH EVERY DAY, Disp: 90 Tab, Rfl: 1 
  LUMIGAN 0.01 % ophthalmic drops, , Disp: , Rfl:  
  diclofenac (VOLTAREN) 1 % gel, Apply  to affected area four (4) times daily. , Disp: 100 g, Rfl: 4 
  amLODIPine (NORVASC) 10 mg tablet, TAKE 1 TABLET BY MOUTH EVERY DAY, Disp: 90 Tab, Rfl: 3 
  nitroglycerin (NITROSTAT) 0.4 mg SL tablet, 1 Tab by SubLINGual route every five (5) minutes as needed. , Disp: 1 Bottle, Rfl: 1 
  latanoprost (XALATAN) 0.005 % ophthalmic solution, , Disp: , Rfl:  
  COMBIGAN 0.2-0.5 % drop ophthalmic solution, Administer 1 Drop to both eyes every twelve (12) hours. , Disp: , Rfl:  
  RESTASIS 0.05 % ophthalmic emulsion, Administer 1 Drop to both eyes every twelve (12) hours. , Disp: , Rfl:  
  ascorbic acid (VITAMIN C) 500 mg tablet, Take 1,000 mg by mouth daily. , Disp: , Rfl:  
  IBUPROFEN (ADVIL PO), Take 1 Tab by mouth as needed. , Disp: , Rfl:  
   CALCIUM PO, Take 1,000 mg by mouth two (2) times a day., Disp: , Rfl:   Blood-Glucose Meter monitoring kit, by Does Not Apply route. Use daily as directed, Disp: 1 Kit, Rfl: 0 
  glucose blood VI test strips (BLOOD GLUCOSE TEST) strip, by Does Not Apply route. Use daily as directed, Disp: 3 Package, Rfl: 3 
  Lancets Misc, by Does Not Apply route. Use daily as directed, Disp: 3 Package, Rfl: 3 
  alcohol swabs (ALCOHOL PREP PADS) PadM, 1 Dose by Apply Externally route daily as needed (Glucose testing). , Disp: 3 Package, Rfl: 3   Cholecalciferol, Vitamin D3, (VITAMIN D) 1,000 unit Cap, Take 2,000 Units by mouth daily. , Disp: 180 Cap, Rfl: 3 
  MULTIVITAMINS PO, Take 1 Tab by mouth daily. , Disp: , Rfl:  
  aspirin 81 mg chewable tablet, Take 81 mg by mouth daily. , Disp: , Rfl:  
 
Allergies Allergen Reactions  Lipitor [Atorvastatin] Myalgia  Spironolactone Other (comments) Dizziness and fatigue Social History Socioeconomic History  Marital status:  Spouse name: Not on file  Number of children: Not on file  Years of education: Not on file  Highest education level: Not on file Occupational History  Not on file Social Needs  Financial resource strain: Not on file  Food insecurity:  
  Worry: Not on file Inability: Not on file  Transportation needs:  
  Medical: Not on file Non-medical: Not on file Tobacco Use  Smoking status: Never Smoker  Smokeless tobacco: Never Used Substance and Sexual Activity  Alcohol use: No  
 Drug use: No  
 Sexual activity: Never Lifestyle  Physical activity:  
  Days per week: Not on file Minutes per session: Not on file  Stress: Not on file Relationships  Social connections:  
  Talks on phone: Not on file Gets together: Not on file Attends Taoist service: Not on file Active member of club or organization: Not on file Attends meetings of clubs or organizations: Not on file Relationship status: Not on file  Intimate partner violence:  
  Fear of current or ex partner: Not on file Emotionally abused: Not on file Physically abused: Not on file Forced sexual activity: Not on file Other Topics Concern  Not on file Social History Narrative  Not on file Past Surgical History:  
Procedure Laterality Date  HX BLADDER SUSPENSION  2009 [address] * Patient was identified by name and date of birth * Agreement on procedure being performed was verified * Risks and Benefits explained to the patient * Procedure site verified and marked as necessary * Patient was positioned for comfort * Consent was signed and verified 4:26 PM 
 
The patient was instructed on post injection care. We did see Ms. Oscar Rodriguez today for followup with regards to complaints of right shoulder pain. It has been ongoing for a number of years without specific injury. She has difficulty with overhead activities. There is no real increasing discomfort at night. She denies any radiating pain or numbness down the lower extremity. She has had no treatment to this point. PHYSICAL EXAMINATION:  In general, the patient is alert and oriented x 3 in no acute distress. The patient is well-developed, well-nourished, with a normal affect. The patient is afebrile. HEENT:  Head is normocephalic and atraumatic. Pupils are equally round and reactive to light and accommodation. Extraocular eye movements are intact. Neck is supple. Trachea is midline. No JVD is present. Breathing is nonlabored. Examination of the right shoulder reveals the skin is intact. There is no ecchymosis, no warmth, and no signs of infection or cellulitis present. There is decreased range of motion of about 80ø forward flexion and 80ø abduction. There is a positive Brownlee' and positive Neer's.   There is a negative drop-arm, Speed's, and Iron's. Radial pulse is 2+, and capillary refill is within normal limits. RADIOGRAPHS:  Radiographs in the office today, including three views of the right shoulder, show moderately advanced glenohumeral arthritis, as well as AC arthritis. No acute bony abnormalities are noted. ASSESSMENT:   
 
1. Right shoulder osteoarthritis. 2. Right shoulder bursitis. 3. Rotator cuff tendinopathy, right shoulder. PLAN:  At this point, we are going to move forward with a cortisone injection for the right shoulder. After informed and written consent with an appropriate time out performed, and under sterile conditions, with ultrasound-guided assistance, the right shoulder was prepped with Betadine and 6 mg of Celestone was injected to the subacromial space without complications. The patient tolerated the injection well. The patient was instructed on post injection care. We will see her back in the office in about four weeks' time for evaluation and to  the efficacy of the injection and possibly move forward with a cortisone injection for the left shoulder at that point.   
 
 
 
 
 
 
 
 
 
 
JR kM WISE, DRE, ATC

## 2019-05-21 ENCOUNTER — HOSPITAL ENCOUNTER (OUTPATIENT)
Dept: LAB | Age: 84
Discharge: HOME | End: 2019-05-21
Payer: MEDICARE

## 2019-05-21 DIAGNOSIS — E78.5 DYSLIPIDEMIA: ICD-10-CM

## 2019-05-21 LAB
ANION GAP SERPL CALC-SCNC: 7 MMOL/L (ref 3–18)
BUN SERPL-MCNC: 16 MG/DL (ref 7–18)
BUN/CREAT SERPL: 14 (ref 12–20)
CALCIUM SERPL-MCNC: 9.9 MG/DL (ref 8.5–10.1)
CHLORIDE SERPL-SCNC: 111 MMOL/L (ref 100–108)
CO2 SERPL-SCNC: 28 MMOL/L (ref 21–32)
CREAT SERPL-MCNC: 1.11 MG/DL (ref 0.6–1.3)
GLUCOSE SERPL-MCNC: 119 MG/DL (ref 74–99)
POTASSIUM SERPL-SCNC: 4.1 MMOL/L (ref 3.5–5.5)
SODIUM SERPL-SCNC: 146 MMOL/L (ref 136–145)

## 2019-05-21 PROCEDURE — 80048 BASIC METABOLIC PNL TOTAL CA: CPT

## 2019-05-21 PROCEDURE — 36415 COLL VENOUS BLD VENIPUNCTURE: CPT

## 2019-06-20 ENCOUNTER — OFFICE VISIT (OUTPATIENT)
Dept: ORTHOPEDIC SURGERY | Age: 84
End: 2019-06-20

## 2019-06-20 VITALS
HEART RATE: 44 BPM | HEIGHT: 60 IN | TEMPERATURE: 97.9 F | RESPIRATION RATE: 11 BRPM | BODY MASS INDEX: 28.47 KG/M2 | SYSTOLIC BLOOD PRESSURE: 161 MMHG | DIASTOLIC BLOOD PRESSURE: 72 MMHG | OXYGEN SATURATION: 93 % | WEIGHT: 145 LBS

## 2019-06-20 DIAGNOSIS — M17.0 PRIMARY OSTEOARTHRITIS OF BOTH KNEES: Primary | ICD-10-CM

## 2019-06-20 DIAGNOSIS — M25.512 LEFT SHOULDER PAIN, UNSPECIFIED CHRONICITY: ICD-10-CM

## 2019-06-20 DIAGNOSIS — M25.561 CHRONIC PAIN OF RIGHT KNEE: ICD-10-CM

## 2019-06-20 DIAGNOSIS — M19.012 PRIMARY OSTEOARTHRITIS OF LEFT SHOULDER: ICD-10-CM

## 2019-06-20 DIAGNOSIS — G89.29 CHRONIC PAIN OF RIGHT KNEE: ICD-10-CM

## 2019-06-20 RX ORDER — BETAMETHASONE SODIUM PHOSPHATE AND BETAMETHASONE ACETATE 3; 3 MG/ML; MG/ML
6 INJECTION, SUSPENSION INTRA-ARTICULAR; INTRALESIONAL; INTRAMUSCULAR; SOFT TISSUE ONCE
Qty: 1 ML | Refills: 0
Start: 2019-06-20 | End: 2019-06-20

## 2019-06-20 NOTE — PROGRESS NOTES
1. Have you been to the ER, urgent care clinic since your last visit? Hospitalized since your last visit? No    2. Have you seen or consulted any other health care providers outside of the [address] [place] [address] since your last visit? Include any pap smears or colon screening.  No

## 2019-06-20 NOTE — PROGRESS NOTES
Patient: Hailey White                MRN: [id number]       SSN: [id number]  YOB: 1923        AGE: 80 y.o. SEX: female  Body mass index is 28.32 kg/m². PCP: Sulma Black MD  06/20/19  HISTORY:  I had the pleasure of reviewing the patient today. We gave her a cortisone injection for her shoulder at the last visit. She has done very well. She is complaining of bilateral knee pain. It is a little worse on the right than on the left. There is some discomfort at night. Her main complaint is when she gets up from a chair, it can be quite uncomfortable for her, and stiffness. There is not too much in the way of hip pain. She is a very young 19-year-old. She answers questions very appropriately. She is alert and oriented. PHYSICAL EXAMINATION:  On examination today, she is a very kind lady. She appears younger than her stated age. She is very soft-spoken and soft-hearted. The hips rotate adequately. Both knees are in varus. It is a little worse on the right than on the left. There is a couple degree fixed flexion deformity and bends fairly well. Minimal peripheral edema. Slight evidence of neuropathy. No foot drop. Calf nontender. There is mild joint line tenderness in all 3 compartments. It is worse in the medial compartment. RADIOGRAPHS:   Review of the x-rays, AP, tunnel, lateral and skyline reveal fairly advanced arthritis involving both knees. IMPRESSION:  My overall impression is advanced arthritis involving both knees. PROCEDURE:  Under aseptic conditions, after informed written consent with timeout, each of the 2 knees injected with 1 mL Celestone preparation, 6 mg, well tolerated. PLAN:  We will have her return to see us in about 3 months or so for follow-up assessment or sooner if there is any problem. We can certainly repeat these injections as needed. It has been an absolute pleasure to share in her care.   She is a very delightful young lady. CC:  Family Medicine          Isaac Manuel MD          REVIEW OF SYSTEMS:      CON: negative for weight loss, fever  EYE: negative for double vision  ENT: negative for hoarseness  RS:   negative for Tb  GI:    negative for blood in stool  :  negative for blood in urine  Other systems reviewed and noted below. Past Medical History:   Diagnosis Date    Arthritis of right knee     CAD (coronary artery disease), native coronary artery October 2010    mild disease    Cardiac cath 10/21/2010    dLM 20%. mLAD 25%. CX mild. pRCA 30%. EF 30-35%. Anterolateral, apical , diaphrag akin. Hypercontractile basal segments. Severe elev left-sided filling press.  Cardiac echocardiogram 02/02/2011    EF 50-55%. Mild apical hypk. Gr 1 DDfx. RVSP 40-45. Mild LAE.  Cardiac Holter monitor 06/10/2015    Sinus rhythm w/avg HR of 53 bpm (range 40-85 bpm). Occasional PACs. One 3-beat run of nonsustained SVT. Very few PVCs. One 3-beat run of nonsustained VT. Pt's HR was < 50 bpm for 8 hrs of the recorded time. No pauses noted. Two episodes of neck pain corresponded to sinus rhythm w/o ectopy.  Cardiovascular renal duplex 12/03/2012    No renal artery stenosis bilaterally. Bilateral intrinsic/med disease. Patent bilateral renal veins. Multiple cysts on both kidneys.     CKD (chronic kidney disease)     while on diuretics for difficult to control HTN    Diabetes mellitus type II 3/15/2010    Dyslipidemia 3/15/2010    Heart attack ([address]     2010    Heart failure, diastolic, chronic ([address]     History of heart attack     HTN (hypertension) 3/15/2010    Hypertensive heart disease     Loss of appetite     Osteopenia 7/13/2010    Other dyspnea and respiratory abnormality     Ringing in the ears     Stress-induced cardiomyopathy     EF 35% (LV angio 10/2010), then EF 50-55% (echo, Feb 2011)    Wears glasses        Family History   Problem Relation Age of Onset    Hypertension Mother     Arthritis-osteo Other        Current Outpatient Medications   Medication Sig Dispense Refill    betamethasone (CELESTONE SOLUSPAN) 6 mg/mL injection 1 mL by Intra artICUlar route once for 1 dose. 1 mL 0    pravastatin (PRAVACHOL) 40 mg tablet TAKE 1 TABLET BY MOUTH EVERY DAY 30 Tab 11    glucose blood VI test strips (ASCENSIA CONTOUR) strip Check BS 1x/day E11.9 100 Strip 3    lancets misc Check BA 1x/day E 11.9 100 Each 11    losartan (COZAAR) 100 mg tablet TAKE ONE TABLET BY MOUTH DAILY 90 Tab 1    glipiZIDE SR (GLUCOTROL XL) 2.5 mg CR tablet TAKE 1 TABLET BY MOUTH EVERY DAY 90 Tab 1    LUMIGAN 0.01 % ophthalmic drops       diclofenac (VOLTAREN) 1 % gel Apply  to affected area four (4) times daily. 100 g 4    amLODIPine (NORVASC) 10 mg tablet TAKE 1 TABLET BY MOUTH EVERY DAY 90 Tab 3    nitroglycerin (NITROSTAT) 0.4 mg SL tablet 1 Tab by SubLINGual route every five (5) minutes as needed. 1 Bottle 1    latanoprost (XALATAN) 0.005 % ophthalmic solution       COMBIGAN 0.2-0.5 % drop ophthalmic solution Administer 1 Drop to both eyes every twelve (12) hours.  RESTASIS 0.05 % ophthalmic emulsion Administer 1 Drop to both eyes every twelve (12) hours.  ascorbic acid (VITAMIN C) 500 mg tablet Take 1,000 mg by mouth daily.  IBUPROFEN (ADVIL PO) Take 1 Tab by mouth as needed.  CALCIUM PO Take 1,000 mg by mouth two (2) times a day.  Blood-Glucose Meter monitoring kit by Does Not Apply route. Use daily as directed 1 Kit 0    glucose blood VI test strips (BLOOD GLUCOSE TEST) strip by Does Not Apply route. Use daily as directed 3 Package 3    Lancets Misc by Does Not Apply route. Use daily as directed 3 Package 3    alcohol swabs (ALCOHOL PREP PADS) PadM 1 Dose by Apply Externally route daily as needed (Glucose testing). 3 Package 3    Cholecalciferol, Vitamin D3, (VITAMIN D) 1,000 unit Cap Take 2,000 Units by mouth daily.  180 Cap 3    MULTIVITAMINS PO Take 1 Tab by mouth daily.      aspirin 81 mg chewable tablet Take 81 mg by mouth daily. Allergies   Allergen Reactions    Lipitor [Atorvastatin] Myalgia    Spironolactone Other (comments)     Dizziness and fatigue       Past Surgical History:   Procedure Laterality Date    HX BLADDER SUSPENSION  2009    HX HYSTERECTOMY  1962       Social History     Socioeconomic History    Marital status:      Spouse name: Not on file    Number of children: Not on file    Years of education: Not on file    Highest education level: Not on file   Occupational History    Not on file   Social Needs    Financial resource strain: Not on file    Food insecurity:     Worry: Not on file     Inability: Not on file    Transportation needs:     Medical: Not on file     Non-medical: Not on file   Tobacco Use    Smoking status: Never Smoker    Smokeless tobacco: Never Used   Substance and Sexual Activity    Alcohol use: No    Drug use: No    Sexual activity: Never   Lifestyle    Physical activity:     Days per week: Not on file     Minutes per session: Not on file    Stress: Not on file   Relationships    Social connections:     Talks on phone: Not on file     Gets together: Not on file     Attends Baptism service: Not on file     Active member of club or organization: Not on file     Attends meetings of clubs or organizations: Not on file     Relationship status: Not on file    Intimate partner violence:     Fear of current or ex partner: Not on file     Emotionally abused: Not on file     Physically abused: Not on file     Forced sexual activity: Not on file   Other Topics Concern    Not on file   Social History Narrative    Not on file       Visit Vitals  /72   Pulse (!) 44   Temp 97.9 °F (36.6 °C) (Oral)   Resp 11   Ht 5' (1.524 m)   Wt 145 lb (65.8 kg)   SpO2 93%   BMI 28.32 kg/m²         PHYSICAL EXAMINATION:  GENERAL: Alert and oriented x3, in no acute distress, well-developed, well-nourished, afebrile.   HEART: No JVD. EYES: No scleral icterus   NECK: No significant lymphadenopathy   LUNGS: No respiratory compromise or indrawing  ABDOMEN: Soft, non-tender, non-distended. Electronically signed by:  Peter Nguyen MD

## 2019-07-11 ENCOUNTER — HOSPITAL ENCOUNTER (OUTPATIENT)
Dept: LAB | Age: 84
Discharge: HOME | End: 2019-07-11
Payer: MEDICARE

## 2019-07-11 DIAGNOSIS — E78.5 DYSLIPIDEMIA: ICD-10-CM

## 2019-07-11 DIAGNOSIS — E11.9 TYPE 2 DIABETES MELLITUS WITHOUT COMPLICATION, WITHOUT LONG-TERM CURRENT USE OF INSULIN (HCC): ICD-10-CM

## 2019-07-11 DIAGNOSIS — I10 ESSENTIAL HYPERTENSION: ICD-10-CM

## 2019-07-11 LAB
ALBUMIN SERPL-MCNC: 3.5 G/DL (ref 3.4–5)
ALBUMIN/GLOB SERPL: 1.3 {RATIO} (ref 0.8–1.7)
ALP SERPL-CCNC: 81 U/L (ref 45–117)
ALT SERPL-CCNC: 22 U/L (ref 13–56)
ANION GAP SERPL CALC-SCNC: 7 MMOL/L (ref 3–18)
AST SERPL-CCNC: 23 U/L (ref 15–37)
BILIRUB SERPL-MCNC: 0.5 MG/DL (ref 0.2–1)
BUN SERPL-MCNC: 18 MG/DL (ref 7–18)
BUN/CREAT SERPL: 14 (ref 12–20)
CALCIUM SERPL-MCNC: 9.3 MG/DL (ref 8.5–10.1)
CHLORIDE SERPL-SCNC: 107 MMOL/L (ref 100–108)
CHOLEST SERPL-MCNC: 210 MG/DL
CO2 SERPL-SCNC: 28 MMOL/L (ref 21–32)
CREAT SERPL-MCNC: 1.3 MG/DL (ref 0.6–1.3)
GLOBULIN SER CALC-MCNC: 2.8 G/DL (ref 2–4)
GLUCOSE SERPL-MCNC: 128 MG/DL (ref 74–99)
HBA1C MFR BLD: 6.3 % (ref 4.2–5.6)
HDLC SERPL-MCNC: 68 MG/DL (ref 40–60)
HDLC SERPL: 3.1 {RATIO} (ref 0–5)
LDLC SERPL CALC-MCNC: 124.6 MG/DL (ref 0–100)
LIPID PROFILE,FLP: ABNORMAL
POTASSIUM SERPL-SCNC: 4.9 MMOL/L (ref 3.5–5.5)
PROT SERPL-MCNC: 6.3 G/DL (ref 6.4–8.2)
SODIUM SERPL-SCNC: 142 MMOL/L (ref 136–145)
TRIGL SERPL-MCNC: 87 MG/DL (ref ?–150)
VLDLC SERPL CALC-MCNC: 17.4 MG/DL

## 2019-07-11 PROCEDURE — 36415 COLL VENOUS BLD VENIPUNCTURE: CPT

## 2019-07-11 PROCEDURE — 80053 COMPREHEN METABOLIC PANEL: CPT

## 2019-07-11 PROCEDURE — 83036 HEMOGLOBIN GLYCOSYLATED A1C: CPT

## 2019-07-11 PROCEDURE — 80061 LIPID PANEL: CPT

## 2019-07-18 ENCOUNTER — OFFICE VISIT (OUTPATIENT)
Dept: FAMILY MEDICINE CLINIC | Age: 84
End: 2019-07-18

## 2019-07-18 VITALS
HEIGHT: 60 IN | BODY MASS INDEX: 28.27 KG/M2 | WEIGHT: 144 LBS | TEMPERATURE: 98.7 F | HEART RATE: 71 BPM | SYSTOLIC BLOOD PRESSURE: 142 MMHG | OXYGEN SATURATION: 95 % | DIASTOLIC BLOOD PRESSURE: 66 MMHG | RESPIRATION RATE: 20 BRPM

## 2019-07-18 DIAGNOSIS — N18.30 TYPE 2 DIABETES MELLITUS WITH STAGE 3 CHRONIC KIDNEY DISEASE, WITHOUT LONG-TERM CURRENT USE OF INSULIN (HCC): Primary | ICD-10-CM

## 2019-07-18 DIAGNOSIS — E78.5 DYSLIPIDEMIA: ICD-10-CM

## 2019-07-18 DIAGNOSIS — E11.22 TYPE 2 DIABETES MELLITUS WITH STAGE 3 CHRONIC KIDNEY DISEASE, WITHOUT LONG-TERM CURRENT USE OF INSULIN (HCC): Primary | ICD-10-CM

## 2019-07-18 DIAGNOSIS — I10 ESSENTIAL HYPERTENSION: ICD-10-CM

## 2019-07-18 NOTE — PROGRESS NOTES
Patient is in the office today for 4 month follow up, patient also would like to discuss HTN medication Losartan. 1. Have you been to the ER, urgent care clinic since your last visit? Hospitalized since your last visit? No    2. Have you seen or consulted any other health care providers outside of the [address] [place] [address] since your last visit? Include any pap smears or colon screening.  No

## 2019-07-18 NOTE — PROGRESS NOTES
Subjective:       Chief Complaint  The patient presents for follow up of diabetes, hypertension and high cholesterol. FATEMEH Castillo is a 80 y.o. female seen for follow up of diabetes. Samuel has hypertension and hyperlipidemia. Diabetes well controlled, no significant medication side effects noted, on glucotrol, hypertension fairly well controlled, no significant medication side effects noted, on current meds, she did not feel good on generic of cozaar she had and the pharmacy is trying to get the previous generic she was on, if unable would change to Diovan,  hyperlipidemia, fairly well controlled on Pravachol. Diet and Lifestyle: generally follows a low fat low cholesterol diet, follows a diabetic diet regularly, exercises sporadically    Home BP Monitoring: is well controlled at home. Diabetic Review of Systems - home glucose monitoring: is well controlled at home when she takes it 1x/day    Other symptoms and concerns: pt feels better when she exercises on a regular basis. Pt's CKD stage 3 is stable on current meds. Patient has palpitations that bother her at times. She has been taken off of beta-blockers by her cardiologist because of low heart rate. Pt has chronic diastolic heart failure that is followed by cardiology and controlled on current meds. Current Outpatient Medications   Medication Sig    pravastatin (PRAVACHOL) 40 mg tablet TAKE 1 TABLET BY MOUTH EVERY DAY    glucose blood VI test strips (ASCENSIA CONTOUR) strip Check BS 1x/day E11.9    lancets misc Check BA 1x/day E 11.9    losartan (COZAAR) 100 mg tablet TAKE ONE TABLET BY MOUTH DAILY    glipiZIDE SR (GLUCOTROL XL) 2.5 mg CR tablet TAKE 1 TABLET BY MOUTH EVERY DAY    LUMIGAN 0.01 % ophthalmic drops     diclofenac (VOLTAREN) 1 % gel Apply  to affected area four (4) times daily.     amLODIPine (NORVASC) 10 mg tablet TAKE 1 TABLET BY MOUTH EVERY DAY    nitroglycerin (NITROSTAT) 0.4 mg SL tablet 1 Tab by SubLINGual route every five (5) minutes as needed.  latanoprost (XALATAN) 0.005 % ophthalmic solution     COMBIGAN 0.2-0.5 % drop ophthalmic solution Administer 1 Drop to both eyes every twelve (12) hours.  RESTASIS 0.05 % ophthalmic emulsion Administer 1 Drop to both eyes every twelve (12) hours.  ascorbic acid (VITAMIN C) 500 mg tablet Take 1,000 mg by mouth daily.  IBUPROFEN (ADVIL PO) Take 1 Tab by mouth as needed.  CALCIUM PO Take 1,000 mg by mouth two (2) times a day.  Blood-Glucose Meter monitoring kit by Does Not Apply route. Use daily as directed    glucose blood VI test strips (BLOOD GLUCOSE TEST) strip by Does Not Apply route. Use daily as directed    Lancets Misc by Does Not Apply route. Use daily as directed    alcohol swabs (ALCOHOL PREP PADS) PadM 1 Dose by Apply Externally route daily as needed (Glucose testing).  Cholecalciferol, Vitamin D3, (VITAMIN D) 1,000 unit Cap Take 2,000 Units by mouth daily.  MULTIVITAMINS PO Take 1 Tab by mouth daily.  aspirin 81 mg chewable tablet Take 81 mg by mouth daily. No current facility-administered medications for this visit.               Review of Systems  Respiratory: negative for dyspnea on exertion  Cardiovascular: negative for chest pain    Objective:     Visit Vitals  /66 (BP 1 Location: Left arm, BP Patient Position: Sitting)   Pulse 71   Temp 98.7 °F (37.1 °C) (Oral)   Resp 20   Ht 5' (1.524 m)   Wt 144 lb (65.3 kg)   SpO2 95%   BMI 28.12 kg/m²        General appearance - alert, well appearing, and in no distress  Neck - supple, no significant adenopathy, carotids upstroke normal bilaterally, no bruits  Chest - clear to auscultation, no wheezes, rales or rhonchi, symmetric air entry  Heart -bradycardia, normal S1, S2, no murmurs, rubs, clicks or gallops  Extremities - peripheral pulses normal, no pedal edema, no clubbing or cyanosis  Skin - normal coloration and turgor, no rashes, no suspicious skin lesions noted      Labs:   Lab Results   Component Value Date/Time    Hemoglobin A1c 6.3 (H) 07/11/2019 09:26 AM    Hemoglobin A1c 5.9 (H) 02/26/2019 09:21 AM    Hemoglobin A1c 6.3 (H) 11/01/2018 09:32 AM    Microalbumin/Creat ratio (mg/g creat) 44 (H) 01/30/2019 01:00 PM    Microalbumin,urine random 10.10 (H) 01/30/2019 01:00 PM    LDL, calculated 124.6 (H) 07/11/2019 09:26 AM    Creatinine 1.30 07/11/2019 09:26 AM      Lab Results   Component Value Date/Time    Cholesterol, total 210 (H) 07/11/2019 09:26 AM    HDL Cholesterol 68 (H) 07/11/2019 09:26 AM    LDL, calculated 124.6 (H) 07/11/2019 09:26 AM    Triglyceride 87 07/11/2019 09:26 AM    CHOL/HDL Ratio 3.1 07/11/2019 09:26 AM     Lab Results   Component Value Date/Time    AST (SGOT) 23 07/11/2019 09:26 AM    Alk. phosphatase 81 07/11/2019 09:26 AM    Bilirubin, direct 0.1 03/11/2016 10:45 AM     Lab Results   Component Value Date/Time    GFR est AA 46 (L) 07/11/2019 09:26 AM    GFR est non-AA 38 (L) 07/11/2019 09:26 AM    Creatinine 1.30 07/11/2019 09:26 AM    BUN 18 07/11/2019 09:26 AM    Sodium 142 07/11/2019 09:26 AM    Potassium 4.9 07/11/2019 09:26 AM    Chloride 107 07/11/2019 09:26 AM    CO2 28 07/11/2019 09:26 AM      Lab Results   Component Value Date/Time    Glucose 128 (H) 07/11/2019 09:26 AM            Assessment / Plan     Diabetes well controlled, on glucotrol  Hypertension  well controlled, on current meds,    Hyperlipidemia fairly well controlled  On Pravachol,        ICD-10-CM ICD-9-CM    1. Type 2 diabetes mellitus with stage 3 chronic kidney disease, without long-term current use of insulin (HCC) E11.22 250.40 HEMOGLOBIN A1C W/O EAG    N18.3 585.3    2. Dyslipidemia E78.5 272.4 LIPID PANEL   3. Essential hypertension F64 454.0 METABOLIC PANEL, COMPREHENSIVE             Diabetic issues reviewed with her: diabetic diet discussed in detail, and low cholesterol diet, weight control and daily exercise discussed.      Follow-up and Dispositions    · Return in about 4 months (around 11/18/2019) for OV, and Medicare Wellness Visit, labs 1 week before. Reviewed plan of care. Patient has provided input and agrees with goals.

## 2019-07-18 NOTE — PATIENT INSTRUCTIONS
High Blood Pressure: Care Instructions  Overview    It's normal for blood pressure to go up and down throughout the day. But if it stays up, you have high blood pressure. Another name for high blood pressure is hypertension. Despite what a lot of people think, high blood pressure usually doesn't cause headaches or make you feel dizzy or lightheaded. It usually has no symptoms. But it does increase your risk of stroke, heart attack, and other problems. You and your doctor will talk about your risks of these problems based on your blood pressure. Your doctor will give you a goal for your blood pressure. Your goal will be based on your health and your age. Lifestyle changes, such as eating healthy and being active, are always important to help lower blood pressure. You might also take medicine to reach your blood pressure goal.  Follow-up care is a key part of your treatment and safety. Be sure to make and go to all appointments, and call your doctor if you are having problems. It's also a good idea to know your test results and keep a list of the medicines you take. How can you care for yourself at home? Medical treatment  · If you stop taking your medicine, your blood pressure will go back up. You may take one or more types of medicine to lower your blood pressure. Be safe with medicines. Take your medicine exactly as prescribed. Call your doctor if you think you are having a problem with your medicine. · Talk to your doctor before you start taking aspirin every day. Aspirin can help certain people lower their risk of a heart attack or stroke. But taking aspirin isn't right for everyone, because it can cause serious bleeding. · See your doctor regularly. You may need to see the doctor more often at first or until your blood pressure comes down. · If you are taking blood pressure medicine, talk to your doctor before you take decongestants or anti-inflammatory medicine, such as ibuprofen.  Some of these medicines can raise blood pressure. · Learn how to check your blood pressure at home. Lifestyle changes  · Stay at a healthy weight. This is especially important if you put on weight around the waist. Losing even 10 pounds can help you lower your blood pressure. · If your doctor recommends it, get more exercise. Walking is a good choice. Bit by bit, increase the amount you walk every day. Try for at least 30 minutes on most days of the week. You also may want to swim, bike, or do other activities. · Avoid or limit alcohol. Talk to your doctor about whether you can drink any alcohol. · Try to limit how much sodium you eat to less than 2,300 milligrams (mg) a day. Your doctor may ask you to try to eat less than 1,500 mg a day. · Eat plenty of fruits (such as bananas and oranges), vegetables, legumes, whole grains, and low-fat dairy products. · Lower the amount of saturated fat in your diet. Saturated fat is found in animal products such as milk, cheese, and meat. Limiting these foods may help you lose weight and also lower your risk for heart disease. · Do not smoke. Smoking increases your risk for heart attack and stroke. If you need help quitting, talk to your doctor about stop-smoking programs and medicines. These can increase your chances of quitting for good. When should you call for help? Call 911 anytime you think you may need emergency care. This may mean having symptoms that suggest that your blood pressure is causing a serious heart or blood vessel problem. Your blood pressure may be over 180/120.   For example, call 911 if:    · You have symptoms of a heart attack. These may include:  ? Chest pain or pressure, or a strange feeling in the chest.  ? Sweating. ? Shortness of breath. ? Nausea or vomiting. ? Pain, pressure, or a strange feeling in the back, neck, jaw, or upper belly or in one or both shoulders or arms. ? Lightheadedness or sudden weakness.   ? A fast or irregular heartbeat.     · You have symptoms of a stroke. These may include:  ? Sudden numbness, tingling, weakness, or loss of movement in your face, arm, or leg, especially on only one side of your body. ? Sudden vision changes. ? Sudden trouble speaking. ? Sudden confusion or trouble understanding simple statements. ? Sudden problems with walking or balance. ? A sudden, severe headache that is different from past headaches.     · You have severe back or belly pain.    Do not wait until your blood pressure comes down on its own. Get help right away.   Call your doctor now or seek immediate care if:    · Your blood pressure is much higher than normal (such as 180/120 or higher), but you don't have symptoms.     · You think high blood pressure is causing symptoms, such as:  ? Severe headache.  ? Blurry vision.    Watch closely for changes in your health, and be sure to contact your doctor if:    · Your blood pressure measures higher than your doctor recommends at least 2 times. That means the top number is higher or the bottom number is higher, or both.     · You think you may be having side effects from your blood pressure medicine. Where can you learn more? Go to http://jason-tiera.info/. Enter D318 in the search box to learn more about \"High Blood Pressure: Care Instructions. \"  Current as of: July 22, 2018  Content Version: 11.9  © 1313-1547 [employer]. Care instructions adapted under license by [employer] (which disclaims liability or warranty for this information). If you have questions about a medical condition or this instruction, always ask your healthcare professional. [place] [address] any warranty or liability for your use of this information.

## 2019-08-01 RX ORDER — GLIPIZIDE 2.5 MG/1
TABLET, EXTENDED RELEASE ORAL
Qty: 90 TAB | Refills: 1 | Status: SHIPPED | OUTPATIENT
Start: 2019-08-01 | End: 2020-01-22 | Stop reason: SDUPTHER

## 2019-08-05 ENCOUNTER — TELEPHONE (OUTPATIENT)
Dept: FAMILY MEDICINE CLINIC | Age: 84
End: 2019-08-05

## 2019-08-05 RX ORDER — OLMESARTAN MEDOXOMIL 20 MG/1
20 TABLET ORAL DAILY
Qty: 90 TAB | Refills: 3 | Status: SHIPPED | OUTPATIENT
Start: 2019-08-05 | End: 2020-07-21 | Stop reason: SDUPTHER

## 2019-08-05 NOTE — TELEPHONE ENCOUNTER
Daughter called to ask if Dr. Starla Nova could send in a medication to replace patient rx for losartan-potassium.

## 2019-08-13 ENCOUNTER — OFFICE VISIT (OUTPATIENT)
Dept: FAMILY MEDICINE CLINIC | Age: 84
End: 2019-08-13

## 2019-08-13 VITALS
HEIGHT: 60 IN | DIASTOLIC BLOOD PRESSURE: 74 MMHG | WEIGHT: 145 LBS | BODY MASS INDEX: 28.47 KG/M2 | OXYGEN SATURATION: 97 % | HEART RATE: 71 BPM | RESPIRATION RATE: 20 BRPM | TEMPERATURE: 98.9 F | SYSTOLIC BLOOD PRESSURE: 146 MMHG

## 2019-08-13 DIAGNOSIS — R00.2 PALPITATIONS: Primary | ICD-10-CM

## 2019-08-13 NOTE — PATIENT INSTRUCTIONS
High Blood Pressure: Care Instructions Overview It's normal for blood pressure to go up and down throughout the day. But if it stays up, you have high blood pressure. Another name for high blood pressure is hypertension. Despite what a lot of people think, high blood pressure usually doesn't cause headaches or make you feel dizzy or lightheaded. It usually has no symptoms. But it does increase your risk of stroke, heart attack, and other problems. You and your doctor will talk about your risks of these problems based on your blood pressure. Your doctor will give you a goal for your blood pressure. Your goal will be based on your health and your age. Lifestyle changes, such as eating healthy and being active, are always important to help lower blood pressure. You might also take medicine to reach your blood pressure goal. 
Follow-up care is a key part of your treatment and safety. Be sure to make and go to all appointments, and call your doctor if you are having problems. It's also a good idea to know your test results and keep a list of the medicines you take. How can you care for yourself at home? Medical treatment · If you stop taking your medicine, your blood pressure will go back up. You may take one or more types of medicine to lower your blood pressure. Be safe with medicines. Take your medicine exactly as prescribed. Call your doctor if you think you are having a problem with your medicine. · Talk to your doctor before you start taking aspirin every day. Aspirin can help certain people lower their risk of a heart attack or stroke. But taking aspirin isn't right for everyone, because it can cause serious bleeding. · See your doctor regularly. You may need to see the doctor more often at first or until your blood pressure comes down. · If you are taking blood pressure medicine, talk to your doctor before you take decongestants or anti-inflammatory medicine, such as ibuprofen. Some of these medicines can raise blood pressure. · Learn how to check your blood pressure at home. Lifestyle changes · Stay at a healthy weight. This is especially important if you put on weight around the waist. Losing even 10 pounds can help you lower your blood pressure. · If your doctor recommends it, get more exercise. Walking is a good choice. Bit by bit, increase the amount you walk every day. Try for at least 30 minutes on most days of the week. You also may want to swim, bike, or do other activities. · Avoid or limit alcohol. Talk to your doctor about whether you can drink any alcohol. · Try to limit how much sodium you eat to less than 2,300 milligrams (mg) a day. Your doctor may ask you to try to eat less than 1,500 mg a day. · Eat plenty of fruits (such as bananas and oranges), vegetables, legumes, whole grains, and low-fat dairy products. · Lower the amount of saturated fat in your diet. Saturated fat is found in animal products such as milk, cheese, and meat. Limiting these foods may help you lose weight and also lower your risk for heart disease. · Do not smoke. Smoking increases your risk for heart attack and stroke. If you need help quitting, talk to your doctor about stop-smoking programs and medicines. These can increase your chances of quitting for good. When should you call for help? Call 911 anytime you think you may need emergency care. This may mean having symptoms that suggest that your blood pressure is causing a serious heart or blood vessel problem. Your blood pressure may be over 180/120. 
 For example, call 911 if: 
  · You have symptoms of a heart attack. These may include: 
? Chest pain or pressure, or a strange feeling in the chest. 
? Sweating. ? Shortness of breath. ? Nausea or vomiting. ? Pain, pressure, or a strange feeling in the back, neck, jaw, or upper belly or in one or both shoulders or arms. ? Lightheadedness or sudden weakness. ? A fast or irregular heartbeat.  
  · You have symptoms of a stroke. These may include: 
? Sudden numbness, tingling, weakness, or loss of movement in your face, arm, or leg, especially on only one side of your body. ? Sudden vision changes. ? Sudden trouble speaking. ? Sudden confusion or trouble understanding simple statements. ? Sudden problems with walking or balance. ? A sudden, severe headache that is different from past headaches.  
  · You have severe back or belly pain.  
 Do not wait until your blood pressure comes down on its own. Get help right away. 
 Call your doctor now or seek immediate care if: 
  · Your blood pressure is much higher than normal (such as 180/120 or higher), but you don't have symptoms.  
  · You think high blood pressure is causing symptoms, such as: 
? Severe headache. 
? Blurry vision.  
 Watch closely for changes in your health, and be sure to contact your doctor if: 
  · Your blood pressure measures higher than your doctor recommends at least 2 times. That means the top number is higher or the bottom number is higher, or both.  
  · You think you may be having side effects from your blood pressure medicine. Where can you learn more? Go to http://jason-tiera.info/. Enter Q625 in the search box to learn more about \"High Blood Pressure: Care Instructions. \" Current as of: July 22, 2018 Content Version: 12.1 © 2209-2984 [employer]. Care instructions adapted under license by [employer] (which disclaims liability or warranty for this information). If you have questions about a medical condition or this instruction, always ask your healthcare professional. [place] [address] any warranty or liability for your use of this information.

## 2019-08-13 NOTE — PROGRESS NOTES
Patient is in the office today for a problem voiding. Patient states when she has to urinate her heart beats really fast, once she voids her heart stops beating fast. Patient states this has been going on a long time and she noticed that as she gets older these symptoms are more frequent. Patient states she does not feel good when she has these symptoms. 1. Have you been to the ER, urgent care clinic since your last visit? Hospitalized since your last visit? No    2. Have you seen or consulted any other health care providers outside of the [address] [place] [address] since your last visit? Include any pap smears or colon screening.  No

## 2019-08-19 NOTE — PROGRESS NOTES
Idalmis George is a 80 y.o.  female and presents with Unable To Void and Palpitations      SUBJECTIVE:  Pt c/o palpitations if she holds her urine for a prolonged period then tries to urinate. Pt unable to use beta-blocker due to slow heart rate. She was advised to try and urinate every 2-3 hours to decrease stress when her bladder is full which may decrease palpitations. Respiratory ROS: negative for - shortness of breath  Cardiovascular ROS: negative for - chest pain    Current Outpatient Medications   Medication Sig    olmesartan (BENICAR) 20 mg tablet Take 1 Tab by mouth daily.  glipiZIDE SR (GLUCOTROL XL) 2.5 mg CR tablet TAKE 1 TABLET BY MOUTH EVERY DAY    pravastatin (PRAVACHOL) 40 mg tablet TAKE 1 TABLET BY MOUTH EVERY DAY    glucose blood VI test strips (ASCENSIA CONTOUR) strip Check BS 1x/day E11.9    lancets misc Check BA 1x/day E 11.9    amLODIPine (NORVASC) 10 mg tablet TAKE 1 TABLET BY MOUTH EVERY DAY    nitroglycerin (NITROSTAT) 0.4 mg SL tablet 1 Tab by SubLINGual route every five (5) minutes as needed.  latanoprost (XALATAN) 0.005 % ophthalmic solution     COMBIGAN 0.2-0.5 % drop ophthalmic solution Administer 1 Drop to both eyes every twelve (12) hours.  RESTASIS 0.05 % ophthalmic emulsion Administer 1 Drop to both eyes every twelve (12) hours.  ascorbic acid (VITAMIN C) 500 mg tablet Take 1,000 mg by mouth daily.  IBUPROFEN (ADVIL PO) Take 1 Tab by mouth as needed.  CALCIUM PO Take 1,000 mg by mouth two (2) times a day.  Blood-Glucose Meter monitoring kit by Does Not Apply route. Use daily as directed    glucose blood VI test strips (BLOOD GLUCOSE TEST) strip by Does Not Apply route. Use daily as directed    Lancets Misc by Does Not Apply route. Use daily as directed    alcohol swabs (ALCOHOL PREP PADS) PadM 1 Dose by Apply Externally route daily as needed (Glucose testing).     Cholecalciferol, Vitamin D3, (VITAMIN D) 1,000 unit Cap Take 2,000 Units by mouth daily.    MULTIVITAMINS PO Take 1 Tab by mouth daily.  aspirin 81 mg chewable tablet Take 81 mg by mouth daily.  LUMIGAN 0.01 % ophthalmic drops     diclofenac (VOLTAREN) 1 % gel Apply  to affected area four (4) times daily. No current facility-administered medications for this visit. OBJECTIVE:  alert, well appearing, and in no distress  Visit Vitals  /74 (BP 1 Location: Left arm, BP Patient Position: Sitting)   Pulse 71   Temp 98.9 °F (37.2 °C) (Oral)   Resp 20   Ht 5' (1.524 m)   Wt 145 lb (65.8 kg)   SpO2 97%   BMI 28.32 kg/m²      well developed and well nourished  Chest - clear to auscultation, no wheezes, rales or rhonchi, symmetric air entry  Heart - normal rate, regular rhythm, normal S1, S2, no murmurs, rubs, clicks or gallops        Assessment/Plan      ICD-10-CM ICD-9-CM    1. Palpitations R00.2 785.1 Pt unable to use beta blockers due to her slow HR. She will therefore try and urinate every 2 hours to try and avoid exacerbating palpitations. Follow-up and Dispositions    · Return if symptoms worsen or fail to improve. Reviewed plan of care. Patient has provided input and agrees with goals.

## 2019-08-23 ENCOUNTER — HOSPITAL ENCOUNTER (OUTPATIENT)
Dept: MAMMOGRAPHY | Age: 84
Discharge: HOME | End: 2019-08-23
Attending: INTERNAL MEDICINE
Payer: MEDICARE

## 2019-08-23 DIAGNOSIS — Z12.39 SCREENING FOR BREAST CANCER: ICD-10-CM

## 2019-08-23 PROCEDURE — 77067 SCR MAMMO BI INCL CAD: CPT

## 2019-09-20 ENCOUNTER — OFFICE VISIT (OUTPATIENT)
Dept: ORTHOPEDIC SURGERY | Age: 84
End: 2019-09-20

## 2019-09-20 VITALS
RESPIRATION RATE: 12 BRPM | HEIGHT: 60 IN | BODY MASS INDEX: 26.97 KG/M2 | WEIGHT: 137.4 LBS | HEART RATE: 71 BPM | SYSTOLIC BLOOD PRESSURE: 168 MMHG | TEMPERATURE: 97.9 F | DIASTOLIC BLOOD PRESSURE: 70 MMHG

## 2019-09-20 DIAGNOSIS — M48.061 SPINAL STENOSIS OF LUMBAR REGION, UNSPECIFIED WHETHER NEUROGENIC CLAUDICATION PRESENT: ICD-10-CM

## 2019-09-20 DIAGNOSIS — R10.2 PAIN IN PELVIS: ICD-10-CM

## 2019-09-20 DIAGNOSIS — M54.50 LUMBAR PAIN: Primary | ICD-10-CM

## 2019-09-20 NOTE — PROGRESS NOTES
Patient: Kasi Castillo                MRN: [id number]       SSN: [id number]  YOB: 1923        AGE: 80 y.o. SEX: female  Body mass index is 26.83 kg/m². PCP: John Sabillon MD  09/20/19    Dear Dr. Rocio Khan:    HISTORY:  I had the pleasure of reviewing Angela. As you know, she is a very sharp-minded young lady who has been having some low back problems and it can radiate down the legs. They will get heavy. She will have to go and sit. She usually prefers to use a cane and I have requested that she use a walker a little bit more for longer distances. Not too much in the way of groin pain. Knees are fairly quiescent currently. She has had some issues with bursitis in the past but no complaints of this today. No bowel or bladder problems. PHYSICAL EXAMINATION:  Today, she is quite sprightly at 80. Moves her head and neck adequately. No respiratory compromise or indrawing. Abdominal exam nontender. The hips rotate nicely, relatively nontender, slightly kyphotic and mild evidence of neuropathy but no foot drop. Tibial band and EHL 5/5. RADIOGRAPHS:  Review of her x-rays, AP pelvis, showed some arthritis of the hips and spine and multiple level degenerative disk disease. No obvious fracture. OVERALL IMPRESSION:  Clinical spinal stenosis. I am wondering if she might be a candidate for some injections from the [address] [place] [address] and I will have her seen at the [address] [place] [address]. She is very welcome to return to see me at any point for some injections which we have done in the past.    It has been an absolute pleasure to share in her care. cc:   Dr. Pino Conch:      CON: negative for weight loss, fever  EYE: negative for double vision  ENT: negative for hoarseness  RS:   negative for Tb  GI:    negative for blood in stool  :  negative for blood in urine  Other systems reviewed and noted below.           Past Medical History:   Diagnosis Date    Arthritis of right knee     CAD (coronary artery disease), native coronary artery October 2010    mild disease    Cardiac cath 10/21/2010    dLM 20%. mLAD 25%. CX mild. pRCA 30%. EF 30-35%. Anterolateral, apical , diaphrag akin. Hypercontractile basal segments. Severe elev left-sided filling press.  Cardiac echocardiogram 02/02/2011    EF 50-55%. Mild apical hypk. Gr 1 DDfx. RVSP 40-45. Mild LAE.  Cardiac Holter monitor 06/10/2015    Sinus rhythm w/avg HR of 53 bpm (range 40-85 bpm). Occasional PACs. One 3-beat run of nonsustained SVT. Very few PVCs. One 3-beat run of nonsustained VT. Pt's HR was < 50 bpm for 8 hrs of the recorded time. No pauses noted. Two episodes of neck pain corresponded to sinus rhythm w/o ectopy.  Cardiovascular renal duplex 12/03/2012    No renal artery stenosis bilaterally. Bilateral intrinsic/med disease. Patent bilateral renal veins. Multiple cysts on both kidneys.  CKD (chronic kidney disease)     while on diuretics for difficult to control HTN    Diabetes mellitus type II 3/15/2010    Dyslipidemia 3/15/2010    Heart attack ([facility] [address]     2010    Heart failure, diastolic, chronic (HCC)     History of heart attack     HTN (hypertension) 3/15/2010    Hypertensive heart disease     Loss of appetite     Osteopenia 7/13/2010    Other dyspnea and respiratory abnormality     Ringing in the ears     Stress-induced cardiomyopathy     EF 35% (LV angio 10/2010), then EF 50-55% (echo, Feb 2011)    Wears glasses        Family History   Problem Relation Age of Onset    Hypertension Mother     Arthritis-osteo Other        Current Outpatient Medications   Medication Sig Dispense Refill    olmesartan (BENICAR) 20 mg tablet Take 1 Tab by mouth daily.  90 Tab 3    glipiZIDE SR (GLUCOTROL XL) 2.5 mg CR tablet TAKE 1 TABLET BY MOUTH EVERY DAY 90 Tab 1    pravastatin (PRAVACHOL) 40 mg tablet TAKE 1 TABLET BY MOUTH EVERY DAY 30 Tab 11    glucose blood VI test strips (ASCENSIA CONTOUR) strip Check BS 1x/day E11.9 100 Strip 3    lancets misc Check BA 1x/day E 11.9 100 Each 11    LUMIGAN 0.01 % ophthalmic drops       diclofenac (VOLTAREN) 1 % gel Apply  to affected area four (4) times daily. 100 g 4    amLODIPine (NORVASC) 10 mg tablet TAKE 1 TABLET BY MOUTH EVERY DAY 90 Tab 3    nitroglycerin (NITROSTAT) 0.4 mg SL tablet 1 Tab by SubLINGual route every five (5) minutes as needed. 1 Bottle 1    latanoprost (XALATAN) 0.005 % ophthalmic solution       COMBIGAN 0.2-0.5 % drop ophthalmic solution Administer 1 Drop to both eyes every twelve (12) hours.  RESTASIS 0.05 % ophthalmic emulsion Administer 1 Drop to both eyes every twelve (12) hours.  ascorbic acid (VITAMIN C) 500 mg tablet Take 1,000 mg by mouth daily.  IBUPROFEN (ADVIL PO) Take 1 Tab by mouth as needed.  CALCIUM PO Take 1,000 mg by mouth two (2) times a day.  Blood-Glucose Meter monitoring kit by Does Not Apply route. Use daily as directed 1 Kit 0    glucose blood VI test strips (BLOOD GLUCOSE TEST) strip by Does Not Apply route. Use daily as directed 3 Package 3    Lancets Misc by Does Not Apply route. Use daily as directed 3 Package 3    alcohol swabs (ALCOHOL PREP PADS) PadM 1 Dose by Apply Externally route daily as needed (Glucose testing). 3 Package 3    Cholecalciferol, Vitamin D3, (VITAMIN D) 1,000 unit Cap Take 2,000 Units by mouth daily. 180 Cap 3    MULTIVITAMINS PO Take 1 Tab by mouth daily.  aspirin 81 mg chewable tablet Take 81 mg by mouth daily.          Allergies   Allergen Reactions    Lipitor [Atorvastatin] Myalgia    Spironolactone Other (comments)     Dizziness and fatigue       Past Surgical History:   Procedure Laterality Date    HX BLADDER SUSPENSION  2009    HX HYSTERECTOMY  1962       Social History     Socioeconomic History    Marital status:      Spouse name: Not on file    Number of children: Not on file    Years of education: Not on file    Highest education level: Not on file   Occupational History    Not on file   Social Needs    Financial resource strain: Not on file    Food insecurity:     Worry: Not on file     Inability: Not on file    Transportation needs:     Medical: Not on file     Non-medical: Not on file   Tobacco Use    Smoking status: Never Smoker    Smokeless tobacco: Never Used   Substance and Sexual Activity    Alcohol use: No    Drug use: No    Sexual activity: Never   Lifestyle    Physical activity:     Days per week: Not on file     Minutes per session: Not on file    Stress: Not on file   Relationships    Social connections:     Talks on phone: Not on file     Gets together: Not on file     Attends Ten[employer] service: Not on file     Active member of club or organization: Not on file     Attends meetings of clubs or organizations: Not on file     Relationship status: Not on file    Intimate partner violence:     Fear of current or ex partner: Not on file     Emotionally abused: Not on file     Physically abused: Not on file     Forced sexual activity: Not on file   Other Topics Concern    Not on file   Social History Narrative    Not on file       Visit Vitals  /70   Pulse 71   Temp 97.9 °F (36.6 °C) (Oral)   Resp 12   Ht 5' (1.524 m)   Wt 137 lb 6.4 oz (62.3 kg)   BMI 26.83 kg/m²         PHYSICAL EXAMINATION:  GENERAL: Alert and oriented x3, in no acute distress, well-developed, well-nourished, afebrile. HEART: No JVD. EYES: No scleral icterus   NECK: No significant lymphadenopathy   LUNGS: No respiratory compromise or indrawing  ABDOMEN: Soft, non-tender, non-distended. Electronically signed by:  Henry Gonzalez MD

## 2019-09-20 NOTE — PROGRESS NOTES
1. Have you been to the ER, urgent care clinic since your last visit? Hospitalized since your last visit? No    2. Have you seen or consulted any other health care providers outside of the [address] [place] [address] since your last visit? Include any pap smears or colon screening.  No

## 2019-10-09 RX ORDER — AMLODIPINE BESYLATE 10 MG/1
TABLET ORAL
Qty: 90 TAB | Refills: 3 | Status: SHIPPED | OUTPATIENT
Start: 2019-10-09 | End: 2020-10-13 | Stop reason: SDUPTHER

## 2019-10-22 ENCOUNTER — OFFICE VISIT (OUTPATIENT)
Dept: CARDIOLOGY CLINIC | Age: 84
End: 2019-10-22

## 2019-10-22 VITALS
HEART RATE: 71 BPM | OXYGEN SATURATION: 98 % | BODY MASS INDEX: 28.07 KG/M2 | DIASTOLIC BLOOD PRESSURE: 70 MMHG | WEIGHT: 143 LBS | SYSTOLIC BLOOD PRESSURE: 138 MMHG | HEIGHT: 60 IN

## 2019-10-22 DIAGNOSIS — R00.1 BRADYCARDIA: ICD-10-CM

## 2019-10-22 DIAGNOSIS — E78.5 DYSLIPIDEMIA: ICD-10-CM

## 2019-10-22 DIAGNOSIS — I11.0 HYPERTENSIVE HEART DISEASE WITH HEART FAILURE (HCC): ICD-10-CM

## 2019-10-22 DIAGNOSIS — R53.83 FATIGUE, UNSPECIFIED TYPE: ICD-10-CM

## 2019-10-22 DIAGNOSIS — I25.10 CORONARY ARTERY DISEASE INVOLVING NATIVE CORONARY ARTERY OF NATIVE HEART WITHOUT ANGINA PECTORIS: Primary | ICD-10-CM

## 2019-10-22 DIAGNOSIS — R00.2 PALPITATIONS: ICD-10-CM

## 2019-10-22 NOTE — PROGRESS NOTES
HPI:   I saw Hood Croft in my office today in cardiovascular evaluation regarding her problems with hypertensive heart disease with some diastolic dysfunction of the left ventricle. Ms. Juancarlos Kwon is a very pleasant 80 year old  female, who appears to be much younger than her stated age, and who has been followed in the past in our practice for her problems with hypertension, dyslipidemia, and specifically some component of chronic diastolic heart failure.     When I saw her last in January 2018 she had a significant sinus bradycardia in the 40s and I ultimately discontinued her atenolol 12.5 mg daily and she comes into the office today with a heart rate in the 60s without any cardiovascular complaints.     She comes in today with her daughter and relates that she was  in the hospital over [facility] in March of 2019 due to some weakness in her legs and while there she was found to have some renal insufficiency with BUN/creatinine of 30 and 2.3. She apparently received some fluids and also had an echocardiogram which demonstrated completely normal left ventricular systolic function with moderate left atrial enlargement and moderate pulmonary hypertension with an estimated pulmonary artery pressure of 50 mmHg. She comes in today relates that she still feels weak in her lower extremities and was told that this is most likely related to spinal stenosis, but she is also been more fatigued over the past few weeks and is somewhat more short of breath with activity which is bothering her. She has very rare palpitations and really no failure symptomatology whatsoever. Encounter Diagnoses   Name Primary?     Fatigue, possible anginal equivalent     Coronary artery disease involving native coronary artery of native heart without angina pectoris Yes    Dyslipidemia     Palpitations     Bradycardia     Hypertensive heart disease with heart failure ([address] Discussion: This lady has some lower extremity weakness which I think is related to some spinal stenosis and evaluated over at [facility] earlier this year, but she does have a new problem of shortness of breath with activity and fatigue which is new in the past month or so and she does have some EKG changes which though subtle could suggest LAD disease. She did have a cath back in 2010 at that time had only mild disease but certainly this could have worsened so I did discuss with her the possibility of doing a myocardial perfusion study to rule out ischemia and if there was significant signs of ischemia consider cardiac catheterization and stenting despite her advanced age since she has been very active for her age. The patient and her daughter are going to discuss this and let us know. We also discussed repeating her echocardiogram, but she had one completed in March 2019 at [facility] and had completely normal left ventricular systolic function with mild to moderate pulmonary hypertension as indicated above and I doubt there is going to be a significant change. I do not have a recent lipid profile on her but she is going to be getting a lipid profile in the near future. Historically her cholesterols have been somewhat elevated in the 190-210 range with LDL cholesterols in the 1 13-1 24 range on Pravachol 40 mg daily and if her cholesterol is remaining at this level I recommend switching her Pravachol to Crestor 20 mg daily. I will plan to see her again in several months although she is going to think about the nuclear myocardial perfusion study and give us a call if her symptoms worsen and she is decided to consider more aggressive evaluation.     PCP: Narcisa Sim MD      Past Medical History:   Diagnosis Date    Arthritis of right knee     CAD (coronary artery disease), native coronary artery October 2010    mild disease    Cardiac cath 10/21/2010    Francisco 20%.  mLAD 25%. CX mild. pRCA 30%. EF 30-35%. Anterolateral, apical , diaphrag akin. Hypercontractile basal segments. Severe elev left-sided filling press.  Cardiac echocardiogram 02/02/2011    EF 50-55%. Mild apical hypk. Gr 1 DDfx. RVSP 40-45. Mild LAE.  Cardiac Holter monitor 06/10/2015    Sinus rhythm w/avg HR of 53 bpm (range 40-85 bpm). Occasional PACs. One 3-beat run of nonsustained SVT. Very few PVCs. One 3-beat run of nonsustained VT. Pt's HR was < 50 bpm for 8 hrs of the recorded time. No pauses noted. Two episodes of neck pain corresponded to sinus rhythm w/o ectopy.  Cardiovascular renal duplex 12/03/2012    No renal artery stenosis bilaterally. Bilateral intrinsic/med disease. Patent bilateral renal veins. Multiple cysts on both kidneys.  CKD (chronic kidney disease)     while on diuretics for difficult to control HTN    Diabetes mellitus type II 3/15/2010    Dyslipidemia 3/15/2010    Heart attack ([address]     2010    Heart failure, diastolic, chronic ([address]     History of heart attack     HTN (hypertension) 3/15/2010    Hypertensive heart disease     Loss of appetite     Osteopenia 7/13/2010    Other dyspnea and respiratory abnormality     Ringing in the ears     Stress-induced cardiomyopathy     EF 35% (LV angio 10/2010), then EF 50-55% (echo, Feb 2011)    Wears glasses        Past Surgical History:   Procedure Laterality Date    HX BLADDER SUSPENSION  2009    HX HYSTERECTOMY  1962       Current Outpatient Medications   Medication Sig    amLODIPine (NORVASC) 10 mg tablet TAKE 1 TABLET BY MOUTH EVERY DAY    olmesartan (BENICAR) 20 mg tablet Take 1 Tab by mouth daily.     glipiZIDE SR (GLUCOTROL XL) 2.5 mg CR tablet TAKE 1 TABLET BY MOUTH EVERY DAY    pravastatin (PRAVACHOL) 40 mg tablet TAKE 1 TABLET BY MOUTH EVERY DAY    glucose blood VI test strips (ASCENSIA CONTOUR) strip Check BS 1x/day E11.9    lancets misc Check BA 1x/day E 11.9    LUMIGAN 0.01 % ophthalmic drops     diclofenac (VOLTAREN) 1 % gel Apply  to affected area four (4) times daily.  nitroglycerin (NITROSTAT) 0.4 mg SL tablet 1 Tab by SubLINGual route every five (5) minutes as needed.  latanoprost (XALATAN) 0.005 % ophthalmic solution     COMBIGAN 0.2-0.5 % drop ophthalmic solution Administer 1 Drop to both eyes every twelve (12) hours.  RESTASIS 0.05 % ophthalmic emulsion Administer 1 Drop to both eyes every twelve (12) hours.  ascorbic acid (VITAMIN C) 500 mg tablet Take 1,000 mg by mouth daily.  IBUPROFEN (ADVIL PO) Take 1 Tab by mouth as needed.  CALCIUM PO Take 1,000 mg by mouth two (2) times a day.  Blood-Glucose Meter monitoring kit by Does Not Apply route. Use daily as directed    glucose blood VI test strips (BLOOD GLUCOSE TEST) strip by Does Not Apply route. Use daily as directed    Lancets Misc by Does Not Apply route. Use daily as directed    alcohol swabs (ALCOHOL PREP PADS) PadM 1 Dose by Apply Externally route daily as needed (Glucose testing).  Cholecalciferol, Vitamin D3, (VITAMIN D) 1,000 unit Cap Take 2,000 Units by mouth daily.  MULTIVITAMINS PO Take 1 Tab by mouth daily.  aspirin 81 mg chewable tablet Take 81 mg by mouth daily. No current facility-administered medications for this visit. Allergies   Allergen Reactions    Lipitor [Atorvastatin] Myalgia    Spironolactone Other (comments)     Dizziness and fatigue       Social History:   Social History     Tobacco Use    Smoking status: Never Smoker    Smokeless tobacco: Never Used   Substance Use Topics    Alcohol use: No         Family history: family history includes Arthritis-osteo in an other family member; Hypertension in her mother. Review of Systems:  Constitutional: Positive for malaise/fatigue. Negative for chills, fever and weight loss. Respiratory: Positive for shortness of breath. Negative for cough, hemoptysis and wheezing.     Cardiovascular: Positive for palpitations. Negative for chest pain, orthopnea and leg swelling. Gastrointestinal: Positive for melena. Negative for abdominal pain, blood in stool, constipation, diarrhea, heartburn, nausea and vomiting. Musculoskeletal: Positive for joint pain and myalgias. Negative for falls. Neurological: Negative for dizziness. Physical Exam:   The patient is an alert, oriented, well developed, well nourished 80 y.o. female who was in no acute distress at the time of my examination. Visit Vitals  /70   Pulse 71   Ht 5' (1.524 m)   Wt 64.9 kg (143 lb)   SpO2 98%   BMI 27.93 kg/m²      BP Readings from Last 3 Encounters:   10/22/19 138/70   09/20/19 168/70   08/13/19 146/74      Wt Readings from Last 3 Encounters:   10/22/19 64.9 kg (143 lb)   09/20/19 62.3 kg (137 lb 6.4 oz)   08/13/19 65.8 kg (145 lb)     HEENT: Conjuctiva white, mucosa moist, no pallor or cyanosis. NECK: Supple without masses, tenderness or thyromegaly. There was no jugular venous distention. Carotid are full bilaterally with soft bilateral bruits vs radiation of heart murmur to the neck. CHEST: Symmetrical with good excursion. LUNGS: Clear to auscultation in all fields. HEART: Regular rate and rhythm. The apex is not displaced. There were no lifts, thrills or heaves. There is a normal S1 and S2. There is a grade 2/6 MINI along the LSB with radiation to the base and neck without appreciable diastolic murmurs, rubs, clicks, or gallops auscultated. ABDOMEN: Soft without masses, tenderness or organomegaly. EXTREMITIES: 1 + peripheral pulses without peripheral edema. INTEGUMENT: Skin is warm and dry   NEUROLOGICAL: The patient was oriented x3 with motor function grossly intact.     Review of Data: See PMH and Cardiology and Imaging sections for cardiac testing  Lab Results   Component Value Date/Time    Cholesterol, total 210 (H) 07/11/2019 09:26 AM    HDL Cholesterol 68 (H) 07/11/2019 09:26 AM    LDL, calculated 124.6 (H) 07/11/2019 09:26 AM    Triglyceride 87 07/11/2019 09:26 AM    CHOL/HDL Ratio 3.1 07/11/2019 09:26 AM       Results for orders placed or performed in visit on 10/22/19   AMB POC EKG ROUTINE W/ 12 LEADS, INTER & REP     Status: None    Narrative    Normal sinus rhythm rate 71. Diffuse ST-T flattening with some early T wave inversions anterolaterally in the high lateral leads. This EKG is similar to the EKG of April 17, 2019 although the nonspecific ST-T changes are somewhat more marked on the current tracing. Lois Russell D.O., F.A.C.C. [facility]  [facility] [place]  [address] [place] [address]. [address] [facility] Center[facility]athan    [phone]    PLEASE NOTE:  This document has been produced using voice recognition software. Unrecognized errors in transcription may be present.

## 2019-10-31 ENCOUNTER — OFFICE VISIT (OUTPATIENT)
Dept: ORTHOPEDIC SURGERY | Age: 84
End: 2019-10-31

## 2019-10-31 VITALS
RESPIRATION RATE: 18 BRPM | TEMPERATURE: 98.3 F | HEART RATE: 66 BPM | SYSTOLIC BLOOD PRESSURE: 138 MMHG | DIASTOLIC BLOOD PRESSURE: 70 MMHG | HEIGHT: 60 IN | BODY MASS INDEX: 27.93 KG/M2

## 2019-10-31 DIAGNOSIS — M48.062 SPINAL STENOSIS OF LUMBAR REGION WITH NEUROGENIC CLAUDICATION: Primary | ICD-10-CM

## 2019-10-31 RX ORDER — GABAPENTIN 100 MG/1
100 CAPSULE ORAL 3 TIMES DAILY
Qty: 90 CAP | Refills: 2 | Status: SHIPPED | OUTPATIENT
Start: 2019-10-31 | End: 2020-01-29

## 2019-10-31 RX ORDER — PREDNISONE 10 MG/1
TABLET ORAL
Qty: 21 TAB | Refills: 0 | Status: SHIPPED | OUTPATIENT
Start: 2019-10-31 | End: 2020-01-29

## 2019-10-31 NOTE — PROGRESS NOTES
[address]  [address]  [place], [facility] [address]  Phone: [phone]  Fax: [phone]        Eneida Rizzo  : 1923  PCP: Dede Wen MD  10/31/2019    NEW PATIENT      HISTORY OF PRESENT ILLNESS  Tosha Xiong is a 80 y.o. female c/o low back pain with BLE paraesthesia x 6 weeks. Pt reports a heaviness in her BLE with standing and walking. She reports a limited standing/walking tolerance. She finds relief with sitting. She also c/o some lateral right thigh pain. She ambulates with a cane. Dr. Ulises Pierre recommended she use a walker for walking longer distances. Pt also c/o left sided functional deficits. She denies h/o stroke. She rates her pain as a 0/10 today. ASSESSMENT  Her symptoms are likely due to lumbar spinal stenosis with neurogenic claudication. On examination, she had no clear myelopathic or UMN signs. Physical therapy may not be beneficial for spinal stenosis but may help some of her lateral thigh symptoms. I offered a referral for an MRI but Pt declines at this time. PLAN  1. 10 mg Prednisone dose pack. 2. Gabapentin 100 mg TID. 3. Referral to PT [facility]). 4. We will likely order an MRI if she does not find relief with medications or PT. Pt will f/u in 6 weeks or sooner if needed. Diagnoses and all orders for this visit:    1. Spinal stenosis of lumbar region with neurogenic claudication  -     predniSONE (STERAPRED DS) 10 mg dose pack; See administration instruction per 10mg dose pack  -     gabapentin (NEURONTIN) 100 mg capsule; Take 1 Cap by mouth three (3) times daily. Max Daily Amount: 300 mg.  -     REFERRAL TO PHYSICAL THERAPY       CHIEF COMPLAINT  Tosha Xiong is seen today in consultation at the request of Dede Wen MD for complaints of low back pain extending into the BLE.       PAST MEDICAL HISTORY   Past Medical History:   Diagnosis Date    Arthritis of right knee     CAD (coronary artery disease), native coronary artery October 2010    mild disease    Cardiac cath 10/21/2010    dLM 20%. mLAD 25%. CX mild. pRCA 30%. EF 30-35%. Anterolateral, apical , diaphrag akin. Hypercontractile basal segments. Severe elev left-sided filling press.  Cardiac echocardiogram 02/02/2011    EF 50-55%. Mild apical hypk. Gr 1 DDfx. RVSP 40-45. Mild LAE.  Cardiac Holter monitor 06/10/2015    Sinus rhythm w/avg HR of 53 bpm (range 40-85 bpm). Occasional PACs. One 3-beat run of nonsustained SVT. Very few PVCs. One 3-beat run of nonsustained VT. Pt's HR was < 50 bpm for 8 hrs of the recorded time. No pauses noted. Two episodes of neck pain corresponded to sinus rhythm w/o ectopy.  Cardiovascular renal duplex 12/03/2012    No renal artery stenosis bilaterally. Bilateral intrinsic/med disease. Patent bilateral renal veins. Multiple cysts on both kidneys.  CKD (chronic kidney disease)     while on diuretics for difficult to control HTN    Diabetes mellitus type II 3/15/2010    Dyslipidemia 3/15/2010    Heart attack ([address]     2010    Heart failure, diastolic, chronic ([address]     History of heart attack     HTN (hypertension) 3/15/2010    Hypertensive heart disease     Loss of appetite     Osteopenia 7/13/2010    Other dyspnea and respiratory abnormality     Ringing in the ears     Stress-induced cardiomyopathy     EF 35% (LV angio 10/2010), then EF 50-55% (echo, Feb 2011)    Wears glasses        Past Surgical History:   Procedure Laterality Date    HX BLADDER SUSPENSION  2009    HX HYSTERECTOMY  1962       MEDICATIONS    Current Outpatient Medications   Medication Sig Dispense Refill    amLODIPine (NORVASC) 10 mg tablet TAKE 1 TABLET BY MOUTH EVERY DAY 90 Tab 3    olmesartan (BENICAR) 20 mg tablet Take 1 Tab by mouth daily.  90 Tab 3    glipiZIDE SR (GLUCOTROL XL) 2.5 mg CR tablet TAKE 1 TABLET BY MOUTH EVERY DAY 90 Tab 1    pravastatin (PRAVACHOL) 40 mg tablet TAKE 1 TABLET BY MOUTH EVERY DAY 30 Tab 11    glucose blood VI test strips (ASCENSIA CONTOUR) strip Check BS 1x/day E11.9 100 Strip 3    lancets misc Check BA 1x/day E 11.9 100 Each 11    LUMIGAN 0.01 % ophthalmic drops       diclofenac (VOLTAREN) 1 % gel Apply  to affected area four (4) times daily. 100 g 4    nitroglycerin (NITROSTAT) 0.4 mg SL tablet 1 Tab by SubLINGual route every five (5) minutes as needed. 1 Bottle 1    latanoprost (XALATAN) 0.005 % ophthalmic solution       COMBIGAN 0.2-0.5 % drop ophthalmic solution Administer 1 Drop to both eyes every twelve (12) hours.  RESTASIS 0.05 % ophthalmic emulsion Administer 1 Drop to both eyes every twelve (12) hours.  ascorbic acid (VITAMIN C) 500 mg tablet Take 1,000 mg by mouth daily.  IBUPROFEN (ADVIL PO) Take 1 Tab by mouth as needed.  CALCIUM PO Take 1,000 mg by mouth two (2) times a day.  Blood-Glucose Meter monitoring kit by Does Not Apply route. Use daily as directed 1 Kit 0    glucose blood VI test strips (BLOOD GLUCOSE TEST) strip by Does Not Apply route. Use daily as directed 3 Package 3    Lancets Misc by Does Not Apply route. Use daily as directed 3 Package 3    alcohol swabs (ALCOHOL PREP PADS) PadM 1 Dose by Apply Externally route daily as needed (Glucose testing). 3 Package 3    Cholecalciferol, Vitamin D3, (VITAMIN D) 1,000 unit Cap Take 2,000 Units by mouth daily. 180 Cap 3    MULTIVITAMINS PO Take 1 Tab by mouth daily.  aspirin 81 mg chewable tablet Take 81 mg by mouth daily.          ALLERGIES  Allergies   Allergen Reactions    Lipitor [Atorvastatin] Myalgia    Spironolactone Other (comments)     Dizziness and fatigue          SOCIAL HISTORY    Social History     Socioeconomic History    Marital status:      Spouse name: Not on file    Number of children: Not on file    Years of education: Not on file    Highest education level: Not on file   Tobacco Use    Smoking status: Never Smoker    Smokeless tobacco: Never Used   Substance and Sexual Activity    Alcohol use: No    Drug use: No    Sexual activity: Never       FAMILY HISTORY  Family History   Problem Relation Age of Onset    Hypertension Mother     Arthritis-osteo Other          REVIEW OF SYSTEMS  Review of Systems   Musculoskeletal: Positive for back pain. BLE paraesthesia/heaviness         PHYSICAL EXAMINATION  There were no vitals taken for this visit. Pain Assessment  9/20/2019   Location of Pain Back   Location Modifiers -   Severity of Pain 10   Quality of Pain Aching; Throbbing   Duration of Pain Persistent   Frequency of Pain Constant   Aggravating Factors Walking;Standing   Limiting Behavior -   Relieving Factors Rest   Relieving Factors Comment -   Result of Injury No         Constitutional:  Well developed, well nourished, in no acute distress. Psychiatric: Affect and mood are appropriate. HEENT: Normocephalic, atraumatic. Extraocular movements intact. Integumentary: No rashes or abrasions noted on exposed areas. Cardiovascular: Regular rate and rhythm. Pulmonary: Clear to auscultation bilaterally. SPINE/MUSCULOSKELETAL EXAM    Cervical spine:  Neck is midline. Normal muscle tone. No focal atrophy is noted. ROM pain free. Shoulder ROM intact. No tenderness to palpation. Negative Spurling's sign. Negative Tinel's sign. Negative Coelho's sign. Sensation in the bilateral arms grossly intact to light touch. Ulnar deviation of fingers on the left  [place] neck deformities on the left    No clear myelopathic or UMN signs    Lumbar spine:  No rash, ecchymosis, or gross obliquity. No fasciculations. No focal atrophy is noted. No pain with hip ROM. Full range of motion. No tenderness to palpation. No tenderness to palpation at the sciatic notch. SI joints non-tender. Trochanters non tender. Sensation in the bilateral legs grossly intact to light touch.       MOTOR: Biceps  Triceps Deltoids Wrist Ext Wrist Flex Hand Intrin   Right 5/5 5/5 5/5 5/5 5/5 5/5   Left 5/5 5/5 5/5 5/5 5/5 5/5             Hip Flex  Quads Hamstrings Ankle DF EHL Ankle PF   Right 5/5 5/5 5/5 5/5 5/5 5/5   Left 5/5 5/5 5/5 5/5 5/5 5/5     DTRs are 1+ biceps, triceps, brachioradialis, patella, and Achilles. Negative Straight Leg raise. Squat not tested. No difficulty with tandem gait. Ambulation with single point cane. FWB. RADIOGRAPHS  Lumbar XR images taken on 10/31/19 personally reviewed with patient:  Facet sclerosis at lower levels  Significant Grade 2 anterolisthesis of L5 on S1 and L4 on L5   Atherosclerosis of aorta  No obvious compression fractures     reviewed    Ms. Tate Hurley has a reminder for a \"due or due soon\" health maintenance. I have asked that she contact her primary care provider for follow-up on this health maintenance. 20 minutes of face-to-face contact were spent with the patient during today's visit extensively discussing symptoms and treatment plan. All questions were answered. More than half of this visit today was spent on counseling. Written by Adria Swift, as dictated by Dr. Radha Jimenez. I, Dr. Radha Jimenez, confirm that all documentation is accurate.

## 2019-10-31 NOTE — PATIENT INSTRUCTIONS
Gabapentin (Neurontin) 100 mg three times a day (TID) daily instructions: 
 
 
 Morning Afternoon Night Week 1 - - 1 pill Week 2 1 pill - 1 pill Week 3 and onwards 1 pill 1 pill 1 pill Week 1: Take one pill each night. Week 2: Take one pill in the morning and one pill at night. Week 3 and onwards: Take one pill in the morning, one pill in the afternoon, and one pill at night. Continue taking this dose each day. Lumbar Spinal Stenosis: Care Instructions Your Care Instructions Stenosis in the spine is a narrowing of the canal that is around the spinal cord and nerve roots in your back. It can happen as part of aging. Sometimes bone and other tissue grow into this canal and press on the nerves that branch out from the spinal cord. This can cause pain, numbness, and weakness. When it happens in the lower part of your back, it is called lumbar spinal stenosis. It can cause problems in the legs, feet, and rear end (buttocks). You may be able to get relief from the symptoms of spinal stenosis by taking pain medicine. Your doctor may suggest physical therapy and exercises to keep your spine strong and flexible. Some people try steroid shots to reduce swelling. If pain and numbness in your legs are still so bad that you cannot do your normal activities, you may need surgery. Follow-up care is a key part of your treatment and safety. Be sure to make and go to all appointments, and call your doctor if you are having problems. It's also a good idea to know your test results and keep a list of the medicines you take. How can you care for yourself at home? · Take an over-the-counter pain medicine. Nonsteroidal anti-inflammatory drugs (NSAIDs) such as ibuprofen or naproxen seem to work best. But if you can't take NSAIDs, you can try acetaminophen. Be safe with medicines. Read and follow all instructions on the label.  
· Do not take two or more pain medicines at the same time unless the doctor told you to. Many pain medicines have acetaminophen, which is Tylenol. Too much acetaminophen (Tylenol) can be harmful. · Stay at a healthy weight. Being overweight puts extra strain on your spine. · Change positions often when you sit or stand. This can ease pain. It may also reduce pressure on the spinal cord and its nerves. · Avoid doing things that make your symptoms worse. Walking downhill and standing for a long time may cause pain. · Stretch and strengthen your back muscles as your doctor or physical therapist recommends. If your doctor says it is okay to do them, these exercises may help. ? Lie on your back with your knees bent. Gently pull one bent knee to your chest. Put that foot back on the floor, and then pull the other knee to your chest. 
? Do pelvic tilts. Lie on your back with your knees bent. Tighten your stomach muscles. Pull your belly button (navel) in and up toward your ribs. You should feel like your back is pressing to the floor and your hips and pelvis are slightly lifting off the floor. Hold for 6 seconds while breathing smoothly. ? Stand with your back flat against a wall. Slowly slide down until your knees are slightly bent. Hold for 10 seconds, then slide back up the wall. · Remove or change anything in your house that may cause you to fall. Keep walkways clear of clutter, electrical cords, and throw rugs. When should you call for help? Call 911 anytime you think you may need emergency care. For example, call if: 
  · You are unable to move a leg at all.  
[facility] your doctor now or seek immediate medical care if: 
  · You have new or worse symptoms in your legs, belly, or buttocks. Symptoms may include: 
? Numbness or tingling. ? Weakness. ? Pain.  
  · You lose bladder or bowel control.  
 Watch closely for changes in your health, and be sure to contact your doctor if: 
  · You have a fever, lose weight, or don't feel well.   · You are not getting better as expected. Where can you learn more? Go to http://jason-tiera.info/. Geovanna Pires in the search box to learn more about \"Lumbar Spinal Stenosis: Care Instructions. \" Current as of: June 26, 2019 Content Version: 12.2 © 2818-9038 [employer], [employer]. Care instructions adapted under license by [employer] (which disclaims liability or warranty for this information). If you have questions about a medical condition or this instruction, always ask your healthcare professional. [address] any warranty or liability for your use of this information.

## 2019-11-04 ENCOUNTER — HOSPITAL ENCOUNTER (OUTPATIENT)
Dept: PHYSICAL THERAPY | Age: 84
Discharge: HOME | End: 2019-11-04
Payer: MEDICARE

## 2019-11-04 PROCEDURE — 97110 THERAPEUTIC EXERCISES: CPT

## 2019-11-04 PROCEDURE — 97162 PT EVAL MOD COMPLEX 30 MIN: CPT

## 2019-11-04 NOTE — PROGRESS NOTES
PT DAILY TREATMENT NOTE/LUMBAR EVAL 10-18    Patient Name: Sarah Mauricio  Date:2019  : 1923  [x]  Patient  Verified  Payor: [place] MEDICARE / Plan: [place] MEDICARE PART A & B / Product Type: Medicare /    In time:12:05  Out time:1:00  Total Treatment Time (min): 54  Visit #: 1 of 16    Medicare/BCBS Only   Total Timed Codes (min):  23 1:1 Treatment Time:  55     Treatment Area: Spinal stenosis, lumbar region with neurogenic claudication [M48.062]  SUBJECTIVE  Pain Level (0-10 scale): 0/10   []constant []intermittent []improving []worsening []no change since onset    Any medication changes, allergies to medications, adverse drug reactions, diagnosis change, or new procedure performed?: [x] No    [] Yes (see summary sheet for update)  Subjective functional status/changes:     PLOF: Patient exercises regularly at the places where she lives. Patient uses [facility] for ambulation. Limitations to PLOF: pain, limited mobility, decreased strength  Mechanism of Injury: Patient presents with LBP that began approximately 3 months ago with an insidious onset. Patient denies any injury or falls. Patient stated pain has become better since original onset. Current symptoms/Complaints: Patient describes pain as intermittent aching pain that originates at right side of side and then wraps around toward low back. Patient has intermittent numbness into lower legs (lateral and medial per patient). Patient reports no pain or numbness with sitting, but pain and numbness occurs with activity. Patient has difficulty with standing/walking 15 minutes and then needs to sit down (prior to onset of pain patient could walk 45 minutes), has increased difficulty with food preparation and household management. No loss of bowel or bladder control. But has some urine urgency that therapist advised patient to discuss with PCP. Previous Treatment/Compliance: no prior PT   PMHx/Surgical Hx: No previous back/hip/knee/ankle foot surgeries.  No Pacemaker, 2010- minor heart attack, no Cancer  Work Hx: n/a  Living Situation:no stairs   Pt Goals: in chart   Cognition: A & O x 2    Other:    OBJECTIVE/EXAMINATION  Domestic Life:lives alone, but has friends and daughter lives 10-15 minutes from patient. 32 min [x]Eval                  []Re-Eval     23 min Therapeutic Exercise:  [x] See flow sheet : Stand: march-5x; hip abduction:5x; 1/2 prone hip extension: Right- 3x; Seated: LAQ 5 reps; Supine: heel slide 5 reps, Hooklying hip abd/Add isometrics 5x5sec hold; Side lying right hip abduction: 5reps    Rationale: increase ROM and increase strength to improve the patients ability to perform ADLs. With   [] TE   [] TA   [] neuro   [] other: Patient Education: [x] Review HEP    [] Progressed/Changed HEP based on:   [] positioning   [] body mechanics   [] transfers   [] heat/ice application    [] other:      Other Objective/Functional Measures:     Physical Therapy Evaluation - Lumbar Spine ([facility])    OBJECTIVE  Posture:  Lateral Shift: [] R    [] L     [] +  [x] -  Kyphosis: [] Increased [] Decreased   [x]  WNL  Lordosis:  [x] Increased [] Decreased   [] WNL    Gait:  [] Normal     [x] Abnormal: increased lateral trunk movement during ambulation and required use of a SPC for stability. Dural Mobility:  SLR Sitting: [] R    [] L    [] +    [] -  @ (degrees):           Supine: [] R    [] L    [] +    [] -  @ (degrees):   Slump Test: [x] R    [x] L    [] +    [x] -  @ (degrees):   Prone Knee Bend: [] R    [] L    [] +    [] -     Palpation  [] Min  [] Mod  [] Severe    Location:  Patient reports pain is located at right external Oblique region. Then radiates towards lumbar region.  No tenderness to palpation at (B) lumbar region and no tenderness with palpation of obliques    Strength   L(0-5) R (0-5) N/T   Hip Flexion (L1,2) 4- 4- []   Knee Extension (L3,4) 4 4 []   Ankle Dorsiflexion (L4) 5 5 []   Great Toe Extension (L5)   []   Ankle Plantarflexion (S1)   []   Knee Flexion (S1,2) 4+ 4+ []   Upper Abdominals   []   Lower Abdominals   []   Paraspinals   []   Back Rotators   []   Gluteus Mykel   []   Hip abduction   3+ []     Special Tests         Deficits:     Hamstrings 90/90: tightness (B)            Other tests/comments:  Patient had increased pain in right oblique region with standing marches and hip abduction on the right, but denied increased pain while bearing weight on the right and performing marches on the left. No pain with 1/2 prone hip extensions. Mild discomfort with seated right hip IR at right oblique region, but no pain with hip ER. Rhomberg on floor with EO: no LOB EC: 5 sec then required therapist assist to regain stability. No pain to right oblique region with supine SLR, hooklying hip abd/add isometrics, heel slides, or sidelying hip abduction. While seated or supine patient denies any pain. Once patient returned to standing position after performing table exercises she reported return of numbness into right lower leg, but no pain in right oblique region. No pain with Passive SKTC, Hip IR/ER at 90deg or flexion or piriformis stretch. No pain with seated lateral trunk movement or QL stretch  Pain Level (0-10 scale) post treatment: 0/10     ASSESSMENT/Changes in Function: See POC. Patient reported no pain in right side while ambulating at end of the session and stated that she felt better after doing the exercises. However, patient reported numbness in right lower leg when walking to the front office. Patient denied any nausea, no dizziness, no SOB, and no chest pains associated with right sided pain. Advised patient if any of those symptoms begin or she feels sick to contact MD/go to hospital. Held administering a HEP to patient yet as patient required frequent and multiple verbal cues for correct technique while performing the exercise.  Patient had a tendency to start performing the exercise with poor form without constant guidance from therapist. Plan to introduce a HEP when patient can perform exercises with more independence in clinic and without onset of symptoms. Patient will continue to benefit from skilled PT services to modify and progress therapeutic interventions, address functional mobility deficits, address ROM deficits, address strength deficits, analyze and address soft tissue restrictions, analyze and cue movement patterns, assess and modify postural abnormalities and address imbalance/dizziness to attain remaining goals.      [x]  See Plan of Care  []  See progress note/recertification  []  See Discharge Summary         Progress towards goals / Updated goals:  See POC    PLAN  []  Upgrade activities as tolerated     [x]  Continue plan of care  []  Update interventions per flow sheet       []  Discharge due to:_  []  Other:_      Jhony Dunn PT 11/4/2019  11:56 AM

## 2019-11-04 NOTE — PROGRESS NOTES
[facility]  [address] [facility], [address] [place] [address], [address] [place], [place] [address],[address]  [phone] fax      Plan of Care/ Statement of Necessity for Physical Therapy Services    Patient name: Rashad Katz Start of Care: 2019   Referral source: Jim Moses MD : 1923    Medical Diagnosis: Spinal stenosis, lumbar region with neurogenic claudication [M48.062]  Payor: [place] MEDICARE / Plan: VA MEDICARE PART A & B / Product Type: Medicare /  Onset Date:  3 months ago     Treatment Diagnosis: LBP    Prior Hospitalization: see medical history Provider#: [id number]   Medications: Verified on Patient summary List    Comorbidities:  Arthritis, Heart disease- minor heart attack in ; DM, HTN, hearing impaired. Prior Level of Function:  Patient exercises regularly at the place where she lives. Patient uses [facility] for ambulation. The Plan of Care and following information is based on the information from the initial evaluation. Assessment/ key information:  Patient presents with LBP that began approximately 3 months ago with an insidious onset. Patient denies any injury or falls. Patient stated pain has become better since original onset. Patient describes pain as intermittent aching pain that originates at right side of abdomen (external oblique region) and then wraps around toward low back. Patient has intermittent numbness into lower legs (right>left). Patient reports no pain or numbness with sitting, but pain and numbness occurs with activity. Patient has difficulty with standing/walking 15 minutes and then needs to sit down (prior to onset of pain patient could walk 45 minutes), has increased difficulty with food preparation and household management. Patient had increased pain in right oblique region with standing marches and hip abduction on the right, but denied increased pain while bearing weight on the right and performing marches on the left.  Mild discomfort reported with seated right hip IR at right oblique region, but no pain with hip ER. No pain to right oblique region with supine SLR, hooklying hip abd/add isometrics, heel slides, or sidelying hip abduction. No pain with Passive SKTC, Hip IR/ER at 90deg of flexion or piriformis stretch. No pain with seated lateral trunk movement or QL stretch. Patient has increased lateral trunk movement during ambulation and requires use of a SPC for stability. Patient had no tenderness to palpation of right side of abdomen or lumbar region. Patient also exhibits decreased stability with balance acts involving eyes closed. Patient would benefit from skilled PT to address above deficits and assist with return to PLOF. Evaluation Complexity History MEDIUM  Complexity : 1-2 comorbidities / personal factors will impact the outcome/ POC ; Examination MEDIUM Complexity : 3 Standardized tests and measures addressing body structure, function, activity limitation and / or participation in recreation  ;Presentation MEDIUM Complexity : Evolving with changing characteristics  ; Clinical Decision Making MEDIUM Complexity : FOTO score of 26-74  Overall Complexity Rating: MEDIUM  Problem List: pain affecting function, decrease ROM, decrease strength, impaired gait/ balance, decrease ADL/ functional abilitiies, decrease activity tolerance, decrease flexibility/ joint mobility and decrease transfer abilities   Treatment Plan may include any combination of the following: Therapeutic exercise, Therapeutic activities, Neuromuscular re-education, Physical agent/modality, Gait/balance training, Manual therapy, Patient education, Functional mobility training and Stair training  Patient / Family readiness to learn indicated by: asking questions, trying to perform skills and interest  Persons(s) to be included in education: patient (P)  Barriers to Learning/Limitations: None  Patient Goal (s): to help relieve pain in lower back and legs  Patient Self Reported Health Status: fair  Rehabilitation Potential: good    Short Term Goals: To be accomplished in 8 weeks:   1. Patient will be ind and compliant with HEP 1-2x/day to increase ease with ADLs. Eval: HEP established    2. Patient will be able to maintain Rhomberg stance on floor with EC for 15 sec without LOB to increase safety ambulating    Eval: Rhomberg on floor with EO: no LOB EC: 5 sec then required therapist assist to regain stability. Long Term Goals: To be accomplished in 16 weeks:   1. Patient will improve FOTO to at least 54 to demonstrate improved function. Eval: FOTO: 34   2. Patient will improve right hip abduction strength to 4/5 to increase safety with transfers. Eval: Hip abduction: right: 3+/5    3. Patient will report being able to ambulate/stand for 25 minutes with SPC with pain no more then 2/10 to increase ease with community ambulation. Eval:  Patient has difficulty with standing/walking 15 minutes and then needs to sit down   Frequency / Duration: Patient to be seen 2 times per week for 8 weeks. Patient/ Caregiver education and instruction: Diagnosis, prognosis, exercises   [x]  Plan of care has been reviewed with PTA    Certification Period: 11/04/19- 01/02/20  Coretta Mariee, PT 11/4/2019 11:11 AM    ________________________________________________________________________    I certify that the above Therapy Services are being furnished while the patient is under my care. I agree with the treatment plan and certify that this therapy is necessary.     Physician's Signature:____________Date:_________TIME:________    [employer], Date and Time must be completed for valid certification **    Please sign and return to In [address] [place] [address], [address] [place] [address], [address] [place] [address],[address] [phone] fax

## 2019-11-05 ENCOUNTER — HOSPITAL ENCOUNTER (OUTPATIENT)
Dept: LAB | Age: 84
Discharge: HOME | End: 2019-11-05
Payer: MEDICARE

## 2019-11-05 DIAGNOSIS — E78.5 DYSLIPIDEMIA: ICD-10-CM

## 2019-11-05 DIAGNOSIS — I10 ESSENTIAL HYPERTENSION: ICD-10-CM

## 2019-11-05 DIAGNOSIS — N18.30 TYPE 2 DIABETES MELLITUS WITH STAGE 3 CHRONIC KIDNEY DISEASE, WITHOUT LONG-TERM CURRENT USE OF INSULIN (HCC): ICD-10-CM

## 2019-11-05 DIAGNOSIS — E11.22 TYPE 2 DIABETES MELLITUS WITH STAGE 3 CHRONIC KIDNEY DISEASE, WITHOUT LONG-TERM CURRENT USE OF INSULIN (HCC): ICD-10-CM

## 2019-11-05 LAB
ALBUMIN SERPL-MCNC: 3.5 G/DL (ref 3.4–5)
ALBUMIN/GLOB SERPL: 1.3 {RATIO} (ref 0.8–1.7)
ALP SERPL-CCNC: 75 U/L (ref 45–117)
ALT SERPL-CCNC: 23 U/L (ref 13–56)
ANION GAP SERPL CALC-SCNC: 7 MMOL/L (ref 3–18)
AST SERPL-CCNC: 24 U/L (ref 10–38)
BILIRUB SERPL-MCNC: 0.5 MG/DL (ref 0.2–1)
BUN SERPL-MCNC: 29 MG/DL (ref 7–18)
BUN/CREAT SERPL: 16 (ref 12–20)
CALCIUM SERPL-MCNC: 9.5 MG/DL (ref 8.5–10.1)
CHLORIDE SERPL-SCNC: 108 MMOL/L (ref 100–111)
CO2 SERPL-SCNC: 27 MMOL/L (ref 21–32)
CREAT SERPL-MCNC: 1.76 MG/DL (ref 0.6–1.3)
GLOBULIN SER CALC-MCNC: 2.7 G/DL (ref 2–4)
GLUCOSE SERPL-MCNC: 129 MG/DL (ref 74–99)
HBA1C MFR BLD: 6 % (ref 4.2–5.6)
POTASSIUM SERPL-SCNC: 4.8 MMOL/L (ref 3.5–5.5)
PROT SERPL-MCNC: 6.2 G/DL (ref 6.4–8.2)
SODIUM SERPL-SCNC: 142 MMOL/L (ref 136–145)

## 2019-11-05 PROCEDURE — 83036 HEMOGLOBIN GLYCOSYLATED A1C: CPT

## 2019-11-05 PROCEDURE — 80061 LIPID PANEL: CPT

## 2019-11-05 PROCEDURE — 36415 COLL VENOUS BLD VENIPUNCTURE: CPT

## 2019-11-05 PROCEDURE — 80053 COMPREHEN METABOLIC PANEL: CPT

## 2019-11-06 ENCOUNTER — HOSPITAL ENCOUNTER (OUTPATIENT)
Dept: PHYSICAL THERAPY | Age: 84
Discharge: HOME | End: 2019-11-06
Payer: MEDICARE

## 2019-11-06 LAB
CHOLEST SERPL-MCNC: 216 MG/DL
HDLC SERPL-MCNC: 66 MG/DL (ref 40–60)
HDLC SERPL: 3.3 {RATIO} (ref 0–5)
LDLC SERPL CALC-MCNC: 128.4 MG/DL (ref 0–100)
LIPID PROFILE,FLP: ABNORMAL
TRIGL SERPL-MCNC: 108 MG/DL (ref ?–150)
VLDLC SERPL CALC-MCNC: 21.6 MG/DL

## 2019-11-06 PROCEDURE — 97530 THERAPEUTIC ACTIVITIES: CPT

## 2019-11-06 PROCEDURE — 97110 THERAPEUTIC EXERCISES: CPT

## 2019-11-06 NOTE — PROGRESS NOTES
PT DAILY TREATMENT NOTE 10-18    Patient Name: Nate Lee  Date:2019  : 1923  [x]  Patient  Verified  Payor: VA MEDICARE / Plan: VA MEDICARE PART A & B / Product Type: Medicare /    In time:10:59  Out time:11:50  Total Treatment Time (min): 51 (8 minutes of rest)   Visit #: 2 of 16    Medicare/BCBS Only   Total Timed Codes (min):  43 1:1 Treatment Time:  43       Treatment Area: Spinal stenosis, lumbar region with neurogenic claudication [M48.062]    SUBJECTIVE  Pain Level (0-10 scale): 0/10- with sitting; 9/10 with standing/walking   Any medication changes, allergies to medications, adverse drug reactions, diagnosis change, or new procedure performed?: [x] No    [] Yes (see summary sheet for update)  Subjective functional status/changes:   [] No changes reported  Patient stated that her pain is elevated from walking from her car into the clinic. She has more pain in the morning too, but once she starts to move around it helps to ease the pain. Patient stated when she was walking into clinic the pain started in right side and then wrapped around her low back. Patient denies numbness in her leg. As patient sat in the clinic the pain went away. Patient denies any issues with her appendix in the past (no surgery), and Patient denies any nausea, no cramping, and no upset stomach. Therapist advised patient if any of those symptoms occur to contact MD/go to hospital.     OBJECTIVE    35 min Therapeutic Exercise:  [x] See flow sheet :   Rationale: increase ROM and increase strength to improve the patients ability to perform ADLs. 8 min Therapeutic Activity:  []  See flow sheet :vital assessment at beginning and end of the session. Discussion of symptoms and advice to contact MD/got to hospital if onset occurs or symptoms persist/increase    Rationale: To ensure safety of patient and increased safety with performing physical therapy activities.       With   [] TE   [] TA   [] neuro   [] other: Patient Education: [x] Review HEP    [] Progressed/Changed HEP based on:   [] positioning   [] body mechanics   [] transfers   [] heat/ice application    [] other:      Other Objective/Functional Measures:  Beginning of the session: O2: 99 HR: 70  /71 HR on BP machine: 77      At end of the session O2: 96 HR: 70 BP: 129/71 HR: 72   Pain Level (0-10 scale) post treatment: seated: 0/10 walkin/10     ASSESSMENT/Changes in Function: Patient mentioned she had some mild discomfort right above left breast at beginning of the PT session, and she thinks it may be from rushing to get into the PT clinic. Patient's vitals were assessed and were WNL and as patient sat and rested the discomfort eased and then went away completely. Patient had no pain with taking a deep breath, and patient was in no apparent distress. Patient able to communicate clearly and with appropriate answers. Therapist advised patient if the discomfort came back during PT to notify therapist immediately, and if it came back later in the day to notify MD/go to hospital. Patient had no onset of chest discomfort again during the PT session, but reported feeling a little short of breath while laying supine and performing heel slides. Therapist changed patient's position to a reclined supine and patient felt much better and the short of breath sensation went away. At end of the session patient denied any pain in sitting and no numbness in legs, and once patient started walking pain in side and across back decreased to a 1/10. Patient denied any chest discomfort at end of the session.      Patient will continue to benefit from skilled PT services to modify and progress therapeutic interventions, address functional mobility deficits, address ROM deficits, address strength deficits, analyze and address soft tissue restrictions, analyze and cue movement patterns, assess and modify postural abnormalities and address imbalance/dizziness to attain remaining goals.     []  See Plan of Care  []  See progress note/recertification  []  See Discharge Summary         Progress towards goals / Updated goals:  Short Term Goals: To be accomplished in 8 weeks:              1. Patient will be ind and compliant with HEP 1-2x/day to increase ease with ADLs. Eval: HEP established               2. Patient will be able to maintain Rhomberg stance on floor with EC for 15 sec without LOB to increase safety ambulating               Eval: Rhomberg on floor with EO: no LOB EC: 5 sec then required therapist assist to regain stability.   Long Term Goals: To be accomplished in 16 weeks:              1. Patient will improve FOTO to at least 54 to demonstrate improved function. Eval: FOTO: 34              2. Patient will improve right hip abduction strength to 4/5 to increase safety with transfers. Eval: Hip abduction: right: 3+/5               3. Patient will report being able to ambulate/stand for 25 minutes with SPC with pain no more then 2/10 to increase ease with community ambulation.               Eval:  Patient has difficulty with standing/walking 15 minutes and then needs to sit down     PLAN  []  Upgrade activities as tolerated     [x]  Continue plan of care  []  Update interventions per flow sheet       []  Discharge due to:_  []  Other:_      Fay Del Real, HANK 11/6/2019  11:05 AM    Future Appointments   Date Time Provider Jean Marie Sorenson   11/11/2019 10:00 AM Donnamaria Lab, PT [facility]   11/12/2019 11:00 AM Leatha Vizcarra MD [facility]   11/13/2019 10:30 AM Donnamaria Lab, PT [facility]   11/18/2019  1:30 PM Desiree Fowler, ARNULFO [facility]   11/20/2019 11:00 AM Donnamaria Lab, PT [facility]   11/25/2019 11:00 AM Donnamaria Lab, PT [facility]   11/27/2019 11:00 AM Donnamaria Lab, PT [facility]   12/2/2019 11:00 AM Donnamaria Lab, PT [facility]   12/4/2019 11:00 AM [facility]rez [facility]   12/9/2019 11:00 AM Kajal Hernandez, PT [facility]   12/11/2019 11:00 AM Lulú Mendez [facility]   12/12/2019 11:30 AM Shashank Hunt  [id number]    4/22/2020 10:40 AM Juarez Gonzales DO [address] [place] [address]

## 2019-11-11 ENCOUNTER — HOSPITAL ENCOUNTER (OUTPATIENT)
Dept: PHYSICAL THERAPY | Age: 84
Discharge: HOME | End: 2019-11-11
Payer: MEDICARE

## 2019-11-11 PROCEDURE — 97530 THERAPEUTIC ACTIVITIES: CPT

## 2019-11-11 PROCEDURE — 97110 THERAPEUTIC EXERCISES: CPT

## 2019-11-11 NOTE — PROGRESS NOTES
PT DAILY TREATMENT NOTE 10-18    Patient Name: Rashad Katz  Date:2019  : 1923  [x]  Patient  Verified  Payor: [place] MEDICARE / Plan: [place] MEDICARE PART A & B / Product Type: Medicare /    In time:10:02  Out time:10:42  Total Treatment Time (min): 40  Visit #: 3 of 16    Medicare/BCBS Only   Total Timed Codes (min):  40 1:1 Treatment Time:  40       Treatment Area: Spinal stenosis, lumbar region with neurogenic claudication [M48.062]  Low back pain [M54.5]    SUBJECTIVE  Pain Level (0-10 scale): 8/10  Any medication changes, allergies to medications, adverse drug reactions, diagnosis change, or new procedure performed?: [x] No    [] Yes (see summary sheet for update)  Subjective functional status/changes:   [] No changes reported  Patient stated that the pain in her side has been gone since the day after therapy. Patient stated pain is mostly at the low back and she continues to get numbness from (B) knees to ankles. Patient denies any SOB, No chest pain, no Dizziness, and no headaches. OBJECTIVE    32 min Therapeutic Exercise:  [x] See flow sheet :   Rationale: increase ROM and increase strength to improve the patients ability to perform ADLs safely. 8 min Therapeutic Activity:  [x]  See flow sheet : seated heel press into ball to facilitate glut activation to assist with ease of sit to stand transfers without compensatory strategies; broke down sit to stand transfers into parts: 1st step: therapist guided seated hip hinging to focus on core stability and neutral lumbar spine, 2nd step: hip hinging and pushing through heels to facilitate glut activation; 3rd step: hip hinging with pushing through heels and unweight buttock; 4th step: perform sit to stand transfer without UE support and cued glut squeeze in standing to gain full standing position.     Rationale: increase strength, improve coordination and increase proprioception  to improve the patients ability to safely transfer With   [] TE   [] TA   [] neuro   [] other: Patient Education: [x] Review HEP    [] Progressed/Changed HEP based on:   [] positioning   [] body mechanics   [] transfers   [] heat/ice application    [] other:      Other Objective/Functional Measures:    O2: 98HR: 60  Pain Level (0-10 scale) post treatment: 4/10    ASSESSMENT/Changes in Function: Patient was able to perform 5 sit to stand transfers without UE support with table at lowest setting. Patient continues to demonstrate decreased glut activation. Patient had difficulty performing bridges with buttock clearance. Patient reported decreased pain at end of the session, and pain was located at low back. Patient will continue to benefit from skilled PT services to modify and progress therapeutic interventions, address functional mobility deficits, address ROM deficits, address strength deficits, analyze and address soft tissue restrictions, analyze and cue movement patterns, assess and modify postural abnormalities and address imbalance/dizziness to attain remaining goals. []  See Plan of Care  []  See progress note/recertification  []  See Discharge Summary         Progress towards goals / Updated goals:  Short Term Goals: To be accomplished in 8 weeks:              7. Patient will be ind and compliant with HEP 1-2x/day to increase ease with ADLs.             Eval: HEP established               2. Patient will be able to maintain Rhomberg stance on floor with EC for 15 sec without LOB to increase safety ambulating               Eval: Rhomberg on floor with EO: no LOB EC: 5 sec then required therapist assist to regain stability.   Long Term Goals: To be accomplished in 16 weeks:              1.  Patient will improve FOTO to at least 54 to demonstrate improved function.              Eval: FOTO: 34              2. Patient will improve right hip abduction strength to 4/5 to increase safety with transfers.              Eval: Hip abduction: right: 3+/5             3. Patient will report being able to ambulate/stand for 25 minutes with [facility] with pain no more then 2/10 to increase ease with community ambulation.              Eval:  Patient has difficulty with standing/walking 15 minutes and then needs to sit down        PLAN  []  Upgrade activities as tolerated     [x]  Continue plan of care  []  Update interventions per flow sheet       []  Discharge due to:_  []  Other:_      Deedee Griffith, PT 11/11/2019  10:05 AM    Future Appointments   Date Time Provider Jaen Marie Sorenson   11/12/2019 11:00 AM Shad Martínez MD [address] [place] [address]   11/13/2019 10:30 AM Janette Tripp, PT [facility]   11/18/2019  1:30 PM Terrie Jensen, ARNULFO [facility]   11/20/2019 11:00 AM Janette Tripp, PT [facility]   11/25/2019 11:00 AM Janette Tripp, PT [facility]   11/27/2019 11:00 AM Janette Tripp, PT [facility]   12/2/2019 11:00 AM Janette Tripp, PT [facility]   12/4/2019 11:00 AM Janette Tripp, PT [facility]   12/9/2019 11:00 AM Janette Tripp, PT [facility]   12/11/2019 11:00 AM Terrie Jensen, PTA [facility]   12/12/2019 11:30 AM Carmen Cobb MD [facility]   4/22/2020 10:40 AM Gabrielle Bourgeois DO [address] [place] [address]

## 2019-11-12 ENCOUNTER — OFFICE VISIT (OUTPATIENT)
Dept: FAMILY MEDICINE CLINIC | Age: 84
End: 2019-11-12

## 2019-11-12 VITALS
HEART RATE: 72 BPM | HEIGHT: 60 IN | RESPIRATION RATE: 20 BRPM | BODY MASS INDEX: 28.27 KG/M2 | TEMPERATURE: 98.7 F | SYSTOLIC BLOOD PRESSURE: 128 MMHG | DIASTOLIC BLOOD PRESSURE: 76 MMHG | WEIGHT: 144 LBS | OXYGEN SATURATION: 94 %

## 2019-11-12 DIAGNOSIS — E11.22 TYPE 2 DIABETES MELLITUS WITH STAGE 3 CHRONIC KIDNEY DISEASE, WITHOUT LONG-TERM CURRENT USE OF INSULIN (HCC): ICD-10-CM

## 2019-11-12 DIAGNOSIS — E78.5 DYSLIPIDEMIA: ICD-10-CM

## 2019-11-12 DIAGNOSIS — R29.898 MUSCULAR DECONDITIONING: ICD-10-CM

## 2019-11-12 DIAGNOSIS — I10 ESSENTIAL HYPERTENSION: ICD-10-CM

## 2019-11-12 DIAGNOSIS — N18.30 TYPE 2 DIABETES MELLITUS WITH STAGE 3 CHRONIC KIDNEY DISEASE, WITHOUT LONG-TERM CURRENT USE OF INSULIN (HCC): ICD-10-CM

## 2019-11-12 DIAGNOSIS — M17.0 PRIMARY OSTEOARTHRITIS OF BOTH KNEES: ICD-10-CM

## 2019-11-12 DIAGNOSIS — Z00.00 MEDICARE ANNUAL WELLNESS VISIT, SUBSEQUENT: Primary | ICD-10-CM

## 2019-11-12 RX ORDER — GLIPIZIDE 2.5 MG/1
TABLET, EXTENDED RELEASE ORAL
Qty: 90 TAB | Refills: 1 | Status: CANCELLED | OUTPATIENT
Start: 2019-11-12

## 2019-11-12 NOTE — PROGRESS NOTES
Patient is in the office today for medicare wellness visit. 1. Have you been to the ER, urgent care clinic since your last visit? Hospitalized since your last visit? No    2. Have you seen or consulted any other health care providers outside of the [address] [place] [address] since your last visit? Include any pap smears or colon screening. No      This is the Subsequent Medicare Annual Wellness Exam, performed 12 months or more after the Initial AWV or the last Subsequent AWV    I have reviewed the patient's medical history in detail and updated the computerized patient record. History     Patient Active Problem List   Diagnosis Code    Diabetes mellitus, type 2 ([facility] E11.9    Osteopenia M85.80    Hypertensive heart disease I11.9    CAD (coronary artery disease), native coronary artery I25.10    Cardiomyopathy, secondary ([facility] I42.9    Heart failure, diastolic, chronic (HCC) H89.46    CKD (chronic kidney disease) N18.9    Dyslipidemia E78.5    Essential hypertension I10    Arthritis of right knee M17.11    ACP (advance care planning) Z71.89    Type 2 diabetes mellitus with nephropathy ([facility] [address] [facility] E11.21    Palpitations R00.2     Past Medical History:   Diagnosis Date    Arthritis of right knee     CAD (coronary artery disease), native coronary artery October 2010    mild disease    Cardiac cath 10/21/2010    dLM 20%. mLAD 25%. CX mild. pRCA 30%. EF 30-35%. Anterolateral, apical , diaphrag akin. Hypercontractile basal segments. Severe elev left-sided filling press.  Cardiac echocardiogram 02/02/2011    EF 50-55%. Mild apical hypk. Gr 1 DDfx. RVSP 40-45. Mild LAE.  Cardiac Holter monitor 06/10/2015    Sinus rhythm w/avg HR of 53 bpm (range 40-85 bpm). Occasional PACs. One 3-beat run of nonsustained SVT. Very few PVCs. One 3-beat run of nonsustained VT. Pt's HR was < 50 bpm for 8 hrs of the recorded time. No pauses noted.   Two episodes of neck pain corresponded to sinus rhythm w/o ectopy.  Cardiovascular renal duplex 12/03/2012    No renal artery stenosis bilaterally. Bilateral intrinsic/med disease. Patent bilateral renal veins. Multiple cysts on both kidneys.  CKD (chronic kidney disease)     while on diuretics for difficult to control HTN    Diabetes mellitus type II 3/15/2010    Dyslipidemia 3/15/2010    Heart attack ([facility] [facility]     2010    Heart failure, diastolic, chronic ([facility] [address] [facility]     History of heart attack     HTN (hypertension) 3/15/2010    Hypertensive heart disease     Loss of appetite     Osteopenia 7/13/2010    Other dyspnea and respiratory abnormality     Ringing in the ears     Stress-induced cardiomyopathy     EF 35% (LV angio 10/2010), then EF 50-55% (echo, Feb 2011)    Wears glasses       Past Surgical History:   Procedure Laterality Date    HX BLADDER SUSPENSION  2009    HX HYSTERECTOMY  1962     Current Outpatient Medications   Medication Sig Dispense Refill    gabapentin (NEURONTIN) 100 mg capsule Take 1 Cap by mouth three (3) times daily. Max Daily Amount: 300 mg. 90 Cap 2    amLODIPine (NORVASC) 10 mg tablet TAKE 1 TABLET BY MOUTH EVERY DAY 90 Tab 3    olmesartan (BENICAR) 20 mg tablet Take 1 Tab by mouth daily. 90 Tab 3    glipiZIDE SR (GLUCOTROL XL) 2.5 mg CR tablet TAKE 1 TABLET BY MOUTH EVERY DAY 90 Tab 1    pravastatin (PRAVACHOL) 40 mg tablet TAKE 1 TABLET BY MOUTH EVERY DAY 30 Tab 11    glucose blood VI test strips (ASCENSIA CONTOUR) strip Check BS 1x/day E11.9 100 Strip 3    lancets misc Check BA 1x/day E 11.9 100 Each 11    LUMIGAN 0.01 % ophthalmic drops       latanoprost (XALATAN) 0.005 % ophthalmic solution       COMBIGAN 0.2-0.5 % drop ophthalmic solution Administer 1 Drop to both eyes every twelve (12) hours.  RESTASIS 0.05 % ophthalmic emulsion Administer 1 Drop to both eyes every twelve (12) hours.  ascorbic acid (VITAMIN C) 500 mg tablet Take 1,000 mg by mouth daily.       IBUPROFEN (ADVIL PO) Take 1 Tab by mouth as needed.  CALCIUM PO Take 1,000 mg by mouth two (2) times a day.  Blood-Glucose Meter monitoring kit by Does Not Apply route. Use daily as directed 1 Kit 0    glucose blood VI test strips (BLOOD GLUCOSE TEST) strip by Does Not Apply route. Use daily as directed 3 Package 3    Lancets Misc by Does Not Apply route. Use daily as directed 3 Package 3    alcohol swabs (ALCOHOL PREP PADS) PadM 1 Dose by Apply Externally route daily as needed (Glucose testing). 3 Package 3    MULTIVITAMINS PO Take 1 Tab by mouth daily.  aspirin 81 mg chewable tablet Take 81 mg by mouth daily.  predniSONE (STERAPRED DS) 10 mg dose pack See administration instruction per 10mg dose pack 21 Tab 0    diclofenac (VOLTAREN) 1 % gel Apply  to affected area four (4) times daily. 100 g 4    nitroglycerin (NITROSTAT) 0.4 mg SL tablet 1 Tab by SubLINGual route every five (5) minutes as needed. 1 Bottle 1    Cholecalciferol, Vitamin D3, (VITAMIN D) 1,000 unit Cap Take 2,000 Units by mouth daily. 180 Cap 3     Allergies   Allergen Reactions    Lipitor [Atorvastatin] Myalgia    Spironolactone Other (comments)     Dizziness and fatigue       Family History   Problem Relation Age of Onset    Hypertension Mother     Arthritis-osteo Other      Social History     Tobacco Use    Smoking status: Never Smoker    Smokeless tobacco: Never Used   Substance Use Topics    Alcohol use: No       Depression Risk Factor Screening:     3 most recent PHQ Screens 11/12/2019   PHQ Not Done -   Little interest or pleasure in doing things Not at all   Feeling down, depressed, irritable, or hopeless Not at all   Total Score PHQ 2 0       Alcohol Risk Factor Screening:   Do you average 1 drink per night or more than 7 drinks a week:  No    On any one occasion in the past three months have you have had more than 3 drinks containing alcohol:  No      Functional Ability and Level of Safety:   Hearing:  The patient needs further evaluation. Activities of Daily Living: The home contains: grab bars  Patient does total self care    Ambulation: with mild difficulty    Fall Risk:  Fall Risk Assessment, last 12 mths 11/12/2019   Able to walk? Yes   Fall in past 12 months? No       Abuse Screen:  Patient is not abused    Cognitive Screening   Has your family/caregiver stated any concerns about your memory: no  Cognitive Screening: Normal - Clock Drawing Test    Patient Care Team   Patient Care Team:  Jessica Graham MD as PCP - General (Internal Medicine)  Jessica Graham MD as PCP - [facility]  Ananya Hadley MD (Ophthalmology)  Ann Rogers MD (Obstetrics & Gynecology)  Alana Lutz DO (Cardiology)  Boo Fountain as Care Transitions Nurse     Glaucoma Screening-  Dr Markham Castleman every 4 months  for glaucoma    Pneumonia Vaccine-  UTD   Shingles Vaccine- did not have chicken pox    Tdap Vaccine-  Declines due to cost    Colonoscopy-   ~10 yrs ago had Colonoscopy. Not a candidate currently due to age  Mammogram  8/2019  Advance Directive-  on file       Assessment/Plan   Education and counseling provided:  Are appropriate based on today's review and evaluation    Diagnoses and all orders for this visit:    1. Medicare annual wellness visit, subsequent    2. Type 2 diabetes mellitus with stage 3 chronic kidney disease, without long-term current use of insulin (HCC)  -     HEMOGLOBIN A1C W/O EAG; Future  -     MICROALBUMIN, UR, RAND W/ MICROALB/CREAT RATIO; Future    3. Dyslipidemia  -     LIPID PANEL; Future    4. Essential hypertension  -     METABOLIC PANEL, COMPREHENSIVE; Future    5. Muscular deconditioning    6.  Primary osteoarthritis of both knees        Health Maintenance Due   Topic Date Due    FOOT EXAM Q1  01/05/1933    DTaP/Tdap/Td series (1 - Tdap) 01/05/1944    Shingrix Vaccine Age 50> (1 of 2) 01/05/1973    Pneumococcal 65+ years (2 of 2 - PPSV23) 05/27/2016    Influenza Age 5 to Adult  08/01/2019 A comprehensive 5 year plan for medical care and screening exams was reviewed with pt and they received a copy of it.

## 2019-11-12 NOTE — PATIENT INSTRUCTIONS
Medicare Part B Preventive Services Limitations Recommendation Scheduled Bone Mass Measurement 
(age 72 & older, biennial) A bone mass density test is recommended when a woman turns 65 to screen for osteoporosis. This test is only recommended one time, as a screening. Some providers will use this same test as a disease monitoring tool if you already have osteoporosis. 5/12 Cardiovascular Screening Blood Tests (every 5 years) Total cholesterol, HDL, Triglycerides and ECG Order blood work  as a panel if possible and adults with routine risk  an electrocardiogram (ECG) at intervals determined by your doctor. Lipid 11/19 Colorectal Cancer Screening 
-Fecal occult blood test (annual) -Flexible sigmoidoscopy (5y) 
-Screening colonoscopy (10y) -Barium Enema Colorectal cancer screenings should be done for adults age 54-65 with no increased risk factors for colorectal cancer. There are a number of acceptable methods of screening for this type of cancer. Each test has its own benefits and drawbacks. Discuss with your doctor what is most appropriate for you during your annual wellness visit. The different tests include: colonoscopy (considered the best screening method), a fecal occult blood test, a fecal DNA test, and sigmoidoscopy. Counseling to Prevent Tobacco Use (up to 8 sessions per year) - Counseling greater than 3 and up to 10 minutes - Counseling greater than 10 minutes Patients must be asymptomatic of tobacco-related conditions to receive as preventive service Diabetes Screening Tests (at least every 3 years, Medicare covers annually or at 6-month intervals for prediabetic patients) Fasting blood sugar (FBS) or glucose tolerance test (GTT) -All adults age 38-68 who are overweight should have a diabetes screening test once every three years.  
-Other screening tests and preventive services for persons with diabetes include: an eye exam to screen for diabetic retinopathy, a kidney function test, a foot exam, and stricter control over your cholesterol. A1C 11/19 Diabetes Self-Management Training (DSMT) (no USPSTF recommendation) Requires referral by treating physician for patient with diabetes or renal disease. 10 hours of initial DSMT session of no less than 30 minutes each in a continuous 12-month period. 2 hours of follow-up DSMT in subsequent years. Glaucoma Screening (no USPSTF recommendation) Diabetes mellitus, family history, , age 48 or over,  American, age 72 or over  8/19 Human Immunodeficiency Virus (HIV) Screening (annually for increased risk patients) HIV-1 and HIV-2 by EIA, RANDY, rapid antibody test, or oral mucosa transudate Patient must be at increased risk for HIV infection per USPSTF guidelines or pregnant. Tests covered annually for patients at increased risk. Pregnant patients may receive up to 3 test during pregnancy. Medical Nutrition Therapy (MNT) (for diabetes or renal disease not recommended schedule) Requires referral by treating physician for patient with diabetes or renal disease. Can be provided in same year as diabetes self-management training (DSMT), and CMS recommends medical nutrition therapy take place after DSMT. Up to 3 hours for initial year and 2 hours in subsequent years. Shingles Vaccination A shingles vaccine is also recommended once in a lifetime after age 61 Seasonal Influenza Vaccination (annually) All adults should have a flu vaccine yearly Pneumococcal Vaccination (once after 72) All adults over the age of 72 should receive the recommended pneumonia vaccines. Current USPSTF guidelines recommend a series of two vaccines for the best pneumonia protection. PCV13 5/15 PPSV23 1/07 Hepatitis B Vaccinations (if medium/high risk) Medium/high risk factors:  End-stage renal disease, Hemophiliacs who received Factor VIII or IX concentrates, Clients of institutions for the mentally retarded, Persons who live in the same house as a HepB virus carrier, Homosexual men, Illicit injectable drug abusers. Screening Mammography (biennial age 54-69) Breast cancer screenings are recommended every other year for women of normal risk, age 54-69.  8/19 Screening Pap Tests and Pelvic Examination (up to age 79 and after 79 if unknown history or abnormal study last 10 years) Cervical cancer screenings for women over age 72 are only recommended with certain risk factors Hepatitis C All adults born between 80 and 1965 should be screened once Tetanus  All adults should have a tetanus vaccine every 10 years

## 2019-11-12 NOTE — PROGRESS NOTES
Subjective:       Chief Complaint  The patient presents for follow up of diabetes, hypertension and high cholesterol. FATEMEH Katz is a 80 y.o. female seen for follow up of diabetes. Shealso has hypertension and hyperlipidemia. Diabetes well controlled, no significant medication side effects noted, on glucotrol XL 2.5 mg, hypertension  well controlled, no significant medication side effects noted, on current meds, which include Benicar 20 mg, Norvasc 10 mg,   hyperlipidemia, fairly well controlled on Pravachol 40 mg for age 80. Diet and Lifestyle: generally follows a low fat low cholesterol diet, follows a diabetic diet regularly, exercises sporadically    Home BP Monitoring: is well controlled at home. Diabetic Review of Systems - home glucose monitoring: is well controlled at home when she takes it 1x/day    Other symptoms and concerns: pt feels better when she exercises on a regular basis. Pt's CKD stage 3 is stable on current meds. Patient has palpitations that bother her at times. She has been taken off of beta-blockers by her cardiologist because of low heart rate. Pt has chronic diastolic heart failure that is followed by cardiology and controlled on current meds. Patient due to her age of 80 along with muscular deconditioning and osteoarthritis of her knees and back patient's at risk of falls with just using her cane. Patient is a good candidate for a Rollator walker to decrease her risk of falling and help her do her daily activities that require ambulation. This will also help patient to continue to be independent since she still lives alone at age 80. Current Outpatient Medications   Medication Sig    gabapentin (NEURONTIN) 100 mg capsule Take 1 Cap by mouth three (3) times daily. Max Daily Amount: 300 mg.    amLODIPine (NORVASC) 10 mg tablet TAKE 1 TABLET BY MOUTH EVERY DAY    olmesartan (BENICAR) 20 mg tablet Take 1 Tab by mouth daily.     glipiZIDE SR (GLUCOTROL XL) 2.5 mg CR tablet TAKE 1 TABLET BY MOUTH EVERY DAY    pravastatin (PRAVACHOL) 40 mg tablet TAKE 1 TABLET BY MOUTH EVERY DAY    glucose blood VI test strips (ASCENSIA CONTOUR) strip Check BS 1x/day E11.9    lancets misc Check BA 1x/day E 11.9    LUMIGAN 0.01 % ophthalmic drops     latanoprost (XALATAN) 0.005 % ophthalmic solution     COMBIGAN 0.2-0.5 % drop ophthalmic solution Administer 1 Drop to both eyes every twelve (12) hours.  RESTASIS 0.05 % ophthalmic emulsion Administer 1 Drop to both eyes every twelve (12) hours.  ascorbic acid (VITAMIN C) 500 mg tablet Take 1,000 mg by mouth daily.  IBUPROFEN (ADVIL PO) Take 1 Tab by mouth as needed.  CALCIUM PO Take 1,000 mg by mouth two (2) times a day.  Blood-Glucose Meter monitoring kit by Does Not Apply route. Use daily as directed    glucose blood VI test strips (BLOOD GLUCOSE TEST) strip by Does Not Apply route. Use daily as directed    Lancets Misc by Does Not Apply route. Use daily as directed    alcohol swabs (ALCOHOL PREP PADS) PadM 1 Dose by Apply Externally route daily as needed (Glucose testing).  MULTIVITAMINS PO Take 1 Tab by mouth daily.  aspirin 81 mg chewable tablet Take 81 mg by mouth daily.  predniSONE (STERAPRED DS) 10 mg dose pack See administration instruction per 10mg dose pack    diclofenac (VOLTAREN) 1 % gel Apply  to affected area four (4) times daily.  nitroglycerin (NITROSTAT) 0.4 mg SL tablet 1 Tab by SubLINGual route every five (5) minutes as needed.  Cholecalciferol, Vitamin D3, (VITAMIN D) 1,000 unit Cap Take 2,000 Units by mouth daily. No current facility-administered medications for this visit.               Review of Systems  Respiratory: negative for dyspnea on exertion  Cardiovascular: negative for chest pain    Objective:     Visit Vitals  /76 (BP 1 Location: Left arm, BP Patient Position: Sitting)   Pulse 72   Temp 98.7 °F (37.1 °C) (Oral)   Resp 20   Ht 5' (1.524 m) Wt 144 lb (65.3 kg)   SpO2 94%   BMI 28.12 kg/m²        General appearance - alert, well appearing, and in no distress  Neck - supple, no significant adenopathy, carotids upstroke normal bilaterally, no bruits  Chest - clear to auscultation, no wheezes, rales or rhonchi, symmetric air entry  Heart -normal S1, S2, no murmurs, rubs, clicks or gallops  Extremities - peripheral pulses normal, no pedal edema, no clubbing or cyanosis  Skin - normal coloration and turgor, no rashes, no suspicious skin lesions noted      Labs:   Lab Results   Component Value Date/Time    Hemoglobin A1c 6.0 (H) 11/05/2019 10:09 AM    Hemoglobin A1c 6.3 (H) 07/11/2019 09:26 AM    Hemoglobin A1c 5.9 (H) 02/26/2019 09:21 AM    Microalbumin/Creat ratio (mg/g creat) 44 (H) 01/30/2019 01:00 PM    Microalbumin,urine random 10.10 (H) 01/30/2019 01:00 PM    LDL, calculated 128.4 (H) 11/05/2019 10:09 AM    Creatinine 1.76 (H) 11/05/2019 10:09 AM      Lab Results   Component Value Date/Time    Cholesterol, total 216 (H) 11/05/2019 10:09 AM    HDL Cholesterol 66 (H) 11/05/2019 10:09 AM    LDL, calculated 128.4 (H) 11/05/2019 10:09 AM    Triglyceride 108 11/05/2019 10:09 AM    CHOL/HDL Ratio 3.3 11/05/2019 10:09 AM     Lab Results   Component Value Date/Time    AST (SGOT) 24 11/05/2019 10:09 AM    Alk.  phosphatase 75 11/05/2019 10:09 AM    Bilirubin, direct 0.1 03/11/2016 10:45 AM     Lab Results   Component Value Date/Time    GFR est AA 32 (L) 11/05/2019 10:09 AM    GFR est non-AA 27 (L) 11/05/2019 10:09 AM    Creatinine 1.76 (H) 11/05/2019 10:09 AM    BUN 29 (H) 11/05/2019 10:09 AM    Sodium 142 11/05/2019 10:09 AM    Potassium 4.8 11/05/2019 10:09 AM    Chloride 108 11/05/2019 10:09 AM    CO2 27 11/05/2019 10:09 AM      Lab Results   Component Value Date/Time    Glucose 129 (H) 11/05/2019 10:09 AM            Assessment / Plan     Diabetes well controlled, on glucotrol XL 2.5 mg w/o any hypoglycemia   Hypertension  well controlled, on current meds, Hyperlipidemia fairly well controlled  On Pravachol,        ICD-10-CM ICD-9-CM    1. Medicare annual wellness visit, subsequent Z00.00 V70.0    2. Type 2 diabetes mellitus with stage 3 chronic kidney disease, without long-term current use of insulin (HCC) E11.22 250.40 HEMOGLOBIN A1C W/O EAG    N18.3 585.3 MICROALBUMIN, UR, RAND W/ MICROALB/CREAT RATIO   3. Dyslipidemia E78.5 272.4 LIPID PANEL   4. Essential hypertension M43 742.1 METABOLIC PANEL, COMPREHENSIVE   5. Muscular deconditioning R29.898 781.99  patient would benefit from Rollator walker to prevent falls   6. Primary osteoarthritis of both knees M17.0 715.16 patient would benefit from Rollator walker to prevent falls             Diabetic issues reviewed with her: diabetic diet discussed in detail, and low cholesterol diet, weight control and daily exercise discussed. Follow-up and Dispositions    · Return in about 4 months (around 3/12/2020) for labs 1 week before. Reviewed plan of care. Patient has provided input and agrees with goals.

## 2019-11-13 ENCOUNTER — HOSPITAL ENCOUNTER (OUTPATIENT)
Dept: PHYSICAL THERAPY | Age: 84
Discharge: HOME | End: 2019-11-13
Payer: MEDICARE

## 2019-11-13 PROCEDURE — 97530 THERAPEUTIC ACTIVITIES: CPT

## 2019-11-13 PROCEDURE — 97110 THERAPEUTIC EXERCISES: CPT

## 2019-11-13 NOTE — PROGRESS NOTES
PT DAILY TREATMENT NOTE 10-18    Patient Name: Binh Alegre  Date:2019  : 1923  [x]  Patient  Verified  Payor: [place] MEDICARE / Plan: [place] MEDICARE PART A & B / Product Type: Medicare /    In time:10:35  Out time: 11:14  Total Treatment Time (min): 39  Visit #: 4 of 16    Medicare/BCBS Only   Total Timed Codes (min):  39 1:1 Treatment Time:  39       Treatment Area: Spinal stenosis, lumbar region with neurogenic claudication [M48.062]  Low back pain [M54.5]    SUBJECTIVE  Pain Level (0-10 scale): 1/10   Any medication changes, allergies to medications, adverse drug reactions, diagnosis change, or new procedure performed?: [x] No    [] Yes (see summary sheet for update)  Subjective functional status/changes:   [] No changes reported  Patient stated that she hadn't been having the side pain at all, but then this morning when she got up and walked toward the bathroom the pain in her side returned and she had to take 2 advil to relieve it. She stated now its hardly bothering her at all. (Patient pointed to area of superior border of iliac crest on the right)      OBJECTIVE    31 min Therapeutic Exercise:  [x] See flow sheet :   Rationale: increase ROM and increase strength to improve the patients ability to perform ADLs safely and efficiently. 8 min Therapeutic Activity:  [x]  See flow sheet : sit to stand transfers: seated heel press into ball to activate gluts in preparation for transferring. Therapist cued patient to hip hinge and then press through heels and squeeze gluts to facilitate a full standing position. Rationale: increase strength and improve coordination  to improve the patients ability to transfer safely and efficiently.           With   [] TE   [] TA   [] neuro   [] other: Patient Education: [x] Review HEP    [] Progressed/Changed HEP based on:   [] positioning   [] body mechanics   [] transfers   [] heat/ice application    [] other:      Other Objective/Functional Measures: patient able to increase reps per flow sheet without any increase in pain and was able to maintain good form, but reported fatigue. Right Hip abduction strength: 4-/5   Pain Level (0-10 scale) post treatment: 0/10     ASSESSMENT/Changes in Function: Progressed patient to standing exercises incorporating Heel raises and marching and patient reported no increase in pain. Patient required HHA for stabilization with standing exercises. Patient was able to perform 10 sit to stand transfers without UE support with cueing from therapist for correct posture. Patient reported no pain at end of the session. Plan to incorporate more standing, functional activities as patient progresses and tolerates. Patient will continue to benefit from skilled PT services to modify and progress therapeutic interventions, address functional mobility deficits, address ROM deficits, address strength deficits, analyze and address soft tissue restrictions, analyze and cue movement patterns, assess and modify postural abnormalities and address imbalance/dizziness to attain remaining goals. []  See Plan of Care  []  See progress note/recertification  []  See Discharge Summary         Progress towards goals / Updated goals:  Short Term Goals: To be accomplished in 8 weeks:              5. Patient will be ind and compliant with HEP 1-2x/day to increase ease with ADLs.             Eval: HEP established               2. Patient will be able to maintain Rhomberg stance on floor with EC for 15 sec without LOB to increase safety ambulating               Eval: Rhomberg on floor with EO: no LOB EC: 5 sec then required therapist assist to regain stability.   Long Term Goals: To be accomplished in 16 weeks:              1.  Patient will improve FOTO to at least 54 to demonstrate improved function.              Eval: FOTO: 34              2. Patient will improve right hip abduction strength to 4/5 to increase safety with transfers.              Eval: Hip abduction: right: 3+/5    Current: hip abduction: Right: 4-/5 (11/13/19)               3. Patient will report being able to ambulate/stand for 25 minutes with [facility] with pain no more then 2/10 to increase ease with community ambulation.              Eval:  Patient has difficulty with standing/walking 15 minutes and then needs to sit down     Current: patent stated that she is able to walk/stand 15 minutes without pain (11/13/19)     PLAN  []  Upgrade activities as tolerated     [x]  Continue plan of care  []  Update interventions per flow sheet       []  Discharge due to:_  []  Other:_      Laura Loza, PT 11/13/2019  10:43 AM    Future Appointments   Date Time Provider Jean Marie Sorenson   11/18/2019  1:30 PM Sergio Kam PTA [facility]   11/20/2019 11:00 AM William Lab, PT [facility]   11/25/2019 11:00 AM William Lab, PT [facility]   11/27/2019 11:00 AM William Lab, PT [facility]   12/2/2019 11:00 AM William Lab, PT [facility]   12/4/2019 11:00 AM William Lab, PT [facility]   12/9/2019 11:00 AM William Lab, PT [facility]   12/11/2019 11:00 AM Sergio Kam PTA [facility]   12/12/2019 11:30 AM Julien Boswell  [address] [facility]   3/3/2020  8:40 AM [facility] NURSE [facility]   3/10/2020 11:30 AM Álvaro Interiano MD 11 [facility]   4/22/2020 10:40 AM Lanette Rutledge, DO [address] [place] [address]

## 2019-11-18 ENCOUNTER — HOSPITAL ENCOUNTER (OUTPATIENT)
Dept: PHYSICAL THERAPY | Age: 84
Discharge: HOME | End: 2019-11-18
Payer: MEDICARE

## 2019-11-18 PROCEDURE — 97110 THERAPEUTIC EXERCISES: CPT

## 2019-11-18 PROCEDURE — 97112 NEUROMUSCULAR REEDUCATION: CPT

## 2019-11-18 NOTE — PROGRESS NOTES
PT DAILY TREATMENT NOTE 10-18    Patient Name: Sukhjinder Knapp  Date:2019  : 1923  [x]  Patient  Verified  Payor: [place] MEDICARE / Plan: [place] MEDICARE PART A & B / Product Type: Medicare /    In time:1:33  Out time:2:16  Total Treatment Time (min): 43  Visit #: 5 of 16    Medicare/BCBS Only   Total Timed Codes (min):  43 1:1 Treatment Time:  43       Treatment Area: Spinal stenosis, lumbar region with neurogenic claudication [M48.062]  Low back pain [M54.5]    SUBJECTIVE  Pain Level (0-10 scale): 6  Any medication changes, allergies to medications, adverse drug reactions, diagnosis change, or new procedure performed?: [x] No    [] Yes (see summary sheet for update)  Subjective functional status/changes:   [] No changes reported  \"this  Arthritis. \"    OBJECTIVE    Modality rationale: decrease edema, decrease inflammation, decrease pain and increase tissue extensibility to improve the patients ability to perform ADL    Min Type Additional Details    [] Estim:  []Unatt       []IFC  []Premod                        []Other:  []w/ice   []w/heat  Position:  Location:    [] Estim: []Att    []TENS instruct  []NMES                    []Other:  []w/US   []w/ice   []w/heat  Position:  Location:    []  Traction: [] Cervical       []Lumbar                       [] Prone          []Supine                       []Intermittent   []Continuous Lbs:  [] before manual  [] after manual    []  Ultrasound: []Continuous   [] Pulsed                           []1MHz   []3MHz W/cm2:  Location:    []  Iontophoresis with dexamethasone         Location: [] Take home patch   [] In clinic    []  Ice     []  heat  []  Ice massage  []  Laser   []  Anodyne Position:  Location:    []  Laser with stim  []  Other:  Position:  Location:    []  Vasopneumatic Device Pressure:       [] lo [] med [] hi   Temperature: [] lo [] med [] hi   [x] Skin assessment post-treatment:  [x]intact []redness- no adverse reaction    []redness  adverse reaction: min []Eval                  []Re-Eval       33 min Therapeutic Exercise:  [x] See flow sheet :   Rationale: increase ROM and increase strength to improve the patients ability to perform ADL      min Therapeutic Activity:  []  See flow sheet :   Rationale: increase ROM and increase strength  to improve the patients ability to perform ADL      10 min Neuromuscular Re-education:  []  See flow sheet :   Rationale:   to improve the patients ability to perform ADL      min Manual Therapy:     Rationale: decrease pain, increase ROM, increase tissue extensibility and decrease edema  to perform ADL      min Gait Training:  ___ feet with ___ device on level surfaces with ___ level of assist   Rationale: With   [x] TE   [] TA   [] neuro   [] other: Patient Education: [x] Review HEP    [] Progressed/Changed HEP based on:   [] positioning   [] body mechanics   [] transfers   [] heat/ice application    [] other:      Other Objective/Functional Measures:  Increased  Rep per flow sheet    Pain Level (0-10 scale) post treatment: 0    ASSESSMENT/Changes in Function: pt  Ambulated 125ft with SC right SBA with good  Step length. Completed each there ex fairly well with cues. Patient will continue to benefit from skilled PT services to address functional mobility deficits, address ROM deficits, address strength deficits, analyze and address soft tissue restrictions and analyze and cue movement patterns to attain remaining goals. [x]  See Plan of Care  []  See progress note/recertification  []  See Discharge Summary         Progress towards goals / Updated goals:  Short Term Goals: To be accomplished in 8 weeks:              1. Patient will be ind and compliant with HEP 1-2x/day to increase ease with ADLs.               Eval: HEP established    Current met 11/18/29              2. Patient will be able to maintain Rhomberg stance on floor with EC for 15 sec without LOB to increase safety ambulating             Eval: Rhomberg on floor with EO: no LOB EC: 5 sec then required therapist assist to regain stability.   [address] be accomplished in 16 weeks:              1.  Patient will improve FOTO to at least 54 to demonstrate improved function.              Eval: FOTO: 34              2. Patient will improve right hip abduction strength to 4/5 to increase safety with transfers.              Eval: Hip abduction: right: 3+/5               Current: hip abduction: Right: 4-/5 (11/13/19)               3. Patient will report being able to ambulate/stand for 25 minutes with [facility] with pain no more then 2/10 to increase ease with community ambulation.              Eval:  Patient has difficulty with standing/walking 15 minutes and then needs to sit down                Current: patent stated that she is able to walk/stand 15 minutes without pain (11/13/19)        PLAN  []  Upgrade activities as tolerated     [x]  Continue plan of care  []  Update interventions per flow sheet       []  Discharge due to:_  []  Other:_      Baylee Montejo PTA 11/18/2019  1:35 PM    Future Appointments   Date Time Provider Jean Marie Sorenson   11/20/2019 11:00 AM Veronica Diaz PT [facility]   11/25/2019 11:00 AM Veronica Diaz PT [facility]   11/27/2019 11:00 AM Veronica Diaz PT [facility]   12/2/2019 11:00 AM Veronica Diaz PT [facility]   12/4/2019 11:00 AM Veronica Diaz PT [facility]   12/9/2019 11:00 AM Veronica Diaz PT [facility]   12/11/2019 11:00 AM Daisy Skinner PTA [facility]   12/12/2019 11:30 AM Azael Arzate  [address]    3/3/2020  8:40 AM [facility] NURSE [facility]   3/10/2020 11:30 AM Janita Sicard, MD 11 [facility]   4/22/2020 10:40 AM Lubna Hernadez DO [address] [place] [address]

## 2019-11-20 ENCOUNTER — HOSPITAL ENCOUNTER (OUTPATIENT)
Dept: PHYSICAL THERAPY | Age: 84
Discharge: HOME | End: 2019-11-20
Payer: MEDICARE

## 2019-11-20 PROCEDURE — 97112 NEUROMUSCULAR REEDUCATION: CPT

## 2019-11-20 PROCEDURE — 97110 THERAPEUTIC EXERCISES: CPT

## 2019-11-20 NOTE — PROGRESS NOTES
PT DAILY TREATMENT NOTE 10-18    Patient Name: Chuy Gomez  Date:2019  : 1923  [x]  Patient  Verified  Payor: [place] MEDICARE / Plan: [place] MEDICARE PART A & B / Product Type: Medicare /    In time: 10:59  Out time:11:44  Total Treatment Time (min): 45  Visit #: 6 of 16    Medicare/BCBS Only   Total Timed Codes (min):  45 1:1 Treatment Time: 23       Treatment Area: Spinal stenosis, lumbar region with neurogenic claudication [M48.062]  Low back pain [M54.5]    SUBJECTIVE  Pain Level (0-10 scale): 0/10  Any medication changes, allergies to medications, adverse drug reactions, diagnosis change, or new procedure performed?: [x] No    [] Yes (see summary sheet for update)  Subjective functional status/changes:   [] No changes reported  Patient stated she hasn't had pain for a couple days, and when she did start to have pain she just did the exercises and pain went away. OBJECTIVE    30 min Therapeutic Exercise:  [x] See flow sheet :   Rationale: increase ROM and increase strength to improve the patients ability to perform ADLs. 15 min Neuromuscular Re-education:  [x]  See flow sheet : core stability and balance acts    Rationale: increase strength, improve coordination, improve balance and increase proprioception  to improve the patients ability to ambulate safely and on uneven terrain and ability to activate core effectively and increase safety with household management. Patient was challenged with maintaining stability and foam surface as patient expressed fear of falling, however patient hands were right above the parallel bars and therapist was closely guarding patient. Patient was able to maintain Romberg stance on foam surface with EO for 15 sec before requiring HHA.          With   [] TE   [] TA   [] neuro   [] other: Patient Education: [x] Review HEP    [] Progressed/Changed HEP based on:   [] positioning   [] body mechanics   [] transfers   [] heat/ice application    [] other:      Other Objective/Functional Measures: Patient has a good heel strike evident During ambulation, mild trunk lean toward right during ambulation noted. Pain Level (0-10 scale) post treatment: 0/10     ASSESSMENT/Changes in Function: Therapist advanced exercises to incorporate more standing/functional activities. Patient was able to perform 4 minutes and 30 seconds of standing activity, but then needed a seated rest break due to fatigue. Patient denied any pain with standing exercises or during ambulation, just fatigue. Plan to focus on more standing/functional activities and begin to discharge table based exercises as patient tolerates and progresses. Patient will continue to benefit from skilled PT services to modify and progress therapeutic interventions, address functional mobility deficits, address ROM deficits, address strength deficits, analyze and address soft tissue restrictions, analyze and cue movement patterns, assess and modify postural abnormalities and address imbalance/dizziness to attain remaining goals. []  See Plan of Care  []  See progress note/recertification  []  See Discharge Summary         Progress towards goals / Updated goals:  Short Term Goals: To be accomplished in 8 weeks:              7. Patient will be ind and compliant with HEP 1-2x/day to increase ease with ADLs.             Eval: HEP established    Current met 11/18/29              2. Patient will be able to maintain Rhomberg stance on floor with EC for 15 sec without LOB to increase safety ambulating               Eval: Rhomberg on floor with EO: no LOB EC: 5 sec then required therapist assist to regain stability.   [address] [facility] [address] be accomplished in 16 weeks:              1.  Patient will improve FOTO to at least 54 to demonstrate improved function.              Eval: FOTO: 34              2. Patient will improve right hip abduction strength to 4/5 to increase safety with transfers.              Eval: Hip abduction: right: 3+/5               Current: hip abduction: Right: 4-/5 (11/13/19)               3. Patient will report being able to ambulate/stand for 25 minutes with [facility] with pain no more then 2/10 to increase ease with community ambulation.              Eval:  Patient has difficulty with standing/walking 15 minutes and then needs to sit down                Current: patent stated that she is able to walk/stand 15 minutes without pain (11/13/19)     PLAN  []  Upgrade activities as tolerated     [x]  Continue plan of care  []  Update interventions per flow sheet       []  Discharge due to:_  []  Other:_      Lyndsey Wing, PT 11/20/2019  11:24 AM    Future Appointments   Date Time Provider Jean Marie Yas   11/25/2019 11:00 AM Narcisa Mitchell, PT [facility] [address]   11/27/2019 11:00 AM Narcisa Mitchell PT [facility] [address]   12/2/2019 11:00 AM Narcisa Mitchell PT [facility] [address]   12/4/2019 11:00 AM Narcisa Mitchell PT [facility] [address]   12/9/2019 11:00 AM Narcisa Mitchell PT [facility] [address]   12/11/2019 11:00 AM James Sheppard PTA [facility] [address]   12/12/2019 11:30 AM Elvira Lazcano  [address]   3/3/2020  8:40 AM [facility] NURSE [facility]   3/10/2020 11:30 AM Karly Cevallos MD 11 [facility]   4/22/2020 10:40 AM Tete Guzman DO [address] [place] [address]

## 2019-11-25 ENCOUNTER — HOSPITAL ENCOUNTER (OUTPATIENT)
Dept: PHYSICAL THERAPY | Age: 84
Discharge: HOME | End: 2019-11-25
Payer: MEDICARE

## 2019-11-25 PROCEDURE — 97110 THERAPEUTIC EXERCISES: CPT

## 2019-11-25 PROCEDURE — 97112 NEUROMUSCULAR REEDUCATION: CPT

## 2019-11-25 NOTE — PROGRESS NOTES
PT DAILY TREATMENT NOTE 10-18    Patient Name: Binh Alegre  Date:2019  : 1923  [x]  Patient  Verified  Payor: [place] MEDICARE / Plan: [place] MEDICARE PART A & B / Product Type: Medicare /    In time:11:02  Out time:11:42  Total Treatment Time (min): 40  Visit #: 7 of 16    Medicare/BCBS Only   Total Timed Codes (min):  40 1:1 Treatment Time:  40       Treatment Area: Spinal stenosis, lumbar region with neurogenic claudication [M48.062]  Low back pain [M54.5]    SUBJECTIVE  Pain Level (0-10 scale): 5/10  Any medication changes, allergies to medications, adverse drug reactions, diagnosis change, or new procedure performed?: [x] No    [] Yes (see summary sheet for update)  Subjective functional status/changes:   [] No changes reported  Patient stated that she felt good after doing PT last session, but then this past Saturday she noticed an aching pain in (B) quads and across low back. Patient reported that the numbness in legs is better and pain in side is still gone. OBJECTIVE    30 min Therapeutic Exercise:  [x] See flow sheet : HEP creation and reviewed with patient. Rationale: increase ROM and increase strength to improve the patients ability to perform ADls. 10 min Neuromuscular Re-education:  [x]  See flow sheet : balance acts    Rationale: increase strength, improve coordination, improve balance and increase proprioception  to improve the patients ability to ambulate safely and manage household safely. Patient did well with weight shifting while performing dynamic reach in a variety of positions. With   [] TE   [] TA   [] neuro   [] other: Patient Education: [x] Review HEP    [] Progressed/Changed HEP based on:   [] positioning   [] body mechanics   [] transfers   [] heat/ice application    [] other:      Other Objective/Functional Measures: Patient was able to perform seated LAQ without pain and had no pain with palpation at (B) quads.      Patient was able to maintain Rhomberg stance for 15 sec without LOB. Pain Level (0-10 scale) post treatment: 0/10    ASSESSMENT/Changes in Function: Patient reported that pain in legs and back went away as patient performed standing exercises, and by the time the session was over patient reported no pain. Therapist administered patient a HEP and reviewed with patient. Therapist advised patient to stop HEP if pain increases. Patient progressing well toward goals and met STG #2. Patient continues to require multiple verbal and tactile cues from therapist to perform exercises correctly, but once cued demonstrates good technique. Patient will continue to benefit from skilled PT services to modify and progress therapeutic interventions, address functional mobility deficits, address ROM deficits, address strength deficits, analyze and address soft tissue restrictions, analyze and cue movement patterns, assess and modify postural abnormalities and address imbalance/dizziness to attain remaining goals. []  See Plan of Care  []  See progress note/recertification  []  See Discharge Summary         Progress towards goals / Updated goals:  Short Term Goals: To be accomplished in 8 weeks:              9. Patient will be ind and compliant with HEP 1-2x/day to increase ease with ADLs.             Eval: HEP established    Current met 11/18/29              2. Patient will be able to maintain Rhomberg stance on floor with EC for 15 sec without LOB to increase safety ambulating               Eval: Rhomberg on floor with EO: no LOB EC: 5 sec then required therapist assist to regain stability. Current: MET. Patient was able to maintain Rhomberg stance for 15 sec without LOB. (11/25/19)     Long Term Goals: To be accomplished in 16 weeks:              1.  Patient will improve FOTO to at least 54 to demonstrate improved function.              Eval: FOTO: 34              2. Patient will improve right hip abduction strength to 4/5 to increase safety with transfers.              Eval: Hip abduction: right: 3+/5               Current: hip abduction: Right: 4-/5 (11/13/19)               3. Patient will report being able to ambulate/stand for 25 minutes with [facility] with pain no more then 2/10 to increase ease with community ambulation.              Eval:  Patient has difficulty with standing/walking 15 minutes and then needs to sit down                Current: patent stated that she is able to walk/stand 15 minutes without pain (11/13/19)     PLAN  []  Upgrade activities as tolerated     [x]  Continue plan of care  []  Update interventions per flow sheet       []  Discharge due to:_  []  Other:_      Jack Wright, PT 11/25/2019  11:04 AM    Future Appointments   Date Time Provider Jean Marie Sorenson   11/27/2019 11:00 AM Cristina Trujillo, PT [facility]   12/2/2019 11:00 AM Wickliffe January, PT [facility]   12/4/2019 11:00 AM Wickliffe January, PT [facility]   12/9/2019 11:00 AM Yanna Collins [facility]   12/11/2019 11:00 AM Fer Olivier PTA [facility]   12/12/2019 11:30 AM Los Garcia  [address] [place] [address]   3/3/2020  8:40 AM [facility] NURSE [facility]   3/10/2020 11:30 AM Marya Reed MD 11 [facility]   4/22/2020 10:40 AM Rickie Tsang DO [address] [place] [address]

## 2019-11-27 ENCOUNTER — HOSPITAL ENCOUNTER (OUTPATIENT)
Dept: PHYSICAL THERAPY | Age: 84
Discharge: HOME | End: 2019-11-27
Payer: MEDICARE

## 2019-11-27 PROCEDURE — 97530 THERAPEUTIC ACTIVITIES: CPT | Performed by: PHYSICAL THERAPIST

## 2019-11-27 PROCEDURE — 97110 THERAPEUTIC EXERCISES: CPT | Performed by: PHYSICAL THERAPIST

## 2019-11-27 NOTE — PROGRESS NOTES
PT DAILY TREATMENT NOTE 10-18    Patient Name: Vicente Amador  Date:2019  : 1923  [x]  Patient  Verified  Payor: VA MEDICARE / Plan: VA MEDICARE PART A & B / Product Type: Medicare /    In time:11:00a  Out time:11:32A  Total Treatment Time (min): 32min  Visit #: 8 of 16    Medicare/BCBS Only   Total Timed Codes (min):  32 1:1 Treatment Time:  24       Treatment Area: Spinal stenosis, lumbar region with neurogenic claudication [M48.062]  Low back pain [M54.5]    SUBJECTIVE  Pain Level (0-10 scale): 0/10  Any medication changes, allergies to medications, adverse drug reactions, diagnosis change, or new procedure performed?: [x] No    [] Yes (see summary sheet for update)  Subjective functional status/changes:   [] No changes reported  Patient states she is feeling pretty good at home overall. No complaints today. OBJECTIVE    15 min Therapeutic Exercise:  [x] See flow sheet :   Rationale: increase ROM and increase strength to improve the patients ability to A/ROM and decrease pain with movement. 17 min Therapeutic Activity:  [x]  See flow sheet :   Rationale: increase strength and improve coordination  to improve the patients ability to Tolerate basic ADLs and job-related tasks without pain. With   [x] TE   [x] TA   [] neuro   [] other: Patient Education: [x] Review HEP    [x] Progressed/Changed HEP based on:   [x] positioning   [x] body mechanics   [] transfers   [] heat/ice application    [] other:      Other Objective/Functional Measures: Difficulty with advancement of TVA exercises to include use of Swiss ball - needed multiple tactile cuing and correction. Pain Level (0-10 scale) post treatment: 0/10    ASSESSMENT/Changes in Function: Patient is progressing towards goals of increased activity and decreased pain overall. Patient stated today's session was a 'lot of work' but that it felt good.  Continues to need skilled services to address impaired gait and balance second to back and leg pain. Patient will continue to benefit from skilled PT services to modify and progress therapeutic interventions, address functional mobility deficits, address ROM deficits, address strength deficits, analyze and address soft tissue restrictions, analyze and cue movement patterns, analyze and modify body mechanics/ergonomics and assess and modify postural abnormalities to attain remaining goals. [x]  See Plan of Care  []  See progress note/recertification  []  See Discharge Summary         Progress towards goals / Updated goals:  Short Term Goals: To be accomplished in 8 weeks:              1. Patient will be ind and compliant with HEP 1-2x/day to increase ease with ADLs.             Eval: HEP established    Current met 11/18/29              2. Patient will be able to maintain Rhomberg stance on floor with EC for 15 sec without LOB to increase safety ambulating               Eval: Rhomberg on floor with EO: no LOB EC: 5 sec then required therapist assist to regain stability. Current: MET. Patient was able to maintain Rhomberg stance for 15 sec without LOB. (11/25/19)     Long Term Goals: To be accomplished in 16 weeks:              1.  Patient will improve FOTO to at least 54 to demonstrate improved function.              Eval: FOTO: 34   Current: not yet assessed 11/27/19              2. Patient will improve right hip abduction strength to 4/5 to increase safety with transfers.              Eval: Hip abduction: right: 3+/5               Current: hip abduction: Right: 4-/5 (11/13/19)               3. Patient will report being able to ambulate/stand for 25 minutes with [facility] with pain no more then 2/10 to increase ease with community ambulation.              Eval:  Patient has difficulty with standing/walking 15 minutes and then needs to sit down                Current: patent stated that she is able to walk/stand 15 minutes without pain (11/13/19)        PLAN  [x]  Upgrade activities as tolerated     []  Continue plan of care  []  Update interventions per flow sheet       []  Discharge due to:_  []  Other:_      Rajinder Gaytan, PT 11/27/2019  12:38 PM    Future Appointments   Date Time Provider Jean Marie Sorenson   12/2/2019 11:00 AM Morris Pedroza, PT [facility]   12/4/2019 11:00 AM Morris Pedroza PT [facility]   12/9/2019 11:00 AM Carlos Eduardo Potts [facility]   12/11/2019 11:00 AM Caroline Cavazos PTA [facility]   12/12/2019 11:30 AM Radha Burris  E [facility]   3/3/2020  8:40 AM [facility] NURSE [facility]   3/10/2020 11:30 AM Alejandra Ling MD 11 [facility]   4/22/2020 10:40 AM Freddie Messina DO [address] [place] [address]

## 2019-12-02 ENCOUNTER — APPOINTMENT (OUTPATIENT)
Dept: PHYSICAL THERAPY | Age: 84
End: 2019-12-02
Payer: MEDICARE

## 2019-12-04 ENCOUNTER — HOSPITAL ENCOUNTER (OUTPATIENT)
Dept: PHYSICAL THERAPY | Age: 84
Discharge: HOME | End: 2019-12-04
Payer: MEDICARE

## 2019-12-04 PROCEDURE — 97112 NEUROMUSCULAR REEDUCATION: CPT

## 2019-12-04 PROCEDURE — 97110 THERAPEUTIC EXERCISES: CPT

## 2019-12-04 NOTE — PROGRESS NOTES
[facility] [address], [address] [place] [address], [address]  [phone] fax      Continued Plan of Care/ Re-certification for Physical Therapy Services    Patient name: Ramsey Orosco Start of Care: 2019   Referral source: Juani Wood MD : 1923   Medical/Treatment Diagnosis: Spinal stenosis, lumbar region with neurogenic claudication [M48.062]  Low back pain [M54.5]  Payor: VA MEDICARE / Plan: VA MEDICARE PART A & B / Product Type: Medicare /  Onset Date: approximately 3 months ago                 Prior Hospitalization: see medical history Provider#: [id number]   Medications: Verified on Patient Summary List    Comorbidities: Arthritis, Heart disease- minor heart attack in ; DM, HTN, hearing impaired. Prior Level of Function:  Patient exercises regularly at the place where she lives. Patient uses [facility] for ambulation.     Visits from Start of Care: *9    Missed Visits: 1    The Plan of Care and following information is based on the patient's current status:  Progress towards goals / Updated goals:  Short Term Goals: To be accomplished in 8 weeks:              1. Patient will be ind and compliant with HEP 1-2x/day to increase ease with ADLs.             Eval: HEP established    Current met 29              2. Patient will be able to maintain Rhomberg stance on floor with EC for 15 sec without LOB to increase safety ambulating               Eval: Rhomberg on floor with EO: no LOB EC: 5 sec then required therapist assist to regain stability.             MCTEHZQ:  Patient was able to maintain Rhomberg stance for 15 sec without LOB. (19); Floor: rhomberg with EC: 9sec then opened eyes to regain visual stability (no HHA required though) (19)   [facility] be accomplished in 16 weeks:              1.  Patient will improve FOTO to at least 54 to demonstrate improved function.              Eval: FOTO: 34              Current: MET: FOTO: 57 (12/4/19)               2. Patient will improve right hip abduction strength to 4/5 to increase safety with transfers.              Eval: Hip abduction: right: 3+/5               Current: MET Hip abduction: right: 4/5  (12/4/19)                3. Patient will report being able to ambulate/stand for 25 minutes with [facility] with pain no more then 2/10 to increase ease with community ambulation.              Eval:  Patient has difficulty with standing/walking 15 minutes and then needs to sit down                Current: Progressing: patent stated that she is able to walk approximately 20 minutes without increased pain as along as she is using her [facility] (12/4/19)     Key functional changes:  Patient reports improvement following physical therapy and frequently reports no pain after a session, but the pain relief lasts only a few days and then low back pain returns. Patient stated she hasn't had the right sided pain for a week. Patient reports numbness in legs is less then it use to be and stated when she does the exercises it helps to alleviate the numbness. Problems/ barriers to goal attainment: none     Problem List: pain affecting function, decrease ROM, decrease strength, impaired gait/ balance, decrease ADL/ functional abilitiies, decrease activity tolerance, decrease flexibility/ joint mobility and decrease transfer abilities    Treatment Plan: Therapeutic exercise, Therapeutic activities, Neuromuscular re-education, Physical agent/modality, Gait/balance training, Manual therapy, Patient education, Functional mobility training, Home safety training and Stair training     Patient Goal (s) has been updated and includes: continue with previous goal      Goals for this certification period to be accomplished in 4 weeks:  1. Patient will maintain MSR stance on foam surface with EO for 25sec without LOB to increase safety ambulating on uneven terrain.     Current on 12/4/19: Foam: MSR: 12 sec then required HHA   2. Patient will maintain Rhomberg stance on floor with EC for 15 sec without LOB to increase safety ambulating in the evening. Current on 12/4/19: Floor: rhomberg with EC: 9sec then opened eyes to regain visual stability (no HHA required though) (12/4/19)   3. Patient will report being able to ambulate/stand for 25 minutes with [facility] with pain no more then 2/10 to increase ease with community ambulation. Current on 12/4/19: patent stated that she is able to walk approximately 20 minutes without increased pain as along as she is using her [facility] (12/4/19)   Frequency / Duration: Patient to be seen 2 times per week for 4 weeks:    Assessment / Recommendations: Patient will continue to benefit from skilled PT services to modify and progress therapeutic interventions, address functional mobility deficits, address ROM deficits, address strength deficits, analyze and address soft tissue restrictions, analyze and cue movement patterns, assess and modify postural abnormalities and address imbalance/dizziness to attain remaining goals. Certification Period: 12/05/19- 01/04/20    Dolores Napier, PT 12/4/2019 12:14 PM    ________________________________________________________________________  I certify that the above Therapy Services are being furnished while the patient is under my care. I agree with the treatment plan and certify that this therapy is necessary. [] I have read the above and request that my patient continue as recommended.   [] I have read the above report and request that my patient continue therapy with the following changes/special instructions: _____________________________________________  [] I have read the above report and request that my patient be discharged from therapy    Physician's Signature:____________Date:_________TIME:________    ** Signature, Date and Time must be completed for valid certification **    Please sign and return to [facility] [address] [place] [address], [address] [place] [address], [address] [place] [address]  [phone] fax

## 2019-12-04 NOTE — PROGRESS NOTES
PT DAILY TREATMENT NOTE 10-18    Patient Name: Nash Lomeli  Date:2019  : 1923  [x]  Patient  Verified  Payor: VA MEDICARE / Plan: VA MEDICARE PART A & B / Product Type: Medicare /    In time: 11:02  Out time:11:41  Total Treatment Time (min): 39  Visit #: 9 of 16    Medicare/BCBS Only   Total Timed Codes (min):  39 1:1 Treatment Time:  39       Treatment Area: Spinal stenosis, lumbar region with neurogenic claudication [M48.062]  Low back pain [M54.5]    SUBJECTIVE  Pain Level (0-10 scale): 5/10    Any medication changes, allergies to medications, adverse drug reactions, diagnosis change, or new procedure performed?: [x] No    [] Yes (see summary sheet for update)  Subjective functional status/changes:   [] No changes reported  Patient reports that she has noticed improvement since starting PT, but her back still hurts sometimes and would like to continue with PT.       OBJECTIVE    29 min Therapeutic Exercise:  [x] See flow sheet :   Rationale: increase ROM and increase strength to improve the patients ability to perform ADLs. *10 min Neuromuscular Re-education:  [x]  See flow sheet : balance acts    Rationale: increase strength, improve coordination, improve balance and increase proprioception  to improve the patients ability to perform household management safely and ambulate on uneven terrains safely.          With   [] TE   [] TA   [] neuro   [] other: Patient Education: [x] Review HEP    [] Progressed/Changed HEP based on:   [] positioning   [] body mechanics   [] transfers   [] heat/ice application    [] other:      Other Objective/Functional Measures:   Hip abduction: right: 4/5    Foam: rhomberg stance: 30 sec no LOB; MSR: 12 sec then required HHA   Floor: rhomberg with EC: 9sec then opened eyes to regain visual stability (no HHA required though)   Pain Level (0-10 scale) post treatment: 0/10     ASSESSMENT/Changes in Function: Patient reports improvement following physical therapy and frequently reports no pain after a session, but the pain relief lasts only a few days and then low back pain returns. Patient stated she hasn't had the right sided pain for a week. Patient reports numbness in legs is less then it use to be and stated when she does the exercises it helps to alleviate the numbness. Patient will continue to benefit from skilled PT services to modify and progress therapeutic interventions, address functional mobility deficits, address ROM deficits, address strength deficits, analyze and address soft tissue restrictions, analyze and cue movement patterns, assess and modify postural abnormalities and address imbalance/dizziness to attain remaining goals. []  See Plan of Care  []  See progress note/recertification  []  See Discharge Summary         Progress towards goals / Updated goals:  Short Term Goals: To be accomplished in 8 weeks:              9. Patient will be ind and compliant with HEP 1-2x/day to increase ease with ADLs.             Eval: HEP established    Current met 11/18/29              2. Patient will be able to maintain Rhomberg stance on floor with EC for 15 sec without LOB to increase safety ambulating               Eval: Rhomberg on floor with EO: no LOB EC: 5 sec then required therapist assist to regain stability.             QYVNKND: MET. Patient was able to maintain Rhomberg stance for 15 sec without LOB. (11/25/19)     Long Term Goals: To be accomplished in 16 weeks:              1.  Patient will improve FOTO to at least 54 to demonstrate improved function.              Eval: FOTO: 34              Current: MET: FOTO: 57 (12/4/19)               2. Patient will improve right hip abduction strength to 4/5 to increase safety with transfers.              Eval: Hip abduction: right: 3+/5               Current: MET Hip abduction: right: 4/5  (12/4/19)                3. Patient will report being able to ambulate/stand for 25 minutes with [facility] with pain no more then 2/10 to increase ease with community ambulation.              Eval:  Patient has difficulty with standing/walking 15 minutes and then needs to sit down                Current: Progressing: patent stated that she is able to walk approximately 20 minutes without increased pain as along as she is using her [facility] (12/4/19)     PLAN  []  Upgrade activities as tolerated     [x]  Continue plan of care  []  Update interventions per flow sheet       []  Discharge due to:_  []  Other:_      Beti Figueroa, PT 12/4/2019  11:08 AM    Future Appointments   Date Time Provider Jean Marie Sorenson   12/9/2019 11:00 AM Avni Alvarez [facility]   12/11/2019 11:00 AM Salvador Barth PTA [facility]   12/12/2019 11:30 AM Stephenie Giraldo  E [facility]   3/3/2020  8:40 AM Valentina [place] [id number]   3/10/2020 11:30 AM Annabel Stephenson MD 11 [facility]   4/22/2020 10:40 AM Rene Francois  [facility]

## 2019-12-09 ENCOUNTER — HOSPITAL ENCOUNTER (OUTPATIENT)
Dept: PHYSICAL THERAPY | Age: 84
Discharge: HOME | End: 2019-12-09
Payer: MEDICARE

## 2019-12-09 PROCEDURE — 97112 NEUROMUSCULAR REEDUCATION: CPT

## 2019-12-09 PROCEDURE — 97110 THERAPEUTIC EXERCISES: CPT

## 2019-12-09 NOTE — PROGRESS NOTES
PT DAILY TREATMENT NOTE 10-18    Patient Name: Tacos Peralta  Date:2019  : 1923  [x]  Patient  Verified  Payor: [place] MED[place]ARE / Plan: [place] MEDICARE PART A & B / Product Type: Medicare /    In time: 11:10  Out time:11:45  Total Treatment Time (min): 35  Visit #: 10 of 16    Medicare/BCBS Only   Total Timed Codes (min):  35 1:1 Treatment Time:  35       Treatment Area: Spinal stenosis, lumbar region with neurogenic claudication [M48.062]  Low back pain [M54.5]    SUBJECTIVE  Pain Level (0-10 scale): 0  Any medication changes, allergies to medications, adverse drug reactions, diagnosis change, or new procedure performed?: [x] No    [] Yes (see summary sheet for update)  Subjective functional status/changes:   [] No changes reported  \"Legs are weak. \"    OBJECTIVE    Modality rationale: decrease edema, decrease inflammation, decrease pain and increase tissue extensibility to improve the patients ability to perform ADL   Min Type Additional Details    [] Estim:  []Unatt       []IFC  []Premod                        []Other:  []w/ice   []w/heat  Position:  Location:    [] Estim: []Att    []TENS instruct  []NMES                    []Other:  []w/US   []w/ice   []w/heat  Position:  Location:    []  Traction: [] Cervical       []Lumbar                       [] Prone          []Supine                       []Intermittent   []Continuous Lbs:  [] before manual  [] after manual    []  Ultrasound: []Continuous   [] Pulsed                           []1MHz   []3MHz W/cm2:  Location:    []  Iontophoresis with dexamethasone         Location: [] Take home patch   [] In clinic    []  Ice     []  heat  []  Ice massage  []  Laser   []  Anodyne Position:  Location:    []  Laser with stim  []  Other:  Position:  Location:    []  Vasopneumatic Device Pressure:       [] lo [] med [] hi   Temperature: [] lo [] med [] hi   [x] Skin assessment post-treatment:  [x]intact []redness- no adverse reaction    []redness  adverse reaction: min []Eval                  []Re-Eval       27 min Therapeutic Exercise:  [x] See flow sheet :   Rationale: increase ROM and increase strength to improve the patients ability to perform ADL     min Therapeutic Activity:  []  See flow sheet :   Rationale:   to improve the patients ability to      8 min Neuromuscular Re-education:  []  See flow sheet :   Rationale:   to improve the patients ability to perform ADL      min Manual Therapy:     Rationale: decrease pain, increase ROM, increase tissue extensibility and decrease edema  to perform ADL      min Gait Training:  ___ feet with ___ device on level surfaces with ___ level of assist   Rationale: With   [x] TE   [] TA   [] neuro   [] other: Patient Education: [x] Review HEP    [] Progressed/Changed HEP based on:   [] positioning   [] body mechanics   [] transfers   [] heat/ice application    [] other:      Other Objective/Functional Measures:      Pain Level (0-10 scale) post treatment: 0    ASSESSMENT/Changes in Function: Ambulated 200ft with SC right with no difficulty. Completed each there ex fairly well with cues. Patient will continue to benefit from skilled PT services to address functional mobility deficits, address ROM deficits, address strength deficits, analyze and address soft tissue restrictions and analyze and cue movement patterns to attain remaining goals. [x]  See Plan of Care  []  See progress note/recertification  []  See Discharge Summary         Progress towards goals / Updated goals:  Goals for this certification period to be accomplished in 4 weeks:  1. Patient will maintain MSR stance on foam surface with EO for 25sec without LOB to increase safety ambulating on uneven terrain. Current on 12/4/19: Foam: MSR: 12 sec then required HHA   2. Patient will maintain Rhomberg stance on floor with EC for 15 sec without LOB to increase safety ambulating in the evening.                 Current on 12/4/19: Floor: ulysseserg with EC: 9sec then opened eyes to regain visual stability (no HHA required though) (12/4/19)   3. Patient will report being able to ambulate/stand for 25 minutes with [facility] with pain no more then 2/10 to increase ease with community ambulation.                Current on 12/4/19: patent stated that she is able to walk approximately 20 minutes without increased pain as along as she is using her SPC (12/4/19)     PLAN  [x]  Upgrade activities as tolerated     []  Continue plan of care  []  Update interventions per flow sheet       []  Discharge due to:_  []  Other:_      Walker Galeano PTA 12/9/2019  11:51 AM    Future Appointments   Date Time Provider Jean Marie Juarezi   12/11/2019 11:00 AM Saw Yancey PTA [facility]   12/12/2019 11:30 AM Juani Wood  [address]    12/16/2019 11:00 AM Rehana He [facility]   12/18/2019 11:00 AM Baylee Montejo PTA [facility]   3/3/2020  8:40 AM [place] [id number]   3/10/2020 11:30 AM Vera Weber MD 11 [facility]   4/22/2020 10:40 AM Flaquito Maldonado  [facility]

## 2019-12-11 ENCOUNTER — HOSPITAL ENCOUNTER (OUTPATIENT)
Dept: PHYSICAL THERAPY | Age: 84
Discharge: HOME | End: 2019-12-11
Payer: MEDICARE

## 2019-12-11 PROCEDURE — 97110 THERAPEUTIC EXERCISES: CPT

## 2019-12-11 PROCEDURE — 97112 NEUROMUSCULAR REEDUCATION: CPT

## 2019-12-11 NOTE — PROGRESS NOTES
PT DAILY TREATMENT NOTE 10-18    Patient Name: Haseeb Marques  Date:2019  : 1923  [x]  Patient  Verified  Payor: [place] MEDICARE / Plan: [place] MEDICARE PART A & B / Product Type: Medicare /    In time:  11:00 Out time:11:41  Total Treatment Time (min): 34  Visit #: 11 of 16    Medicare/BCBS Only   Total Timed Codes (min):  34 1:1 Treatment Time:  34       Treatment Area: Spinal stenosis, lumbar region with neurogenic claudication [M48.062]  Low back pain [M54.5]    SUBJECTIVE  Pain Level (0-10 scale): 0  Any medication changes, allergies to medications, adverse drug reactions, diagnosis change, or new procedure performed?: [x] No    [] Yes (see summary sheet for update)  Subjective functional status/changes:   [] No changes reported  \"feeling  Ok today. \"    OBJECTIVE    Modality rationale: decrease edema, decrease inflammation, decrease pain and increase tissue extensibility to improve the patients ability to perform ADL    Min Type Additional Details    [] Estim:  []Unatt       []IFC  []Premod                        []Other:  []w/ice   []w/heat  Position:  Location:    [] Estim: []Att    []TENS instruct  []NMES                    []Other:  []w/US   []w/ice   []w/heat  Position:  Location:    []  Traction: [] Cervical       []Lumbar                       [] Prone          []Supine                       []Intermittent   []Continuous Lbs:  [] before manual  [] after manual    []  Ultrasound: []Continuous   [] Pulsed                           []1MHz   []3MHz W/cm2:  Location:    []  Iontophoresis with dexamethasone         Location: [] Take home patch   [] In clinic    []  Ice     []  heat  []  Ice massage  []  Laser   []  Anodyne Position:  Location:    []  Laser with stim  []  Other:  Position:  Location:    []  Vasopneumatic Device Pressure:       [] lo [] med [] hi   Temperature: [] lo [] med [] hi   [x] Skin assessment post-treatment:  [x]intact []redness- no adverse reaction    []redness  adverse reaction:      min []Eval                  []Re-Eval       26 min Therapeutic Exercise:  [x] See flow sheet :   Rationale: increase ROM and increase strength to improve the patients ability to perform ADL    min Therapeutic Activity:  []  See flow sheet :   Rationale: increase ROM and increase strength  to improve the patients ability to perform ADL      8 min Neuromuscular Re-education:  [x]  See flow sheet :   Rationale: increase ROM and increase strength  to improve the patients ability to perform ADL      min Manual Therapy:     Rationale: decrease pain, increase ROM, increase tissue extensibility and decrease edema  to perform ADL     min Gait Training:  ___ feet with ___ device on level surfaces with ___ level of assist   Rationale: With   [x] TE   [] TA   [] neuro   [] other: Patient Education: [x] Review HEP    [] Progressed/Changed HEP based on:   [] positioning   [] body mechanics   [] transfers   [] heat/ice application    [] other:      Other Objective/Functional Measures: Added bike    Pain Level (0-10 scale) post treatment: 0    ASSESSMENT/Changes in Function: pt was unable to complete full 5 minutes on bike due to fatigue but able to complete 4 min. Completed remaining there ex  Fairly well with cues. Patient will continue to benefit from skilled PT services to address functional mobility deficits, address ROM deficits, address strength deficits, analyze and address soft tissue restrictions and analyze and cue movement patterns to attain remaining goals. [x]  See Plan of Care  []  See progress note/recertification  []  See Discharge Summary         Progress towards goals / Updated goals:  Goals for this certification period to be accomplished in 4 weeks:  1.  Patient will maintain MSR stance on foam surface with EO for 25sec without LOB to increase safety ambulating on uneven terrain.                Current on 12/4/19: Foam: MSR: 12 sec then required HHA   2. Patient will maintain Rhomberg stance on floor with EC for 15 sec without LOB to increase safety ambulating in the evening.                Current on 12/4/19: Floor: rhomberg with EC: 9sec then opened eyes to regain visual stability (no HHA required though) (12/4/19)   3. Patient will report being able to ambulate/stand for 25 minutes with SPC with pain no more then 2/10 to increase ease with community ambulation.               Current on 12/4/19: patent stated that she is able to walk approximately 20 minutes without increased pain as along as she is using her SPC (12/4/19)    PLAN  []  Upgrade activities as tolerated     []  Continue plan of care  []  Update interventions per flow sheet       []  Discharge due to:_  []  Other:_      Baylee Montejo PTA 12/11/2019  11:10 AM    Future Appointments   Date Time Provider Jean Marie Sorenson   12/12/2019 11:30 AM Azael Arzate  [address]    12/16/2019 11:00 AM Lizbet Meraz [facility]   12/18/2019 11:00 AM Baylee Montejo PTA [facility]   3/3/2020  8:40 [facility]   3/10/2020 11:30 AM Janita Sicard, MD 11 [facility]   4/22/2020 10:40 AM Lubna Hernadez  [facility]

## 2019-12-12 ENCOUNTER — OFFICE VISIT (OUTPATIENT)
Dept: ORTHOPEDIC SURGERY | Age: 84
End: 2019-12-12

## 2019-12-12 VITALS
DIASTOLIC BLOOD PRESSURE: 70 MMHG | TEMPERATURE: 98.5 F | SYSTOLIC BLOOD PRESSURE: 143 MMHG | HEART RATE: 85 BPM | OXYGEN SATURATION: 99 % | WEIGHT: 143 LBS | HEIGHT: 60 IN | BODY MASS INDEX: 28.07 KG/M2 | RESPIRATION RATE: 16 BRPM

## 2019-12-12 DIAGNOSIS — R10.2 PAIN IN PELVIS: ICD-10-CM

## 2019-12-12 DIAGNOSIS — M48.062 SPINAL STENOSIS OF LUMBAR REGION WITH NEUROGENIC CLAUDICATION: Primary | ICD-10-CM

## 2019-12-12 NOTE — PROGRESS NOTES
[address]  [address], [address]  [place], [address] [place] [address] Street  Phone: [phone]  Fax: [phone]        Buckner Eisenmenger  : 1923  PCP: Desi Sparks MD  2019    FOLLOW-UP    HISTORY OF PRESENT ILLNESS  Kirill Oviedo is a 80 y.o. female who was seen as a new patient 10/31/19 with c/o low back pain with BLE paraesthesia x 6 weeks. Pt reports a heaviness in her BLE with standing and walking. She reports a limited standing/walking tolerance. She finds relief with sitting. She also c/o some lateral right thigh pain. She ambulates with a cane. Dr. Elsa Manzanares recommended she use a walker for walking longer distances. Pt also c/o left sided functional deficits. She denies h/o stroke. Kirill Oviedo returns for follow up. She notes significant benefit from PT (19-19; Keith) with her walking improved. She states that her lateral thigh symptoms have improved. Today, she rates her pain as 5/10. ASSESSMENT  Her symptoms are likely due to lumbar spinal stenosis with neurogenic claudication. On examination, she had no clear myelopathic or UMN signs. PLAN  1. Continue PT  2. Should numbness or weakness in the BLE, discussed further medications and imaging. Pt will f/u in 3 months or sooner if needed. Diagnoses and all orders for this visit:    1. Spinal stenosis of lumbar region with neurogenic claudication    2. Pain in pelvis       CHIEF COMPLAINT  Kirill Oviedo is seen today in consultation at the request of Desi Sparks MD for complaints of low back pain extending into the BLE. PAST MEDICAL HISTORY   Past Medical History:   Diagnosis Date    Arthritis of right knee     CAD (coronary artery disease), native coronary artery 2010    mild disease    Cardiac cath 10/21/2010    dLM 20%. mLAD 25%. CX mild. pRCA 30%. EF 30-35%. Anterolateral, apical , diaphrag akin. Hypercontractile basal segments.   Severe elev left-sided filling press.  Cardiac echocardiogram 02/02/2011    EF 50-55%. Mild apical hypk. Gr 1 DDfx. RVSP 40-45. Mild LAE.  Cardiac Holter monitor 06/10/2015    Sinus rhythm w/avg HR of 53 bpm (range 40-85 bpm). Occasional PACs. One 3-beat run of nonsustained SVT. Very few PVCs. One 3-beat run of nonsustained VT. Pt's HR was < 50 bpm for 8 hrs of the recorded time. No pauses noted. Two episodes of neck pain corresponded to sinus rhythm w/o ectopy.  Cardiovascular renal duplex 12/03/2012    No renal artery stenosis bilaterally. Bilateral intrinsic/med disease. Patent bilateral renal veins. Multiple cysts on both kidneys.  CKD (chronic kidney disease)     while on diuretics for difficult to control HTN    Diabetes mellitus type II 3/15/2010    Dyslipidemia 3/15/2010    Heart attack ([address]     2010    Heart failure, diastolic, chronic ([address]     History of heart attack     HTN (hypertension) 3/15/2010    Hypertensive heart disease     Loss of appetite     Osteopenia 7/13/2010    Other dyspnea and respiratory abnormality     Ringing in the ears     Stress-induced cardiomyopathy     EF 35% (LV angio 10/2010), then EF 50-55% (echo, Feb 2011)    Wears glasses        Past Surgical History:   Procedure Laterality Date    HX BLADDER SUSPENSION  2009    HX HYSTERECTOMY  1962       MEDICATIONS      Current Outpatient Medications   Medication Sig Dispense Refill    predniSONE (STERAPRED DS) 10 mg dose pack See administration instruction per 10mg dose pack 21 Tab 0    gabapentin (NEURONTIN) 100 mg capsule Take 1 Cap by mouth three (3) times daily. Max Daily Amount: 300 mg. 90 Cap 2    amLODIPine (NORVASC) 10 mg tablet TAKE 1 TABLET BY MOUTH EVERY DAY 90 Tab 3    olmesartan (BENICAR) 20 mg tablet Take 1 Tab by mouth daily.  90 Tab 3    glipiZIDE SR (GLUCOTROL XL) 2.5 mg CR tablet TAKE 1 TABLET BY MOUTH EVERY DAY 90 Tab 1    pravastatin (PRAVACHOL) 40 mg tablet TAKE 1 TABLET BY MOUTH EVERY DAY 30 Tab 11    glucose blood VI test strips (ASCENSIA CONTOUR) strip Check BS 1x/day E11.9 100 Strip 3    lancets misc Check BA 1x/day E 11.9 100 Each 11    LUMIGAN 0.01 % ophthalmic drops       diclofenac (VOLTAREN) 1 % gel Apply  to affected area four (4) times daily. 100 g 4    nitroglycerin (NITROSTAT) 0.4 mg SL tablet 1 Tab by SubLINGual route every five (5) minutes as needed. 1 Bottle 1    latanoprost (XALATAN) 0.005 % ophthalmic solution       COMBIGAN 0.2-0.5 % drop ophthalmic solution Administer 1 Drop to both eyes every twelve (12) hours.  RESTASIS 0.05 % ophthalmic emulsion Administer 1 Drop to both eyes every twelve (12) hours.  ascorbic acid (VITAMIN C) 500 mg tablet Take 1,000 mg by mouth daily.  IBUPROFEN (ADVIL PO) Take 1 Tab by mouth as needed.  CALCIUM PO Take 1,000 mg by mouth two (2) times a day.  Blood-Glucose Meter monitoring kit by Does Not Apply route. Use daily as directed 1 Kit 0    glucose blood VI test strips (BLOOD GLUCOSE TEST) strip by Does Not Apply route. Use daily as directed 3 Package 3    Lancets Misc by Does Not Apply route. Use daily as directed 3 Package 3    alcohol swabs (ALCOHOL PREP PADS) PadM 1 Dose by Apply Externally route daily as needed (Glucose testing). 3 Package 3    Cholecalciferol, Vitamin D3, (VITAMIN D) 1,000 unit Cap Take 2,000 Units by mouth daily. 180 Cap 3    MULTIVITAMINS PO Take 1 Tab by mouth daily.  aspirin 81 mg chewable tablet Take 81 mg by mouth daily.          ALLERGIES    Allergies   Allergen Reactions    Lipitor [Atorvastatin] Myalgia    Spironolactone Other (comments)     Dizziness and fatigue          SOCIAL HISTORY    Social History     Socioeconomic History    Marital status:      Spouse name: Not on file    Number of children: Not on file    Years of education: Not on file    Highest education level: Not on file   Tobacco Use    Smoking status: Never Smoker    Smokeless tobacco: Never Used   Substance and Sexual Activity    Alcohol use: No    Drug use: No    Sexual activity: Never       FAMILY HISTORY    Family History   Problem Relation Age of Onset    Hypertension Mother     Arthritis-osteo Other          REVIEW OF SYSTEMS  Review of Systems   Musculoskeletal: Positive for back pain. BLE paraesthesia/heaviness         PHYSICAL EXAMINATION  Visit Vitals  /70 (BP 1 Location: Left arm, BP Patient Position: Sitting)   Pulse 85   Temp 98.5 °F (36.9 °C) (Oral)   Resp 16   Ht 5' (1.524 m)   Wt 143 lb (64.9 kg)   SpO2 99%   BMI 27.93 kg/m²         Pain Assessment  12/12/2019   Location of Pain Back   Location Modifiers (No Data)   Severity of Pain 5   Quality of Pain Aching   Duration of Pain A few minutes   Frequency of Pain Intermittent   Aggravating Factors Stairs; Bending   Aggravating Factors Comment in the morning   Limiting Behavior Yes   Relieving Factors Exercises; Rest   Relieving Factors Comment walking   Result of Injury No         Constitutional:  Well developed, well nourished, in no acute distress. Psychiatric: Affect and mood are appropriate. HEENT: Normocephalic, atraumatic. Extraocular movements intact. Integumentary: No rashes or abrasions noted on exposed areas. Cardiovascular: Regular rate and rhythm. Pulmonary: Clear to auscultation bilaterally. SPINE/MUSCULOSKELETAL EXAM    Cervical spine:  Neck is midline. Normal muscle tone. No focal atrophy is noted. ROM pain free. Shoulder ROM intact. No tenderness to palpation. Negative Spurling's sign. Negative Tinel's sign. Negative Coelho's sign. Sensation in the bilateral arms grossly intact to light touch. Ulnar deviation of fingers on the left  Letha neck deformities on the left    No clear myelopathic or UMN signs    Lumbar spine:  No rash, ecchymosis, or gross obliquity. No fasciculations. No focal atrophy is noted. No pain with hip ROM.    Full range of motion. No tenderness to palpation. No tenderness to palpation at the sciatic notch. SI joints non-tender. Trochanters non tender. Sensation in the bilateral legs grossly intact to light touch. Updates from 12/12/19:   Full ROM. Tenderness to palpation of bilateral trochanters. MOTOR:      Biceps  Triceps Deltoids Wrist Ext Wrist Flex Hand Intrin   Right 5/5 5/5 5/5 5/5 5/5 5/5   Left 5/5 5/5 5/5 5/5 5/5 5/5             Hip Flex  Quads Hamstrings Ankle DF EHL Ankle PF   Right 5/5 5/5 5/5 5/5 5/5 5/5   Left 5/5 5/5 5/5 5/5 5/5 5/5     DTRs are 1+ biceps, triceps, brachioradialis, patella, and Achilles. Negative Straight Leg raise. Squat not tested. No difficulty with tandem gait. Ambulation with single point cane. FWB. RADIOGRAPHS  Lumbar XR images taken on 10/31/19 personally reviewed with patient:  Facet sclerosis at lower levels  Significant Grade 2 anterolisthesis of L5 on S1 and L4 on L5   Atherosclerosis of aorta  No obvious compression fractures     reviewed    Ms. Ac Luis has a reminder for a \"due or due soon\" health maintenance. I have asked that she contact her primary care provider for follow-up on this health maintenance. 20 minutes of face-to-face contact were spent with the patient during today's visit extensively discussing symptoms and treatment plan. All questions were answered. More than half of this visit today was spent on counseling. Written by Naz Lynne, as dictated by Dr. Nimo Leal. I, Dr. Nimo Leal, confirm that all documentation is accurate.

## 2019-12-12 NOTE — PATIENT INSTRUCTIONS

## 2019-12-16 ENCOUNTER — HOSPITAL ENCOUNTER (OUTPATIENT)
Dept: PHYSICAL THERAPY | Age: 84
Discharge: HOME | End: 2019-12-16
Payer: MEDICARE

## 2019-12-16 PROCEDURE — 97112 NEUROMUSCULAR REEDUCATION: CPT

## 2019-12-16 PROCEDURE — 97164 PT RE-EVAL EST PLAN CARE: CPT

## 2019-12-16 PROCEDURE — 97116 GAIT TRAINING THERAPY: CPT

## 2019-12-16 NOTE — PROGRESS NOTES
PT DAILY TREATMENT NOTE 10-18    Patient Name: Chuy Gomez  Date:2019  : 1923  [x]  Patient  Verified  Payor: [place] MEDICARE / Plan: [place] MEDICARE PART A & B / Product Type: Medicare /    In SZDV:[id number]  Out time:1149  Total Treatment Time (min): 50  Visit #: 12 of 16    Medicare/BCBS Only   Total Timed Codes (min):  40 1:1 Treatment Time:  40       Treatment Area: Spinal stenosis, lumbar region with neurogenic claudication [M48.062]  Low back pain [M54.5]    SUBJECTIVE  Pain Level (0-10 scale): 8  Any medication changes, allergies to medications, adverse drug reactions, diagnosis change, or new procedure performed?: [x] No    [] Yes (see summary sheet for update)  Subjective functional status/changes:   [] No changes reported  Pt reports she has noticed increased pain the last few days. She saw doctor Friday and everything was okay at that point. She has noticed increased numbness/tingling in Right LE that started yesterday. She reports sharp pain with walking and sitting makes it better. Denies difficulty with bowel/bladder. OBJECTIVE    Modality rationale: decrease pain and increase tissue extensibility to improve the patients ability to complete functional activities.    Min Type Additional Details    [] Estim:  []Unatt       []IFC  []Premod                        []Other:  []w/ice   []w/heat  Position:  Location:    [] Estim: []Att    []TENS instruct  []NMES                    []Other:  []w/US   []w/ice   []w/heat  Position:  Location:    []  Traction: [] Cervical       []Lumbar                       [] Prone          []Supine                       []Intermittent   []Continuous Lbs:  [] before manual  [] after manual    []  Ultrasound: []Continuous   [] Pulsed                           []1MHz   []3MHz W/cm2:  Location:    []  Iontophoresis with dexamethasone         Location: [] Take home patch   [] In clinic   10 []  Ice     [x]  heat  []  Ice massage  []  Laser   []  Anodyne Position: semi reclined  Location: lumbar    []  Laser with stim  []  Other:  Position:  Location:    []  Vasopneumatic Device Pressure:       [] lo [] med [] hi   Temperature: [] lo [] med [] hi   [x] Skin assessment post-treatment:  [x]intact []redness- no adverse reaction    []redness  adverse reaction:     8 min []Eval                  [x]Re-Eval  Assessed mobility on spine with pt report of no pain with  in thoracic or lumbar region; palpation of lumbar/thoracic paraspinals with no tenderness to palpation       4 min Therapeutic Exercise:  [x] See flow sheet :   Rationale: increase ROM and increase strength to improve the patients ability to complete functional activities. 5 min Therapeutic Activity:  [x]  See flow sheet :   Rationale: increase ROM, increase strength, improve coordination and increase proprioception  to improve the patients ability to complete household chores     15 min Neuromuscular Re-education:  [x]  See flow sheet :   Rationale: increase strength, improve coordination, increase proprioception and increase core activation, patient education on the importance of core activation  to improve the patients ability to activate core effectively and maintain proper posture to decrease pain and improve body mechanics with functional activity.     8 min Gait Training:  _1x100__ feet with SPC___ device on level surfaces with _standby__ level of assist   Rationale: to increase strength and endurance to increase ability to get to doctor appointments safely          With   [x] TE   [x] TA   [x] neuro   [x] gait: Patient Education: [x] Review HEP    [] Progressed/Changed HEP based on:   [] positioning   [] body mechanics   [] transfers   [] heat/ice application    [] other:      Other Objective/Functional Measures:      Pain Level (0-10 scale) post treatment: 2    ASSESSMENT/Changes in Function: Pt completed 5' on bike to improve cardiovascular endurance and LE strength to increase abilities to complete functional task. Assessed spine mobility with no report of pain with . Unable to replicate pain; however after 7 min of laying prone, pt reported SOB. After sitting up pt reported feeling better. Added core activation exercises with pt report of feeling decreased pain after completion. Verbal cues and demonstration required to use correct body mechanics. Encouraged pt to complete HEP daily to help to continue to progress PT interventions to improve patient's ability to complete functional and community task independently. At the conclusion of the session, pt reported decreased pain after activity and MHP for pain management. Patient will continue to benefit from skilled PT services to modify and progress therapeutic interventions, address functional mobility deficits, address ROM deficits, address strength deficits, analyze and address soft tissue restrictions, analyze and cue movement patterns, analyze and modify body mechanics/ergonomics, assess and modify postural abnormalities and address imbalance/dizziness to attain remaining goals. [x]  See Plan of Care  []  See progress note/recertification  []  See Discharge Summary         Progress towards goals / Updated goals:  Goals for this certification period to be accomplished in 4 weeks:  1.  Patient will maintain MSR stance on foam surface with EO for 25sec without LOB to increase safety ambulating on uneven terrain.                Current on 12/4/19: Foam: MSR: 12 sec then required HHA   2. Patient will maintain Rhomberg stance on floor with EC for 15 sec without LOB to increase safety ambulating in the evening.                Current on 12/4/19: Floor: rhomberg with EC: 9sec then opened eyes to regain visual stability (no HHA required though) (12/4/19)   3. Patient will report being able to ambulate/stand for 25 minutes with SPC with pain no more then 2/10 to increase ease with community ambulation.               Current on 12/4/19: patent stated that she is able to walk approximately 20 minutes without increased pain as along as she is using her SPC (12/4/19)    PLAN  [x]  Upgrade activities as tolerated     [x]  Continue plan of care  [x]  Update interventions per flow sheet       []  Discharge due to:_  []  Other:_      Pretty Jenkins 12/16/2019  11:05 AM    Future Appointments   Date Time Provider Jean Marie Sorenson   12/18/2019 11:00 AM Marshall Cowan PTA [facility]   3/3/2020  8:40 AM [facility] 22   3/10/2020 11:30 AM Yesi Cavazos MD 11 [facility]   3/12/2020 11:30 AM Erich Chandra  E [facility]   4/22/2020 10:40 AM Lesvia Valle DO [address] [place] [address]

## 2019-12-18 ENCOUNTER — HOSPITAL ENCOUNTER (OUTPATIENT)
Dept: PHYSICAL THERAPY | Age: 84
Discharge: HOME | End: 2019-12-18
Payer: MEDICARE

## 2019-12-18 PROCEDURE — 97112 NEUROMUSCULAR REEDUCATION: CPT

## 2019-12-18 PROCEDURE — 97530 THERAPEUTIC ACTIVITIES: CPT

## 2019-12-18 PROCEDURE — 97110 THERAPEUTIC EXERCISES: CPT

## 2019-12-18 NOTE — PROGRESS NOTES
PT DAILY TREATMENT NOTE 10-18    Patient Name: Rashad Katz  Date:2019  : 1923  [x]  Patient  Verified  Payor: VA MEDICARE / Plan: VA MEDICARE PART A & B / Product Type: Medicare /    In time:11:07  Out time:11:53  Total Treatment Time (min): 39  Visit #: 13 of 16    Medicare/BCBS Only   Total Timed Codes (min):  39 1:1 Treatment Time:  39       Treatment Area: Spinal stenosis, lumbar region with neurogenic claudication [M48.062]  Low back pain [M54.5]    SUBJECTIVE  Pain Level (0-10 scale): 0  Any medication changes, allergies to medications, adverse drug reactions, diagnosis change, or new procedure performed?: [x] No    [] Yes (see summary sheet for update)  Subjective functional status/changes:   [] No changes reported  \"im feeling good today. \"    OBJECTIVE    Modality rationale: decrease edema, decrease inflammation, decrease pain and increase tissue extensibility to improve the patients ability to perform ADL    Min Type Additional Details    [] Estim:  []Unatt       []IFC  []Premod                        []Other:  []w/ice   []w/heat  Position:  Location:    [] Estim: []Att    []TENS instruct  []NMES                    []Other:  []w/US   []w/ice   []w/heat  Position:  Location:    []  Traction: [] Cervical       []Lumbar                       [] Prone          []Supine                       []Intermittent   []Continuous Lbs:  [] before manual  [] after manual    []  Ultrasound: []Continuous   [] Pulsed                           []1MHz   []3MHz W/cm2:  Location:    []  Iontophoresis with dexamethasone         Location: [] Take home patch   [] In clinic    []  Ice     []  heat  []  Ice massage  []  Laser   []  Anodyne Position:  Location:    []  Laser with stim  []  Other:  Position:  Location:    []  Vasopneumatic Device Pressure:       [] lo [] med [] hi   Temperature: [] lo [] med [] hi   [x] Skin assessment post-treatment:  [x]intact []redness- no adverse reaction    []redness  adverse reaction:      min []Eval                  []Re-Eval       23 min Therapeutic Exercise:  [x] See flow sheet :   Rationale: increase ROM and increase strength to improve the patients ability to perform ADL     8 min Therapeutic Activity:  []  See flow sheet :   Rationale:   to improve the patients ability to     8 min Neuromuscular Re-education:  []  See flow sheet :   Rationale:   to improve the patients ability to      min Manual Therapy:     Rationale: decrease pain, increase ROM, increase tissue extensibility and decrease edema  to perform ADL      min Gait Training:  ___ feet with ___ device on level surfaces with ___ level of assist   Rationale: With   [x] TE   [] TA   [] neuro   [] other: Patient Education: [x] Review HEP    [] Progressed/Changed HEP based on:   [] positioning   [] body mechanics   [] transfers   [] heat/ice application    [] other:      Other Objective/Functional Measures: Added 1# PRE's  Pain Level (0-10 scale) post treatment:  0    ASSESSMENT/Changes in Function: pt  completed each strengthening and balance  There ex fairly well. Pt ambulated     Patient will continue to benefit from skilled PT services to address functional mobility deficits, address ROM deficits, address strength deficits, analyze and address soft tissue restrictions and analyze and cue movement patterns to attain remaining goals. [x]  See Plan of Care  []  See progress note/recertification  []  See Discharge Summary         Progress towards goals / Updated goals:  Goals for this certification period to be accomplished in 4 weeks:  1.  Patient will maintain MSR stance on foam surface with EO for 25sec without LOB to increase safety ambulating on uneven terrain.                Current on 12/4/19: Foam: MSR: 12 sec then required HHA   2. Patient will maintain Rhomberg stance on floor with EC for 15 sec without LOB to increase safety ambulating in the evening.                Current on 12/4/19: Floor: ulysseserg with EC: 9sec then opened eyes to regain visual stability (no HHA required though) (12/4/19)   3. Patient will report being able to ambulate/stand for 25 minutes with SPC with pain no more then 2/10 to increase ease with community ambulation.               Current on 12/4/19: patent stated that she is able to walk approximately 20 minutes without increased pain as along as she is using her SPC (12/4/19)    PLAN  []  Upgrade activities as tolerated     []  Continue plan of care  []  Update interventions per flow sheet       []  Discharge due to:_  []  Other:_      Bart Glez, ARNULFO 12/18/2019  11:27 AM    Future Appointments   Date Time Provider Jean Marie Sorenson   3/3/2020  8:40 AM [place] [id number]   3/10/2020 11:30 AM Dede Wen MD 11 [facility]   3/12/2020 11:30 AM Shavonne Kelsey  E 23[facility]   4/22/2020 10:40 AM Tosin Reynaga DO [address] [place] [address]

## 2019-12-30 RX ORDER — TOPIRAMATE 25 MG/1
25 TABLET ORAL
Qty: 30 TAB | Refills: 0 | Status: SHIPPED | OUTPATIENT
Start: 2019-12-30 | End: 2020-01-20 | Stop reason: SDUPTHER

## 2020-01-06 ENCOUNTER — PATIENT OUTREACH (OUTPATIENT)
Dept: FAMILY MEDICINE CLINIC | Age: 85
End: 2020-01-06

## 2020-01-06 ENCOUNTER — HOSPITAL ENCOUNTER (OUTPATIENT)
Dept: PHYSICAL THERAPY | Age: 85
Discharge: HOME | End: 2020-01-06
Payer: MEDICARE

## 2020-01-06 PROCEDURE — 97110 THERAPEUTIC EXERCISES: CPT

## 2020-01-06 PROCEDURE — 97530 THERAPEUTIC ACTIVITIES: CPT

## 2020-01-06 NOTE — PROGRESS NOTES
[facility]  [address] [facility], [address] [place] [address], [facility] [address]  [phone] fax      Continued Plan of Care/ Re-certification for Physical Therapy Services    Patient name: Dontae Broderick Start of Care: 2019   Referral source: Kendal Kim MD : 1923   Medical/Treatment Diagnosis: Spinal stenosis, lumbar region with neurogenic claudication [M48.062]  Low back pain [M54.5]  Payor: VA MEDICARE / Plan: VA MEDICARE PART A & B / Product Type: Medicare /  Onset Date:approximately 3 months ago                 Prior Hospitalization: see medical history Provider#: [id number]   Medications: Verified on Patient Summary List    Comorbidities: Arthritis, Heart disease- minor heart attack in ; DM, HTN, hearing impaired.   Prior Level of Function:Patient exercises regularly at the place where she lives. Patient uses [facility] for ambulation.     Visits from Start of Care: 14    Missed Visits: 1    The Plan of Care and following information is based on the patient's current status:  Goal: Patient will maintain MSR stance on foam surface with EO for 25sec without LOB to increase safety ambulating on uneven terrain. Status at last note/certification: Foam: MSR: 12 sec then required HHA  Current Status: not met    Goal:  Patient will maintain Rhomberg stance on floor with EC for 15 sec without LOB to increase safety ambulating in the evening. Status at last note/certification: floor 15 sec  Current Status: met    Goal: Patient will report being able to ambulate/stand for 25 minutes with SPC with pain no more then 2/10 to increase ease with community ambulation.   Status at last note/certification: patent stated that she is able to walk approximately 30 minutes without increased pain as along as she is using her SPC 1-2/10  Current Status: met    Key functional changes: decreased pain      Problems/ barriers to goal attainment: weakness     Problem List: pain affecting function, decrease strength, impaired gait/ balance, decrease ADL/ functional abilitiies, decrease activity tolerance, decrease flexibility/ joint mobility and decrease transfer abilities    Treatment Plan: Therapeutic exercise, Therapeutic activities, Neuromuscular re-education, Physical agent/modality, Gait/balance training, Manual therapy, Patient education, Self Care training, Functional mobility training, Home safety training and Stair training     Patient Goal (s) has been updated and includes: \"improve strength and decrease stiffness\"     Goals for this certification period to be accomplished in 5 treatments:  1. Patient will maintain MSR stance on foam surface with EO for 25sec without LOB to increase safety ambulating on uneven terrain. PN: Foam: MSR: 12 sec then required HHA  2. Patient will report being able to ambulate/stand for 25 minutes with SPC with pain no more then 0-1/10 to increase ease with community ambulation. PN: patent stated that she is able to walk approximately 30 minutes without increased pain as along as she is using her SPC 1-2/10  3. Pt will maintain Rhomberg stance on foam with EC for 15 sec without LOB to increase safety ambulating in the evening. PN: floor    Frequency / Duration: Patient to be seen 1-2 times per week for 5 additional treatments:    Assessment / Katherine Ferraro reports 95% improvement. Pain level on average 0/10. Pt mainly c/o weakness and  Stiffness (B) LE. FOTO has improved since initial eval. Patient will continue to benefit from skilled PT services to address functional mobility deficits, address ROM deficits, address strength deficits, analyze and address soft tissue restrictions and analyze and cue movement patterns to attain remaining goals.     Certification Period: 1/6/20 to 2/4/20    Lisa Wynn 1/6/2020 2:08 PM    ________________________________________________________________________  I certify that the above Therapy Services are being furnished while the patient is under my care. I agree with the treatment plan and certify that this therapy is necessary. [] I have read the above and request that my patient continue as recommended.   [] I have read the above report and request that my patient continue therapy with the following changes/special instructions: _____________________________________________  [] I have read the above report and request that my patient be discharged from therapy    Physician's Signature:____________Date:_________TIME:________    ** Signature, Date and Time must be completed for valid certification **    Please sign and return to [facility] [address] [place] [address],[facility] [address] [phone] fax

## 2020-01-06 NOTE — PROGRESS NOTES
PT DAILY TREATMENT NOTE 10-18    Patient Name: Kathy Schilling  Date:2020  : 1923  [x]  Patient  Verified  Payor: VA MEDICARE / Plan: VA MEDICARE PART A & B / Product Type: Medicare /    In time 10:08  Out time:11:00  Total Treatment Time (min): 45  Visit #: 14 of 16    Medicare/BCBS Only   Total Timed Codes (min):  48 1:1 Treatment Time:  48       Treatment Area: Spinal stenosis, lumbar region with neurogenic claudication [M48.062]  Low back pain [M54.5]    SUBJECTIVE  Pain Level (0-10 scale): 0  Any medication changes, allergies to medications, adverse drug reactions, diagnosis change, or new procedure performed?: [x] No    [] Yes (see summary sheet for update)  Subjective functional status/changes:   [] No changes reported  \"My leg feels  Weak. \" \"I wet walking this morning. \"    OBJECTIVE    Modality rationale: decrease edema, decrease inflammation, decrease pain and increase tissue extensibility to improve the patients ability to perform ADL    Min Type Additional Details    [] Estim:  []Unatt       []IFC  []Premod                        []Other:  []w/ice   []w/heat  Position:  Location:    [] Estim: []Att    []TENS instruct  []NMES                    []Other:  []w/US   []w/ice   []w/heat  Position:  Location:    []  Traction: [] Cervical       []Lumbar                       [] Prone          []Supine                       []Intermittent   []Continuous Lbs:  [] before manual  [] after manual    []  Ultrasound: []Continuous   [] Pulsed                           []1MHz   []3MHz W/cm2:  Location:    []  Iontophoresis with dexamethasone         Location: [] Take home patch   [] In clinic    []  Ice     []  heat  []  Ice massage  []  Laser   []  Anodyne Position:  Location:    []  Laser with stim  []  Other:  Position:  Location:    []  Vasopneumatic Device Pressure:       [] lo [] med [] hi   Temperature: [] lo [] med [] hi   [x] Skin assessment post-treatment:  [x]intact []redness- no adverse reaction    []redness  adverse reaction:      min []Eval                  []Re-Eval       23 min Therapeutic Exercise:  [x] See flow sheet :   Rationale: increase ROM and increase strength to improve the patients ability to perform ADL     25 min Therapeutic Activity:  []  See flow sheet :   Rationale: increase ROM and increase strength  to improve the patients ability to perform ADL       min Neuromuscular Re-education:  []  See flow sheet :   Rationale: increase ROM and increase strength  to improve the patients ability to perform ADL      min Manual Therapy:     Rationale: decrease pain, increase ROM, increase tissue extensibility and decrease edema  to perform ADL      min Gait Training:  ___ feet with ___ device on level surfaces with ___ level of assist   Rationale: With   [x] TE   [] TA   [] neuro   [] other: Patient Education: [x] Review HEP    [] Progressed/Changed HEP based on:   [] positioning   [] body mechanics   [] transfers   [] heat/ice application    [x] other: REASSESS GOALS     Other Objective/Functional Measures: FOTO:61    Pain Level (0-10 scale) post treatment:0    ASSESSMENT/Changes in Function: Mrs. George Bosewll reports 95% improvement. Pain level on average 0/10. Pt mainly c/o weakness and  Stiffness (B) LE. FOTO has improved since initial eval.    Patient will continue to benefit from skilled PT services to address functional mobility deficits, address ROM deficits, address strength deficits, analyze and address soft tissue restrictions and analyze and cue movement patterns to attain remaining goals. [x]  See Plan of Care  []  See progress note/recertification  []  See Discharge Summary         Progress towards goals / Updated goals:  1. Patient will maintain MSR stance on foam surface with EO for 25sec without LOB to increase safety ambulating on uneven terrain.                Current on 12/4/19: Foam: MSR: 12 sec then required HHA   Not  Assessed 1/6/19  2.  Patient will maintain Rhomberg stance on floor with EC for 15 sec without LOB to increase safety ambulating in the evening.                Current : Floor: met  1/6/20  3. Patient will report being able to ambulate/stand for 25 minutes with SPC with pain no more then 2/10 to increase ease with community ambulation.               Current on 1/6/19: patent stated that she is able to walk approximately 30 minutes without increased pain as along as she is using her [employer] 1-2/10     PLAN  []  Upgrade activities as tolerated     []  Continue plan of care  []  Update interventions per flow sheet       []  Discharge due to:_  [x]  Other:_  Flor Ramey PTA 1/6/2020  10:11 AM    Future Appointments   Date Time Provider Jean Marie Sorenson   1/9/2020 11:00 AM Arlene Smith PT [facility]   1/13/2020 10:30 AM Luis Reed PTA [facility]   1/16/2020 11:00 AM Luis Reed PTA [facility]   3/3/2020  8:40 [facility]   3/10/2020 11:30 AM Merari Kemp MD 11 [facility]   3/12/2020 11:30 AM Khadar Lugo  E 23[facility]   4/22/2020 10:40 AM Carey Frey,  [facility]

## 2020-01-06 NOTE — PROGRESS NOTES
Called patient for complex case management. Daughter answered the phone and reported that patient was in PT. Will attempt to call at another time.

## 2020-01-08 ENCOUNTER — PATIENT OUTREACH (OUTPATIENT)
Dept: FAMILY MEDICINE CLINIC | Age: 85
End: 2020-01-08

## 2020-01-08 NOTE — PROGRESS NOTES
Complex Case Management      Date/Time:  2020 1:11 PM    Method of communication with patient: phone    [facility] contacted the Patient by telephone to perform Ambulatory Care Coordination. Verified name and  with Patient as identifiers. Provided introduction to self, and explanation of the Ambulatory Care Manager's role. Reviewed most recent clinic visit w/ Patient who verbalized understanding. Patient given an opportunity to ask questions. Top Challenges reviewed with the patient   1. None at this time. Patient reports she was not feeling well yesterday but states she is feeling better today. She stated yesterday was \"just one of those days\". Patient relates that she checks her blood sugar and blood pressure at home daily and her readings have been \"good\" although she was unable to provide exact readings. She states she adheres to a list of foods that was given to her by one of her physicians. She said she always eats the foods on that list and that's why her blood sugar and blood pressure are doing good. Ms. Roseanne Westbrook states \"I try to take good care of myself\". Patient reports taking medications as prescribed and denies needing any refills at this time. Upcoming appointments were reviewed with patient. Patient verbalized acknowledgement. The Patient agrees to contact the PCP office or the [facility] for questions related to their healthcare. Provided contact information for future reference. Disease Specific:   CHF    Home Health Active: Yes Rajat Zimmerman PT     DME Active: No     Barriers to care? None at this time. Advance Care Planning:   Does patient have an Advance Directive:  reviewed and current     Medication(s):   Medication reconciliation was not performed with patient, who verbalizes understanding of administration of home medications. There were no barriers to obtaining medications identified at this time.     Referral to Pharm D needed: no Current Outpatient Medications   Medication Sig    topiramate (TOPAMAX) 25 mg tablet Take 1 Tab by mouth nightly for 30 days.  predniSONE (STERAPRED DS) 10 mg dose pack See administration instruction per 10mg dose pack    gabapentin (NEURONTIN) 100 mg capsule Take 1 Cap by mouth three (3) times daily. Max Daily Amount: 300 mg.    amLODIPine (NORVASC) 10 mg tablet TAKE 1 TABLET BY MOUTH EVERY DAY    olmesartan (BENICAR) 20 mg tablet Take 1 Tab by mouth daily.  glipiZIDE SR (GLUCOTROL XL) 2.5 mg CR tablet TAKE 1 TABLET BY MOUTH EVERY DAY    pravastatin (PRAVACHOL) 40 mg tablet TAKE 1 TABLET BY MOUTH EVERY DAY    glucose blood VI test strips (ASCENSIA CONTOUR) strip Check BS 1x/day E11.9    lancets misc Check BA 1x/day E 11.9    LUMIGAN 0.01 % ophthalmic drops     diclofenac (VOLTAREN) 1 % gel Apply  to affected area four (4) times daily.  nitroglycerin (NITROSTAT) 0.4 mg SL tablet 1 Tab by SubLINGual route every five (5) minutes as needed.  latanoprost (XALATAN) 0.005 % ophthalmic solution     COMBIGAN 0.2-0.5 % drop ophthalmic solution Administer 1 Drop to both eyes every twelve (12) hours.  RESTASIS 0.05 % ophthalmic emulsion Administer 1 Drop to both eyes every twelve (12) hours.  ascorbic acid (VITAMIN C) 500 mg tablet Take 1,000 mg by mouth daily.  IBUPROFEN (ADVIL PO) Take 1 Tab by mouth as needed.  CALCIUM PO Take 1,000 mg by mouth two (2) times a day.  Blood-Glucose Meter monitoring kit by Does Not Apply route. Use daily as directed    glucose blood VI test strips (BLOOD GLUCOSE TEST) strip by Does Not Apply route. Use daily as directed    Lancets Misc by Does Not Apply route. Use daily as directed    alcohol swabs (ALCOHOL PREP PADS) PadM 1 Dose by Apply Externally route daily as needed (Glucose testing).  Cholecalciferol, Vitamin D3, (VITAMIN D) 1,000 unit Cap Take 2,000 Units by mouth daily.  MULTIVITAMINS PO Take 1 Tab by mouth daily.     aspirin 81 mg chewable tablet Take 81 mg by mouth daily. No current facility-administered medications for this visit. [facility] follow up appointment(s):   Future Appointments   Date Time Provider Jean Marie Sorenson   1/9/2020 11:00 AM Renata Bales, PT [facility]   1/13/2020 10:30 AM [facility]   1/16/2020 11:00 AM Vikram Brito PTA [facility]   3/3/2020  8:40 AM [facility] NURSE [facility]   3/10/2020 11:30 AM Dorothy Marrero MD [facility]E SCHED   3/12/2020 11:30 AM Mir Schofield  [address]   4/22/2020 10:40 AM Cipriaon George  [facility]        Non-[facility] follow up appointment(s): n/a    Goals      Pt will take all medications prescribed to be evaluated on each outreach through 3/1/20      1/8/20 Patient reports taking medications as prescribed and denies needing any refills at this time.

## 2020-01-09 ENCOUNTER — HOSPITAL ENCOUNTER (OUTPATIENT)
Dept: PHYSICAL THERAPY | Age: 85
Discharge: HOME | End: 2020-01-09
Payer: MEDICARE

## 2020-01-09 PROCEDURE — 97110 THERAPEUTIC EXERCISES: CPT

## 2020-01-09 PROCEDURE — 97112 NEUROMUSCULAR REEDUCATION: CPT

## 2020-01-09 NOTE — PROGRESS NOTES
PT DAILY TREATMENT NOTE 10-18    Patient Name: Selam Montez  Date:2020  : 1923  [x]  Patient  Verified  Payor: Janie Saucedo / Plan: [place] MED[place]ARE [place] & B / Product Type: Medicare /    In time: 11:01  Out time: 11:36  Total Treatment Time (min): 35  Visit #: 1 of 5    Medicare/BCBS Only   Total Timed Codes (min): 35 1:1 Treatment Time: 30       Treatment Area: Spinal stenosis, lumbar region with neurogenic claudication [M48.062]  Low back pain [M54.5]    SUBJECTIVE  Pain Level (0-10 scale): 0  Any medication changes, allergies to medications, adverse drug reactions, diagnosis change, or new procedure performed?: [x] No    [] Yes (see summary sheet for update)  Subjective functional status/changes:   [] No changes reported  Pt reports she is \"just weak\". OBJECTIVE    15 min Therapeutic Exercise:  [x] See flow sheet :   Rationale: increase ROM and increase strength to improve the patients ability to perform ADLs     20 min Neuromuscular Re-education:  [x]  See flow sheet : balancing and foam exercises. Rationale: increase strength, improve coordination, improve balance and increase proprioception  to improve the patients ability to tolerate ADLs           With   [x] TE   [] TA   [] neuro   [] other: Patient Education: [x] Review HEP    [] Progressed/Changed HEP based on:   [] positioning   [] body mechanics   [] transfers   [] heat/ice application    [] other:      Other Objective/Functional Measures: Mild instability noted with balancing exercises today with no LOB. Added exercises per flow sheet to improve strength and balance in the LEs. Pain Level (0-10 scale) post treatment: 0    ASSESSMENT/Changes in Function: Reported no pain post session today. Attempted sitting tband B shoulder EXT for core strengthening but pt unable to keep her B elbows extended. Pt continues to demonstrate limitations with balance and strength of the LEs and core. Continue POC as tolerated.      Patient will continue to benefit from skilled PT services to modify and progress therapeutic interventions, address functional mobility deficits, address ROM deficits, address strength deficits, analyze and address soft tissue restrictions, analyze and cue movement patterns, analyze and modify body mechanics/ergonomics, assess and modify postural abnormalities, address imbalance/dizziness and instruct in home and community integration to attain remaining goals. []  See Plan of Care  []  See progress note/recertification  []  See Discharge Summary         Progress towards goals / Updated goals:  Goals for this certification period to be accomplished in 5 treatments:  1. Patient will maintain MSR stance on foam surface with EO for 25sec without LOB to increase safety ambulating on uneven terrain. PN: Foam: MSR: 12 sec then required HHA   Current: performed on floor today with mild instability with EO 1/9/2020  2. Patient will report being able to ambulate/stand for 25 minutes with SPC with pain no more then 0-1/10 to increase ease with community ambulation. PN: patent stated that she is able to walk approximately 30 minutes without increased pain as along as she is using her SPC 1-2/10  3. Pt will maintain Rhomberg stance on foam with EC for 15 sec without LOB to increase safety ambulating in the evening.                 PN: floor     PLAN  [x]  Upgrade activities as tolerated     [x]  Continue plan of care  [x]  Update interventions per flow sheet       []  Discharge due to:_  []  Other:_      Shannon Lopez, PT 1/9/2020  11:11 AM    Future Appointments   Date Time Provider Jean Marie Juarezi   1/13/2020 10:30 AM Brent Bertrand PTA [facility]   1/16/2020 11:00 AM Brent Bertrand PTA [facility]   3/3/2020  8:40 AM Valentina [facility] [address]   3/10/2020 11:30 AM Zeno Snellen, MD [facility]   3/12/2020 11:30 AM Clarisa Kraft  [address]    4/22/2020 10:40 AM Elier Siu DO [address] [place] [address]

## 2020-01-13 ENCOUNTER — HOSPITAL ENCOUNTER (OUTPATIENT)
Dept: PHYSICAL THERAPY | Age: 85
Discharge: HOME | End: 2020-01-13
Payer: MEDICARE

## 2020-01-13 PROCEDURE — 97110 THERAPEUTIC EXERCISES: CPT

## 2020-01-13 PROCEDURE — 97112 NEUROMUSCULAR REEDUCATION: CPT

## 2020-01-13 NOTE — PROGRESS NOTES
PT DAILY TREATMENT NOTE 10-18    Patient Name: Tayo Pickens  Date:2020  : 1923  [x]  Patient  Verified  Payor: [place] MEDICARE / Plan: [place] MEDICARE PART A & B / Product Type: Medicare /    In time: 10:35  Out time:11:09  Total Treatment Time (min): 34  Visit #: 2 of 5    Medicare/BCBS Only   Total Timed Codes (min):  34 1:1 Treatment Time:  34       Treatment Area: Spinal stenosis, lumbar region with neurogenic claudication [M48.062]  Low back pain [M54.5]    SUBJECTIVE  Pain Level (0-10 scale): 0  Any medication changes, allergies to medications, adverse drug reactions, diagnosis change, or new procedure performed?: [x] No    [] Yes (see summary sheet for update)  Subjective functional status/changes:   [] No changes reported  \"Feeling ok. \"    OBJECTIVE    Modality rationale: decrease edema, decrease inflammation, decrease pain and increase tissue extensibility to improve the patients ability to perform ADL    Min Type Additional Details    [] Estim:  []Unatt       []IFC  []Premod                        []Other:  []w/ice   []w/heat  Position:  Location:    [] Estim: []Att    []TENS instruct  []NMES                    []Other:  []w/US   []w/ice   []w/heat  Position:  Location:    []  Traction: [] Cervical       []Lumbar                       [] Prone          []Supine                       []Intermittent   []Continuous Lbs:  [] before manual  [] after manual    []  Ultrasound: []Continuous   [] Pulsed                           []1MHz   []3MHz W/cm2:  Location:    []  Iontophoresis with dexamethasone         Location: [] Take home patch   [] In clinic    []  Ice     []  heat  []  Ice massage  []  Laser   []  Anodyne Position:  Location:    []  Laser with stim  []  Other:  Position:  Location:    []  Vasopneumatic Device Pressure:       [] lo [] med [] hi   Temperature: [] lo [] med [] hi   [x] Skin assessment post-treatment:  [x]intact []redness- no adverse reaction    []redness  adverse reaction: min []Eval                  []Re-Eval       26 min Therapeutic Exercise:  [x] See flow sheet :   Rationale: increase ROM and increase strength to improve the patients ability to perform ADL      min Therapeutic Activity:  []  See flow sheet :   Rationale: increase ROM and increase strength  to improve the patients ability to perform ADL      8 min Neuromuscular Re-education:  [x]  See flow sheet :   Rationale:   to improve the patients ability to  Perform ADL      min Manual Therapy:     Rationale: decrease pain, increase ROM, increase tissue extensibility and decrease edema  to perform ADL     min Gait Training:  ___ feet with ___ device on level surfaces with ___ level of assist   Rationale: With   [x] TE   [] TA   [] neuro   [] other: Patient Education: [x] Review HEP    [] Progressed/Changed HEP based on:   [] positioning   [] body mechanics   [] transfers   [] heat/ice application    [] other:      Other Objective/Functional Measures:      Pain Level (0-10 scale) post treatment: 0    ASSESSMENT/Changes in Function: pt was slight challenged with MSR. Overall fair response to strengthening there ex. Patient will continue to benefit from skilled PT services to address functional mobility deficits, address ROM deficits, address strength deficits, analyze and address soft tissue restrictions and analyze and cue movement patterns to attain remaining goals.      [x]  See Plan of Care  []  See progress note/recertification  []  See Discharge Summary         Progress towards goals / Updated goals:  1. Patient will maintain MSR stance on foam surface with EO for 25sec without LOB to increase safety ambulating on uneven terrain.                PN: Foam: MSR: 12 sec then required HHA              Current: performed on floor today with mild instability with EO 1/9/2020    Pt  Held unto //bars with I hand   1/13/20  2. Patient will report being able to ambulate/stand for 25 minutes with SPC with pain no more then 0-1/10 to increase ease with community ambulation.               PN: patent stated that she is able to walk approximately 30 minutes without increased pain as along as she is using her SPC 1-2/10  3.  Pt will maintain Rhomberg stance on foam with EC for 15 sec without LOB to increase safety ambulating in the evening.                PN: floor  PLAN  []  Upgrade activities as tolerated     [x]  Continue plan of care  []  Update interventions per flow sheet       []  Discharge due to:_  []  Other:_      Baylee Montejo PTA 1/13/2020  11:11 AM    Future Appointments   Date Time Provider Jean Marie Sorenson   1/16/2020 11:00 AM Jared Reddy PTA [facility]   3/3/2020  8:40 AM [place] [id number]   3/10/2020 11:30 AM Zaina Luis MD [facility]   3/12/2020 11:30 AM Amy Krishna  [address] [facility]   4/22/2020 10:40 AM Juancho Moncada DO [address] [place] [address]

## 2020-01-15 ENCOUNTER — PATIENT OUTREACH (OUTPATIENT)
Dept: FAMILY MEDICINE CLINIC | Age: 85
End: 2020-01-15

## 2020-01-16 ENCOUNTER — HOSPITAL ENCOUNTER (OUTPATIENT)
Dept: PHYSICAL THERAPY | Age: 85
Discharge: HOME | End: 2020-01-16
Payer: MEDICARE

## 2020-01-16 PROCEDURE — 97110 THERAPEUTIC EXERCISES: CPT

## 2020-01-16 PROCEDURE — 97112 NEUROMUSCULAR REEDUCATION: CPT

## 2020-01-16 NOTE — PROGRESS NOTES
PT DAILY TREATMENT NOTE 10-18    Patient Name: Jose A Chen  Date:2020  : 1923  [x]  Patient  Verified  Payor: VA MEDICARE / Plan: VA MEDICARE PART A & B / Product Type: Medicare /    In time:10:57 Out time:11:36  Total Treatment Time (min):  39  Visit #: 3 of 5    Medicare/BCBS Only   Total Timed Codes (min):  35 1:1 Treatment Time:  23       Treatment Area: Spinal stenosis, lumbar region with neurogenic claudication [M48.062]  Low back pain [M54.5]    SUBJECTIVE  Pain Level (0-10 scale): 0  Any medication changes, allergies to medications, adverse drug reactions, diagnosis change, or new procedure performed?: [x] No    [] Yes (see summary sheet for update)  Subjective functional status/changes:   [] No changes reported  \"No pain. \"    OBJECTIVE    Modality rationale: decrease edema, decrease inflammation, decrease pain and increase tissue extensibility to improve the patients ability to perform ADL    Min Type Additional Details    [] Estim:  []Unatt       []IFC  []Premod                        []Other:  []w/ice   []w/heat  Position:  Location:    [] Estim: []Att    []TENS instruct  []NMES                    []Other:  []w/US   []w/ice   []w/heat  Position:  Location:    []  Traction: [] Cervical       []Lumbar                       [] Prone          []Supine                       []Intermittent   []Continuous Lbs:  [] before manual  [] after manual    []  Ultrasound: []Continuous   [] Pulsed                           []1MHz   []3MHz W/cm2:  Location:    []  Iontophoresis with dexamethasone         Location: [] Take home patch   [] In clinic    []  Ice     []  heat  []  Ice massage  []  Laser   []  Anodyne Position:  Location:    []  Laser with stim  []  Other:  Position:  Location:    []  Vasopneumatic Device Pressure:       [] lo [] med [] hi   Temperature: [] lo [] med [] hi   [x] Skin assessment post-treatment:  [x]intact []redness- no adverse reaction    []redness  adverse reaction:      min []Eval                  []Re-Eval       25 min Therapeutic Exercise:  [x] See flow sheet :   Rationale: increase ROM and increase strength to improve the patients ability to perform ADL      min Therapeutic Activity:  []  See flow sheet :   Rationale: increase ROM and increase strength  to improve the patients ability to perform ADL     10 min Neuromuscular Re-education:  []  See flow sheet :   Rationale:   to improve the patients ability to perform ADL     min Manual Therapy:     Rationale: decrease pain, increase ROM, increase tissue extensibility and decrease edema  to perform ADL      min Gait Training:  ___ feet with ___ device on level surfaces with ___ level of assist   Rationale: With   [x] TE   [] TA   [] neuro   [] other: Patient Education: [x] Review HEP    [] Progressed/Changed HEP based on:   [] positioning   [] body mechanics   [] transfers   [] heat/ice application    [] other:      Other Objective/Functional Measures:      Pain Level (0-10 scale) post treatment: 0    ASSESSMENT/Changes in Function: Pt  Responded  Fairly well to each there ex. Pt Benefited with treatment. Patient will continue to benefit from skilled PT services to address functional mobility deficits, address ROM deficits, address strength deficits, analyze and address soft tissue restrictions and analyze and cue movement patterns to attain remaining goals.      [x]  See Plan of Care  []  See progress note/recertification  []  See Discharge Summary         Progress towards goals / Updated goals:  1. Patient will maintain MSR stance on foam surface with EO for 25sec without LOB to increase safety ambulating on uneven terrain.                PN: Foam: MSR: 12 sec then required HHA              Current: performed on floor today with mild instability with EO 1/9/2020    Pt  Held unto //bars with I hand   1/13/20  2. Patient will report being able to ambulate/stand for 25 minutes with SPC with pain no more then 0-1/10 to increase ease with community ambulation.               PN: patent stated that she is able to walk approximately 30 minutes without increased pain as along as she is using her SPC 1-2/10  3.  Pt will maintain Rhomberg stance on foam with EC for 15 sec without LOB to increase safety ambulating in the evening.                PN: floor    PLAN  []  Upgrade activities as tolerated     []  Continue plan of care  []  Update interventions per flow sheet       []  Discharge due to:_  [x]  Other:_  DC [place]    Baylee Montejo, PTA 1/16/2020  11:09 AM    Future Appointments   Date Time Provider Jean Marie Sorenson   3/3/2020  8:40 AM [place] [id number]   3/10/2020 11:30 AM Brissa Marie MD 11 [facility]   3/12/2020 11:30 AM Na Spence  [address]   4/22/2020 10:40 AM Alexandro Ortez  [facility]

## 2020-01-20 RX ORDER — TOPIRAMATE 25 MG/1
25 TABLET ORAL
Qty: 30 TAB | Refills: 5 | Status: SHIPPED | OUTPATIENT
Start: 2020-01-20 | End: 2020-01-29

## 2020-01-20 NOTE — TELEPHONE ENCOUNTER
Last Visit: 12/12/19 with MD Mame Camacho Next Appointment: 3/12/20 with MD Enid Kuhn Previous Refill Encounter(s): 12/30/19 #30 Requested Prescriptions Pending Prescriptions Disp Refills  topiramate (TOPAMAX) 25 mg tablet 30 Tab 0 Sig: Take 1 Tab by mouth nightly for 30 days.

## 2020-01-22 ENCOUNTER — PATIENT OUTREACH (OUTPATIENT)
Dept: FAMILY MEDICINE CLINIC | Age: 85
End: 2020-01-22

## 2020-01-22 ENCOUNTER — HOSPITAL ENCOUNTER (OUTPATIENT)
Dept: PHYSICAL THERAPY | Age: 85
Discharge: HOME | End: 2020-01-22
Payer: MEDICARE

## 2020-01-22 PROCEDURE — 97110 THERAPEUTIC EXERCISES: CPT

## 2020-01-22 PROCEDURE — 97112 NEUROMUSCULAR REEDUCATION: CPT

## 2020-01-22 PROCEDURE — 97530 THERAPEUTIC ACTIVITIES: CPT

## 2020-01-22 RX ORDER — PRAVASTATIN SODIUM 40 MG/1
40 TABLET ORAL DAILY
Qty: 30 TAB | Refills: 2 | Status: SHIPPED | OUTPATIENT
Start: 2020-01-22 | End: 2020-05-14 | Stop reason: ALTCHOICE

## 2020-01-22 RX ORDER — GLIPIZIDE 2.5 MG/1
2.5 TABLET, EXTENDED RELEASE ORAL DAILY
Qty: 90 TAB | Refills: 1 | Status: SHIPPED | OUTPATIENT
Start: 2020-01-22 | End: 2020-02-04

## 2020-01-22 NOTE — TELEPHONE ENCOUNTER
Per Sharyle Cal at the pharmacy - [facility] on [address] [facility] is unable to refill the Pravachol because [facility] on [address] has a pending refill request. They could not correct this without the patient calling and cancelling the request at the one [facility] and then requesting the refill again from their [facility]. In order to ease this confusion. I have pended a new Rx with the remaining refills to [facility] on [address] [facility]. Please sign if appropriate. Last Visit: 11/12/19 with MD Nina Harry Next Appointment: 3/10/20 with MD Nina Harry Previous Refill Encounter(s): 4/30/19 #30 with 11 refills Requested Prescriptions Pending Prescriptions Disp Refills  pravastatin (PRAVACHOL) 40 mg tablet 30 Tab 2 Sig: Take 1 Tab by mouth daily.

## 2020-01-22 NOTE — PROGRESS NOTES
Complex Case Management  Follow-Up    Date/Time:  1/22/2020 2:45 PM     Ambulatory Care Manager contacted patient for Complex Case Management  follow up. Spoke to patient. Introduced self/role and reason for call. Patient reported:   Patient states she has good and bad days. Today was not the best day because she feels a little weak. Patient states she was having dizziness that she felt was being caused by Topamax that was prescribed by ortho so she states she stopped taking it and the dizziness resolved. Patient states her daughter informed ortho. She will discuss an alternative medication at her next visit. She ambulates with a walker and denies any recent falls. Patient states her appetite is good and she usually eats three times daily. She states she has completed PT and has been released as of today. Patient reports taking medications as prescribed and denies needing any refills at this time. Upcoming appointments were reviewed with patient. Patient verbalized acknowledgement. Pertinent negatives:  Patient denies cp, sob, dizziness, n/v, fever, diarrhea, constipation, urinary frequency or retention. Specialist appointments since last outreach? no  If so, specialist and date: n    Medications:   New medications since last outreach: no  Does patient need refills on any medications: no  Medication changes since last outreach (dose adjustments or discontinued meds): no    Home Health company: n/a  Date of discharge: n/a    Barriers to care? None at this time. Patient reminded that there are physicians on call 24 hours a day / 7 days a week (M-F 5pm to 8am and from Friday 5pm until Monday 8a for the weekend) should the patient have questions or concerns.      Future Appointments   Date Time Provider Jean Marie Sorenson   3/3/2020  8:40 AM [place] [id number]   3/10/2020 11:30 AM Huy Pedroza MD [address] [place] [address]   3/12/2020 11:30 AM Mery Farias  [address]   4/22/2020 10:40 AM Tsering Mitcehll  [facility]        Other upcoming appointments:  n/a    Goals      Pt will take all medications prescribed to be evaluated on each outreach through 3/1/20      1/8/20 Patient reports taking medications as prescribed and denies needing any refills at this time. 1/22/20 Patient reports taking medications as prescribed and denies needing any refills at this time.

## 2020-01-22 NOTE — PROGRESS NOTES
PT DISCHARGE DAILY NOTE AND [id number]    Patient name: Kathy Schilling Start of Care: 2019   Referral source: Rafaela Greene MD : 1923   Medical/Treatment Diagnosis: Spinal stenosis, lumbar region with neurogenic claudication [M48.062]  Low back pain [M54.5] Onset Date:approximately 3 months ago                    Prior Hospitalization: see medical history Provider#: [id number]   Medications: Verified on Patient Summary List    Comorbidities: Arthritis, Heart disease- minor heart attack in ; DM, HTN, hearing impaired.   Prior Level of Function:Patient exercises regularly at the place where she lives. Patient uses [address] for ambulation.        Visits from Start of Care: 18   Missed Visits: 1    Reporting Period 2020 to 2020  Date:2020  : 1923  [x]  Patient  Verified  Payor: [place] MEDICARE / Plan: [place] MEDICARE PART A & B / Product Type: Medicare /    In time:11:30  Out time:12:22  Total Treatment Time (min): 52  Visit #: 4 of 5    Medicare/BCBS Only   Total Timed Codes (min):  52 1:1 Treatment Time:  52       SUBJECTIVE  Pain Level (0-10 scale): 0  Any medication changes, allergies to medications, adverse drug reactions, diagnosis change, or new procedure performed?: [x] No    [] Yes (see summary sheet for update)  Subjective functional status/changes:   [] No changes reported  \"I feel good, just stiff and can't move as fast as I want to. \"    OBJECTIVE       15 min Therapeutic Exercise:  [x] See flow sheet :   Rationale: increase ROM and increase strength to improve the patients ability to tolerate ADLs and activities.      22 min Therapeutic Activity:  [x]?   See flow sheet :   Rationale: increase ROM and increase strength  to improve the patients ability to perform ADL    15 min Neuromuscular Re-education:  [x]  See flow sheet :   Rationale: increase strength, improve coordination, improve balance and increase proprioception  to improve the patients ability to tolerate ADLs and activities. DISCHARGE INSTRUCTIONS issued to patient, FOTO [id number]    Other Objective/Functional Measures: VC for exercise and techniques     Pain Level (0-10 scale) post treatment: 0    Summary of Care:  Goal:Patient will maintain MSR stance on foam surface with EO for 25sec without LOB to increase safety ambulating on uneven terrain.   Status at last note/certification: Last MD note  Status at discharge: not met    Goal:Patient will report being able to ambulate/stand for 25 minutes with SPC with pain no more then 0-1/10 to increase ease with community ambulation. Status at last note/certification: Last MD note  Status at discharge: met    Goal:Pt will maintain Rhomberg stance on foam with EC for 15 sec without LOB to increase safety ambulating in the evening. Status at last note/certification: Last MD note  Status at discharge: not met    ASSESSMENT/Changes in Function: Patient complete 5 mins on the bike at  Lv1 to increase strength and endurance with functional activities. Patient demonstrated poor activity tolerance, required one 5 min rest break to recover. Patient report improvement with physical therapy as evident by diminished pain however continues with stiffness. Patient educated on the importance of pushing from a seated  Surface Vice pulling prior to standing to minimize fall risk, verbalizes understanding. POC and HEP reviewed.   Patient ready to attempt self management and will follow up with MD PRN  Thank you for this referral!      PLAN  [x]Discontinue therapy: [x]Patient has reached or is progressing toward set goals      []Patient is non-compliant or has abdicated      []Due to lack of appreciable progress towards set goals    Rian Sewell 1/22/2020  10:56 AM

## 2020-01-22 NOTE — TELEPHONE ENCOUNTER
Last Visit: 11/12/19 with MD Reji Urban Next Appointment: 3/10/20 with MD Reji Urban Previous Refill Encounter(s): 8/1/19 #90 with 1 refill Requested Prescriptions Pending Prescriptions Disp Refills  glipiZIDE SR (GLUCOTROL XL) 2.5 mg CR tablet 90 Tab 1 Sig: Take 1 Tab by mouth daily.

## 2020-01-22 NOTE — PROGRESS NOTES
Physical Therapy Discharge Instructions      [facility] [address], [address] [place] [address], [facility] [address]  [phone] fax    Patient: Lawanda Small  : 1923      Continue Home Exercise Program 1-2 times per day for 1-2 weeks, then decrease to 3-5 times per week      Continue with    [] Ice  as needed PRN times per day     [x] Heat         Follow up with MD:     [] Upon completion of therapy     [x] As needed  Recommendations:     [x]   Return to activity with home program    []   Return to activity with the following modifications:       []Post Rehab Program    []Join Independent aquatic program     []Return to/join local gym  Additional Comments:            Jose Manuel Snyder, PT 2020 12:05 PM

## 2020-01-22 NOTE — TELEPHONE ENCOUNTER
Requested Prescriptions Pending Prescriptions Disp Refills  pravastatin (PRAVACHOL) 40 mg tablet 30 Tab 11 Per pharmacy Pt requesting this sent to [facility] in [place] [address]

## 2020-01-24 RX ORDER — PRAVASTATIN SODIUM 40 MG/1
TABLET ORAL
Qty: 90 TAB | Refills: 3 | Status: SHIPPED | OUTPATIENT
Start: 2020-01-24 | End: 2021-03-01 | Stop reason: SDUPTHER

## 2020-01-29 ENCOUNTER — PATIENT OUTREACH (OUTPATIENT)
Dept: FAMILY MEDICINE CLINIC | Age: 85
End: 2020-01-29

## 2020-01-29 ENCOUNTER — HOSPITAL ENCOUNTER (OUTPATIENT)
Dept: LAB | Age: 85
Discharge: HOME | End: 2020-01-29
Payer: MEDICARE

## 2020-01-29 ENCOUNTER — OFFICE VISIT (OUTPATIENT)
Dept: FAMILY MEDICINE CLINIC | Age: 85
End: 2020-01-29

## 2020-01-29 VITALS
RESPIRATION RATE: 16 BRPM | OXYGEN SATURATION: 99 % | SYSTOLIC BLOOD PRESSURE: 122 MMHG | HEART RATE: 76 BPM | DIASTOLIC BLOOD PRESSURE: 70 MMHG | WEIGHT: 143 LBS | BODY MASS INDEX: 27 KG/M2 | HEIGHT: 61 IN | TEMPERATURE: 97.9 F

## 2020-01-29 DIAGNOSIS — R60.9 EDEMA, UNSPECIFIED TYPE: ICD-10-CM

## 2020-01-29 DIAGNOSIS — E11.22 TYPE 2 DIABETES MELLITUS WITH STAGE 3 CHRONIC KIDNEY DISEASE, WITHOUT LONG-TERM CURRENT USE OF INSULIN (HCC): ICD-10-CM

## 2020-01-29 DIAGNOSIS — I10 ESSENTIAL HYPERTENSION: ICD-10-CM

## 2020-01-29 DIAGNOSIS — N18.30 TYPE 2 DIABETES MELLITUS WITH STAGE 3 CHRONIC KIDNEY DISEASE, WITHOUT LONG-TERM CURRENT USE OF INSULIN (HCC): ICD-10-CM

## 2020-01-29 DIAGNOSIS — R53.1 WEAKNESS: Primary | ICD-10-CM

## 2020-01-29 LAB
ALBUMIN SERPL-MCNC: 3.4 G/DL (ref 3.4–5)
ALBUMIN/GLOB SERPL: 1.1 {RATIO} (ref 0.8–1.7)
ALP SERPL-CCNC: 73 U/L (ref 45–117)
ALT SERPL-CCNC: 26 U/L (ref 13–56)
ANION GAP SERPL CALC-SCNC: 6 MMOL/L (ref 3–18)
AST SERPL-CCNC: 24 U/L (ref 10–38)
BASOPHILS # BLD: 0 K/UL (ref 0–0.1)
BASOPHILS NFR BLD: 0 % (ref 0–2)
BILIRUB SERPL-MCNC: 0.3 MG/DL (ref 0.2–1)
BUN SERPL-MCNC: 22 MG/DL (ref 7–18)
BUN/CREAT SERPL: 15 (ref 12–20)
CALCIUM SERPL-MCNC: 9 MG/DL (ref 8.5–10.1)
CHLORIDE SERPL-SCNC: 111 MMOL/L (ref 100–111)
CHOLEST SERPL-MCNC: 217 MG/DL
CO2 SERPL-SCNC: 27 MMOL/L (ref 21–32)
CREAT SERPL-MCNC: 1.48 MG/DL (ref 0.6–1.3)
DIFFERENTIAL METHOD BLD: ABNORMAL
EOSINOPHIL # BLD: 0 K/UL (ref 0–0.4)
EOSINOPHIL NFR BLD: 1 % (ref 0–5)
ERYTHROCYTE [DISTWIDTH] IN BLOOD BY AUTOMATED COUNT: 14.1 % (ref 11.6–14.5)
GLOBULIN SER CALC-MCNC: 3 G/DL (ref 2–4)
GLUCOSE SERPL-MCNC: 107 MG/DL (ref 74–99)
HCT VFR BLD AUTO: 37.2 % (ref 35–45)
HDLC SERPL-MCNC: 66 MG/DL (ref 40–60)
HDLC SERPL: 3.3 {RATIO} (ref 0–5)
HGB BLD-MCNC: 12.2 G/DL (ref 12–16)
LDLC SERPL CALC-MCNC: 121 MG/DL (ref 0–100)
LIPID PROFILE,FLP: ABNORMAL
LYMPHOCYTES # BLD: 1.4 K/UL (ref 0.9–3.6)
LYMPHOCYTES NFR BLD: 24 % (ref 21–52)
MCH RBC QN AUTO: 30.1 PG (ref 24–34)
MCHC RBC AUTO-ENTMCNC: 32.8 G/DL (ref 31–37)
MCV RBC AUTO: 91.9 FL (ref 74–97)
MONOCYTES # BLD: 0.5 K/UL (ref 0.05–1.2)
MONOCYTES NFR BLD: 8 % (ref 3–10)
NEUTS SEG # BLD: 3.7 K/UL (ref 1.8–8)
NEUTS SEG NFR BLD: 67 % (ref 40–73)
PLATELET # BLD AUTO: 293 K/UL (ref 135–420)
PMV BLD AUTO: 10.2 FL (ref 9.2–11.8)
POTASSIUM SERPL-SCNC: 4.3 MMOL/L (ref 3.5–5.5)
PROT SERPL-MCNC: 6.4 G/DL (ref 6.4–8.2)
RBC # BLD AUTO: 4.05 M/UL (ref 4.2–5.3)
SODIUM SERPL-SCNC: 144 MMOL/L (ref 136–145)
TRIGL SERPL-MCNC: 150 MG/DL (ref ?–150)
TSH SERPL DL<=0.05 MIU/L-ACNC: 0.86 UIU/ML (ref 0.36–3.74)
VLDLC SERPL CALC-MCNC: 30 MG/DL
WBC # BLD AUTO: 5.6 K/UL (ref 4.6–13.2)

## 2020-01-29 PROCEDURE — 85025 COMPLETE CBC W/AUTO DIFF WBC: CPT

## 2020-01-29 PROCEDURE — 80053 COMPREHEN METABOLIC PANEL: CPT

## 2020-01-29 PROCEDURE — 84443 ASSAY THYROID STIM HORMONE: CPT

## 2020-01-29 PROCEDURE — 36415 COLL VENOUS BLD VENIPUNCTURE: CPT

## 2020-01-29 PROCEDURE — 80061 LIPID PANEL: CPT

## 2020-01-29 NOTE — PROGRESS NOTES
HPI:   Chronic weakness - intermittent over last 12 mos  Recently stopped topamax (given for chronic pain d/t dizziness)  No fever, cough, abd pain, wt loss, SOB or exertional chest pain; no GI/ bleeding; pedal edema is unchanged  Labs 11/2019 showed A1C of 6.3 (A1C 6 in July 2019) with Cr of 1.76      ROS is otherwise negative. Past Medical History:   Diagnosis Date    Arthritis of right knee     CAD (coronary artery disease), native coronary artery October 2010    mild disease    Cardiac cath 10/21/2010    dLM 20%. mLAD 25%. CX mild. pRCA 30%. EF 30-35%. Anterolateral, apical , diaphrag akin. Hypercontractile basal segments. Severe elev left-sided filling press.  Cardiac echocardiogram 02/02/2011    EF 50-55%. Mild apical hypk. Gr 1 DDfx. RVSP 40-45. Mild LAE.  Cardiac Holter monitor 06/10/2015    Sinus rhythm w/avg HR of 53 bpm (range 40-85 bpm). Occasional PACs. One 3-beat run of nonsustained SVT. Very few PVCs. One 3-beat run of nonsustained VT. Pt's HR was < 50 bpm for 8 hrs of the recorded time. No pauses noted. Two episodes of neck pain corresponded to sinus rhythm w/o ectopy.  Cardiovascular renal duplex 12/03/2012    No renal artery stenosis bilaterally. Bilateral intrinsic/med disease. Patent bilateral renal veins. Multiple cysts on both kidneys.     CKD (chronic kidney disease)     while on diuretics for difficult to control HTN    Diabetes mellitus type II 3/15/2010    Dyslipidemia 3/15/2010    Heart attack ([address]     2010    Heart failure, diastolic, chronic ([address]     History of heart attack     HTN (hypertension) 3/15/2010    Hypertensive heart disease     Loss of appetite     Osteopenia 7/13/2010    Other dyspnea and respiratory abnormality     Ringing in the ears     Stress-induced cardiomyopathy     EF 35% (LV angio 10/2010), then EF 50-55% (echo, Feb 2011)    Wears glasses        Past Surgical History:   Procedure Laterality Date    HX BLADDER SUSPENSION  2009    HX HYSTERECTOMY  1962       Social History     Socioeconomic History    Marital status:      Spouse name: Not on file    Number of children: Not on file    Years of education: Not on file    Highest education level: Not on file   Occupational History    Not on file   Social Needs    Financial resource strain: Not on file    Food insecurity:     Worry: Not on file     Inability: Not on file    Transportation needs:     Medical: Not on file     Non-medical: Not on file   Tobacco Use    Smoking status: Never Smoker    Smokeless tobacco: Never Used   Substance and Sexual Activity    Alcohol use: No    Drug use: No    Sexual activity: Never   Lifestyle    Physical activity:     Days per week: Not on file     Minutes per session: Not on file    Stress: Not on file   Relationships    Social connections:     Talks on phone: Not on file     Gets together: Not on file     Attends Congregation service: Not on file     Active member of club or organization: Not on file     Attends meetings of clubs or organizations: Not on file     Relationship status: Not on file    Intimate partner violence:     Fear of current or ex partner: Not on file     Emotionally abused: Not on file     Physically abused: Not on file     Forced sexual activity: Not on file   Other Topics Concern    Not on file   Social History Narrative    Not on file       Allergies   Allergen Reactions    Lipitor [Atorvastatin] Myalgia    Spironolactone Other (comments)     Dizziness and fatigue       Family History   Problem Relation Age of Onset    Hypertension Mother     Arthritis-osteo Other        Current Outpatient Medications   Medication Sig Dispense Refill    pravastatin (PRAVACHOL) 40 mg tablet TAKE 1 TABLET BY MOUTH EVERY DAY 90 Tab 3    pravastatin (PRAVACHOL) 40 mg tablet Take 1 Tab by mouth daily. 30 Tab 2    glipiZIDE SR (GLUCOTROL XL) 2.5 mg CR tablet Take 1 Tab by mouth daily.  90 Tab 1    amLODIPine (NORVASC) 10 mg tablet TAKE 1 TABLET BY MOUTH EVERY DAY 90 Tab 3    olmesartan (BENICAR) 20 mg tablet Take 1 Tab by mouth daily. 90 Tab 3    glucose blood VI test strips (ASCENSIA CONTOUR) strip Check BS 1x/day E11.9 100 Strip 3    lancets misc Check BA 1x/day E 11.9 100 Each 11    LUMIGAN 0.01 % ophthalmic drops       diclofenac (VOLTAREN) 1 % gel Apply  to affected area four (4) times daily. 100 g 4    nitroglycerin (NITROSTAT) 0.4 mg SL tablet 1 Tab by SubLINGual route every five (5) minutes as needed. 1 Bottle 1    latanoprost (XALATAN) 0.005 % ophthalmic solution       COMBIGAN 0.2-0.5 % drop ophthalmic solution Administer 1 Drop to both eyes every twelve (12) hours.  RESTASIS 0.05 % ophthalmic emulsion Administer 1 Drop to both eyes every twelve (12) hours.  ascorbic acid (VITAMIN C) 500 mg tablet Take 1,000 mg by mouth daily.  CALCIUM PO Take 1,000 mg by mouth two (2) times a day.  Blood-Glucose Meter monitoring kit by Does Not Apply route. Use daily as directed 1 Kit 0    glucose blood VI test strips (BLOOD GLUCOSE TEST) strip by Does Not Apply route. Use daily as directed 3 Package 3    Lancets Misc by Does Not Apply route. Use daily as directed 3 Package 3    alcohol swabs (ALCOHOL PREP PADS) PadM 1 Dose by Apply Externally route daily as needed (Glucose testing). 3 Package 3    Cholecalciferol, Vitamin D3, (VITAMIN D) 1,000 unit Cap Take 2,000 Units by mouth daily. 180 Cap 3    MULTIVITAMINS PO Take 1 Tab by mouth daily.  aspirin 81 mg chewable tablet Take 81 mg by mouth daily. Visit Vitals  /70 (BP 1 Location: Right arm, BP Patient Position: Sitting)   Pulse 76   Temp 97.9 °F (36.6 °C) (Tympanic)   Resp 16   Ht 5' 1\" (1.549 m)   Wt 143 lb (64.9 kg)   SpO2 99%   BMI 27.02 kg/m²       PE  Elderly appearing in NAD  HEENT:  OP: clear. Neck: supple w/o mass   Chest: clear. CV: RRR w/o m,r,g  Abd: soft, NT  Ext: tr edema. Neuro: NF.     Assessment and Plan    Encounter Diagnoses   Name Primary?  Weakness Yes    Type 2 diabetes mellitus with stage 3 chronic kidney disease, without long-term current use of insulin (HCC)     Essential hypertension     Edema, unspecified type        Chronic weakness most likely multifactorial related in large part to advanced age;    With nl A1Cs x 6 mos, stop glipizide to prevent hypoglycemia  (allow more permissive glycemic control based on advanced age)  HTN - controlled  Labs ordered to r/o anemia/thyroid disease or worsening renal fct (CKD)  OV March 2020 or prn  I have explained plan to patient and the patient verbalizes understanding

## 2020-01-29 NOTE — PROGRESS NOTES
Chief Complaint   Patient presents with    Fatigue       Pt preferred language for health care discussion is english. Is someone accompanying this pt? no    Is the patient using any DME equipment during OV? no    Depression Screening:  3 most recent [facility] Screens 1/29/2020 11/12/2019 10/22/2019 10/22/2019 9/20/2019 8/13/2019 7/18/2019   PHQ Not Done - - - - - - -   Little interest or pleasure in doing things Not at all Not at all Not at all Not at all Several days Not at all Not at all   Feeling down, depressed, irritable, or hopeless Not at all Not at all Not at all Not at all Several days Not at all Not at all   Total Score PHQ 2 0 0 0 0 2 0 0       Learning Assessment:  Learning Assessment 8/13/2019 7/27/2017 7/13/2016 2/11/2015 1/21/2015 9/25/2014 6/17/2014   PRIMARY LEARNER Patient Patient Patient Patient Patient Patient Patient   HIGHEST LEVEL OF EDUCATION - PRIMARY LEARNER  GRADUATED HIGH SCHOOL OR GED - - - DID NOT GRADUATE HIGH SCHOOL DID NOT GRADUATE HIGH SCHOOL DID NOT GRADUATE [address] LEARNER NONE - - - - - [address] CAREGIVER No - - - - No -   [address]   LEARNER PREFERENCE PRIMARY LISTENING READING DEMONSTRATION DEMONSTRATION DEMONSTRATION DEMONSTRATION DEMONSTRATION   ANSWERED BY pt Patient patient pt patient patient Patient   RELATIONSHIP SELF SELF SELF SELF SELF SELF SELF       Abuse Screening:  Abuse Screening Questionnaire 11/12/2019 8/13/2019 11/13/2017 8/4/2016 7/17/2015 5/22/2014   Do you ever feel afraid of your partner? N N N N N N   Are you in a relationship with someone who physically or mentally threatens you? N N N N N N   Is it safe for you to go home? Dora NEELY       Fall Risk  Fall Risk Assessment, last 12 mths 11/12/2019   Able to walk? Yes   Fall in past 12 months? No           Advance Directive:  1. Do you have an advance directive in place?  Patient Reply:n    2.  Per patient no changes to their ACP contact no. Coordination of Care:  1. Have you been to the ER, urgent care clinic since your last visit? Hospitalized since your last visit? Yes patient radha 1 dizziness    2. Have you seen or consulted any other health care providers outside of the [address] [place] [address] since your last visit? Include any pap smears or colon screening.  no    Provider prefers to do his own med reconciliation

## 2020-01-29 NOTE — PROGRESS NOTES
Patient attended appointment on 1/29/20 with Alfredo Chavez M.D.  EMR reviewed. Will continue to monitor and will contact patient at time of next scheduled outreach. Goals Addressed                 This Visit's Progress     Patient will attend all scheduled appointments through 3/1/20        1/29/20 Patient attended appointment today with Dr. Shirlene Sanchez.

## 2020-02-05 ENCOUNTER — PATIENT OUTREACH (OUTPATIENT)
Dept: FAMILY MEDICINE CLINIC | Age: 85
End: 2020-02-05

## 2020-02-05 NOTE — PROGRESS NOTES
Complex Case Management  Follow-Up    Date/Time:  2/5/2020 2:03 PM     Ambulatory Care Manager contacted patient for Complex Case Management  follow up. Spoke to patient. Introduced self/role and reason for call. Patient reported:   She states she feels a little bit better than she did on her visit with Dr. Lizet Cabezas on 1/29/20, but she reports that she still feels weak. Patient admits to good oral intake Denies difficulty ambulating. States her blood sugars recently have been between . Patient reports taking medications as prescribed and denies needing any refills at this time. Upcoming appointments were reviewed with patient. Patient verbalized acknowledgement. Pertinent negatives:  Patient denies cp, sob, dizziness, n/v, fever, diarrhea, constipation, urinary frequency or retention. Specialist appointments since last outreach? no  If so, specialist and date: n/a    Medications:   New medications since last outreach: no  Does patient need refills on any medications: no  Medication changes since last outreach (dose adjustments or discontinued meds): no    Home Health company: n/a  Date of discharge: n/a    Barriers to care? None at this time. Patient reminded that there are physicians on call 24 hours a day / 7 days a week (M-F 5pm to 8am and from Friday 5pm until Monday 8a for the weekend) should the patient have questions or concerns. Future Appointments   Date Time Provider Jean Marie Sorenson   3/3/2020  8:40 AM [facility] [address]   3/10/2020 11:30 AM Fuad Farah MD [address] [place] [address]   3/12/2020 11:30 AM Rachel Carrera  [address] [place] [address]   4/22/2020 10:40 AM Raymundo Hyde  [facility]        Other upcoming appointments:  n/a    Goals      Patient will attend all scheduled appointments through 3/1/20      1/29/20 Patient attended appointment today with Dr. Lizet Cabezas. 2/5/20 Upcoming appointments were reviewed with patient.  Patient verbalized acknowledgement.  Pt will take all medications prescribed to be evaluated on each outreach through 3/1/20      1/8/20 Patient reports taking medications as prescribed and denies needing any refills at this time. 1/22/20 Patient reports taking medications as prescribed and denies needing any refills at this time. 2/5/20 Patient reports taking medications as prescribed and denies needing any refills at this time.

## 2020-02-12 ENCOUNTER — PATIENT OUTREACH (OUTPATIENT)
Dept: FAMILY MEDICINE CLINIC | Age: 85
End: 2020-02-12

## 2020-02-12 NOTE — PROGRESS NOTES
Contacted patient for Complex Case Management  follow up. No answer. Will attempt to contact at a later time.

## 2020-02-21 ENCOUNTER — PATIENT OUTREACH (OUTPATIENT)
Dept: FAMILY MEDICINE CLINIC | Age: 85
End: 2020-02-21

## 2020-02-28 ENCOUNTER — PATIENT OUTREACH (OUTPATIENT)
Dept: FAMILY MEDICINE CLINIC | Age: 85
End: 2020-02-28

## 2020-02-28 NOTE — PROGRESS NOTES
Complex Case Management  Follow-Up    Date/Time:  2/28/2020 1:36 PM     Ambulatory Care Manager contacted patient for Complex Case Management  follow up. Spoke to patient. Introduced self/role and reason for call. Patient reported:   · She is doing well with the exception of arthritis pain in her back and legs. She has been doing exercises to help relieve the discomfort. She is still ambulating with her walker without difficulty. · Patient reports her blood sugars have been good. Her reading this morning was 104. · She admits to good oral intake. Pertinent negatives:  Patient denies cp, sob, dizziness, n/v, fever, diarrhea, constipation, urinary frequency or retention. Specialist appointments since last outreach? no  If so, specialist and date: n/a    Medications:   New medications since last outreach: no  Does patient need refills on any medications: no  Medication changes since last outreach (dose adjustments or discontinued meds): no    Home Health company: n/a  Date of discharge: n/a    Barriers to care? None at this time. Patient reminded that there are physicians on call 24 hours a day / 7 days a week (M-F 5pm to 8am and from Friday 5pm until Monday 8a for the weekend) should the patient have questions or concerns. Future Appointments   Date Time Provider Jean Marie Sorenson   3/3/2020  8:40 AM [place] [id number]   3/10/2020 11:30 AM Dorothy Marrero MD [facility]   3/12/2020 11:30 AM Mir Schofield  [address]   4/22/2020 10:40 AM Cipriano George  [facility]        Other upcoming appointments:  n/a    Goals      Patient will attend all scheduled appointments through 3/1/20      1/29/20 Patient attended appointment today with Dr. Hillary Catherine. 2/5/20 Upcoming appointments were reviewed with patient. Patient verbalized acknowledgement. 2/28/20 Upcoming appointments were reviewed with patient. Patient verbalized acknowledgement.           Pt will take all medications prescribed to be evaluated on each outreach through 3/1/20      1/8/20 Patient reports taking medications as prescribed and denies needing any refills at this time. 1/22/20 Patient reports taking medications as prescribed and denies needing any refills at this time. 2/5/20 Patient reports taking medications as prescribed and denies needing any refills at this time. 2/28/20 Patient reports taking medications as prescribed and denies needing any refills at this time.

## 2020-03-03 ENCOUNTER — HOSPITAL ENCOUNTER (OUTPATIENT)
Dept: LAB | Age: 85
Discharge: HOME | End: 2020-03-03
Payer: MEDICARE

## 2020-03-03 DIAGNOSIS — N18.30 TYPE 2 DIABETES MELLITUS WITH STAGE 3 CHRONIC KIDNEY DISEASE, WITHOUT LONG-TERM CURRENT USE OF INSULIN (HCC): ICD-10-CM

## 2020-03-03 DIAGNOSIS — I10 ESSENTIAL HYPERTENSION: ICD-10-CM

## 2020-03-03 DIAGNOSIS — E11.22 TYPE 2 DIABETES MELLITUS WITH STAGE 3 CHRONIC KIDNEY DISEASE, WITHOUT LONG-TERM CURRENT USE OF INSULIN (HCC): ICD-10-CM

## 2020-03-03 DIAGNOSIS — E78.5 DYSLIPIDEMIA: ICD-10-CM

## 2020-03-03 LAB
ALBUMIN SERPL-MCNC: 3.7 G/DL (ref 3.4–5)
ALBUMIN/GLOB SERPL: 1.3 {RATIO} (ref 0.8–1.7)
ALP SERPL-CCNC: 75 U/L (ref 45–117)
ALT SERPL-CCNC: 24 U/L (ref 13–56)
ANION GAP SERPL CALC-SCNC: 7 MMOL/L (ref 3–18)
AST SERPL-CCNC: 25 U/L (ref 10–38)
BILIRUB SERPL-MCNC: 0.4 MG/DL (ref 0.2–1)
BUN SERPL-MCNC: 32 MG/DL (ref 7–18)
BUN/CREAT SERPL: 22 (ref 12–20)
CALCIUM SERPL-MCNC: 9.7 MG/DL (ref 8.5–10.1)
CHLORIDE SERPL-SCNC: 108 MMOL/L (ref 100–111)
CHOLEST SERPL-MCNC: 226 MG/DL
CO2 SERPL-SCNC: 27 MMOL/L (ref 21–32)
CREAT SERPL-MCNC: 1.48 MG/DL (ref 0.6–1.3)
CREAT UR-MCNC: 200 MG/DL (ref 30–125)
GLOBULIN SER CALC-MCNC: 2.8 G/DL (ref 2–4)
GLUCOSE SERPL-MCNC: 92 MG/DL (ref 74–99)
HBA1C MFR BLD: 6.2 % (ref 4.2–5.6)
HDLC SERPL-MCNC: 68 MG/DL (ref 40–60)
HDLC SERPL: 3.3 {RATIO} (ref 0–5)
LDLC SERPL CALC-MCNC: 133.2 MG/DL (ref 0–100)
LIPID PROFILE,FLP: ABNORMAL
MICROALBUMIN UR-MCNC: 11.2 MG/DL (ref 0–3)
MICROALBUMIN/CREAT UR-RTO: 56 MG/G (ref 0–30)
POTASSIUM SERPL-SCNC: 4.6 MMOL/L (ref 3.5–5.5)
PROT SERPL-MCNC: 6.5 G/DL (ref 6.4–8.2)
SODIUM SERPL-SCNC: 142 MMOL/L (ref 136–145)
TRIGL SERPL-MCNC: 124 MG/DL (ref ?–150)
VLDLC SERPL CALC-MCNC: 24.8 MG/DL

## 2020-03-03 PROCEDURE — 82043 UR ALBUMIN QUANTITATIVE: CPT

## 2020-03-03 PROCEDURE — 80053 COMPREHEN METABOLIC PANEL: CPT

## 2020-03-03 PROCEDURE — 83036 HEMOGLOBIN GLYCOSYLATED A1C: CPT

## 2020-03-03 PROCEDURE — 36415 COLL VENOUS BLD VENIPUNCTURE: CPT

## 2020-03-03 PROCEDURE — 80061 LIPID PANEL: CPT

## 2020-03-10 ENCOUNTER — PATIENT OUTREACH (OUTPATIENT)
Dept: FAMILY MEDICINE CLINIC | Age: 85
End: 2020-03-10

## 2020-03-10 ENCOUNTER — OFFICE VISIT (OUTPATIENT)
Dept: FAMILY MEDICINE CLINIC | Age: 85
End: 2020-03-10

## 2020-03-10 VITALS
SYSTOLIC BLOOD PRESSURE: 177 MMHG | DIASTOLIC BLOOD PRESSURE: 68 MMHG | HEIGHT: 61 IN | OXYGEN SATURATION: 97 % | BODY MASS INDEX: 27.26 KG/M2 | TEMPERATURE: 97.3 F | WEIGHT: 144.4 LBS | HEART RATE: 64 BPM | RESPIRATION RATE: 20 BRPM

## 2020-03-10 DIAGNOSIS — M25.512 CHRONIC LEFT SHOULDER PAIN: ICD-10-CM

## 2020-03-10 DIAGNOSIS — G89.29 CHRONIC LEFT SHOULDER PAIN: ICD-10-CM

## 2020-03-10 DIAGNOSIS — E11.22 TYPE 2 DIABETES MELLITUS WITH STAGE 3 CHRONIC KIDNEY DISEASE, WITHOUT LONG-TERM CURRENT USE OF INSULIN (HCC): Primary | ICD-10-CM

## 2020-03-10 DIAGNOSIS — M19.012 PRIMARY OSTEOARTHRITIS OF LEFT SHOULDER: ICD-10-CM

## 2020-03-10 DIAGNOSIS — I50.32 HEART FAILURE, DIASTOLIC, CHRONIC (HCC): ICD-10-CM

## 2020-03-10 DIAGNOSIS — I10 ESSENTIAL HYPERTENSION: ICD-10-CM

## 2020-03-10 DIAGNOSIS — E78.5 DYSLIPIDEMIA: ICD-10-CM

## 2020-03-10 DIAGNOSIS — N18.30 TYPE 2 DIABETES MELLITUS WITH STAGE 3 CHRONIC KIDNEY DISEASE, WITHOUT LONG-TERM CURRENT USE OF INSULIN (HCC): Primary | ICD-10-CM

## 2020-03-10 RX ORDER — DICLOFENAC SODIUM 10 MG/G
GEL TOPICAL 4 TIMES DAILY
Qty: 100 G | Refills: 4 | Status: SHIPPED | OUTPATIENT
Start: 2020-03-10 | End: 2022-01-07

## 2020-03-10 NOTE — PATIENT INSTRUCTIONS

## 2020-03-10 NOTE — PROGRESS NOTES
Patient is in the office today for 4 month follow up. 1. Have you been to the ER, urgent care clinic since your last visit? Hospitalized since your last visit? No    2. Have you seen or consulted any other health care providers outside of the [address] [place] [address] since your last visit? Include any pap smears or colon screening.  No

## 2020-03-10 NOTE — PROGRESS NOTES
Subjective:       Chief Complaint  The patient presents for follow up of diabetes, hypertension and high cholesterol. HPI Louisa Sacks is a 80 y.o. female seen for follow up of diabetes. Shealso has hypertension and hyperlipidemia. Diabetes well controlled, no significant medication side effects noted, off glucotrol XL 2.5 mg for last 6 weeks, will continue off medications for now,  hypertension normally well controlled, no significant medication side effects noted, on current meds, which include Benicar 20 mg, Norvasc 10 mg, will continue to monitor at home,  hyperlipidemia, fairly well controlled on Pravachol 40 mg for age 80. Pt does have some myalgias/weakness in her legs at times, her daughter who is with her knows to stop the Pravachol if the discomfort worsens. Diet and Lifestyle: generally follows a low fat low cholesterol diet, follows a diabetic diet regularly, exercises sporadically    Home BP Monitoring: is well controlled at home. Diabetic Review of Systems - home glucose monitoring: is well controlled at home when she takes it 1x/day    Other symptoms and concerns: pt feels better when she exercises on a regular basis. Pt's CKD stage 3 is stable on current meds. Patient has palpitations that bother her at times. She has been taken off of beta-blockers by her cardiologist because of low heart rate. Pt has chronic diastolic heart failure that is followed by cardiology and controlled on current meds. Pt has generalized arthritis for which she uses aleve with mild improvement. She is currently have a flair up today. She also uses OTC Lidoderm patches       Current Outpatient Medications   Medication Sig    diclofenac (VOLTAREN) 1 % gel Apply  to affected area four (4) times daily.  pravastatin (PRAVACHOL) 40 mg tablet TAKE 1 TABLET BY MOUTH EVERY DAY    pravastatin (PRAVACHOL) 40 mg tablet Take 1 Tab by mouth daily.     amLODIPine (NORVASC) 10 mg tablet TAKE 1 TABLET BY MOUTH EVERY DAY    olmesartan (BENICAR) 20 mg tablet Take 1 Tab by mouth daily.  glucose blood VI test strips (ASCENSIA CONTOUR) strip Check BS 1x/day E11.9    lancets misc Check BA 1x/day E 11.9    LUMIGAN 0.01 % ophthalmic drops     latanoprost (XALATAN) 0.005 % ophthalmic solution     COMBIGAN 0.2-0.5 % drop ophthalmic solution Administer 1 Drop to both eyes every twelve (12) hours.  RESTASIS 0.05 % ophthalmic emulsion Administer 1 Drop to both eyes every twelve (12) hours.  ascorbic acid (VITAMIN C) 500 mg tablet Take 1,000 mg by mouth daily.  CALCIUM PO Take 1,000 mg by mouth two (2) times a day.  Blood-Glucose Meter monitoring kit by Does Not Apply route. Use daily as directed    glucose blood VI test strips (BLOOD GLUCOSE TEST) strip by Does Not Apply route. Use daily as directed    Lancets Misc by Does Not Apply route. Use daily as directed    alcohol swabs (ALCOHOL PREP PADS) PadM 1 Dose by Apply Externally route daily as needed (Glucose testing).  Cholecalciferol, Vitamin D3, (VITAMIN D) 1,000 unit Cap Take 2,000 Units by mouth daily.  MULTIVITAMINS PO Take 1 Tab by mouth daily.  aspirin 81 mg chewable tablet Take 81 mg by mouth daily.  nitroglycerin (NITROSTAT) 0.4 mg SL tablet 1 Tab by SubLINGual route every five (5) minutes as needed. No current facility-administered medications for this visit.               Review of Systems  Respiratory: negative for dyspnea on exertion  Cardiovascular: negative for chest pain    Objective:     Visit Vitals  /68 (BP 1 Location: Left arm, BP Patient Position: Sitting)   Pulse 64   Temp 97.3 °F (36.3 °C) (Oral)   Resp 20   Ht 5' 1\" (1.549 m)   Wt 144 lb 6.4 oz (65.5 kg)   SpO2 97%   BMI 27.28 kg/m²        General appearance - alert, well appearing, and in no distress  Neck - supple, no significant adenopathy, carotids upstroke normal bilaterally, no bruits  Chest - clear to auscultation, no wheezes, rales or rhonchi, symmetric air entry  Heart -normal S1, S2, no murmurs, rubs, clicks or gallops  Extremities - peripheral pulses normal, no pedal edema, no clubbing or cyanosis  Skin - normal coloration and turgor, no rashes, no suspicious skin lesions noted      Labs:   Lab Results   Component Value Date/Time    Hemoglobin A1c 6.2 (H) 03/03/2020 10:27 AM    Hemoglobin A1c 6.0 (H) 11/05/2019 10:09 AM    Hemoglobin A1c 6.3 (H) 07/11/2019 09:26 AM    Microalbumin/Creat ratio (mg/g creat) 56 (H) 03/03/2020 10:27 AM    Microalbumin,urine random 11.20 (H) 03/03/2020 10:27 AM    LDL, calculated 133.2 (H) 03/03/2020 10:27 AM    Creatinine 1.48 (H) 03/03/2020 10:27 AM      Lab Results   Component Value Date/Time    Cholesterol, total 226 (H) 03/03/2020 10:27 AM    HDL Cholesterol 68 (H) 03/03/2020 10:27 AM    LDL, calculated 133.2 (H) 03/03/2020 10:27 AM    Triglyceride 124 03/03/2020 10:27 AM    CHOL/HDL Ratio 3.3 03/03/2020 10:27 AM     Lab Results   Component Value Date/Time    AST (SGOT) 25 03/03/2020 10:27 AM    Alk. phosphatase 75 03/03/2020 10:27 AM    Bilirubin, direct 0.1 03/11/2016 10:45 AM     Lab Results   Component Value Date/Time    GFR est AA 40 (L) 03/03/2020 10:27 AM    GFR est non-AA 33 (L) 03/03/2020 10:27 AM    Creatinine 1.48 (H) 03/03/2020 10:27 AM    BUN 32 (H) 03/03/2020 10:27 AM    Sodium 142 03/03/2020 10:27 AM    Potassium 4.6 03/03/2020 10:27 AM    Chloride 108 03/03/2020 10:27 AM    CO2 27 03/03/2020 10:27 AM      Lab Results   Component Value Date/Time    Glucose 92 03/03/2020 10:27 AM            Assessment / Plan     Diabetes well controlled, off medications. Will continue to monitor  Hypertension  Normally well controlled, on current meds,  High today but pt in pain due to arthritis flair up   Hyperlipidemia fairly well controlled  On Pravachol,  Would have low threshold to stop it for myalgias. ICD-10-CM ICD-9-CM    1.  Type 2 diabetes mellitus with stage 3 chronic kidney disease, without long-term current use of insulin ([facility]) E11.22 250.40 HEMOGLOBIN A1C W/O EAG    N18.3 585.3    2. Essential hypertension Y58 518.7 METABOLIC PANEL, COMPREHENSIVE   3. Dyslipidemia E78.5 272.4 LIPID PANEL   4. Heart failure, diastolic, chronic ([facility]) A16.88 428.32 Stable on current meds    5. Chronic left shoulder pain M25.512 719.41 Will try diclofenac (VOLTAREN) 1 % gel again along with aleve 1-2 tabs as needed     G89.29 338.29    6. Primary osteoarthritis of left shoulder M19.012 715.11 diclofenac (VOLTAREN) 1 % gel              Diabetic issues reviewed with her: diabetic diet discussed in detail, and low cholesterol diet, weight control and daily exercise discussed. Follow-up and Dispositions    · Return in about 3 months (around 6/10/2020) for labs 1 week before. Reviewed plan of care. Patient has provided input and agrees with goals.

## 2020-03-10 NOTE — PROGRESS NOTES
Patient attended appointment on 3/10/20 with Merari Kemp MD.  EMR reviewed. Will continue to monitor and will contact patient at time of next scheduled outreach. Goals Addressed                 This Visit's Progress     Patient will attend all scheduled appointments through 3/1/20        1/29/20 Patient attended appointment today with Dr. Gia Rudd. 2/5/20 Upcoming appointments were reviewed with patient. Patient verbalized acknowledgement. 2/28/20 Upcoming appointments were reviewed with patient. Patient verbalized acknowledgement. 3/10/20 Patient attended appointment today with PCP, Dr. Debbie Wheat.

## 2020-03-12 ENCOUNTER — OFFICE VISIT (OUTPATIENT)
Dept: ORTHOPEDIC SURGERY | Age: 85
End: 2020-03-12

## 2020-03-12 VITALS
SYSTOLIC BLOOD PRESSURE: 166 MMHG | OXYGEN SATURATION: 99 % | DIASTOLIC BLOOD PRESSURE: 69 MMHG | BODY MASS INDEX: 27.38 KG/M2 | WEIGHT: 145 LBS | HEIGHT: 61 IN | RESPIRATION RATE: 20 BRPM | TEMPERATURE: 98.5 F | HEART RATE: 64 BPM

## 2020-03-12 DIAGNOSIS — M48.062 SPINAL STENOSIS OF LUMBAR REGION WITH NEUROGENIC CLAUDICATION: Primary | ICD-10-CM

## 2020-03-12 NOTE — PROGRESS NOTES
[address]  [address], [address]  [place] [address]  Phone: [phone]  Fax: [phone]        Caridad Sesay  : 1923  PCP: Mark Connell MD  3/12/2020    PROGRESS NOTE      HISTORY OF PRESENT ILLNESS  Migel Sweeney is a 80 y.o. female who was seen as a new patient 10/31/19 with c/o low back pain with BLE paraesthesia x 6 weeks. Pt reports a heaviness in her BLE with standing and walking. She reports a limited standing/walking tolerance. She finds relief with sitting. She also c/o some lateral right thigh pain. She ambulates with a cane. Dr. Diane Newman recommended she use a walker for walking longer distances. Pt also c/o left sided functional deficits. She denies h/o stroke. She notes significant benefit from PT (19-19; Keith) with her walking improved. She states that her lateral thigh symptoms have improved. Migel Sweeney comes in to the office today for 3 month f/u. Pt notes that she has been doing well overall lately, especially over the last 3 days. However, she continues to feel some weakness in her knees. She does not feel her symptoms warrant further treatment at this time. She rates her pain as a 5/10 today. ASSESSMENT  Her symptoms are likely due to lumbar spinal stenosis with neurogenic claudication. We discussed the option of: MRI    PLAN  1. Lumbar MRI to evaluate for lumbar spinal stenosis. Pt will f/u after MRI or sooner as needed. Diagnoses and all orders for this visit:    1. Spinal stenosis of lumbar region with neurogenic claudication  -     MRI LUMB SPINE WO CONT; Future       PAST MEDICAL HISTORY   Past Medical History:   Diagnosis Date    Arthritis of right knee     CAD (coronary artery disease), native coronary artery 2010    mild disease    Cardiac cath 10/21/2010    dLM 20%. mLAD 25%. CX mild. pRCA 30%. EF 30-35%. Anterolateral, apical , diaphrag akin. Hypercontractile basal segments. Severe elev left-sided filling press.  Cardiac echocardiogram 02/02/2011    EF 50-55%. Mild apical hypk. Gr 1 DDfx. RVSP 40-45. Mild LAE.  Cardiac Holter monitor 06/10/2015    Sinus rhythm w/avg HR of 53 bpm (range 40-85 bpm). Occasional PACs. One 3-beat run of nonsustained SVT. Very few PVCs. One 3-beat run of nonsustained VT. Pt's HR was < 50 bpm for 8 hrs of the recorded time. No pauses noted. Two episodes of neck pain corresponded to sinus rhythm w/o ectopy.  Cardiovascular renal duplex 12/03/2012    No renal artery stenosis bilaterally. Bilateral intrinsic/med disease. Patent bilateral renal veins. Multiple cysts on both kidneys.  CKD (chronic kidney disease)     while on diuretics for difficult to control HTN    Diabetes mellitus type II 3/15/2010    Dyslipidemia 3/15/2010    Heart attack ([address]     2010    Heart failure, diastolic, chronic ([address]     History of heart attack     HTN (hypertension) 3/15/2010    Hypertensive heart disease     Loss of appetite     Osteopenia 7/13/2010    Other dyspnea and respiratory abnormality     Ringing in the ears     Stress-induced cardiomyopathy     EF 35% (LV angio 10/2010), then EF 50-55% (echo, Feb 2011)    Wears glasses        Past Surgical History:   Procedure Laterality Date    HX BLADDER SUSPENSION  2009    HX HYSTERECTOMY  1962   . MEDICATIONS    Current Outpatient Medications   Medication Sig Dispense Refill    diclofenac (VOLTAREN) 1 % gel Apply  to affected area four (4) times daily. 100 g 4    pravastatin (PRAVACHOL) 40 mg tablet TAKE 1 TABLET BY MOUTH EVERY DAY 90 Tab 3    pravastatin (PRAVACHOL) 40 mg tablet Take 1 Tab by mouth daily. 30 Tab 2    amLODIPine (NORVASC) 10 mg tablet TAKE 1 TABLET BY MOUTH EVERY DAY 90 Tab 3    olmesartan (BENICAR) 20 mg tablet Take 1 Tab by mouth daily.  90 Tab 3    glucose blood VI test strips (ASCENSIA CONTOUR) strip Check BS 1x/day E11.9 100 Strip 3    lancets misc Check BA 1x/day E 11.9 100 Each 11    LUMIGAN 0.01 % ophthalmic drops       nitroglycerin (NITROSTAT) 0.4 mg SL tablet 1 Tab by SubLINGual route every five (5) minutes as needed. 1 Bottle 1    latanoprost (XALATAN) 0.005 % ophthalmic solution       COMBIGAN 0.2-0.5 % drop ophthalmic solution Administer 1 Drop to both eyes every twelve (12) hours.  RESTASIS 0.05 % ophthalmic emulsion Administer 1 Drop to both eyes every twelve (12) hours.  ascorbic acid (VITAMIN C) 500 mg tablet Take 1,000 mg by mouth daily.  CALCIUM PO Take 1,000 mg by mouth two (2) times a day.  Blood-Glucose Meter monitoring kit by Does Not Apply route. Use daily as directed 1 Kit 0    glucose blood VI test strips (BLOOD GLUCOSE TEST) strip by Does Not Apply route. Use daily as directed 3 Package 3    Lancets Misc by Does Not Apply route. Use daily as directed 3 Package 3    alcohol swabs (ALCOHOL PREP PADS) PadM 1 Dose by Apply Externally route daily as needed (Glucose testing). 3 Package 3    Cholecalciferol, Vitamin D3, (VITAMIN D) 1,000 unit Cap Take 2,000 Units by mouth daily. 180 Cap 3    MULTIVITAMINS PO Take 1 Tab by mouth daily.  aspirin 81 mg chewable tablet Take 81 mg by mouth daily.           ALLERGIES  Allergies   Allergen Reactions    Lipitor [Atorvastatin] Myalgia    Spironolactone Other (comments)     Dizziness and fatigue          SOCIAL HISTORY    Social History     Socioeconomic History    Marital status:      Spouse name: Not on file    Number of children: Not on file    Years of education: Not on file    Highest education level: Not on file   Tobacco Use    Smoking status: Never Smoker    Smokeless tobacco: Never Used   Substance and Sexual Activity    Alcohol use: No    Drug use: No    Sexual activity: Never       FAMILY HISTORY  Family History   Problem Relation Age of Onset    Hypertension Mother     Arthritis-osteo Other          REVIEW OF SYSTEMS  Review of Systems   Musculoskeletal: Positive for back pain. BLE paraesthesia/heaviness           PHYSICAL EXAMINATION  Visit Vitals  /69 (BP 1 Location: Right arm, BP Patient Position: Sitting)   Pulse 64   Temp 98.5 °F (36.9 °C) (Oral)   Resp 20   Ht 5' 1\" (1.549 m)   Wt 145 lb (65.8 kg)   SpO2 99%   BMI 27.40 kg/m²       Pain Assessment  3/12/2020   Location of Pain Back   Location Modifiers Inferior   Severity of Pain 5   Quality of Pain Throbbing   Quality of Pain Comment \"twisting feeling, weakness\"   Duration of Pain -   Frequency of Pain Intermittent   Aggravating Factors -   Aggravating Factors Comment -   Limiting Behavior -   Relieving Factors -   Relieving Factors Comment -   Result of Injury No           Constitutional:  Well developed, well nourished, in no acute distress. Psychiatric: Affect and mood are appropriate. Integumentary: No rashes or abrasions noted on exposed areas. SPINE/MUSCULOSKELETAL EXAM    Cervical spine:  Neck is midline. Normal muscle tone. No focal atrophy is noted. ROM pain free. Shoulder ROM intact. No tenderness to palpation. Negative Spurling's sign. Negative Tinel's sign. Negative Coelho's sign. Sensation in the bilateral arms grossly intact to light touch.      Ulnar deviation of fingers on the left  [place] neck deformities on the left     No clear myelopathic or UMN signs     Lumbar spine:  No rash, ecchymosis, or gross obliquity. No fasciculations. No focal atrophy is noted. No pain with hip ROM. Full range of motion. No tenderness to palpation. No tenderness to palpation at the sciatic notch. SI joints non-tender. Trochanters non tender. Sensation in the bilateral legs grossly intact to light touch.     Updates from 12/12/19:   Full ROM.    Tenderness to palpation of bilateral trochanters. MOTOR:      Biceps  Triceps Deltoids Wrist Ext Wrist Flex Hand Intrin   Right 5/5 5/5 5/5 5/5 5/5 5/5   Left 5/5 5/5 5/5 5/5 5/5 5/5             Hip Flex  Quads Hamstrings Ankle DF EHL Ankle PF   Right 5/5 5/5 5/5 5/5 5/5 5/5   Left 5/5 5/5 5/5 5/5 5/5 5/5     DTRs are 1+ biceps, triceps, brachioradialis, patella, and Achilles.     Negative Straight Leg raise. Squat not tested. No difficulty with tandem gait.      Ambulation with single point cane. FWB.       RADIOGRAPHS  Lumbar XR images taken on 10/31/19 personally reviewed with patient:  Facet sclerosis at lower levels  Significant Grade 2 anterolisthesis of L5 on S1 and L4 on L5   Atherosclerosis of aorta  No obvious compression fractures    10 minutes of face-to-face contact were spent with the patient during today's visit extensively discussing symptoms and treatment plan. All questions were answered. More than half of this visit today was spent on counseling.      Written by Sabrina Taylor as dictated by Tawanna Mcintosh MD

## 2020-03-12 NOTE — PATIENT INSTRUCTIONS
MRI of the Lumbar Spine: About This Test 
What is it? MRI (magnetic resonance imaging) is a test that uses a magnetic field and pulses of radio wave energy to make pictures of the organs and structures inside the body. An MRI can give your doctor information about the spine in your lower back (the lumbar spine). This can include the spine, the space around the spinal cord, and the vertebrae in your lower back. When you have an MRI, you lie on a table that moves into the MRI machine. Why is this test done? An MRI of the lumbar spine can help find the cause of symptoms like back pain or leg pain, weakness, or numbness. It can help find problems such as a herniated disc, a tumor, or an infection. How do you prepare for the test? 
In general, there's nothing you have to do before this test, unless your doctor tells you to. Tell your doctor if you get nervous in tight spaces. You may get a medicine to help you relax. If you think you'll get this medicine, be sure you have someone to take you home. How is the test done? · You may have contrast material (dye) put into your arm through a tube called an IV. · You will lie on a table that's part of the MRI scanner. · The table will slide into the space that contains the magnet. · Inside the scanner, you will hear a fan and feel air moving. You may hear tapping, thumping, or snapping noises. You may be given earplugs or headphones to reduce the noise. · You will be asked to hold still during the scan. You may be asked to hold your breath for short periods. · You may be alone in the scanning room. But a technologist will watch through a window and talk with you during the test. 
How does having an MRI of the spine feel? You won't have pain from the magnetic field or radio waves used for the MRI test. You may be tired or sore from lying in one position for a long time. If a contrast material is used, you may feel some coolness when it is put into your IV. In rare cases, you may feel: · Tingling in the mouth if you have metal dental fillings. · Warmth in the area being checked. This is normal. Tell the technologist if you have nausea, vomiting, a headache, dizziness, pain, burning, or breathing problems. How long does the test take? The test usually takes 30 to 60 minutes but can take as long as 2 hours. What are the risks of an MRI of the spine? There are no known harmful effects from the strong magnetic field used for an MRI. But the magnet is very powerful. It may affect any metal implants or other medical devices you have. Risks from contrast material 
Contrast material that contains gadolinium may be used in this test. But for most people, the benefit of its use in this test outweighs the risk. Be sure to tell your doctor if you have kidney problems or are pregnant. There is a slight chance of an allergic reaction if contrast material is used during the test. But most reactions are mild and can be treated using medicine. If you breastfeed and are concerned about whether the contrast material used in this test is safe, talk to your doctor. Most experts believe that very little dye passes into breast milk and even less is passed on to the baby. But if you are concerned, you can stop breastfeeding for up to 24 hours after the test. During this time, you can give your baby breast milk that you stored before the test. Don't use the breast milk you pump in the 24 hours after the test. Throw it out. What happens after the test? 
· You will probably be able to go home right away. It depends on the reason for the test. 
· You can go back to your usual activities right away. Follow-up care is a key part of your treatment and safety. Be sure to make and go to all appointments, and call your doctor if you are having problems. It's also a good idea to keep a list of the medicines you take. Ask your doctor when you can expect to have your test results. Where can you learn more? Go to http://jason-tiera.info/ Enter W005 in the search box to learn more about \"MRI of the Lumbar Spine: About This Test.\" Current as of: December 8, 2019Content Version: 12.4 © 0736-1423 [employer]. Care instructions adapted under license by [employer] (which disclaims liability or warranty for this information). If you have questions about a medical condition or this instruction, always ask your healthcare professional. [address] any warranty or liability for your use of this information.

## 2020-03-16 ENCOUNTER — TELEPHONE (OUTPATIENT)
Dept: ORTHOPEDIC SURGERY | Age: 85
End: 2020-03-16

## 2020-03-16 DIAGNOSIS — M48.062 SPINAL STENOSIS OF LUMBAR REGION WITH NEUROGENIC CLAUDICATION: Primary | ICD-10-CM

## 2020-03-16 NOTE — TELEPHONE ENCOUNTER
Pt daughter scott on hipaa states her mother cancelled mri she's not going to do it because she's very claustrophobic. Please advise. .. Pt daughter O#[phone]

## 2020-03-18 RX ORDER — NORTRIPTYLINE HYDROCHLORIDE 25 MG/1
25 CAPSULE ORAL
Qty: 30 CAP | Refills: 2 | Status: SHIPPED | OUTPATIENT
Start: 2020-03-18 | End: 2020-05-14 | Stop reason: ALTCHOICE

## 2020-03-18 NOTE — TELEPHONE ENCOUNTER
Delayed entry Spoke with daughter who stated she wouldn't be able to do the MRI unless she is out, she has already tried the open MRIs. MD informed, no mri will be completed we will try to treat with neuro meds. VO received for Nortriptyline 25mg QHS.

## 2020-03-23 ENCOUNTER — TELEPHONE (OUTPATIENT)
Dept: FAMILY MEDICINE CLINIC | Age: 85
End: 2020-03-23

## 2020-03-23 NOTE — TELEPHONE ENCOUNTER
Pt daughter Kristen called to verify if can use the Voltaren prescribed by Dr. Pham Blum along with the Notriptyline prescribed after that by Dr. Carlo Hernandez. Just making sure ifthey can be taken at the same time. Please adv [phone]

## 2020-03-23 NOTE — TELEPHONE ENCOUNTER
Ms. Sheldon Marinelli is aware patient can take both medications at the same time. Ms. Sheldon Marinelli verbalizes understanding.

## 2020-05-14 ENCOUNTER — VIRTUAL VISIT (OUTPATIENT)
Dept: ORTHOPEDIC SURGERY | Age: 85
End: 2020-05-14

## 2020-05-14 DIAGNOSIS — M48.062 SPINAL STENOSIS OF LUMBAR REGION WITH NEUROGENIC CLAUDICATION: Primary | ICD-10-CM

## 2020-05-14 NOTE — PROGRESS NOTES
[address]  [address], [address]  [place], [address] [place] [address] Street  Phone: [phone]  Fax: [phone]        Josselyn Ricks  : 1923  PCP: Thomas Doyle MD  2020    PROGRESS NOTE    Consent: Pancho Oliveros is a 80 y.o. female who was seen by synchronous (real-time) audio-video technology, and/or her healthcare decision maker, is aware that this patient-initiated, Telehealth encounter on 2020 is a billable service, with coverage as determined by his/her insurance carrier. He/She is aware that he/she may receive a bill and has provided verbal consent to proceed: Yes. The visit was conducted in the office with the patient being in home through a Telephone platform. Visit video time start: 10:45AM end: 10:56AM      HISTORY OF PRESENT ILLNESS  Pancho Oliveros is a 80 y.o. female who was seen as a new patient 10/31/19 with c/o low back pain with BLE paraesthesia x 6 weeks. Pt reports a heaviness in her BLE with standing and walking. She reports a limited standing/walking tolerance. She finds relief with sitting. She also c/o some lateral right thigh pain. She ambulates with a cane. Dr. Felix Griffith recommended she use a walker for walking longer distances. Pt also c/o left sided functional deficits. She denies h/o stroke. She notes significant benefit from PT (19-19; [employer] her walking improved. She states that her lateral thigh symptoms have improved.     However, she continues to feel some weakness in her knees. She does not feel her symptoms warrant further treatment at this time. An MRI was ordered. She presents for followup today. She feels that her symptoms have been stable. She reports a 7/10 pain score. She ended up not doing the MRI due to claustrophobia and does not wish to proceed with one in the future.        ASSESSMENT  Her symptoms are likely due to lumbar spinal stenosis with neurogenic claudication.  This has not been confirmed with MRI. She does not wish to act upon her symptoms at this time. She would prefer to see what happens.       PLAN  1. Continue to monitor symptoms  2. She was reminded about self-management     Pt will f/u in 6 months or sooner as needed. Diagnoses and all orders for this visit:    1. Spinal stenosis of lumbar region with neurogenic claudication               PAST MEDICAL HISTORY   Past Medical History:   Diagnosis Date    Arthritis of right knee     CAD (coronary artery disease), native coronary artery October 2010    mild disease    Cardiac cath 10/21/2010    dLM 20%. mLAD 25%. CX mild. pRCA 30%. EF 30-35%. Anterolateral, apical , diaphrag akin. Hypercontractile basal segments. Severe elev left-sided filling press.  Cardiac echocardiogram 02/02/2011    EF 50-55%. Mild apical hypk. Gr 1 DDfx. RVSP 40-45. Mild LAE.  Cardiac Holter monitor 06/10/2015    Sinus rhythm w/avg HR of 53 bpm (range 40-85 bpm). Occasional PACs. One 3-beat run of nonsustained SVT. Very few PVCs. One 3-beat run of nonsustained VT. Pt's HR was < 50 bpm for 8 hrs of the recorded time. No pauses noted. Two episodes of neck pain corresponded to sinus rhythm w/o ectopy.  Cardiovascular renal duplex 12/03/2012    No renal artery stenosis bilaterally. Bilateral intrinsic/med disease. Patent bilateral renal veins. Multiple cysts on both kidneys.     CKD (chronic kidney disease)     while on diuretics for difficult to control HTN    Diabetes mellitus type II 3/15/2010    Dyslipidemia 3/15/2010    Heart attack ([address]     2010    Heart failure, diastolic, chronic ([address]     History of heart attack     HTN (hypertension) 3/15/2010    Hypertensive heart disease     Loss of appetite     Osteopenia 7/13/2010    Other dyspnea and respiratory abnormality     Ringing in the ears     Stress-induced cardiomyopathy     EF 35% (LV angio 10/2010), then EF 50-55% (echo, Feb 2011)    Wears glasses Past Surgical History:   Procedure Laterality Date    HX BLADDER SUSPENSION  2009    HX [facility]   . MEDICATIONS      Current Outpatient Medications   Medication Sig Dispense Refill    diclofenac (VOLTAREN) 1 % gel Apply  to affected area four (4) times daily. 100 g 4    pravastatin (PRAVACHOL) 40 mg tablet TAKE 1 TABLET BY MOUTH EVERY DAY 90 Tab 3    amLODIPine (NORVASC) 10 mg tablet TAKE 1 TABLET BY MOUTH EVERY DAY 90 Tab 3    olmesartan (BENICAR) 20 mg tablet Take 1 Tab by mouth daily. 90 Tab 3    LUMIGAN 0.01 % ophthalmic drops       nitroglycerin (NITROSTAT) 0.4 mg SL tablet 1 Tab by SubLINGual route every five (5) minutes as needed. 1 Bottle 1    latanoprost (XALATAN) 0.005 % ophthalmic solution       RESTASIS 0.05 % ophthalmic emulsion Administer 1 Drop to both eyes every twelve (12) hours.  ascorbic acid (VITAMIN C) 500 mg tablet Take 1,000 mg by mouth daily.  CALCIUM PO Take 1,000 mg by mouth two (2) times a day.  Cholecalciferol, Vitamin D3, (VITAMIN D) 1,000 unit Cap Take 2,000 Units by mouth daily. 180 Cap 3    MULTIVITAMINS PO Take 1 Tab by mouth daily.  aspirin 81 mg chewable tablet Take 81 mg by mouth daily.           ALLERGIES    Allergies   Allergen Reactions    Lipitor [Atorvastatin] Myalgia    Spironolactone Other (comments)     Dizziness and fatigue          SOCIAL HISTORY    Social History     Socioeconomic History    Marital status:      Spouse name: Not on file    Number of children: Not on file    Years of education: Not on file    Highest education level: Not on file   Tobacco Use    Smoking status: Never Smoker    Smokeless tobacco: Never Used   Substance and Sexual Activity    Alcohol use: No    Drug use: No    Sexual activity: Never       FAMILY HISTORY    Family History   Problem Relation Age of Onset    Hypertension Mother     Arthritis-osteo Other          REVIEW OF SYSTEMS  Review of Systems   Musculoskeletal: Positive for back pain. All other systems reviewed and are negative. PHYSICAL EXAMINATION  There were no vitals taken for this visit. Pain Assessment  3/12/2020   Location of Pain Back   Location Modifiers Inferior   Severity of Pain 5   Quality of Pain Throbbing   Quality of Pain Comment \"twisting feeling, weakness\"   Duration of Pain -   Frequency of Pain Intermittent   Aggravating Factors -   Aggravating Factors Comment -   Limiting Behavior -   Relieving Factors -   Relieving Factors Comment -   Result of Injury No         The limitations of a telemedicine visit including the inability to perform a detailed physical examination may miss significant findings was presented to the patient, and the patient still elected to proceed with the telemedicine visit. RADIOGRAPHS  Lumbar XR images taken on 10/31/19 personally reviewed with patient:  Facet sclerosis at lower levels  Significant Grade 2 anterolisthesis of L5 on S1 and L4 on L5   Atherosclerosis of aorta  No obvious compression fractures         reviewed    Ms. Kayleigh Myers has a reminder for a \"due or due soon\" health maintenance. I have asked that she contact her primary care provider for follow-up on this health maintenance. Pursuant to the emergency declaration under the [address] [place] [address], [facility] waiver authority and the [employer] Act, this Virtual  Visit was conducted, with patient's consent, to reduce the patient's risk of exposure to COVID-19 and provide continuity of care for an established patient. Services were provided through a video synchronous discussion virtually to substitute for in-person clinic visit.       CPT Codes 21040-42507 for Established Patients may apply to this Telehealth Visit  Time-based coding, delete if not needed: I spent at least 10 minutes with this established patient, and >50% of the time was spent counseling and/or coordinating care regarding spinal stenosis, MRI, medications

## 2020-07-07 ENCOUNTER — APPOINTMENT (OUTPATIENT)
Dept: INTERNAL MEDICINE CLINIC | Age: 85
End: 2020-07-07

## 2020-07-07 ENCOUNTER — HOSPITAL ENCOUNTER (OUTPATIENT)
Dept: LAB | Age: 85
Discharge: HOME | End: 2020-07-07
Payer: MEDICARE

## 2020-07-07 DIAGNOSIS — E78.5 DYSLIPIDEMIA: ICD-10-CM

## 2020-07-07 DIAGNOSIS — E11.22 TYPE 2 DIABETES MELLITUS WITH STAGE 3 CHRONIC KIDNEY DISEASE, WITHOUT LONG-TERM CURRENT USE OF INSULIN (HCC): ICD-10-CM

## 2020-07-07 DIAGNOSIS — I10 ESSENTIAL HYPERTENSION: ICD-10-CM

## 2020-07-07 DIAGNOSIS — N18.30 TYPE 2 DIABETES MELLITUS WITH STAGE 3 CHRONIC KIDNEY DISEASE, WITHOUT LONG-TERM CURRENT USE OF INSULIN (HCC): ICD-10-CM

## 2020-07-07 LAB
ALBUMIN SERPL-MCNC: 3.6 G/DL (ref 3.4–5)
ALBUMIN/GLOB SERPL: 1.2 {RATIO} (ref 0.8–1.7)
ALP SERPL-CCNC: 72 U/L (ref 45–117)
ALT SERPL-CCNC: 24 U/L (ref 13–56)
ANION GAP SERPL CALC-SCNC: 8 MMOL/L (ref 3–18)
AST SERPL-CCNC: 27 U/L (ref 10–38)
BILIRUB SERPL-MCNC: 0.5 MG/DL (ref 0.2–1)
BUN SERPL-MCNC: 23 MG/DL (ref 7–18)
BUN/CREAT SERPL: 17 (ref 12–20)
CALCIUM SERPL-MCNC: 9.7 MG/DL (ref 8.5–10.1)
CHLORIDE SERPL-SCNC: 110 MMOL/L (ref 100–111)
CHOLEST SERPL-MCNC: 225 MG/DL
CO2 SERPL-SCNC: 25 MMOL/L (ref 21–32)
CREAT SERPL-MCNC: 1.33 MG/DL (ref 0.6–1.3)
GLOBULIN SER CALC-MCNC: 2.9 G/DL (ref 2–4)
GLUCOSE SERPL-MCNC: 115 MG/DL (ref 74–99)
HBA1C MFR BLD: 6.4 % (ref 4.2–5.6)
HDLC SERPL-MCNC: 69 MG/DL (ref 40–60)
HDLC SERPL: 3.3 {RATIO} (ref 0–5)
LDLC SERPL CALC-MCNC: 133.8 MG/DL (ref 0–100)
LIPID PROFILE,FLP: ABNORMAL
POTASSIUM SERPL-SCNC: 4.5 MMOL/L (ref 3.5–5.5)
PROT SERPL-MCNC: 6.5 G/DL (ref 6.4–8.2)
SODIUM SERPL-SCNC: 143 MMOL/L (ref 136–145)
TRIGL SERPL-MCNC: 111 MG/DL (ref ?–150)
VLDLC SERPL CALC-MCNC: 22.2 MG/DL

## 2020-07-07 PROCEDURE — 36415 COLL VENOUS BLD VENIPUNCTURE: CPT

## 2020-07-07 PROCEDURE — 83036 HEMOGLOBIN GLYCOSYLATED A1C: CPT

## 2020-07-07 PROCEDURE — 80053 COMPREHEN METABOLIC PANEL: CPT

## 2020-07-07 PROCEDURE — 80061 LIPID PANEL: CPT

## 2020-07-14 ENCOUNTER — VIRTUAL VISIT (OUTPATIENT)
Dept: INTERNAL MEDICINE CLINIC | Age: 85
End: 2020-07-14

## 2020-07-14 DIAGNOSIS — I50.32 HEART FAILURE, DIASTOLIC, CHRONIC (HCC): ICD-10-CM

## 2020-07-14 DIAGNOSIS — I10 ESSENTIAL HYPERTENSION: ICD-10-CM

## 2020-07-14 DIAGNOSIS — E78.5 DYSLIPIDEMIA: ICD-10-CM

## 2020-07-14 DIAGNOSIS — E11.21 TYPE 2 DIABETES MELLITUS WITH NEPHROPATHY (HCC): Primary | ICD-10-CM

## 2020-07-14 NOTE — PROGRESS NOTES
Lady Brock 80 y.o. female who was seen by synchronous (real-time) audio-video technology on 07/14/20    Consent:  sheand/or her healthcare decision maker is aware that this patient-initiated Telehealth encounter is a billable service, with coverage as determined by her insurance carrier. she is aware that she may receive a bill and has provided verbal consent to proceed: Yes I was in my office while conducting this encounter. The patient was in their home. Subjective:       Chief Complaint  The patient presents for follow up of diabetes, hypertension and high cholesterol. HPI  Lady Brock is a 80 y.o. female seen for follow up of diabetes. Shealso has hypertension and hyperlipidemia. Diabetes well controlled off medication and just with diet,   hypertension reasonably  well controlled, no significant medication side effects noted, on current meds, which include Benicar 20 mg, Norvasc 10 mg, will continue to monitor at home,  hyperlipidemia, borderline controlled on Pravachol 40 mg, for age 80 would not increase dose. Pt does have some myalgias/weakness in her legs at times, her daughter who is with her knows to stop the Pravachol if the discomfort worsens. Diet and Lifestyle: generally follows a low fat low cholesterol diet, follows a diabetic diet regularly, exercises sporadically    Home BP Monitoring: is well controlled at home. Diabetic Review of Systems - home glucose monitoring: is well controlled at home when she takes it 1x/day    Other symptoms and concerns: pt feels better when she exercises on a regular basis. Pt's CKD stage 3 is stable on current meds. Patient has palpitations that bother her at times. She has been taken off of beta-blockers by her cardiologist because of low heart rate. Pt has chronic diastolic heart failure that is followed by cardiology and controlled on current meds.      Pt has generalized arthritis for which she uses aleve with mild improvement. Current Outpatient Medications   Medication Sig    calcium-cholecalciferol, d3, (CALCIUM 600 + D) 600-125 mg-unit tab Take  by mouth.  diclofenac (VOLTAREN) 1 % gel Apply  to affected area four (4) times daily.  pravastatin (PRAVACHOL) 40 mg tablet TAKE 1 TABLET BY MOUTH EVERY DAY    amLODIPine (NORVASC) 10 mg tablet TAKE 1 TABLET BY MOUTH EVERY DAY    olmesartan (BENICAR) 20 mg tablet Take 1 Tab by mouth daily.  LUMIGAN 0.01 % ophthalmic drops     nitroglycerin (NITROSTAT) 0.4 mg SL tablet 1 Tab by SubLINGual route every five (5) minutes as needed.  latanoprost (XALATAN) 0.005 % ophthalmic solution     RESTASIS 0.05 % ophthalmic emulsion Administer 1 Drop to both eyes every twelve (12) hours.  ascorbic acid (VITAMIN C) 500 mg tablet Take 1,000 mg by mouth daily.  CALCIUM PO Take 1,000 mg by mouth two (2) times a day.  Cholecalciferol, Vitamin D3, (VITAMIN D) 1,000 unit Cap Take 2,000 Units by mouth daily.  MULTIVITAMINS PO Take 1 Tab by mouth daily.  aspirin 81 mg chewable tablet Take 81 mg by mouth daily. No current facility-administered medications for this visit.               Review of Systems  Respiratory: negative for dyspnea on exertion  Cardiovascular: negative for chest pain    Objective:     Patient-Reported Vitals 7/14/2020   Patient-Reported Weight 145 lbs   Patient-Reported Pulse 58   Patient-Reported Systolic  692   Patient-Reported Diastolic 74           General appearance - alert, well appearing, and in no distress        Labs:   Lab Results   Component Value Date/Time    Hemoglobin A1c 6.4 (H) 07/07/2020 10:50 AM    Hemoglobin A1c 6.2 (H) 03/03/2020 10:27 AM    Hemoglobin A1c 6.0 (H) 11/05/2019 10:09 AM    Microalbumin/Creat ratio (mg/g creat) 56 (H) 03/03/2020 10:27 AM    Microalbumin,urine random 11.20 (H) 03/03/2020 10:27 AM    LDL, calculated 133.8 (H) 07/07/2020 10:50 AM    Creatinine 1.33 (H) 07/07/2020 10:50 AM      Lab Results Component Value Date/Time    Cholesterol, total 225 (H) 07/07/2020 10:50 AM    HDL Cholesterol 69 (H) 07/07/2020 10:50 AM    LDL, calculated 133.8 (H) 07/07/2020 10:50 AM    Triglyceride 111 07/07/2020 10:50 AM    CHOL/HDL Ratio 3.3 07/07/2020 10:50 AM     Lab Results   Component Value Date/Time    Alk. phosphatase 72 07/07/2020 10:50 AM    Bilirubin, direct 0.1 03/11/2016 10:45 AM     Lab Results   Component Value Date/Time    GFR est AA 45 (L) 07/07/2020 10:50 AM    GFR est non-AA 37 (L) 07/07/2020 10:50 AM    Creatinine 1.33 (H) 07/07/2020 10:50 AM    BUN 23 (H) 07/07/2020 10:50 AM    Sodium 143 07/07/2020 10:50 AM    Potassium 4.5 07/07/2020 10:50 AM    Chloride 110 07/07/2020 10:50 AM    CO2 25 07/07/2020 10:50 AM      Lab Results   Component Value Date/Time    Glucose 115 (H) 07/07/2020 10:50 AM            Assessment / Plan     Diabetes well controlled, off medications. Hypertension  reasonably well controlled, on current meds,    Hyperlipidemia fairly well controlled  On Pravachol,  Would have low threshold to stop it for myalgias. ICD-10-CM ICD-9-CM    1. Type 2 diabetes mellitus with nephropathy (HCC)  E11.21 250.40 HEMOGLOBIN A1C W/O EAG     583.81    2. Essential hypertension  A15 756.3 METABOLIC PANEL, COMPREHENSIVE   3. Dyslipidemia  E78.5 272.4 LIPID PANEL   4. Heart failure, diastolic, chronic (HCC)  Y34.99 428.32 Stable on current meds. Pt is followed by cardiology. Diabetic issues reviewed with her: diabetic diet discussed in detail, and low cholesterol diet, weight control and daily exercise discussed. Follow-up and Dispositions    · Return in about 4 months (around 11/14/2020) for labs 1 week before. Reviewed plan of care. Patient has provided input and agrees with goals.

## 2020-07-15 ENCOUNTER — OFFICE VISIT (OUTPATIENT)
Dept: CARDIOLOGY CLINIC | Age: 85
End: 2020-07-15

## 2020-07-15 VITALS
OXYGEN SATURATION: 98 % | WEIGHT: 143 LBS | DIASTOLIC BLOOD PRESSURE: 60 MMHG | HEART RATE: 75 BPM | BODY MASS INDEX: 27 KG/M2 | SYSTOLIC BLOOD PRESSURE: 142 MMHG | HEIGHT: 61 IN

## 2020-07-15 DIAGNOSIS — I11.0 HYPERTENSIVE HEART DISEASE WITH HEART FAILURE (HCC): ICD-10-CM

## 2020-07-15 DIAGNOSIS — E78.5 DYSLIPIDEMIA: ICD-10-CM

## 2020-07-15 DIAGNOSIS — R00.1 BRADYCARDIA: ICD-10-CM

## 2020-07-15 DIAGNOSIS — R00.2 PALPITATIONS: ICD-10-CM

## 2020-07-15 DIAGNOSIS — R53.83 FATIGUE, UNSPECIFIED TYPE: ICD-10-CM

## 2020-07-15 DIAGNOSIS — I25.10 CORONARY ARTERY DISEASE INVOLVING NATIVE CORONARY ARTERY OF NATIVE HEART WITHOUT ANGINA PECTORIS: Primary | ICD-10-CM

## 2020-07-15 DIAGNOSIS — E11.21 TYPE 2 DIABETES MELLITUS WITH NEPHROPATHY (HCC): ICD-10-CM

## 2020-07-15 RX ORDER — CALCIUM CARBONATE/VITAMIN D3 600 MG-125
1 TABLET ORAL DAILY
COMMUNITY

## 2020-07-15 NOTE — PROGRESS NOTES
HPI:   I saw Marybel Chery in my office today in cardiovascular evaluation regarding her problems with hypertensive heart disease with some diastolic dysfunction of the left ventricle. Ms. Garland Levi is a very pleasant 80 year old  female, who appears to be much younger than her stated age, and who has been followed in the past in our practice for her problems with hypertension, dyslipidemia, and specifically some component of chronic diastolic heart failure.     When I saw her in January 2018 she had a significant sinus bradycardia in the 40s and I ultimately discontinued her atenolol 12.5 mg daily and when I saw her into the office today with a heart rate in the 60s without any cardiovascular complaints.     She was  in the hospital over [facility] in March of 2019 due to some weakness in her legs and while there she was found to have some renal insufficiency with BUN/creatinine of 30 and 2.3. She apparently received some fluids and also had an echocardiogram which demonstrated completely normal left ventricular systolic function with moderate left atrial enlargement and moderate pulmonary hypertension with an estimated pulmonary artery pressure of 50 mmHg. She comes in today relates that she is doing reasonably well. The big problem is arthritis in several joints which is somewhat migratory. She is not really complaining very much of the weakness in her legs which is presumably from spinal stenosis and her fatigue issues seem to be somewhat better. She does have shortness of breath with exertion but this is mild and stable. Encounter Diagnoses   Name Primary?     Coronary artery disease involving native coronary artery of native heart without angina pectoris Yes    Fatigue, possible anginal equivalent     Dyslipidemia     Palpitations     Bradycardia     Hypertensive heart disease with heart failure (HCC)     Type 2 diabetes mellitus with nephropathy [facility])        Discussion: This lady has some lower extremity weakness which I think is related to some spinal stenosis has not worsened and her shortness of breath with activity and fatigue are mild and stable. We had discussed possibly doing a nuclear myocardial perfusion study when she was last here in October for these problems to be sure she was not having significant ischemia, but she and her daughter decided that at her advanced age she would rather just be treated medically so myocardial perfusion study was not completed. Her most recent lipid profile which was completed on July 7, 2020 showed total cholesterol of 225 with triglycerides 111, HDL of 69, LDL of 133.8, and VLDL of 22.2 on only Pravachol 40 mg daily. .  Certainly a case could be made for more aggressive treatment but at her advanced age I would tend to just leave her on her same dosage. This lady is seems to be doing about as well as we could expect and since she is having no new issues with a stable EKG and only a borderline elevated blood pressure I will simply plan to see her again in several months. PCP: Becka Rinaldi MD      Past Medical History:   Diagnosis Date    Arthritis of right knee     CAD (coronary artery disease), native coronary artery October 2010    mild disease    Cardiac cath 10/21/2010    dLM 20%. mLAD 25%. CX mild. pRCA 30%. EF 30-35%. Anterolateral, apical , diaphrag akin. Hypercontractile basal segments. Severe elev left-sided filling press.  Cardiac echocardiogram 02/02/2011    EF 50-55%. Mild apical hypk. Gr 1 DDfx. RVSP 40-45. Mild LAE.  Cardiac Holter monitor 06/10/2015    Sinus rhythm w/avg HR of 53 bpm (range 40-85 bpm). Occasional PACs. One 3-beat run of nonsustained SVT. Very few PVCs. One 3-beat run of nonsustained VT. Pt's HR was < 50 bpm for 8 hrs of the recorded time. No pauses noted. Two episodes of neck pain corresponded to sinus rhythm w/o ectopy.    [facility] Cardiovascular renal duplex 12/03/2012    No renal artery stenosis bilaterally. Bilateral intrinsic/med disease. Patent bilateral renal veins. Multiple cysts on both kidneys.  CKD (chronic kidney disease)     while on diuretics for difficult to control HTN    Diabetes mellitus type II 3/15/2010    Dyslipidemia 3/15/2010    Heart attack ([facility]     2010    Heart failure, diastolic, chronic ([facility]     History of heart attack     HTN (hypertension) 3/15/2010    Hypertensive heart disease     Loss of appetite     Osteopenia 7/13/2010    Other dyspnea and respiratory abnormality     Ringing in the ears     Stress-induced cardiomyopathy     EF 35% (LV angio 10/2010), then EF 50-55% (echo, Feb 2011)    Wears glasses        Past Surgical History:   Procedure Laterality Date    HX BLADDER SUSPENSION  2009    HX HYSTERECTOMY  1962       Current Outpatient Medications   Medication Sig    calcium-cholecalciferol, d3, (CALCIUM 600 + D) 600-125 mg-unit tab Take  by mouth.  pravastatin (PRAVACHOL) 40 mg tablet TAKE 1 TABLET BY MOUTH EVERY DAY    amLODIPine (NORVASC) 10 mg tablet TAKE 1 TABLET BY MOUTH EVERY DAY    olmesartan (BENICAR) 20 mg tablet Take 1 Tab by mouth daily.  LUMIGAN 0.01 % ophthalmic drops     nitroglycerin (NITROSTAT) 0.4 mg SL tablet 1 Tab by SubLINGual route every five (5) minutes as needed.  latanoprost (XALATAN) 0.005 % ophthalmic solution     RESTASIS 0.05 % ophthalmic emulsion Administer 1 Drop to both eyes every twelve (12) hours.  ascorbic acid (VITAMIN C) 500 mg tablet Take 1,000 mg by mouth daily.  MULTIVITAMINS PO Take 1 Tab by mouth daily.  aspirin 81 mg chewable tablet Take 81 mg by mouth daily.  diclofenac (VOLTAREN) 1 % gel Apply  to affected area four (4) times daily.  CALCIUM PO Take 1,000 mg by mouth two (2) times a day.  Cholecalciferol, Vitamin D3, (VITAMIN D) 1,000 unit Cap Take 2,000 Units by mouth daily.      No current facility-administered medications for this visit. Allergies   Allergen Reactions    Lipitor [Atorvastatin] Myalgia    Spironolactone Other (comments)     Dizziness and fatigue       Social History:   Social History     Tobacco Use    Smoking status: Never Smoker    Smokeless tobacco: Never Used   Substance Use Topics    Alcohol use: No         Family history: family history includes Arthritis-osteo in an other family member; Hypertension in her mother. Review of Systems:  Constitutional: Positive for malaise/fatigue. Negative for chills, fever and weight loss. Respiratory: Positive for shortness of breath. Negative for cough, hemoptysis and wheezing. Cardiovascular: Negative for chest pain, orthopnea and leg swelling. Gastrointestinal: Positive for melena she has had intermittently for some time. Negative for abdominal pain, blood in stool, constipation, diarrhea, heartburn, nausea and vomiting. Musculoskeletal: Positive for joint pain and myalgias. Negative for falls. Neurological: Negative for dizziness. Physical Exam:   The patient is an alert, oriented, well developed, well nourished 80 y.o. female who was in no acute distress at the time of my examination. Visit Vitals  /60   Pulse 75   Ht 5' 1\" (1.549 m)   Wt 143 lb (64.9 kg)   SpO2 98%   BMI 27.02 kg/m²      BP Readings from Last 3 Encounters:   07/15/20 142/60   03/12/20 166/69   03/10/20 177/68      Wt Readings from Last 3 Encounters:   07/15/20 143 lb (64.9 kg)   03/12/20 145 lb (65.8 kg)   03/10/20 144 lb 6.4 oz (65.5 kg)     HEENT: Conjuctiva white, mucosa moist, no pallor or cyanosis. NECK: Supple without masses, tenderness or thyromegaly. There was no jugular venous distention. Carotid are full bilaterally with soft bilateral bruits vs radiation of heart murmur to the neck. CHEST: Symmetrical with good excursion. LUNGS: Clear to auscultation in all fields. HEART: Regular rate and rhythm. The apex is not displaced.  There were no lifts, thrills or heaves. There is a normal S1 and S2. There is a grade 2/6 MINI along the LSB with radiation to the base and neck without appreciable diastolic murmurs, rubs, clicks, or gallops auscultated. ABDOMEN: Soft without masses, tenderness or organomegaly. EXTREMITIES: 1 + peripheral pulses without peripheral edema. INTEGUMENT: Skin is warm and dry   NEUROLOGICAL: The patient was oriented x3 with motor function grossly intact. Review of Data: See PMH and Cardiology and Imaging sections for cardiac testing  Lab Results   Component Value Date/Time    Cholesterol, total 225 (H) 07/07/2020 10:50 AM    HDL Cholesterol 69 (H) 07/07/2020 10:50 AM    LDL, calculated 133.8 (H) 07/07/2020 10:50 AM    Triglyceride 111 07/07/2020 10:50 AM    CHOL/HDL Ratio 3.3 07/07/2020 10:50 AM       Results for orders placed or performed in visit on 07/15/20   AMB POC EKG ROUTINE W/ 12 LEADS, INTER & REP     Status: None    Narrative    Normal sinus rhythm rate 75. Diffuse ST-T flattening which is nonspecific. This tracing is otherwise within normal limits and similar to the EKG of October 22, 2019. Lidia Brandt D.O., F.A.C.C. [facility]  [facility] [place]  [address] [place] [address]. [address] [facility][note 3]  [phone]    PLEASE NOTE:  This document has been produced using voice recognition software. Unrecognized errors in transcription may be present.

## 2020-07-21 RX ORDER — OLMESARTAN MEDOXOMIL 20 MG/1
20 TABLET ORAL DAILY
Qty: 90 TAB | Refills: 3 | Status: SHIPPED | OUTPATIENT
Start: 2020-07-21 | End: 2021-07-12

## 2020-07-21 NOTE — TELEPHONE ENCOUNTER
Per pharmacy, the patient has 30 tabs remaining on her prescription. Insurance requires a 90 day fill. Last Visit: 03/10/2020 with MD Canchola  Next Appointment: 11/18/2020 with MD Canchola  Previous Refill Encounter(s): 08/05/2019 per MD Canchola #[id number]    Requested Prescriptions     Pending Prescriptions Disp Refills    olmesartan (BENICAR) 20 mg tablet 90 Tab 3     Sig: Take 1 Tab by mouth daily.

## 2020-07-28 ENCOUNTER — OFFICE VISIT (OUTPATIENT)
Dept: ORTHOPEDIC SURGERY | Age: 85
End: 2020-07-28

## 2020-07-28 VITALS
WEIGHT: 143 LBS | SYSTOLIC BLOOD PRESSURE: 130 MMHG | OXYGEN SATURATION: 96 % | HEART RATE: 61 BPM | RESPIRATION RATE: 18 BRPM | BODY MASS INDEX: 27 KG/M2 | DIASTOLIC BLOOD PRESSURE: 57 MMHG | TEMPERATURE: 98.9 F | HEIGHT: 61 IN

## 2020-07-28 DIAGNOSIS — M25.522 BILATERAL ELBOW JOINT PAIN: ICD-10-CM

## 2020-07-28 DIAGNOSIS — M25.521 BILATERAL ELBOW JOINT PAIN: ICD-10-CM

## 2020-07-28 DIAGNOSIS — M25.511 ACUTE PAIN OF BOTH SHOULDERS: Primary | ICD-10-CM

## 2020-07-28 DIAGNOSIS — M25.512 ACUTE PAIN OF BOTH SHOULDERS: Primary | ICD-10-CM

## 2020-07-28 RX ORDER — NETARSUDIL AND LATANOPROST OPHTHALMIC SOLUTION, 0.02%/0.005% .2; .05 MG/ML; MG/ML
1 SOLUTION/ DROPS OPHTHALMIC; TOPICAL
COMMUNITY
Start: 2020-07-10

## 2020-07-28 RX ORDER — GLIPIZIDE 2.5 MG/1
1 TABLET, EXTENDED RELEASE ORAL DAILY
COMMUNITY
Start: 2020-05-13 | End: 2020-08-30

## 2020-07-28 NOTE — PROGRESS NOTES
[address]  [address], [address]  [place]  Phone: [phone]  Fax: [phone]        Carmen Shahid  : 1923  PCP: Orly Joseph MD  2020    PROGRESS NOTE      HISTORY OF PRESENT ILLNESS  Torey Palacio is a 80 y.o. female who was seen as a new patient 10/31/19 with c/o low back pain with BLE paraesthesia x 6 weeks. Pt reports a heaviness in her BLE with standing and walking. She reports a limited standing/walking tolerance. She finds relief with sitting. She also c/o some lateral right thigh pain. She ambulates with a cane. Dr. Vahe Mckeon recommended she use a walker for walking longer distances. Pt also c/o left sided functional deficits. She denies h/o stroke. She notes significant benefit from PT (19-19; [employer] her walking improved. She states that her lateral thigh symptoms have improved. She noted improvement, but continued to feel some weakness in her knees. She did not feel her symptoms warranted further treatment at the time. She ended up not doing the MRI due to claustrophobia and does not wish to proceed with one in the future. Torey Palacio comes in to the office today for f/u. Pt reports increased pain in her bilateral knees. She reports a new pain in her bilateral shoulders and elbows (R>L) x 2 weeks. She has found some benefit from using [employer] topical cream. Pt notes that she exercises her arms daily, and it does not help her pain much. Her pain is worse in the afternoons. She is unable to do her hair because of limited shoulder/scapular ROM. Pain Score: 10/10. PmHx: HTN, CAD, cardiomyopathy, DM, CKD    ASSESSMENT  Her low back and BLE symptoms are likely due to lumbar spinal stenosis with neurogenic claudication. This has not been confirmed with MRI. She does not wish to act upon her symptoms at this time. She would prefer to see what happens.     Her bilateral shoulder and elbow pain may be due to mechanical pain related to posture and decreased scapular ROM causing referred pain into the BUE. PLAN  1. Provided exercises to add to HEP. - She can call if she would like a referral for formal PT.  2. I advised she may want to try OTC Voltaren gel. 3. I advised she f/u with Dr. Erica Horowitz for her knee pains. Pt will f/u PRN. We can schedule for a virtual visit if needed. Diagnoses and all orders for this visit:    1. Acute pain of both shoulders  -     REFERRAL TO PHYSICAL THERAPY    2. Bilateral elbow joint pain  -     REFERRAL TO PHYSICAL THERAPY         PAST MEDICAL HISTORY   Past Medical History:   Diagnosis Date    Arthritis of right knee     CAD (coronary artery disease), native coronary artery October 2010    mild disease    Cardiac cath 10/21/2010    dLM 20%. mLAD 25%. CX mild. pRCA 30%. EF 30-35%. Anterolateral, apical , diaphrag akin. Hypercontractile basal segments. Severe elev left-sided filling press.  Cardiac echocardiogram 02/02/2011    EF 50-55%. Mild apical hypk. Gr 1 DDfx. RVSP 40-45. Mild LAE.  Cardiac Holter monitor 06/10/2015    Sinus rhythm w/avg HR of 53 bpm (range 40-85 bpm). Occasional PACs. One 3-beat run of nonsustained SVT. Very few PVCs. One 3-beat run of nonsustained VT. Pt's HR was < 50 bpm for 8 hrs of the recorded time. No pauses noted. Two episodes of neck pain corresponded to sinus rhythm w/o ectopy.  Cardiovascular renal duplex 12/03/2012    No renal artery stenosis bilaterally. Bilateral intrinsic/med disease. Patent bilateral renal veins. Multiple cysts on both kidneys.     CKD (chronic kidney disease)     while on diuretics for difficult to control HTN    Diabetes mellitus type II 3/15/2010    Dyslipidemia 3/15/2010    Heart attack ([address]     2010    Heart failure, diastolic, chronic ([address]     History of heart attack     HTN (hypertension) 3/15/2010    Hypertensive heart disease     Loss of appetite  Osteopenia 7/13/2010    Other dyspnea and respiratory abnormality     Ringing in the ears     Stress-induced cardiomyopathy     EF 35% (LV angio 10/2010), then EF 50-55% (echo, Feb 2011)    Wears glasses        Past Surgical History:   Procedure Laterality Date    HX BLADDER SUSPENSION  2009    HX [facility]   . MEDICATIONS    Current Outpatient Medications   Medication Sig Dispense Refill    olmesartan (BENICAR) 20 mg tablet Take 1 Tab by mouth daily. 90 Tab 3    calcium-cholecalciferol, d3, (CALCIUM 600 + D) 600-125 mg-unit tab Take  by mouth.  diclofenac (VOLTAREN) 1 % gel Apply  to affected area four (4) times daily. 100 g 4    pravastatin (PRAVACHOL) 40 mg tablet TAKE 1 TABLET BY MOUTH EVERY DAY 90 Tab 3    amLODIPine (NORVASC) 10 mg tablet TAKE 1 TABLET BY MOUTH EVERY DAY 90 Tab 3    LUMIGAN 0.01 % ophthalmic drops       nitroglycerin (NITROSTAT) 0.4 mg SL tablet 1 Tab by SubLINGual route every five (5) minutes as needed. 1 Bottle 1    latanoprost (XALATAN) 0.005 % ophthalmic solution       RESTASIS 0.05 % ophthalmic emulsion Administer 1 Drop to both eyes every twelve (12) hours.  ascorbic acid (VITAMIN C) 500 mg tablet Take 1,000 mg by mouth daily.  CALCIUM PO Take 1,000 mg by mouth two (2) times a day.  Cholecalciferol, Vitamin D3, (VITAMIN D) 1,000 unit Cap Take 2,000 Units by mouth daily. 180 Cap 3    MULTIVITAMINS PO Take 1 Tab by mouth daily.  aspirin 81 mg chewable tablet Take 81 mg by mouth daily.           ALLERGIES  Allergies   Allergen Reactions    Lipitor [Atorvastatin] Myalgia    Spironolactone Other (comments)     Dizziness and fatigue          SOCIAL HISTORY    Social History     Socioeconomic History    Marital status:      Spouse name: Not on file    Number of children: Not on file    Years of education: Not on file    Highest education level: Not on file   Tobacco Use    Smoking status: Never Smoker    Smokeless tobacco: Never Used   Substance and Sexual Activity    Alcohol use: No    Drug use: No    Sexual activity: Never       FAMILY HISTORY  Family History   Problem Relation Age of Onset    Hypertension Mother     Arthritis-osteo Other          REVIEW OF SYSTEMS  Review of Systems   Musculoskeletal:        Bilateral shoulder and elbow pains          PHYSICAL EXAMINATION  Visit Vitals  /57   Pulse 61   Temp 98.9 °F (37.2 °C) (Oral)   Resp 18   Ht 5' 1\" (1.549 m)   Wt 143 lb (64.9 kg)   SpO2 96%   BMI 27.02 kg/m²       Pain Assessment  7/28/2020   Location of Pain Knee; Shoulder;Elbow   Location Modifiers Right;Left   Severity of Pain 10   Quality of Pain Aching   Quality of Pain Comment -   Duration of Pain Persistent   Frequency of Pain Constant   Aggravating Factors -   Aggravating Factors Comment -   Limiting Behavior Yes   Relieving Factors -   Relieving Factors Comment -   Result of Injury No           Constitutional:  Well developed, well nourished, in no acute distress. Psychiatric: Affect and mood are appropriate. Integumentary: No rashes or abrasions noted on exposed areas. SPINE/MUSCULOSKELETAL EXAM    Cervical spine:  Neck is midline. Normal muscle tone. No focal atrophy is noted. ROM pain free. Shoulder ROM intact. No tenderness to palpation. Negative Spurling's sign. Negative Tinel's sign. Negative Coelho's sign.                                                                                                                             Sensation in the bilateral arms grossly intact to light touch.      Ulnar deviation of fingers on the left  Clio neck deformities on the left     No clear myelopathic or UMN signs     Lumbar spine:  No rash, ecchymosis, or gross obliquity. No fasciculations. No focal atrophy is noted. No pain with hip ROM. Full range of motion. No tenderness to palpation. No tenderness to palpation at the sciatic notch. SI joints non-tender.    Trochanters non tender.     Sensation in the bilateral legs grossly intact to light touch.     Updates from 12/12/19:   Full ROM. Tenderness to palpation of bilateral trochanters.     Updates 7/28/2020:  No pain with neck ROM  Negative Brownlee sign bilaterally   Pain with scapular retraction. No tenderness to palpation of cervicothoracic region         MOTOR:      Biceps  Triceps Deltoids Wrist Ext Wrist Flex Hand Intrin   Right 5/5 5/5 5/5 5/5 5/5 5/5   Left 5/5 5/5 5/5 5/5 5/5 5/5             Hip Flex  Quads Hamstrings Ankle DF EHL Ankle PF   Right 5/5 5/5 5/5 5/5 5/5 5/5   Left 5/5 5/5 5/5 5/5 5/5 5/5     DTRs are 1+ biceps, triceps, brachioradialis, patella, and Achilles.     Negative Straight Leg raise. Squat not tested. No difficulty with tandem gait.      Ambulation with single point cane. FWB.       RADIOGRAPHS  Lumbar XR images taken on 10/31/19 personally reviewed with patient:  Facet sclerosis at lower levels  Significant Grade 2 anterolisthesis of L5 on S1 and L4 on L5   Atherosclerosis of aorta  No obvious compression fractures    20 minutes of face-to-face contact were spent with the patient during today's visit extensively discussing symptoms and treatment plan. All questions were answered. More than half of this visit today was spent on counseling.      Written by Kim Osman as dictated by Yoselin Nye MD

## 2020-07-28 NOTE — PATIENT INSTRUCTIONS
Shoulder Blade: Exercises Introduction Here are some examples of exercises for you to try. The exercises may be suggested for a condition or for rehabilitation. Start each exercise slowly. Ease off the exercises if you start to have pain. You will be told when to start these exercises and which ones will work best for you. How to do the exercises Shoulder roll 1. Stand tall with your chin slightly tucked. Imagine that a string at the top of your head is pulling you straight up. 2. Keep your arms relaxed. All motion will be in your shoulders. 3. Shrug your shoulders up toward your ears, then up and back. [place] your shoulders down and back, like you're sliding your hands down into your back pants pockets. 4. Repeat the circles at least 2 to 4 times. 5. This exercise is also helpful anytime you want to relax. Lower neck and upper back stretch 1. With your arms about shoulder height, clasp your hands in front of you. 2. Drop your chin toward your chest. 
3. Reach straight forward so you are rounding your upper back. Think about pulling your shoulder blades apart. Marge Pino feel a stretch across your upper back and shoulders. Hold for at least 6 seconds. 4. Repeat 2 to 4 times. Triceps stretch 1. Reach your arm straight up. 2. Keeping your elbow in place, bend your arm and reach your hand down behind your back. 3. With your other hand, apply gentle pressure to the bent elbow. Marge Pino feel a stretch at the back of your upper arm and shoulder. Hold about 6 seconds. 4. Repeat 2 to 4 times with each arm. Shoulder stretch 1. Relax your shoulders. 2. Raise one arm to shoulder height, and reach it across your chest. 
3. Pull the arm slightly toward you with your other arm. This will help you get a gentle stretch. Hold for about 6 seconds. 4. Repeat 2 to 4 times. Shoulder blade squeeze 1. Sit or stand up tall with your arms at your sides. 2. Keep your shoulders relaxed and down, not shrugged. 3. Squeeze your shoulder blades together. Hold for 6 seconds, then relax. 4. Repeat 8 to 12 times. Straight-arm shoulder blade squeeze 1. Sit or stand tall. Relax your shoulders. 2. With palms down, hold your elastic tubing or band straight out in front of you. 3. Start with slight tension in the tubing or band, with your hands about shoulder-width apart. 4. Slowly pull straight out to the sides, squeezing your shoulder blades together. Keep your arms straight and at shoulder height. Slowly release. 5. Repeat 8 to 12 times. Rowing 1. [place] your elastic tubing or band at about waist height. Take one end in each hand. 2. Sit or stand with your feet hip-width apart. 3. Hold your arms straight in front of you. Adjust your distance to create slight tension in the tubing or band. 4. Slightly tuck your chin. Relax your shoulders. 5. Without shrugging your shoulders, pull straight back. Your elbows will pass alongside your waist.   
Pull-downs 1. [place] your elastic tubing or band in the top of a closed door. Take one end in each hand. 2. Either sit or stand, depending on what is more comfortable. If you feel unsteady, sit on a chair. 3. Start with your arms up and comfortably apart, elbows straight. There should be a slight tension in the tubing or band. 4. Slightly tuck your chin, and look straight ahead. 5. Keeping your back straight, slowly pull down and back, bending your elbows. 6. Stop where your hands are level with your chin, in a \"goalpost\" position. 7. Repeat 8 to 12 times. Chest T stretch 1. Lie on your back. Raise your knees so they are bent. Plant your feet on the floor, hip-width apart. 2. Tuck your chin, and relax your shoulders. 3. Reach your arms straight out to the sides.  If you don't feel a mild stretch in your shoulders and across your chest, use a foam roll or a tightly rolled blanket under your spine, from your tailbone to your head. 4. Relax in this position for at least 15 to 30 seconds while you breathe normally. Repeat 2 to 4 times. 5. As you get used to this stretch, keep adding a little more time until you are able relax in this position for 2 or 3 minutes. When you can relax for at least 2 minutes, you only need to do the exercise 1 time per session. Chest goalpost stretch 1. Lie on your back. Raise your knees so they are bent. Plant your feet on the floor, hip-width apart. 2. Tuck your chin, and relax your shoulders. 3. Reach your arms straight out to the sides. 4. Bend your arms at the elbows, with your hands pointed toward the top of your head. Your arms should make an L on either side of your head. Your palms should be facing up. 5. If you don't feel a mild stretch in your shoulders and across your chest, use a foam roll or tightly rolled blanket under your spine, from your tailbone to your head. 6. Relax in this position for at least 15 to 30 seconds while you breathe normally. Repeat 2 to 4 times. 7. Each day you do this exercise, add a little more time until you can relax in this position for 2 or 3 minutes. When you can relax for at least 2 minutes, you only need to do the exercise 1 time per session. Follow-up care is a key part of your treatment and safety. Be sure to make and go to all appointments, and call your doctor if you are having problems. It's also a good idea to know your test results and keep a list of the medicines you take. Where can you learn more? Go to http://jason-tiera.info/ Enter [phone] in the search box to learn more about \"Shoulder Blade: Exercises. \" Current as of: March 2, 2020               Content Version: 12.5 © 0514-8319 Healthwise, Incorporated. Care instructions adapted under license by [employer] (which disclaims liability or warranty for this information).  If you have questions about a medical condition or this instruction, always ask your healthcare professional. [address] any warranty or liability for your use of this information. Healthy Upper Back: Exercises Introduction Here are some examples of exercises for your upper back. Start each exercise slowly. Ease off the exercise if you start to have pain. Your doctor or physical therapist will tell you when you can start these exercises and which ones will work best for you. How to do the exercises Lower neck and upper back stretch 1. Stretch your arms out in front of your body. Clasp one hand on top of your other hand. 2. Gently reach out so that you feel your shoulder blades stretching away from each other. 3. Gently bend your head forward. 4. Hold for 15 to 30 seconds. 5. Repeat 2 to 4 times. Midback stretch If you have knee pain, do not do this exercise. 1. Kneel on the floor, and sit back on your ankles. 2. Lean forward, place your hands on the floor, and stretch your arms out in front of you. Rest your head between your arms. 3. Gently push your chest toward the floor, reaching as far in front of you as possible. 4. Hold for 15 to 30 seconds. 5. Repeat 2 to 4 times. Shoulder rolls 1. Sit comfortably with your feet shoulder-width apart. You can also do this exercise while standing. 2. Roll your shoulders up, then back, and then down in a smooth, circular motion. 3. Repeat 2 to 4 times. Wall push-up 1. Stand against a wall with your feet about 12 to 24 inches back from the wall. If you feel any pain when you do this exercise, stand closer to the wall. 2. Place your hands on the wall slightly wider apart than your shoulders, and lean forward. 3. Gently lean your body toward the wall. Then push back to your starting position. Keep the motion smooth and controlled. 4. Repeat 8 to 12 times. Resisted shoulder blade squeeze For this exercise, you will need elastic exercise material, such as surgical tubing or Thera-Band. 1. Sit or stand, holding the band in both hands in front of you. Keep your elbows close to your sides, bent at a 90-degree angle. Your palms should face up. 2. Squeeze your shoulder blades together, and move your arms to the outside, stretching the band. Be sure to keep your elbows at your sides while you do this. 3. Relax. 4. Repeat 8 to 12 times. Resisted rows For this exercise, you will need elastic exercise material, such as surgical tubing or Thera-Band. 1. Put the band around a solid object, such as a bedpost, at about waist level. Hold one end of the band in each hand. 2. With your elbows at your sides and bent to 90 degrees, pull the band back to move your shoulder blades toward each other. Return to the starting position. 3. Repeat 8 to 12 times. Follow-up care is a key part of your treatment and safety. Be sure to make and go to all appointments, and call your doctor if you are having problems. It's also a good idea to know your test results and keep a list of the medicines you take. Where can you learn more? Go to http://jason-tiera.info/ Enter D118 in the search box to learn more about \"Healthy Upper Back: Exercises. \" Current as of: March 2, 2020               Content Version: 12.5 © 8540-2246 Healthwise, Incorporated. Care instructions adapted under license by [employer] (which disclaims liability or warranty for this information). If you have questions about a medical condition or this instruction, always ask your healthcare professional. [place] [address] any warranty or liability for your use of this information.

## 2020-08-17 ENCOUNTER — TELEPHONE (OUTPATIENT)
Dept: ORTHOPEDIC SURGERY | Age: 85
End: 2020-08-17

## 2020-08-17 NOTE — TELEPHONE ENCOUNTER
Patients daughter, Shannon Arias (ok per HIPAA), called on behalf of patient who doesn't hear well. They're requesting we order a \"motorized scooter\" for patient as she is having increased difficulty getting around with just a walker. Please prepare order for patient and advise Kristen once done, [phone].

## 2020-08-18 NOTE — TELEPHONE ENCOUNTER
Per Dr Valenzuela [facility] insurance won't pay for a scooter, they may pay for a motorized wheelchair but the pt would need to go to PT to be evaluated for that

## 2020-08-18 NOTE — TELEPHONE ENCOUNTER
Please return Alloscar Johnson', HIPAA checked, call regarding the below information. She will be awaiting a call at [phone].

## 2020-08-19 NOTE — TELEPHONE ENCOUNTER
Spoke with Ms. Antonia Garcia and told her to research which мария of electric motorized wheelchair will work for patient and give us a call back. She will then have to go to PT to be evaluated and we can write a RX for it. There are so many on the market so she will want to make sure she is getting the one that will suit her needs.

## 2020-08-27 ENCOUNTER — TELEPHONE (OUTPATIENT)
Dept: INTERNAL MEDICINE CLINIC | Age: 85
End: 2020-08-27

## 2020-08-27 ENCOUNTER — HOSPITAL ENCOUNTER (OUTPATIENT)
Dept: MAMMOGRAPHY | Age: 85
Discharge: HOME | End: 2020-08-27
Attending: INTERNAL MEDICINE
Payer: MEDICARE

## 2020-08-27 DIAGNOSIS — Z12.31 VISIT FOR SCREENING MAMMOGRAM: ICD-10-CM

## 2020-08-27 PROCEDURE — 77067 SCR MAMMO BI INCL CAD: CPT

## 2020-08-27 NOTE — TELEPHONE ENCOUNTER
Daughter calling, says [facility] called them saying Dr. Luis Simon wanted pt back on Glipizide. I don't see any information about it. Please advise daughter

## 2020-10-12 ENCOUNTER — TELEPHONE (OUTPATIENT)
Dept: INTERNAL MEDICINE CLINIC | Age: 85
End: 2020-10-12

## 2020-10-12 NOTE — TELEPHONE ENCOUNTER
Precious Guzmán, daughter, calling. Said pt said she is dizzy and light headed. Two BP readings today were 167/96 and 143/77. Said pt just isn't feeling that well today. She was requesting pt be seen today. Please advise her at [phone]

## 2020-10-12 NOTE — TELEPHONE ENCOUNTER
Spoke with Socorro Briggs and advised her she needs to take patient to the ED for dizziness. Ms. Damicoia Jabier states she will tell her.

## 2020-10-13 NOTE — PROGRESS NOTES
Bety Stapleton presents today for evaluation of complaints of dizziness and elevated blood pressures. She was accompanied by her daughter. She states that last week, she experienced some dizziness for about 2-3 days. She states that the \"room was spinning\" when she was moving around but not spinning if she was sitting. It came on suddenly and improved each day until it resolved on its own. She states that it was not accompanied by any other symptoms. She is compliant with her medications. She lives alone but spends a week with each of her daughters and when she is at her apartment, they bring her meals. She is a 80year old  female with history of hypertension, diabetes, CHF, CAD (s/p MI in 2010), and chronic kidney disease. She was last seen by Dr. Jeremiah Robins in July 2020. Prior to that, he saw her in Oct. 2019 and discussed doing a pharmacologic nuclear stress test due to her complaints of mild fatigue and ABDUL. She and her daughter decided to decline testing at that time due to her advanced age and opted for continued medical management. Her last cardiac catheterization was performed in October 2010 and it showed only mild CAD. She presented to the emergency room on January 19, 2018 with complaints of palpitations. She states that it been intermittent and had been occurring for several months and has been waking her up at night. When seen by Dr. Jeremiah Robins in early November 2017, he ordered a 48 hour Holter monitor which showed sinus bradycardia and 3 episodes of SVT. Her average heart rate was 46 bpm and her atenolol was then discontinued. Her EKG in the ER showed sinus bradycardia. Overall, she states that she is feeling well. She is alert and oriented. She continues to live by herself and is very independent. She denies any unusual fatigue or change in level of activity. Denies chest pain, tightness, heaviness, and palpitations.   Denies shortness of breath at rest, dyspnea on exertion, orthopnea and PND. Denies abdominal bloating. Denies lightheadedness, admits to episodes of dizziness as noted above but none since the episode resolved, and denies syncope. Denies lower extremity edema and claudication. Denies nausea, vomiting, diarrhea, melena, hematochezia. Denies hematuria, urgency, frequency. Denies fever, chills. PMH:  Past Medical History:   Diagnosis Date    Arthritis of right knee     CAD (coronary artery disease), native coronary artery October 2010    mild disease    Cardiac cath 10/21/2010    dLM 20%. mLAD 25%. CX mild. pRCA 30%. EF 30-35%. Anterolateral, apical , diaphrag akin. Hypercontractile basal segments. Severe elev left-sided filling press.  Cardiac echocardiogram 02/02/2011    EF 50-55%. Mild apical hypk. Gr 1 DDfx. RVSP 40-45. Mild LAE.  Cardiac Holter monitor 06/10/2015    Sinus rhythm w/avg HR of 53 bpm (range 40-85 bpm). Occasional PACs. One 3-beat run of nonsustained SVT. Very few PVCs. One 3-beat run of nonsustained VT. Pt's HR was < 50 bpm for 8 hrs of the recorded time. No pauses noted. Two episodes of neck pain corresponded to sinus rhythm w/o ectopy.  Cardiovascular renal duplex 12/03/2012    No renal artery stenosis bilaterally. Bilateral intrinsic/med disease. Patent bilateral renal veins. Multiple cysts on both kidneys.     CKD (chronic kidney disease)     while on diuretics for difficult to control HTN    Diabetes mellitus type II 3/15/2010    Dyslipidemia 3/15/2010    Heart attack ([address]     2010    Heart failure, diastolic, chronic ([address]     History of heart attack     HTN (hypertension) 3/15/2010    Hypertensive heart disease     Loss of appetite     Osteopenia 7/13/2010    Other dyspnea and respiratory abnormality     Ringing in the ears     Stress-induced cardiomyopathy     EF 35% (LV angio 10/2010), then EF 50-55% (echo, Feb 2011)    Wears glasses        PSH:  Past Surgical History:   Procedure Laterality Date    HX BLADDER SUSPENSION  2009    HX HYSTERECTOMY  1962       MEDS:  Current Outpatient Medications   Medication Sig    amLODIPine (NORVASC) 10 mg tablet Take 1 Tab by mouth daily.  Rocklatan 0.02-0.005 % drop Administer 1 Drop to both eyes nightly.  olmesartan (BENICAR) 20 mg tablet Take 1 Tab by mouth daily.  calcium-cholecalciferol, d3, (CALCIUM 600 + D) 600-125 mg-unit tab Take  by mouth.  diclofenac (VOLTAREN) 1 % gel Apply  to affected area four (4) times daily.  pravastatin (PRAVACHOL) 40 mg tablet TAKE 1 TABLET BY MOUTH EVERY DAY    LUMIGAN 0.01 % ophthalmic drops     nitroglycerin (NITROSTAT) 0.4 mg SL tablet 1 Tab by SubLINGual route every five (5) minutes as needed.  latanoprost (XALATAN) 0.005 % ophthalmic solution     RESTASIS 0.05 % ophthalmic emulsion Administer 1 Drop to both eyes every twelve (12) hours.  ascorbic acid (VITAMIN C) 500 mg tablet Take 1,000 mg by mouth daily.  CALCIUM PO Take 1,000 mg by mouth two (2) times a day.  Cholecalciferol, Vitamin D3, (VITAMIN D) 1,000 unit Cap Take 2,000 Units by mouth daily.  MULTIVITAMINS PO Take 1 Tab by mouth daily.  aspirin 81 mg chewable tablet Take 81 mg by mouth daily. No current facility-administered medications for this visit. Allergies and Sensitivities:  Allergies   Allergen Reactions    Lipitor [Atorvastatin] Myalgia    Spironolactone Other (comments)     Dizziness and fatigue       Family History:  Family History   Problem Relation Age of Onset    Hypertension Mother     Arthritis-osteo Other        Social History:  She  reports that she has never smoked. She has never used smokeless tobacco.  She  reports no history of alcohol use.       Physical:  Visit Vitals  BP (!) 145/80 (BP 1 Location: Right arm, BP Patient Position: Sitting)   Pulse 62   Ht 5' 2\" (1.575 m)   Wt 66.1 kg (145 lb 12.8 oz)   SpO2 98%   BMI 26.67 kg/m²       Exam:  Neck:  Supple, no JVD, no carotid bruits  CV:  Normal S1 and  S2, no murmurs, rubs, or gallops noted  Lungs:  Clear to ausculation throughout, no wheezes or rales  Abd:  Soft, non-tender, non-distended with good bowel sounds. No hepatosplenomegaly  Extremities:  trace edema around the ankles      Data:  EKG:  Not done today      LABS:  Lab Results   Component Value Date/Time    Sodium 143 07/07/2020 10:50 AM    Potassium 4.5 07/07/2020 10:50 AM    Chloride 110 07/07/2020 10:50 AM    CO2 25 07/07/2020 10:50 AM    Glucose 115 (H) 07/07/2020 10:50 AM    BUN 23 (H) 07/07/2020 10:50 AM    Creatinine 1.33 (H) 07/07/2020 10:50 AM     Lab Results   Component Value Date/Time    Cholesterol, total 225 (H) 07/07/2020 10:50 AM    HDL Cholesterol 69 (H) 07/07/2020 10:50 AM    LDL, calculated 133.8 (H) 07/07/2020 10:50 AM    Triglyceride 111 07/07/2020 10:50 AM    CHOL/HDL Ratio 3.3 07/07/2020 10:50 AM     Lab Results   Component Value Date/Time    ALT (SGPT) 24 07/07/2020 10:50 AM         Impression/Plan:  1. Dizziness, symptoms indicative of vertigo  2. History of Sinus bradycardia, heart rate stable  3. Essential hypertension, blood pressure stable  4. CAD, s/p MI in 2010, stable  5. Diabetes mellitus, recommend Hgb A1c less than 7% from cardiac standpoint    Mrs. Nora Means was seen today for evaluation of complaints of dizziness and elevated blood pressures. They have obtained some elevated blood pressures on occasion at home but they have been using a wrist cuff. Blood pressures obtained in the office have been stable and today it was 145/80. Will not adjust her medications at this time as I do not want to lower her blood pressure too much which may cause lightheadedness/dizziness and may increase the risk for falls in this independent, elderly female. She describes episodes of dizziness that lasted about 2 days. It began suddenly and occurred when she was moving around.   She states that the room was spinning each time she moved but it was not moving when she was sitting down. It improved each day until it resolved on its own. She has not any further episodes since that time. It was not accompanied by any other symptoms. No chest pain, shortness of breath, or palpitations. I discussed with her and her daughter, the option of performing a pharmacologic nuclear stress test to rule out ischemia. Dr. Michael Khanna discussed this option with them about a year ago. Mrs. India Pérez states that she would like to decline the testing for now. If she has recurrent dizziness with other symptoms, they will call the office and request that a stress test be ordered. Her daughter agrees. I informed them that if we proceed with stress testing and it is abnormal, recommendation would be to proceed with invasive evaluation to rule out progression of CAD. They verbalized their understanding. Vertigo explained to them. If she has similar symptoms, they will follow-up with her PCP first but will also let us know. Greater than 50% of a 45 minute visit was spent in discussion, counseling, and answering questions. She will follow-up with Dr. Michael Khanna and as needed. Rai Iniguez MSN, FNP-BC    Please note:  Portions of this chart were created with Dragon medical speech to text program.  Unrecognized errors may be present.

## 2020-10-13 NOTE — TELEPHONE ENCOUNTER
Last Visit: 7/14/2020 with MD Brennan Centeno Next Appointment: 11/18/2020 with MD Brennan Centeno Previous Refill Encounter(s): 10/09/2019 per MD [facility] #[id number] Requested Prescriptions Pending Prescriptions Disp Refills  amLODIPine (NORVASC) 10 mg tablet 90 Tab 3 Sig: Take 1 Tab by mouth daily.

## 2020-10-14 ENCOUNTER — OFFICE VISIT (OUTPATIENT)
Dept: CARDIOLOGY CLINIC | Age: 85
End: 2020-10-14
Payer: MEDICARE

## 2020-10-14 VITALS
DIASTOLIC BLOOD PRESSURE: 80 MMHG | SYSTOLIC BLOOD PRESSURE: 145 MMHG | HEART RATE: 62 BPM | BODY MASS INDEX: 26.83 KG/M2 | OXYGEN SATURATION: 98 % | HEIGHT: 62 IN | WEIGHT: 145.8 LBS

## 2020-10-14 DIAGNOSIS — I10 ESSENTIAL HYPERTENSION: Primary | ICD-10-CM

## 2020-10-14 DIAGNOSIS — I25.10 CORONARY ARTERY DISEASE INVOLVING NATIVE CORONARY ARTERY OF NATIVE HEART WITHOUT ANGINA PECTORIS: ICD-10-CM

## 2020-10-14 DIAGNOSIS — E78.5 DYSLIPIDEMIA: ICD-10-CM

## 2020-10-14 DIAGNOSIS — R42 DIZZINESS: ICD-10-CM

## 2020-10-14 PROCEDURE — 99214 OFFICE O/P EST MOD 30 MIN: CPT | Performed by: NURSE PRACTITIONER

## 2020-10-14 PROCEDURE — G8417 CALC BMI ABV UP PARAM F/U: HCPCS | Performed by: NURSE PRACTITIONER

## 2020-10-14 PROCEDURE — G8432 DEP SCR NOT DOC, RNG: HCPCS | Performed by: NURSE PRACTITIONER

## 2020-10-14 PROCEDURE — G8536 NO DOC ELDER MAL SCRN: HCPCS | Performed by: NURSE PRACTITIONER

## 2020-10-14 PROCEDURE — G8427 DOCREV CUR MEDS BY ELIG CLIN: HCPCS | Performed by: NURSE PRACTITIONER

## 2020-10-14 RX ORDER — AMLODIPINE BESYLATE 10 MG/1
10 TABLET ORAL DAILY
Qty: 90 TAB | Refills: 3 | Status: SHIPPED | OUTPATIENT
Start: 2020-10-14 | End: 2021-10-04

## 2020-10-14 NOTE — PATIENT INSTRUCTIONS
Continue present medication regimen Call us if you have more episodes of dizziness Follow-up with Dr. Miguel A Booth as scheduled and as needed

## 2020-11-02 ENCOUNTER — TELEPHONE (OUTPATIENT)
Dept: CARDIOLOGY CLINIC | Age: 85
End: 2020-11-02

## 2020-11-02 NOTE — TELEPHONE ENCOUNTER
Received call from patient's daughter Ms. Ymailka Cook stating patient is having spells of dizziness again. Wishes to discuss testing needed to determine possible causes of dizziness as discussed at last office visit.

## 2020-11-04 ENCOUNTER — OFFICE VISIT (OUTPATIENT)
Dept: CARDIOLOGY CLINIC | Age: 85
End: 2020-11-04
Payer: MEDICARE

## 2020-11-04 VITALS
DIASTOLIC BLOOD PRESSURE: 64 MMHG | HEART RATE: 60 BPM | BODY MASS INDEX: 27.26 KG/M2 | OXYGEN SATURATION: 97 % | WEIGHT: 144.4 LBS | HEIGHT: 61 IN | SYSTOLIC BLOOD PRESSURE: 142 MMHG

## 2020-11-04 DIAGNOSIS — I25.10 CORONARY ARTERY DISEASE INVOLVING NATIVE CORONARY ARTERY OF NATIVE HEART WITHOUT ANGINA PECTORIS: ICD-10-CM

## 2020-11-04 DIAGNOSIS — I10 ESSENTIAL HYPERTENSION: ICD-10-CM

## 2020-11-04 DIAGNOSIS — I25.10 CORONARY ARTERY DISEASE INVOLVING NATIVE CORONARY ARTERY OF NATIVE HEART WITHOUT ANGINA PECTORIS: Primary | ICD-10-CM

## 2020-11-04 DIAGNOSIS — R42 DIZZINESS: ICD-10-CM

## 2020-11-04 DIAGNOSIS — R53.1 WEAKNESS GENERALIZED: ICD-10-CM

## 2020-11-04 DIAGNOSIS — I11.0 HYPERTENSIVE HEART DISEASE WITH HEART FAILURE (HCC): ICD-10-CM

## 2020-11-04 PROCEDURE — G8427 DOCREV CUR MEDS BY ELIG CLIN: HCPCS | Performed by: NURSE PRACTITIONER

## 2020-11-04 PROCEDURE — 93000 ELECTROCARDIOGRAM COMPLETE: CPT | Performed by: NURSE PRACTITIONER

## 2020-11-04 PROCEDURE — G8417 CALC BMI ABV UP PARAM F/U: HCPCS | Performed by: NURSE PRACTITIONER

## 2020-11-04 PROCEDURE — G8432 DEP SCR NOT DOC, RNG: HCPCS | Performed by: NURSE PRACTITIONER

## 2020-11-04 PROCEDURE — 99212 OFFICE O/P EST SF 10 MIN: CPT | Performed by: NURSE PRACTITIONER

## 2020-11-04 PROCEDURE — G8536 NO DOC ELDER MAL SCRN: HCPCS | Performed by: NURSE PRACTITIONER

## 2020-11-04 NOTE — PROGRESS NOTES
Chief Complaint : Dizziness     HPI:  Luigi Devi is a 80 y.o. female with PMHx significant for hypertension, diabetes, CHF, CAD (s/p MI in 2010), and chronic kidney disease. She was last seen by Dr. Taye Jenkins in July 2020. Prior to that, he saw her in Oct. 2019 and discussed doing a pharmacologic nuclear stress test due to her complaints of mild fatigue and ABDUL. She and her daughter decided to decline testing at that time due to her advanced age and opted for continued medical management. Her last cardiac catheterization was performed in October 2010 and it showed only mild CAD. She presented to the emergency room on January 19, 2018 with complaints of palpitations. She states that it been intermittent and had been occurring for several months and has been waking her up at night. When seen by Dr. Taye Jenkins in early November 2017, he ordered a 48 hour Holter monitor which showed sinus bradycardia and 3 episodes of SVT. Her average heart rate was 46 bpm and her atenolol was then discontinued. Her EKG in the ER showed sinus bradycardia. She was recently seen by BROOKLYNN Sanders in October 2020. During that visit she offered complaints of dizziness and elevated blood pressure. She was accompanied by her daughter. She is compliant with her medications. She lives alone but spends a week with each of her daughters and when she is at her apartment, they bring her meals.       Today she presents for evaluation of complaints of dizziness and generalized weakness. Gabi Phillips is accompanied by her daughter. Patient states that she experiences dizziness sporadically. She states that the room spins around when moving and when she is sitting. The dizziness also continues when she is lying in bed sometimes. She also offers complaints of generalized weakness. She denies chest pain, tightness, heaviness, palpitations, orthopnea and PND. She admits to some mild shortness of breath on exertion.   Denies syncope and claudication. Past Medical History:  Past Medical History:   Diagnosis Date    Arthritis of right knee     CAD (coronary artery disease), native coronary artery October 2010    mild disease    Cardiac cath 10/21/2010    dLM 20%. mLAD 25%. CX mild. pRCA 30%. EF 30-35%. Anterolateral, apical , diaphrag akin. Hypercontractile basal segments. Severe elev left-sided filling press.  Cardiac echocardiogram 02/02/2011    EF 50-55%. Mild apical hypk. Gr 1 DDfx. RVSP 40-45. Mild LAE.  Cardiac Holter monitor 06/10/2015    Sinus rhythm w/avg HR of 53 bpm (range 40-85 bpm). Occasional PACs. One 3-beat run of nonsustained SVT. Very few PVCs. One 3-beat run of nonsustained VT. Pt's HR was < 50 bpm for 8 hrs of the recorded time. No pauses noted. Two episodes of neck pain corresponded to sinus rhythm w/o ectopy.  Cardiovascular renal duplex 12/03/2012    No renal artery stenosis bilaterally. Bilateral intrinsic/med disease. Patent bilateral renal veins. Multiple cysts on both kidneys.     CKD (chronic kidney disease)     while on diuretics for difficult to control HTN    Diabetes mellitus type II 3/15/2010    Dyslipidemia 3/15/2010    Heart attack ([address]     2010    Heart failure, diastolic, chronic ([address]     History of heart attack     HTN (hypertension) 3/15/2010    Hypertensive heart disease     Loss of appetite     Osteopenia 7/13/2010    Other dyspnea and respiratory abnormality     Ringing in the ears     Stress-induced cardiomyopathy     EF 35% (LV angio 10/2010), then EF 50-55% (echo, Feb 2011)    Wears glasses        Surgical History:  Past Surgical History:   Procedure Laterality Date    HX BLADDER SUSPENSION  2009    HX HYSTERECTOMY  1962        Social History:  Social History     Socioeconomic History    Marital status:      Spouse name: Not on file    Number of children: Not on file    Years of education: Not on file    Highest education level: Not on file   Occupational History    Not on file   Social Needs    Financial resource strain: Not on file    Food insecurity     Worry: Not on file     Inability: Not on file    Transportation needs     Medical: Not on file     Non-medical: Not on file   Tobacco Use    Smoking status: Never Smoker    Smokeless tobacco: Never Used   Substance and Sexual Activity    Alcohol use: No    Drug use: No    Sexual activity: Never   Lifestyle    Physical activity     Days per week: Not on file     Minutes per session: Not on file    Stress: Not on file   Relationships    Social connections     Talks on phone: Not on file     Gets together: Not on file     Attends Holiness service: Not on file     Active member of club or organization: Not on file     Attends meetings of clubs or organizations: Not on file     Relationship status: Not on file    Intimate partner violence     Fear of current or ex partner: Not on file     Emotionally abused: Not on file     Physically abused: Not on file     Forced sexual activity: Not on file   Other Topics Concern    Not on file   Social History Narrative    Not on file        Family History:  Family History   Problem Relation Age of Onset    Hypertension Mother     Arthritis-osteo Other         Allergies: Allergies   Allergen Reactions    Lipitor [Atorvastatin] Myalgia    Spironolactone Other (comments)     Dizziness and fatigue        Current Medications:  Current Outpatient Medications   Medication Sig Dispense Refill    amLODIPine (NORVASC) 10 mg tablet Take 1 Tab by mouth daily. 90 Tab 3    olmesartan (BENICAR) 20 mg tablet Take 1 Tab by mouth daily. 90 Tab 3    calcium-cholecalciferol, d3, (CALCIUM 600 + D) 600-125 mg-unit tab Take  by mouth.  pravastatin (PRAVACHOL) 40 mg tablet TAKE 1 TABLET BY MOUTH EVERY DAY 90 Tab 3    LUMIGAN 0.01 % ophthalmic drops       nitroglycerin (NITROSTAT) 0.4 mg SL tablet 1 Tab by SubLINGual route every five (5) minutes as needed. 1 Bottle 1    latanoprost (XALATAN) 0.005 % ophthalmic solution       RESTASIS 0.05 % ophthalmic emulsion Administer 1 Drop to both eyes every twelve (12) hours.  ascorbic acid (VITAMIN C) 500 mg tablet Take 1,000 mg by mouth daily.  CALCIUM PO Take 1,000 mg by mouth two (2) times a day.  Cholecalciferol, Vitamin D3, (VITAMIN D) 1,000 unit Cap Take 2,000 Units by mouth daily. 180 Cap 3    MULTIVITAMINS PO Take 1 Tab by mouth daily.  aspirin 81 mg chewable tablet Take 81 mg by mouth daily.  Rocklatan 0.02-0.005 % drop Administer 1 Drop to both eyes nightly.  diclofenac (VOLTAREN) 1 % gel Apply  to affected area four (4) times daily. 100 g 4       Review of systems:  Review of Systems   Constitutional: Negative for malaise/fatigue. Respiratory: Negative for shortness of breath. Cardiovascular: Positive for leg swelling. Negative for chest pain, palpitations, orthopnea, claudication and PND. Gastrointestinal: Negative for blood in stool. Musculoskeletal: Negative for falls and myalgias. Neurological: Positive for dizziness and weakness. Wt Readings from Last 3 Encounters:   11/04/20 65.5 kg (144 lb 6.4 oz)   10/14/20 66.1 kg (145 lb 12.8 oz)   07/28/20 64.9 kg (143 lb)     BP Readings from Last 3 Encounters:   11/04/20 (!) 142/64   10/14/20 (!) 145/80   07/28/20 130/57     Pulse Readings from Last 3 Encounters:   11/04/20 60   10/14/20 62   07/28/20 61     EKG:  Ekg 11.4.20: Sinus bradycardia. Cardiologist to read. Physical Exam:  Constitutional:       Appearance: Normal appearance. HENT:      Head: Normocephalic and atraumatic. Eyes:      Extraocular Movements: Extraocular movements intact. Pupils: Pupils are equal, round, and reactive to light. Neck:      Musculoskeletal: Normal range of motion and neck supple. Cardiovascular:      Rate and Rhythm: Normal rate and regular rhythm. Heart sounds: No murmur. No friction rub. No gallop.     Pulmonary: Effort: Pulmonary effort is normal.      Breath sounds: Normal breath sounds. No wheezing, rhonchi or rales. Chest:      Chest wall: No tenderness. Abdominal:      General: Bowel sounds are normal.   Musculoskeletal: Normal range of motion. Right lower leg: +1 edema. Left lower leg: +1 edema. Skin:     General: Skin is warm and dry. Neurological:      Mental Status: She is alert and oriented to person, place, and time.    Labs  Lab Results   Component Value Date/Time    WBC 5.6 01/29/2020 01:14 PM    HGB 12.2 01/29/2020 01:14 PM    HCT 37.2 01/29/2020 01:14 PM    PLATELET 841 27/52/2089 01:14 PM    MCV 91.9 01/29/2020 01:14 PM     Lab Results   Component Value Date/Time    Sodium 143 07/07/2020 10:50 AM    Potassium 4.5 07/07/2020 10:50 AM    Chloride 110 07/07/2020 10:50 AM    CO2 25 07/07/2020 10:50 AM    Anion gap 8 07/07/2020 10:50 AM    Glucose 115 (H) 07/07/2020 10:50 AM    BUN 23 (H) 07/07/2020 10:50 AM    Creatinine 1.33 (H) 07/07/2020 10:50 AM    BUN/Creatinine ratio 17 07/07/2020 10:50 AM    GFR est AA 45 (L) 07/07/2020 10:50 AM    GFR est non-AA 37 (L) 07/07/2020 10:50 AM    Calcium 9.7 07/07/2020 10:50 AM     Lab Results   Component Value Date/Time    Cholesterol, total 225 (H) 07/07/2020 10:50 AM    Cholesterol, total 226 (H) 03/03/2020 10:27 AM    Cholesterol, total 217 (H) 01/29/2020 01:14 PM    HDL Cholesterol 69 (H) 07/07/2020 10:50 AM    HDL Cholesterol 68 (H) 03/03/2020 10:27 AM    HDL Cholesterol 66 (H) 01/29/2020 01:14 PM    LDL, calculated 133.8 (H) 07/07/2020 10:50 AM    LDL, calculated 133.2 (H) 03/03/2020 10:27 AM    LDL, calculated 121 (H) 01/29/2020 01:14 PM    Triglyceride 111 07/07/2020 10:50 AM    Triglyceride 124 03/03/2020 10:27 AM    Triglyceride 150 (H) 01/29/2020 01:14 PM    CHOL/HDL Ratio 3.3 07/07/2020 10:50 AM    CHOL/HDL Ratio 3.3 03/03/2020 10:27 AM    CHOL/HDL Ratio 3.3 01/29/2020 01:14 PM      Lab Results   Component Value Date/Time    B-type Natriuretic Peptide 507.1 (H) 11/27/2010 12:00 AM      Lab Results   Component Value Date/Time    Hemoglobin A1c 6.4 (H) 07/07/2020 10:50 AM    Hemoglobin A1c (POC) 6.1 07/02/2018 10:28 AM       Impression/Plan:  1. Generalized weakness  - Given hx of coronary artery disease proceed with Pharm NUC to further evaluate weakness.   - Patient agrees to proceed. - Will notify patients of results via telephone. 2. Dizziness, symptoms indicative of vertigo  - Orthostatics performed-- negative. - Recommended for patient to follow up with her PCP. 3. History of sinus bradycardia, asymptomatic  - Heart rate stable. 4. Essential hypertension  - BP well controlled. - Continue current medication regimen. 5. CAD, s/p MI in 2010  - Continue statin and aspirin. 6. Diabetes mellitus  - Recommend Hgb A1c less than 7% from cardiac standpoint. Thank you for allowing me to participate in the care of your patient. Please do not hesitate to call with questions or concerns.     Chapis Couch NP

## 2020-11-10 ENCOUNTER — OFFICE VISIT (OUTPATIENT)
Dept: ORTHOPEDIC SURGERY | Age: 85
End: 2020-11-10
Payer: MEDICARE

## 2020-11-10 VITALS
RESPIRATION RATE: 18 BRPM | TEMPERATURE: 97.6 F | HEART RATE: 60 BPM | WEIGHT: 144 LBS | HEIGHT: 61 IN | BODY MASS INDEX: 27.19 KG/M2 | DIASTOLIC BLOOD PRESSURE: 61 MMHG | SYSTOLIC BLOOD PRESSURE: 135 MMHG

## 2020-11-10 DIAGNOSIS — M48.062 SPINAL STENOSIS OF LUMBAR REGION WITH NEUROGENIC CLAUDICATION: Primary | ICD-10-CM

## 2020-11-10 PROCEDURE — G8536 NO DOC ELDER MAL SCRN: HCPCS | Performed by: PHYSICAL MEDICINE & REHABILITATION

## 2020-11-10 PROCEDURE — 1101F PT FALLS ASSESS-DOCD LE1/YR: CPT | Performed by: PHYSICAL MEDICINE & REHABILITATION

## 2020-11-10 PROCEDURE — G8417 CALC BMI ABV UP PARAM F/U: HCPCS | Performed by: PHYSICAL MEDICINE & REHABILITATION

## 2020-11-10 PROCEDURE — G8432 DEP SCR NOT DOC, RNG: HCPCS | Performed by: PHYSICAL MEDICINE & REHABILITATION

## 2020-11-10 PROCEDURE — 1090F PRES/ABSN URINE INCON ASSESS: CPT | Performed by: PHYSICAL MEDICINE & REHABILITATION

## 2020-11-10 PROCEDURE — 99213 OFFICE O/P EST LOW 20 MIN: CPT | Performed by: PHYSICAL MEDICINE & REHABILITATION

## 2020-11-10 PROCEDURE — G8427 DOCREV CUR MEDS BY ELIG CLIN: HCPCS | Performed by: PHYSICAL MEDICINE & REHABILITATION

## 2020-11-10 NOTE — PROGRESS NOTES
[address], [address]  [facility], [address]  Phone: [phone]  Fax: [phone]        Josiane Foss  : 1923  PCP: Han Lazo MD  11/10/2020    PROGRESS NOTE      HISTORY OF PRESENT ILLNESS  Peace Bey is a 80 y.o. female who was seen as a new patient 10/31/19 with c/o low back pain with BLE paraesthesia x 6 weeks. Pt reports a heaviness in her BLE with standing and walking. She reports a limited standing/walking tolerance. She finds relief with sitting. She also c/o some lateral right thigh pain. She ambulates with a cane. Dr. Kendal Mcdonough recommended she use a walker for walking longer distances. Pt also c/o left sided functional deficits. She denies h/o stroke. She notes significant benefit from PT (19-19; [employer] her walking improved. She states that her lateral thigh symptoms have improved. She noted improvement, but continued to feel some weakness in her knees. She did not feel her symptoms warranted further treatment at the time. She ended up not doing the MRI due to claustrophobia and does not wish to proceed with one in the future. Pt reports increased pain in her bilateral knees. She reports a new pain in her bilateral shoulders and elbows (R>L) x 2 weeks. She has found some benefit from using [employer] topical cream. Pt notes that she exercises her arms daily, and it does not help her pain much. Her pain is worse in the afternoons. She is unable to do her hair because of limited shoulder/scapular ROM. Peace Bey comes in to the office today for f/u. She notes that she has been doing well. However, she notes that she feels weakness in her legs, especially with walking. She feels better walking with the rollator walker. She has been maintaining an HEP with benefit. Pain Score: 0/10.     PmHx: HTN, CAD, cardiomyopathy, DM, CKD    ASSESSMENT  This is a 80year-old female with stenotic-type symptoms. Her low back and BLE symptoms are likely due to lumbar spinal stenosis with neurogenic claudication. This has not been confirmed with MRI. She does not wish to act upon her symptoms at this time. She would prefer to see what happens.     PLAN  1. Provided chair yoga exercises to add to HEP. Pt will f/u in 6 months or sooner as needed. Diagnoses and all orders for this visit:    1. Spinal stenosis of lumbar region with neurogenic claudication         PAST MEDICAL HISTORY   Past Medical History:   Diagnosis Date    Arthritis of right knee     CAD (coronary artery disease), native coronary artery October 2010    mild disease    Cardiac cath 10/21/2010    dLM 20%. mLAD 25%. CX mild. pRCA 30%. EF 30-35%. Anterolateral, apical , diaphrag akin. Hypercontractile basal segments. Severe elev left-sided filling press.  Cardiac echocardiogram 02/02/2011    EF 50-55%. Mild apical hypk. Gr 1 DDfx. RVSP 40-45. Mild LAE.  Cardiac Holter monitor 06/10/2015    Sinus rhythm w/avg HR of 53 bpm (range 40-85 bpm). Occasional PACs. One 3-beat run of nonsustained SVT. Very few PVCs. One 3-beat run of nonsustained VT. Pt's HR was < 50 bpm for 8 hrs of the recorded time. No pauses noted. Two episodes of neck pain corresponded to sinus rhythm w/o ectopy.  Cardiovascular renal duplex 12/03/2012    No renal artery stenosis bilaterally. Bilateral intrinsic/med disease. Patent bilateral renal veins. Multiple cysts on both kidneys.     CKD (chronic kidney disease)     while on diuretics for difficult to control HTN    Diabetes mellitus type II 3/15/2010    Dyslipidemia 3/15/2010    Heart attack ([place])     2010    Heart failure, diastolic, chronic (HCC)     History of heart attack     HTN (hypertension) 3/15/2010    Hypertensive heart disease     Loss of appetite     Osteopenia 7/13/2010    Other dyspnea and respiratory abnormality     Ringing in the ears     Stress-induced cardiomyopathy     EF 35% (LV angio 10/2010), then EF 50-55% (echo, Feb 2011)    Wears glasses        Past Surgical History:   Procedure Laterality Date    HX BLADDER SUSPENSION  2009    HX [facility]   . MEDICATIONS      Current Outpatient Medications   Medication Sig Dispense Refill    amLODIPine (NORVASC) 10 mg tablet Take 1 Tab by mouth daily. 90 Tab 3    Rocklatan 0.02-0.005 % drop Administer 1 Drop to both eyes nightly.  olmesartan (BENICAR) 20 mg tablet Take 1 Tab by mouth daily. 90 Tab 3    calcium-cholecalciferol, d3, (CALCIUM 600 + D) 600-125 mg-unit tab Take  by mouth.  diclofenac (VOLTAREN) 1 % gel Apply  to affected area four (4) times daily. 100 g 4    pravastatin (PRAVACHOL) 40 mg tablet TAKE 1 TABLET BY MOUTH EVERY DAY 90 Tab 3    LUMIGAN 0.01 % ophthalmic drops       nitroglycerin (NITROSTAT) 0.4 mg SL tablet 1 Tab by SubLINGual route every five (5) minutes as needed. 1 Bottle 1    latanoprost (XALATAN) 0.005 % ophthalmic solution       RESTASIS 0.05 % ophthalmic emulsion Administer 1 Drop to both eyes every twelve (12) hours.  ascorbic acid (VITAMIN C) 500 mg tablet Take 1,000 mg by mouth daily.  CALCIUM PO Take 1,000 mg by mouth two (2) times a day.  Cholecalciferol, Vitamin D3, (VITAMIN D) 1,000 unit Cap Take 2,000 Units by mouth daily. 180 Cap 3    MULTIVITAMINS PO Take 1 Tab by mouth daily.  aspirin 81 mg chewable tablet Take 81 mg by mouth daily.           ALLERGIES    Allergies   Allergen Reactions    Lipitor [Atorvastatin] Myalgia    Spironolactone Other (comments)     Dizziness and fatigue          SOCIAL HISTORY    Social History     Socioeconomic History    Marital status:      Spouse name: Not on file    Number of children: Not on file    Years of education: Not on file    Highest education level: Not on file   Tobacco Use    Smoking status: Never Smoker    Smokeless tobacco: Never Used   Substance and Sexual Activity  Alcohol use: No    Drug use: No    Sexual activity: Never       FAMILY HISTORY  Family History   Problem Relation Age of Onset    Hypertension Mother     Arthritis-osteo Other          REVIEW OF SYSTEMS  Review of Systems   Constitutional: Negative for chills, fever and weight loss. Respiratory: Negative for shortness of breath. Cardiovascular: Negative for chest pain. Gastrointestinal: Negative for constipation. Negative for fecal incontinence    Genitourinary: Negative for dysuria. Negative for urinary incontinence   Musculoskeletal: Positive for back pain. Skin: Negative for rash. Neurological: Positive for tingling ( BLE). Negative for dizziness, tremors, focal weakness and headaches. Endo/Heme/Allergies: Does not bruise/bleed easily. Psychiatric/Behavioral: The patient does not have insomnia. PHYSICAL EXAMINATION  Visit Vitals  /61   Pulse 60   Temp 97.6 °F (36.4 °C) (Temporal)   Resp 18   Ht 5' 1\" (1.549 m)   Wt 144 lb (65.3 kg)   BMI 27.21 kg/m²       Pain Assessment  7/28/2020   Location of Pain Knee; Shoulder;Elbow   Location Modifiers Right;Left   Severity of Pain 10   Quality of Pain Aching   Quality of Pain Comment -   Duration of Pain Persistent   Frequency of Pain Constant   Aggravating Factors -   Aggravating Factors Comment -   Limiting Behavior Yes   Relieving Factors -   Relieving Factors Comment -   Result of Injury No           Constitutional:  Well developed, well nourished, in no acute distress. Psychiatric: Affect and mood are appropriate. Integumentary: No rashes or abrasions noted on exposed areas. SPINE/MUSCULOSKELETAL EXAM    Cervical spine:  Neck is midline. Normal muscle tone. No focal atrophy is noted. ROM pain free. Shoulder ROM intact. No tenderness to palpation. Negative Spurling's sign. Negative Tinel's sign. Negative Coelho's sign.                                                                                                                           Sensation in the bilateral arms grossly intact to light touch.      Ulnar deviation of fingers on the left  [place] neck deformities on the left     No clear myelopathic or UMN signs     Lumbar spine:  No rash, ecchymosis, or gross obliquity. No fasciculations. No focal atrophy is noted. No pain with hip ROM. Full range of motion. No tenderness to palpation. No tenderness to palpation at the sciatic notch. SI joints non-tender. Trochanters non tender.     Sensation in the bilateral legs grossly intact to light touch.     Updates from 12/12/19:   Full ROM. Tenderness to palpation of bilateral trochanters.      Updates 7/28/2020:  No pain with neck ROM  Negative Brownlee sign bilaterally   Pain with scapular retraction. No tenderness to palpation of cervicothoracic region    MOTOR:      Biceps  Triceps Deltoids Wrist Ext Wrist Flex Hand Intrin   Right 5/5 5/5 5/5 5/5 5/5 5/5   Left 5/5 5/5 5/5 5/5 5/5 5/5             Hip Flex  Quads Hamstrings Ankle DF EHL Ankle PF   Right 5/5 5/5 5/5 5/5 5/5 5/5   Left 5/5 5/5 5/5 5/5 5/5 5/5     DTRs are 1+ biceps, triceps, brachioradialis, patella, and Achilles.     Negative Straight Leg raise. Squat not tested. No difficulty with tandem gait.      Ambulation with rollator walker. FWB.       RADIOGRAPHS  Lumbar XR images taken on 10/31/19 personally reviewed with patient:  Facet sclerosis at lower levels  Significant Grade 2 anterolisthesis of L5 on S1 and L4 on L5   Atherosclerosis of aorta  No obvious compression fractures    6 minutes of face-to-face contact were spent with the patient during today's visit extensively discussing symptoms and treatment plan. All questions were answered. More than half of this visit today was spent on counseling.      Written by Katherine Edwards as dictated by Luis Carlos Holloway MD

## 2020-11-11 ENCOUNTER — HOSPITAL ENCOUNTER (OUTPATIENT)
Dept: LAB | Age: 85
Discharge: HOME | End: 2020-11-11
Payer: MEDICARE

## 2020-11-11 ENCOUNTER — APPOINTMENT (OUTPATIENT)
Dept: INTERNAL MEDICINE CLINIC | Age: 85
End: 2020-11-11

## 2020-11-11 DIAGNOSIS — I10 ESSENTIAL HYPERTENSION: ICD-10-CM

## 2020-11-11 DIAGNOSIS — E78.5 DYSLIPIDEMIA: ICD-10-CM

## 2020-11-11 LAB
ALBUMIN SERPL-MCNC: 3.6 G/DL (ref 3.4–5)
ALBUMIN/GLOB SERPL: 1.3 {RATIO} (ref 0.8–1.7)
ALP SERPL-CCNC: 79 U/L (ref 45–117)
ALT SERPL-CCNC: 24 U/L (ref 13–56)
ANION GAP SERPL CALC-SCNC: 8 MMOL/L (ref 3–18)
AST SERPL-CCNC: 28 U/L (ref 10–38)
BILIRUB SERPL-MCNC: 0.5 MG/DL (ref 0.2–1)
BUN SERPL-MCNC: 31 MG/DL (ref 7–18)
BUN/CREAT SERPL: 20 (ref 12–20)
CALCIUM SERPL-MCNC: 9.6 MG/DL (ref 8.5–10.1)
CHLORIDE SERPL-SCNC: 109 MMOL/L (ref 100–111)
CHOLEST SERPL-MCNC: 224 MG/DL
CO2 SERPL-SCNC: 25 MMOL/L (ref 21–32)
CREAT SERPL-MCNC: 1.57 MG/DL (ref 0.6–1.3)
GLOBULIN SER CALC-MCNC: 2.8 G/DL (ref 2–4)
GLUCOSE SERPL-MCNC: 126 MG/DL (ref 74–99)
HDLC SERPL-MCNC: 70 MG/DL (ref 40–60)
HDLC SERPL: 3.2 {RATIO} (ref 0–5)
LDLC SERPL CALC-MCNC: 136 MG/DL (ref 0–100)
LIPID PROFILE,FLP: ABNORMAL
POTASSIUM SERPL-SCNC: 4.8 MMOL/L (ref 3.5–5.5)
PROT SERPL-MCNC: 6.4 G/DL (ref 6.4–8.2)
SODIUM SERPL-SCNC: 142 MMOL/L (ref 136–145)
TRIGL SERPL-MCNC: 90 MG/DL (ref ?–150)
VLDLC SERPL CALC-MCNC: 18 MG/DL

## 2020-11-11 PROCEDURE — 80061 LIPID PANEL: CPT

## 2020-11-11 PROCEDURE — 80053 COMPREHEN METABOLIC PANEL: CPT

## 2020-11-11 PROCEDURE — 36415 COLL VENOUS BLD VENIPUNCTURE: CPT

## 2020-11-13 LAB
STRESS BASELINE DIAS BP: 70 MMHG
STRESS BASELINE HR: 71 BPM
STRESS BASELINE SYS BP: 140 MMHG
STRESS PEAK DIAS BP: 60 MMHG
STRESS PEAK SYS BP: 120 MMHG
STRESS PERCENT HR ACHIEVED: 66 %
STRESS POST PEAK HR: 81 BPM
STRESS RATE PRESSURE PRODUCT: 9720 BPM*MMHG
STRESS ST DEPRESSION: 0 MM
STRESS ST ELEVATION: 0 MM
STRESS STAGE 1 BP: NORMAL MMHG
STRESS STAGE 1 DURATION: 3 MIN:SEC
STRESS STAGE 1 HR: 81 BPM
STRESS STAGE RECOVERY 1 BP: NORMAL MMHG
STRESS STAGE RECOVERY 1 DURATION: 1 MIN:SEC
STRESS STAGE RECOVERY 1 HR: 78 BPM
STRESS TARGET HR: 123 BPM

## 2020-11-18 ENCOUNTER — OFFICE VISIT (OUTPATIENT)
Dept: INTERNAL MEDICINE CLINIC | Age: 85
End: 2020-11-18
Payer: MEDICARE

## 2020-11-18 VITALS
BODY MASS INDEX: 26.88 KG/M2 | WEIGHT: 142.4 LBS | TEMPERATURE: 97.4 F | HEART RATE: 68 BPM | OXYGEN SATURATION: 97 % | DIASTOLIC BLOOD PRESSURE: 62 MMHG | RESPIRATION RATE: 12 BRPM | HEIGHT: 61 IN | SYSTOLIC BLOOD PRESSURE: 145 MMHG

## 2020-11-18 DIAGNOSIS — Z00.00 MEDICARE ANNUAL WELLNESS VISIT, SUBSEQUENT: Primary | ICD-10-CM

## 2020-11-18 DIAGNOSIS — I50.32 HEART FAILURE, DIASTOLIC, CHRONIC (HCC): ICD-10-CM

## 2020-11-18 DIAGNOSIS — E78.5 DYSLIPIDEMIA: ICD-10-CM

## 2020-11-18 DIAGNOSIS — I10 ESSENTIAL HYPERTENSION: ICD-10-CM

## 2020-11-18 DIAGNOSIS — R00.2 PALPITATIONS: ICD-10-CM

## 2020-11-18 DIAGNOSIS — E11.21 TYPE 2 DIABETES MELLITUS WITH NEPHROPATHY (HCC): ICD-10-CM

## 2020-11-18 DIAGNOSIS — N18.32 STAGE 3B CHRONIC KIDNEY DISEASE (HCC): ICD-10-CM

## 2020-11-18 PROCEDURE — 1090F PRES/ABSN URINE INCON ASSESS: CPT | Performed by: INTERNAL MEDICINE

## 2020-11-18 PROCEDURE — G8536 NO DOC ELDER MAL SCRN: HCPCS | Performed by: INTERNAL MEDICINE

## 2020-11-18 PROCEDURE — G8417 CALC BMI ABV UP PARAM F/U: HCPCS | Performed by: INTERNAL MEDICINE

## 2020-11-18 PROCEDURE — G0439 PPPS, SUBSEQ VISIT: HCPCS | Performed by: INTERNAL MEDICINE

## 2020-11-18 PROCEDURE — 1101F PT FALLS ASSESS-DOCD LE1/YR: CPT | Performed by: INTERNAL MEDICINE

## 2020-11-18 PROCEDURE — G8510 SCR DEP NEG, NO PLAN REQD: HCPCS | Performed by: INTERNAL MEDICINE

## 2020-11-18 PROCEDURE — G0463 HOSPITAL OUTPT CLINIC VISIT: HCPCS | Performed by: INTERNAL MEDICINE

## 2020-11-18 PROCEDURE — G8427 DOCREV CUR MEDS BY ELIG CLIN: HCPCS | Performed by: INTERNAL MEDICINE

## 2020-11-18 PROCEDURE — 99214 OFFICE O/P EST MOD 30 MIN: CPT | Performed by: INTERNAL MEDICINE

## 2020-11-18 RX ORDER — NITROGLYCERIN 0.4 MG/1
TABLET SUBLINGUAL
Qty: 1 BOTTLE | Refills: 1 | Status: SHIPPED | OUTPATIENT
Start: 2020-11-18 | End: 2022-01-07

## 2020-11-18 NOTE — PROGRESS NOTES
Subjective:       Chief Complaint  The patient presents for follow up of diabetes, hypertension and high cholesterol. FATEMEH Lott is a 80 y.o. female seen for follow up of diabetes. Shealso has hypertension and hyperlipidemia. Diabetes well controlled, no significant medication side effects noted, off glucotrol XL 2.5 mg, will continue off medications for now,  hypertension reasonably well controlled, no significant medication side effects noted, on current meds, which include Benicar 20 mg, Norvasc 10 mg, will continue to monitor at home,  hyperlipidemia, fairly well controlled on Pravachol 40 mg for age 80. Pt does have some myalgias/weakness in her legs which could be from the statin especially in 80year-old. Discussed with daughter to hold her statin for the next 4 months to see if any improvement in her leg weakness which has progressed since last office visit. Patient is using a walker on a regular basis now and she did not several months ago. Diet and Lifestyle: generally follows a low fat low cholesterol diet, follows a diabetic diet regularly, exercises sporadically    Home BP Monitoring: is well controlled at home. Diabetic Review of Systems - home glucose monitoring: is well controlled at home when she takes it 1x/day    Other symptoms and concerns: pt feels better when she exercises on a regular basis. Pt's CKD stage 3 is stable on current meds. Patient has palpitations that bother her at times. She has been taken off of beta-blockers by her cardiologist because of low heart rate. Pt has chronic diastolic heart failure that is followed by cardiology and controlled on current meds. Pt has generalized arthritis for which she uses aleve with mild improvement. She is currently have a flair up today.  She also uses OTC Lidoderm patches       Current Outpatient Medications   Medication Sig    nitroglycerin (NITROSTAT) 0.4 mg SL tablet 1 q 5 minutes x 3 prn chest pain  amLODIPine (NORVASC) 10 mg tablet Take 1 Tab by mouth daily.  Rocklatan 0.02-0.005 % drop Administer 1 Drop to both eyes nightly.  olmesartan (BENICAR) 20 mg tablet Take 1 Tab by mouth daily.  calcium-cholecalciferol, d3, (CALCIUM 600 + D) 600-125 mg-unit tab Take  by mouth.  pravastatin (PRAVACHOL) 40 mg tablet TAKE 1 TABLET BY MOUTH EVERY DAY    RESTASIS 0.05 % ophthalmic emulsion Administer 1 Drop to both eyes every twelve (12) hours.  ascorbic acid (VITAMIN C) 500 mg tablet Take 1,000 mg by mouth daily.  Cholecalciferol, Vitamin D3, (VITAMIN D) 1,000 unit Cap Take 2,000 Units by mouth daily.  MULTIVITAMINS PO Take 1 Tab by mouth daily.  aspirin 81 mg chewable tablet Take 81 mg by mouth daily.  diclofenac (VOLTAREN) 1 % gel Apply  to affected area four (4) times daily.  LUMIGAN 0.01 % ophthalmic drops     latanoprost (XALATAN) 0.005 % ophthalmic solution     CALCIUM PO Take 1,000 mg by mouth two (2) times a day. No current facility-administered medications for this visit.               Review of Systems  Respiratory: negative for dyspnea on exertion  Cardiovascular: negative for chest pain    Objective:     Visit Vitals  BP (!) 145/62 (BP 1 Location: Right arm, BP Patient Position: Sitting)   Pulse 68   Temp 97.4 °F (36.3 °C) (Oral)   Resp 12   Ht 5' 1\" (1.549 m)   Wt 142 lb 6.4 oz (64.6 kg)   SpO2 97%   BMI 26.91 kg/m²        General appearance - alert, well appearing, and in no distress  Neck - supple, no significant adenopathy, carotids upstroke normal bilaterally, no bruits  Chest - clear to auscultation, no wheezes, rales or rhonchi, symmetric air entry  Heart -normal S1, S2, no murmurs, rubs, clicks or gallops  Extremities - peripheral pulses normal, no pedal edema, no clubbing or cyanosis  Skin - normal coloration and turgor, no rashes, no suspicious skin lesions noted      Labs:   Lab Results   Component Value Date/Time    Hemoglobin A1c 6.4 (H) 07/07/2020 10:50 AM Hemoglobin A1c 6.2 (H) 03/03/2020 10:27 AM    Hemoglobin A1c 6.0 (H) 11/05/2019 10:09 AM    Microalbumin/Creat ratio (mg/g creat) 56 (H) 03/03/2020 10:27 AM    Microalbumin,urine random 11.20 (H) 03/03/2020 10:27 AM    LDL, calculated 136 (H) 11/11/2020 10:08 AM    Creatinine 1.57 (H) 11/11/2020 10:08 AM      Lab Results   Component Value Date/Time    Cholesterol, total 224 (H) 11/11/2020 10:08 AM    HDL Cholesterol 70 (H) 11/11/2020 10:08 AM    LDL, calculated 136 (H) 11/11/2020 10:08 AM    Triglyceride 90 11/11/2020 10:08 AM    CHOL/HDL Ratio 3.2 11/11/2020 10:08 AM     Lab Results   Component Value Date/Time    Alk. phosphatase 79 11/11/2020 10:08 AM    Bilirubin, direct 0.1 03/11/2016 10:45 AM     Lab Results   Component Value Date/Time    GFR est AA 37 (L) 11/11/2020 10:08 AM    GFR est non-AA 30 (L) 11/11/2020 10:08 AM    Creatinine 1.57 (H) 11/11/2020 10:08 AM    BUN 31 (H) 11/11/2020 10:08 AM    Sodium 142 11/11/2020 10:08 AM    Potassium 4.8 11/11/2020 10:08 AM    Chloride 109 11/11/2020 10:08 AM    CO2 25 11/11/2020 10:08 AM      Lab Results   Component Value Date/Time    Glucose 126 (H) 11/11/2020 10:08 AM            Assessment / Plan     Diabetes well controlled, off medications. Will continue to monitor  Hypertension  fairly well controlled, on current meds,    Hyperlipidemia fairly well controlled  On Pravachol,  Will stop due to myalgias and leg weakness. ICD-10-CM ICD-9-CM    1. Medicare annual wellness visit, subsequent  Z00.00 V70.0    2. Type 2 diabetes mellitus with nephropathy (HCC)  E11.21 250.40 HEMOGLOBIN A1C W/O EAG     583.81    3. Essential hypertension  X72 363.2 METABOLIC PANEL, COMPREHENSIVE   4. Dyslipidemia  E78.5 272.4 LIPID PANEL   5. Stage 3b chronic kidney disease  N18.32 585.3  fairly stable for patient's age   10. Palpitations  R00.2 785.1  controlled off of medications   7.  Heart failure, diastolic, chronic (HCC)  F62.64 428.32  stable on current medications patient followed by cardiology              Diabetic issues reviewed with her: diabetic diet discussed in detail, and low cholesterol diet, weight control and daily exercise discussed. Follow-up and Dispositions    · Return in about 4 months (around 3/18/2021) for labs 1 week before. Reviewed plan of care. Patient has provided input and agrees with goals.

## 2020-11-18 NOTE — PATIENT INSTRUCTIONS
Medicare Wellness Visit, Female The best way to live healthy is to have a lifestyle where you eat a well-balanced diet, exercise regularly, limit alcohol use, and quit all forms of tobacco/nicotine, if applicable. Regular preventive services are another way to keep healthy. Preventive services (vaccines, screening tests, monitoring & exams) can help personalize your care plan, which helps you manage your own care. Screening tests can find health problems at the earliest stages, when they are easiest to treat. [facility] follows the current, evidence-based guidelines published by the [facility] ([facility]) when recommending preventive services for our patients. Because we follow these guidelines, sometimes recommendations change over time as research supports it. (For example, mammograms used to be recommended annually. Even though Medicare will still pay for an annual mammogram, the newer guidelines recommend a mammogram every two years for women of average risk). Of course, you and your doctor may decide to screen more often for some diseases, based on your risk and your co-morbidities (chronic disease you are already diagnosed with). Preventive services for you include: - Medicare offers their members a free annual wellness visit, which is time for you and your primary care provider to discuss and plan for your preventive service needs. Take advantage of this benefit every year! 
-All adults over the age of 72 should receive the recommended pneumonia vaccines. Current USPSTF guidelines recommend a series of two vaccines for the best pneumonia protection.  
-All adults should have a flu vaccine yearly and a tetanus vaccine every 10 years.  
-All adults age 48 and older should receive the shingles vaccines (series of two vaccines). -All adults age 38-68 who are overweight should have a diabetes screening test once every three years. -All adults born between [birth year] and 1965 should be screened once for Hepatitis C. 
-Other screening tests and preventive services for persons with diabetes include: an eye exam to screen for diabetic retinopathy, a kidney function test, a foot exam, and stricter control over your cholesterol.  
-Cardiovascular screening for adults with routine risk involves an electrocardiogram (ECG) at intervals determined by your doctor.  
-Colorectal cancer screenings should be done for adults age 54-65 with no increased risk factors for colorectal cancer. There are a number of acceptable methods of screening for this type of cancer. Each test has its own benefits and drawbacks. Discuss with your doctor what is most appropriate for you during your annual wellness visit. The different tests include: colonoscopy (considered the best screening method), a fecal occult blood test, a fecal DNA test, and sigmoidoscopy. 
 
-A bone mass density test is recommended when a woman turns 65 to screen for osteoporosis. This test is only recommended one time, as a screening. Some providers will use this same test as a disease monitoring tool if you already have osteoporosis. -Breast cancer screenings are recommended every other year for women of normal risk, age 54-69. 
-Cervical cancer screenings for women over age 72 are only recommended with certain risk factors. Here is a list of your current Health Maintenance items (your personalized list of preventive services) with a due date: 
Health Maintenance Due Topic Date Due  
 Diabetic Foot Care  01/05/1933  
 DTaP/Tdap/Td  (1 - Tdap) 01/05/1944  Shingles Vaccine (1 of 2) 01/05/1973  Pneumococcal Vaccine (2 of 2 - PPSV23) 05/27/2016

## 2020-11-18 NOTE — PROGRESS NOTES
This is the Subsequent Medicare Annual Wellness Exam, performed 12 months or more after the Initial AWV or the last Subsequent AWV    I have reviewed the patient's medical history in detail and updated the computerized patient record. Depression Risk Factor Screening:     3 most recent PHQ Screens 11/18/2020   PHQ Not Done -   Little interest or pleasure in doing things Not at all   Feeling down, depressed, irritable, or hopeless Not at all   Total Score PHQ 2 0       Alcohol Risk Screen   Do you average more than 1 drink per night or more than 7 drinks a week:  No    On any one occasion in the past three months have you have had more than 3 drinks containing alcohol:  No        Functional Ability and Level of Safety:   Hearing: The patient needs further evaluation. Activities of Daily Living: The home contains: no safety equipment. Patient does total self care     Ambulation: with difficulty, uses a walker     Fall Risk:  Fall Risk Assessment, last 12 mths 11/18/2020   Able to walk? Yes   Fall in past 12 months? No     Abuse Screen:  Patient is not abused       Cognitive Screening   Has your family/caregiver stated any concerns about your memory: no     Cognitive Screening: Normal - . Assessment/Plan   Education and counseling provided:  Are appropriate based on today's review and evaluation  End-of-Life planning (with patient's consent)    Diagnoses and all orders for this visit:    1. Medicare annual wellness visit, subsequent    2. Essential hypertension    3. Stage 3a chronic kidney disease    4. Palpitations    5.  Type 2 diabetes mellitus with nephropathy (HCC)    Other orders  -     nitroglycerin (NITROSTAT) 0.4 mg SL tablet; 1 q 5 minutes x 3 prn chest pain        Health Maintenance Due     Health Maintenance Due   Topic Date Due    Foot Exam Q1  01/05/1933    DTaP/Tdap/Td series (1 - Tdap) 01/05/1944    Shingrix Vaccine Age 50> (1 of 2) 01/05/1973    Pneumococcal 65+ years (2 of 2 - PPSV23) 05/27/2016       Patient Care Team   Patient Care Team:  Camila Valencia MD as PCP - General (Internal Medicine)  Camila Valencia MD as PCP - [facility] Provider  Raphael Kat MD (Ophthalmology)  Elida Castro MD (Obstetrics & Gynecology)  Milagros Rodriges DO (Cardiology)  Alan Asher as [facility]    Glaucoma Screening-  Dr Sienna Harris every 4 months  for glaucoma    Pneumonia Vaccine-  UTD   Shingles Vaccine- did not have chicken pox  so declines  Tdap Vaccine-  Declines due to cost    Colonoscopy-   >10 yrs ago had Colonoscopy. Not a candidate  due to age  Mammogram  8/2020  Advance Directive-  on file     History     Patient Active Problem List   Diagnosis Code    Diabetes mellitus, type 2 ([facility] [address] E11.9    Osteopenia M85.80    Hypertensive heart disease I11.9    CAD (coronary artery disease), native coronary artery I25.10    Cardiomyopathy, secondary ([address] I42.9    Heart failure, diastolic, chronic (HCC) K13.24    CKD (chronic kidney disease) N18.9    Dyslipidemia E78.5    Essential hypertension I10    Arthritis of right knee M17.11    ACP (advance care planning) Z71.89    Type 2 diabetes mellitus with nephropathy ([facility] [address] E11.21    Palpitations R00.2     Past Medical History:   Diagnosis Date    Arthritis of right knee     CAD (coronary artery disease), native coronary artery October 2010    mild disease    Cardiac cath 10/21/2010    dLM 20%. mLAD 25%. CX mild. pRCA 30%. EF 30-35%. Anterolateral, apical , diaphrag akin. Hypercontractile basal segments. Severe elev left-sided filling press.  Cardiac echocardiogram 02/02/2011    EF 50-55%. Mild apical hypk. Gr 1 DDfx. RVSP 40-45. Mild LAE.  Cardiac Holter monitor 06/10/2015    Sinus rhythm w/avg HR of 53 bpm (range 40-85 bpm). Occasional PACs. One 3-beat run of nonsustained SVT. Very few PVCs. One 3-beat run of nonsustained VT. Pt's HR was < 50 bpm for 8 hrs of the recorded time.   No pauses noted.  Two episodes of neck pain corresponded to sinus rhythm w/o ectopy.  Cardiovascular renal duplex 12/03/2012    No renal artery stenosis bilaterally. Bilateral intrinsic/med disease. Patent bilateral renal veins. Multiple cysts on both kidneys.  CKD (chronic kidney disease)     while on diuretics for difficult to control HTN    Diabetes mellitus type II 3/15/2010    Dyslipidemia 3/15/2010    Heart attack ([address]     2010    Heart failure, diastolic, chronic ([address]     History of heart attack     HTN (hypertension) 3/15/2010    Hypertensive heart disease     Loss of appetite     Osteopenia 7/13/2010    Other dyspnea and respiratory abnormality     Ringing in the ears     Stress-induced cardiomyopathy     EF 35% (LV angio 10/2010), then EF 50-55% (echo, Feb 2011)    Wears glasses       Past Surgical History:   Procedure Laterality Date    HX BLADDER SUSPENSION  2009    HX HYSTERECTOMY  1962     Current Outpatient Medications   Medication Sig Dispense Refill    nitroglycerin (NITROSTAT) 0.4 mg SL tablet 1 q 5 minutes x 3 prn chest pain 1 Bottle 1    amLODIPine (NORVASC) 10 mg tablet Take 1 Tab by mouth daily. 90 Tab 3    Rocklatan 0.02-0.005 % drop Administer 1 Drop to both eyes nightly.  olmesartan (BENICAR) 20 mg tablet Take 1 Tab by mouth daily. 90 Tab 3    calcium-cholecalciferol, d3, (CALCIUM 600 + D) 600-125 mg-unit tab Take  by mouth.  pravastatin (PRAVACHOL) 40 mg tablet TAKE 1 TABLET BY MOUTH EVERY DAY 90 Tab 3    RESTASIS 0.05 % ophthalmic emulsion Administer 1 Drop to both eyes every twelve (12) hours.  ascorbic acid (VITAMIN C) 500 mg tablet Take 1,000 mg by mouth daily.  Cholecalciferol, Vitamin D3, (VITAMIN D) 1,000 unit Cap Take 2,000 Units by mouth daily. 180 Cap 3    MULTIVITAMINS PO Take 1 Tab by mouth daily.  aspirin 81 mg chewable tablet Take 81 mg by mouth daily.       diclofenac (VOLTAREN) 1 % gel Apply  to affected area four (4) times daily. 100 g 4    LUMIGAN 0.01 % ophthalmic drops       latanoprost (XALATAN) 0.005 % ophthalmic solution       CALCIUM PO Take 1,000 mg by mouth two (2) times a day. Allergies   Allergen Reactions    Lipitor [Atorvastatin] Myalgia    Spironolactone Other (comments)     Dizziness and fatigue       Family History   Problem Relation Age of Onset    Hypertension Mother     Arthritis-osteo Other      Social History     Tobacco Use    Smoking status: Never Smoker    Smokeless tobacco: Never Used   Substance Use Topics    Alcohol use: No         A comprehensive 5 year plan for medical care and screening exams was reviewed with pt and they received a copy of it.

## 2020-11-25 ENCOUNTER — TELEPHONE (OUTPATIENT)
Dept: CARDIOLOGY CLINIC | Age: 85
End: 2020-11-25

## 2020-11-25 NOTE — TELEPHONE ENCOUNTER
NP called patient to inform her of stress test results. 2 patient identifiers used. Spoke with daughter, Carmelo Cintron. Informed of negative stress test results. Opportunities for questions and concerns provided. Patient to follow up as scheduled with Dr. Jeremiah Robins.  
 
Candice Grayson NP

## 2020-12-22 ENCOUNTER — OFFICE VISIT (OUTPATIENT)
Dept: CARDIOLOGY CLINIC | Age: 85
End: 2020-12-22
Payer: MEDICARE

## 2020-12-22 VITALS
DIASTOLIC BLOOD PRESSURE: 62 MMHG | WEIGHT: 146 LBS | SYSTOLIC BLOOD PRESSURE: 150 MMHG | HEIGHT: 61 IN | HEART RATE: 65 BPM | BODY MASS INDEX: 27.56 KG/M2 | OXYGEN SATURATION: 98 %

## 2020-12-22 DIAGNOSIS — E11.21 TYPE 2 DIABETES MELLITUS WITH NEPHROPATHY (HCC): ICD-10-CM

## 2020-12-22 DIAGNOSIS — I10 ESSENTIAL HYPERTENSION: ICD-10-CM

## 2020-12-22 DIAGNOSIS — R06.02 SOB (SHORTNESS OF BREATH): ICD-10-CM

## 2020-12-22 DIAGNOSIS — I11.0 HYPERTENSIVE HEART DISEASE WITH HEART FAILURE (HCC): ICD-10-CM

## 2020-12-22 DIAGNOSIS — E78.5 DYSLIPIDEMIA: ICD-10-CM

## 2020-12-22 DIAGNOSIS — I25.10 CORONARY ARTERY DISEASE INVOLVING NATIVE CORONARY ARTERY OF NATIVE HEART WITHOUT ANGINA PECTORIS: Primary | ICD-10-CM

## 2020-12-22 DIAGNOSIS — R53.1 WEAKNESS GENERALIZED: ICD-10-CM

## 2020-12-22 DIAGNOSIS — R53.83 FATIGUE, UNSPECIFIED TYPE: ICD-10-CM

## 2020-12-22 PROCEDURE — 1101F PT FALLS ASSESS-DOCD LE1/YR: CPT | Performed by: INTERNAL MEDICINE

## 2020-12-22 PROCEDURE — G8510 SCR DEP NEG, NO PLAN REQD: HCPCS | Performed by: INTERNAL MEDICINE

## 2020-12-22 PROCEDURE — G8536 NO DOC ELDER MAL SCRN: HCPCS | Performed by: INTERNAL MEDICINE

## 2020-12-22 PROCEDURE — 1090F PRES/ABSN URINE INCON ASSESS: CPT | Performed by: INTERNAL MEDICINE

## 2020-12-22 PROCEDURE — G8427 DOCREV CUR MEDS BY ELIG CLIN: HCPCS | Performed by: INTERNAL MEDICINE

## 2020-12-22 PROCEDURE — 99214 OFFICE O/P EST MOD 30 MIN: CPT | Performed by: INTERNAL MEDICINE

## 2020-12-22 PROCEDURE — 93000 ELECTROCARDIOGRAM COMPLETE: CPT | Performed by: INTERNAL MEDICINE

## 2020-12-22 PROCEDURE — G8417 CALC BMI ABV UP PARAM F/U: HCPCS | Performed by: INTERNAL MEDICINE

## 2020-12-22 NOTE — PROGRESS NOTES
Review of Systems Constitutional: Positive for malaise/fatigue. Negative for chills, fever and weight loss. Respiratory: Positive for shortness of breath. Negative for cough, sputum production and wheezing. Cardiovascular: Negative. Gastrointestinal: Positive for melena. Negative for abdominal pain, blood in stool, constipation, diarrhea, heartburn, nausea and vomiting. Musculoskeletal: Negative. Negative for back pain, falls, joint pain and neck pain. Neurological: Positive for dizziness.

## 2020-12-22 NOTE — PROGRESS NOTES
Jovani Warren presents today for   Chief Complaint   Patient presents with    Follow-up     [place] preferred language for health care discussion is english/other. Is someone accompanying this pt? no    Is the patient using any DME equipment during [address]? no    Depression Screening:  3 most recent PHQ Screens 12/22/2020   PHQ Not Done -   Little interest or pleasure in doing things Not at all   Feeling down, depressed, irritable, or hopeless Not at all   Total Score PHQ 2 0       Learning Assessment:  Learning Assessment 8/13/2019   PRIMARY LEARNER Patient   HIGHEST LEVEL OF EDUCATION - PRIMARY LEARNER  GRADUATED HIGH SCHOOL OR GED   BARRIERS PRIMARY LEARNER NONE   CO-LEARNER CAREGIVER No   PRIMARY LANGUAGE ENGLISH   LEARNER PREFERENCE PRIMARY LISTENING   ANSWERED BY pt   RELATIONSHIP SELF       Abuse Screening:  Abuse Screening Questionnaire 12/22/2020   Do you ever feel afraid of your partner? N   Are you in a relationship with someone who physically or mentally threatens you? N   Is it safe for you to go home? Y       Fall Risk  Fall Risk Assessment, last 12 mths 12/22/2020   Able to walk? Yes   Fall in past 12 months? No       Pt currently taking Anticoagulant therapy? no    Coordination of Care:  1. Have you been to the ER, urgent care clinic since your last visit? Hospitalized since your last visit? no    2. Have you seen or consulted any other health care providers outside of the [address] [place] [address] since your last visit? Include any pap smears or colon screening.  no

## 2020-12-22 NOTE — PROGRESS NOTES
HPI:   I saw Gaurang Carlin in my office today in cardiovascular evaluation regarding her problems with hypertensive heart disease with some diastolic dysfunction of the left ventricle. Ms. Emmanuel Man is a very pleasant 80 year old  female, who appears to be much younger than her stated age, and who has been followed in the past in our practice for her problems with hypertension, dyslipidemia, and specifically some component of chronic diastolic heart failure.     When I saw her in January 2018 she had a significant sinus bradycardia in the 40s and I ultimately discontinued her atenolol 12.5 mg daily and when I saw her into the office today with a heart rate in the 60s without any cardiovascular complaints.     She was  in the hospital over [facility] in March of 2019 due to some weakness in her legs and while there she was found to have some renal insufficiency with BUN/creatinine of 30 and 2.3.  She apparently received some fluids and also had an echocardiogram which demonstrated completely normal left ventricular systolic function with moderate left atrial enlargement and moderate pulmonary hypertension with an estimated pulmonary artery pressure of 50 mmHg.      She came into the office with mild fatigue and dyspnea on exertion and had a nuclear myocardial perfusion study completed on November 13, 2020 which demonstrated normal perfusion of the left ventricle with normal left ventricular function and an ejection fraction of 54%. There was no transient ischemic dilatation and this was read as a low risk study. He has had some problems with dizziness, but her symptoms are not very frequent or well described and she really denies any other cardiovascular complaints. Encounter Diagnoses   Name Primary?     Coronary artery disease involving native coronary artery of native heart without angina pectoris Yes    Fatigue, possible anginal equivalent     SOB (shortness of breath)     Hypertensive heart disease with heart failure (HCC)     Weakness generalized     Essential hypertension     Type 2 diabetes mellitus with nephropathy ([address]     Dyslipidemia        Discussion: This lady continues to have some mild shortness of breath and fatigue, but her big problem actually is weakness in her legs and she apparently has some known spinal stenosis issues so I suspect her problem with weakness in her lower extremities and generalized fatigue is actually more related to her spinal stenosis. Her latest lipid profile which was completed on November 11, 2020 was quite abnormal with total cholesterol 224, triglycerides of 90, HDL of 70, LDL of 136, and VLDL of 18 which is certainly suboptimal on Pravachol 40 mg daily. She reportedly has had problems with Lipitor in the past, but I wonder about switching her to a drug like Crestor. May be the first best option would be to add Zetia 10 mg daily and repeat a lipid profile in the end of February and then decide on possibly switching Pravachol to Crestor if her cholesterol continues to remain quite elevated with a total non-HDL cholesterol below 100. Her blood pressures were somewhat elevated today at 170/66 and 150/62, but I am going to leave management of that problem to her [facility] care physician Dr. Azar Xiao and since I am going to be retiring at the end of the year I am going to have the patient follow-up with my associate Dr. Abelino Julian in the next several months. PCP: Kathleen Brito MD      Past Medical History:   Diagnosis Date    Arthritis of right knee     CAD (coronary artery disease), native coronary artery October 2010    mild disease    Cardiac cath 10/21/2010    dLM 20%. mLAD 25%. CX mild. pRCA 30%. EF 30-35%. Anterolateral, apical , diaphrag akin. Hypercontractile basal segments. Severe elev left-sided filling press.  Cardiac echocardiogram 02/02/2011    EF 50-55%. Mild apical hypk. Gr 1 DDfx. RVSP 40-45. Mild LAE.  Cardiac Holter monitor 06/10/2015    Sinus rhythm w/avg HR of 53 bpm (range 40-85 bpm). Occasional PACs. One 3-beat run of nonsustained SVT. Very few PVCs. One 3-beat run of nonsustained VT. Pt's HR was < 50 bpm for 8 hrs of the recorded time. No pauses noted. Two episodes of neck pain corresponded to sinus rhythm w/o ectopy.  Cardiovascular renal duplex 12/03/2012    No renal artery stenosis bilaterally. Bilateral intrinsic/med disease. Patent bilateral renal veins. Multiple cysts on both kidneys.  CKD (chronic kidney disease)     while on diuretics for difficult to control HTN    Diabetes mellitus type II 3/15/2010    Dyslipidemia 3/15/2010    Heart attack ([facility] [address]     2010    Heart failure, diastolic, chronic ([facility] [address]     History of heart attack     HTN (hypertension) 3/15/2010    Hypertensive heart disease     Loss of appetite     Osteopenia 7/13/2010    Other dyspnea and respiratory abnormality     Ringing in the ears     Stress-induced cardiomyopathy     EF 35% (LV angio 10/2010), then EF 50-55% (echo, Feb 2011)    Wears glasses        Past Surgical History:   Procedure Laterality Date    HX BLADDER SUSPENSION  2009    HX HYSTERECTOMY  1962       Current Outpatient Medications   Medication Sig    nitroglycerin (NITROSTAT) 0.4 mg SL tablet 1 q 5 minutes x 3 prn chest pain    amLODIPine (NORVASC) 10 mg tablet Take 1 Tab by mouth daily.  Rocklatan 0.02-0.005 % drop Administer 1 Drop to both eyes nightly.  olmesartan (BENICAR) 20 mg tablet Take 1 Tab by mouth daily.  calcium-cholecalciferol, d3, (CALCIUM 600 + D) 600-125 mg-unit tab Take  by mouth.  diclofenac (VOLTAREN) 1 % gel Apply  to affected area four (4) times daily.     pravastatin (PRAVACHOL) 40 mg tablet TAKE 1 TABLET BY MOUTH EVERY DAY    LUMIGAN 0.01 % ophthalmic drops     latanoprost (XALATAN) 0.005 % ophthalmic solution     RESTASIS 0.05 % ophthalmic emulsion Administer 1 Drop to both eyes every twelve (12) hours.  ascorbic acid (VITAMIN C) 500 mg tablet Take 1,000 mg by mouth daily.  CALCIUM PO Take 1,000 mg by mouth two (2) times a day.  Cholecalciferol, Vitamin D3, (VITAMIN D) 1,000 unit Cap Take 2,000 Units by mouth daily.  MULTIVITAMINS PO Take 1 Tab by mouth daily.  aspirin 81 mg chewable tablet Take 81 mg by mouth daily. No current facility-administered medications for this visit. Allergies   Allergen Reactions    Lipitor [Atorvastatin] Myalgia    Spironolactone Other (comments)     Dizziness and fatigue       Social History:   Social History     Tobacco Use    Smoking status: Never Smoker    Smokeless tobacco: Never Used   Substance Use Topics    Alcohol use: No         Family history: family history includes Arthritis-osteo in an other family member; Hypertension in her mother. Review of Systems:  Constitutional: Positive for malaise/fatigue. Negative for chills, fever and weight loss. Respiratory: Positive for shortness of breath. Negative for cough, sputum production and wheezing. Cardiovascular: Negative. Gastrointestinal: Positive for melena. Negative for abdominal pain, blood in stool, constipation, diarrhea, heartburn, nausea and vomiting. Musculoskeletal: Negative. Negative for back pain, falls, joint pain and neck pain. Neurological: Positive for dizziness. Physical Exam:   The patient is an alert, oriented, well developed, well nourished 80 y.o. female who was in no acute distress at the time of my examination.   Visit Vitals  BP (!) 150/62 (BP 1 Location: Left arm, BP Patient Position: Sitting)   Pulse 65   Ht 5' 1\" (1.549 m)   Wt 66.2 kg (146 lb)   SpO2 98%   BMI 27.59 kg/m²      BP Readings from Last 3 Encounters:   12/22/20 (!) 150/62   11/18/20 (!) 145/62   11/10/20 135/61      Wt Readings from Last 3 Encounters:   12/22/20 66.2 kg (146 lb)   11/18/20 64.6 kg (142 lb 6.4 oz)   11/13/20 65.3 kg (144 lb)     HEENT: Conjuctiva white, mucosa moist, no pallor or cyanosis. NECK: Supple without masses, tenderness or thyromegaly. There was no jugular venous distention. Carotid are full bilaterally with soft bilateral bruits vs radiation of heart murmur to the neck. CHEST: Symmetrical with good excursion. LUNGS: Clear to auscultation in all fields. HEART: Regular rate and rhythm. The apex is not displaced. There were no lifts, thrills or heaves. There is a normal S1 and S2. There is a grade 2/6 MINI along the LSB with radiation to the base and neck without appreciable diastolic murmurs, rubs, clicks, or gallops auscultated. ABDOMEN: Soft without masses, tenderness or organomegaly. EXTREMITIES: 1 + peripheral pulses without peripheral edema. INTEGUMENT: Skin is warm and dry   NEUROLOGICAL: The patient was oriented x3 with motor function grossly intact. Review of Data: See PMH and Cardiology and Imaging sections for cardiac testing  Lab Results   Component Value Date/Time    Cholesterol, total 224 (H) 11/11/2020 10:08 AM    HDL Cholesterol 70 (H) 11/11/2020 10:08 AM    LDL, calculated 136 (H) 11/11/2020 10:08 AM    Triglyceride 90 11/11/2020 10:08 AM    CHOL/HDL Ratio 3.2 11/11/2020 10:08 AM       Results for orders placed or performed in visit on 12/22/20   AMB POC EKG ROUTINE W/ 12 LEADS, INTER & REP     Status: None    Narrative    Normal sinus rhythm, rate 65.  1 PAC. Is ST-T flattening with early T wave inversions anterolaterally on the high lateral leads. Compared to the EKG of November 4, 2020 there was little interval change. Rey Cohn D.O., F.A.C.C. [facility]  [facility] [place]  [address] [place] [address]. [address] [facility]    [phone]    PLEASE NOTE:  This document has been produced using voice recognition software. Unrecognized errors in transcription may be present.

## 2021-02-09 ENCOUNTER — TELEPHONE (OUTPATIENT)
Dept: INTERNAL MEDICINE CLINIC | Age: 86
End: 2021-02-09

## 2021-02-09 NOTE — TELEPHONE ENCOUNTER
Daughter calling, asking because her mom is 80years old ,would it be safe for her to get the vaccine please advise

## 2021-03-01 RX ORDER — PRAVASTATIN SODIUM 40 MG/1
40 TABLET ORAL DAILY
Qty: 90 TAB | Refills: 3 | Status: SHIPPED | OUTPATIENT
Start: 2021-03-01 | End: 2021-11-18 | Stop reason: SINTOL

## 2021-03-01 NOTE — TELEPHONE ENCOUNTER
Last Visit: 11/18/20 with MD Morteza Avendano Next Appointment: 3/24/21 with MD Morteza Avendano Previous Refill Encounter(s): 1/24/20 #90 with 3 refills Requested Prescriptions Pending Prescriptions Disp Refills  pravastatin (PRAVACHOL) 40 mg tablet 90 Tab 3 Sig: Take 1 Tab by mouth daily.

## 2021-03-12 ENCOUNTER — HOSPITAL ENCOUNTER (OUTPATIENT)
Dept: LAB | Age: 86
Discharge: HOME | End: 2021-03-12
Payer: MEDICARE

## 2021-03-12 ENCOUNTER — APPOINTMENT (OUTPATIENT)
Dept: INTERNAL MEDICINE CLINIC | Age: 86
End: 2021-03-12

## 2021-03-12 DIAGNOSIS — E78.5 DYSLIPIDEMIA: ICD-10-CM

## 2021-03-12 DIAGNOSIS — I10 ESSENTIAL HYPERTENSION: ICD-10-CM

## 2021-03-12 DIAGNOSIS — E11.21 TYPE 2 DIABETES MELLITUS WITH NEPHROPATHY (HCC): ICD-10-CM

## 2021-03-12 LAB
ALBUMIN SERPL-MCNC: 3.6 G/DL (ref 3.4–5)
ALBUMIN/GLOB SERPL: 1.2 {RATIO} (ref 0.8–1.7)
ALP SERPL-CCNC: 96 U/L (ref 45–117)
ALT SERPL-CCNC: 25 U/L (ref 13–56)
ANION GAP SERPL CALC-SCNC: 8 MMOL/L (ref 3–18)
AST SERPL-CCNC: 25 U/L (ref 10–38)
BILIRUB SERPL-MCNC: 0.4 MG/DL (ref 0.2–1)
BUN SERPL-MCNC: 23 MG/DL (ref 7–18)
BUN/CREAT SERPL: 20 (ref 12–20)
CALCIUM SERPL-MCNC: 9.5 MG/DL (ref 8.5–10.1)
CHLORIDE SERPL-SCNC: 111 MMOL/L (ref 100–111)
CHOLEST SERPL-MCNC: 238 MG/DL
CO2 SERPL-SCNC: 25 MMOL/L (ref 21–32)
CREAT SERPL-MCNC: 1.15 MG/DL (ref 0.6–1.3)
GLOBULIN SER CALC-MCNC: 3.1 G/DL (ref 2–4)
GLUCOSE SERPL-MCNC: 130 MG/DL (ref 74–99)
HBA1C MFR BLD: 6.8 % (ref 4.2–5.6)
HDLC SERPL-MCNC: 74 MG/DL (ref 40–60)
HDLC SERPL: 3.2 {RATIO} (ref 0–5)
LDLC SERPL CALC-MCNC: 143.2 MG/DL (ref 0–100)
LIPID PROFILE,FLP: ABNORMAL
POTASSIUM SERPL-SCNC: 4.9 MMOL/L (ref 3.5–5.5)
PROT SERPL-MCNC: 6.7 G/DL (ref 6.4–8.2)
SODIUM SERPL-SCNC: 144 MMOL/L (ref 136–145)
TRIGL SERPL-MCNC: 104 MG/DL (ref ?–150)
VLDLC SERPL CALC-MCNC: 20.8 MG/DL

## 2021-03-12 PROCEDURE — 36415 COLL VENOUS BLD VENIPUNCTURE: CPT

## 2021-03-12 PROCEDURE — 80053 COMPREHEN METABOLIC PANEL: CPT

## 2021-03-12 PROCEDURE — 83036 HEMOGLOBIN GLYCOSYLATED A1C: CPT

## 2021-03-12 PROCEDURE — 80061 LIPID PANEL: CPT

## 2021-03-24 ENCOUNTER — OFFICE VISIT (OUTPATIENT)
Dept: INTERNAL MEDICINE CLINIC | Age: 86
End: 2021-03-24
Payer: MEDICARE

## 2021-03-24 VITALS
WEIGHT: 147 LBS | RESPIRATION RATE: 16 BRPM | TEMPERATURE: 97.2 F | BODY MASS INDEX: 27.75 KG/M2 | HEART RATE: 64 BPM | HEIGHT: 61 IN | OXYGEN SATURATION: 100 % | DIASTOLIC BLOOD PRESSURE: 64 MMHG | SYSTOLIC BLOOD PRESSURE: 173 MMHG

## 2021-03-24 DIAGNOSIS — I11.0 HYPERTENSIVE HEART DISEASE WITH HEART FAILURE (HCC): ICD-10-CM

## 2021-03-24 DIAGNOSIS — M54.31 SCIATICA OF RIGHT SIDE: ICD-10-CM

## 2021-03-24 DIAGNOSIS — E11.21 TYPE 2 DIABETES MELLITUS WITH NEPHROPATHY (HCC): Primary | ICD-10-CM

## 2021-03-24 DIAGNOSIS — N18.31 STAGE 3A CHRONIC KIDNEY DISEASE (HCC): ICD-10-CM

## 2021-03-24 DIAGNOSIS — E78.5 DYSLIPIDEMIA: ICD-10-CM

## 2021-03-24 DIAGNOSIS — I10 ESSENTIAL HYPERTENSION: ICD-10-CM

## 2021-03-24 PROCEDURE — G8536 NO DOC ELDER MAL SCRN: HCPCS | Performed by: INTERNAL MEDICINE

## 2021-03-24 PROCEDURE — G8510 SCR DEP NEG, NO PLAN REQD: HCPCS | Performed by: INTERNAL MEDICINE

## 2021-03-24 PROCEDURE — G0463 HOSPITAL OUTPT CLINIC VISIT: HCPCS | Performed by: INTERNAL MEDICINE

## 2021-03-24 PROCEDURE — G8427 DOCREV CUR MEDS BY ELIG CLIN: HCPCS | Performed by: INTERNAL MEDICINE

## 2021-03-24 PROCEDURE — 1101F PT FALLS ASSESS-DOCD LE1/YR: CPT | Performed by: INTERNAL MEDICINE

## 2021-03-24 PROCEDURE — 99214 OFFICE O/P EST MOD 30 MIN: CPT | Performed by: INTERNAL MEDICINE

## 2021-03-24 PROCEDURE — 1090F PRES/ABSN URINE INCON ASSESS: CPT | Performed by: INTERNAL MEDICINE

## 2021-03-24 PROCEDURE — G8417 CALC BMI ABV UP PARAM F/U: HCPCS | Performed by: INTERNAL MEDICINE

## 2021-03-24 NOTE — PATIENT INSTRUCTIONS
High Blood Pressure: Care Instructions Overview It's normal for blood pressure to go up and down throughout the day. But if it stays up, you have high blood pressure. Another name for high blood pressure is hypertension. Despite what a lot of people think, high blood pressure usually doesn't cause headaches or make you feel dizzy or lightheaded. It usually has no symptoms. But it does increase your risk of stroke, heart attack, and other problems. You and your doctor will talk about your risks of these problems based on your blood pressure. Your doctor will give you a goal for your blood pressure. Your goal will be based on your health and your age. Lifestyle changes, such as eating healthy and being active, are always important to help lower blood pressure. You might also take medicine to reach your blood pressure goal. 
Follow-up care is a key part of your treatment and safety. Be sure to make and go to all appointments, and call your doctor if you are having problems. It's also a good idea to know your test results and keep a list of the medicines you take. How can you care for yourself at home? Medical treatment · If you stop taking your medicine, your blood pressure will go back up. You may take one or more types of medicine to lower your blood pressure. Be safe with medicines. Take your medicine exactly as prescribed. Call your doctor if you think you are having a problem with your medicine. · Talk to your doctor before you start taking aspirin every day. Aspirin can help certain people lower their risk of a heart attack or stroke. But taking aspirin isn't right for everyone, because it can cause serious bleeding. · See your doctor regularly. You may need to see the doctor more often at first or until your blood pressure comes down. · If you are taking blood pressure medicine, talk to your doctor before you take decongestants or anti-inflammatory medicine, such as ibuprofen.  Some of these medicines can raise blood pressure. · Learn how to check your blood pressure at home. Lifestyle changes · Stay at a healthy weight. This is especially important if you put on weight around the waist. Losing even 10 pounds can help you lower your blood pressure. · If your doctor recommends it, get more exercise. Walking is a good choice. Bit by bit, increase the amount you walk every day. Try for at least 30 minutes on most days of the week. You also may want to swim, bike, or do other activities. · Avoid or limit alcohol. Talk to your doctor about whether you can drink any alcohol. · Try to limit how much sodium you eat to less than 2,300 milligrams (mg) a day. Your doctor may ask you to try to eat less than 1,500 mg a day. · Eat plenty of fruits (such as bananas and oranges), vegetables, legumes, whole grains, and low-fat dairy products. · Lower the amount of saturated fat in your diet. Saturated fat is found in animal products such as milk, cheese, and meat. Limiting these foods may help you lose weight and also lower your risk for heart disease. · Do not smoke. Smoking increases your risk for heart attack and stroke. If you need help quitting, talk to your doctor about stop-smoking programs and medicines. These can increase your chances of quitting for good. When should you call for help? Call  911 anytime you think you may need emergency care. This may mean having symptoms that suggest that your blood pressure is causing a serious heart or blood vessel problem. Your blood pressure may be over 180/120. For example, call 911 if: 
  · You have symptoms of a heart attack. These may include: 
? Chest pain or pressure, or a strange feeling in the chest. 
? Sweating. ? Shortness of breath. ? Nausea or vomiting. ? Pain, pressure, or a strange feeling in the back, neck, jaw, or upper belly or in one or both shoulders or arms. ? Lightheadedness or sudden weakness.  
? A fast or irregular heartbeat.  
  · You have symptoms of a stroke. These may include: 
? Sudden numbness, tingling, weakness, or loss of movement in your face, arm, or leg, especially on only one side of your body. ? Sudden vision changes. ? Sudden trouble speaking. ? Sudden confusion or trouble understanding simple statements. ? Sudden problems with walking or balance. ? A sudden, severe headache that is different from past headaches.  
  · You have severe back or belly pain. Do not wait until your blood pressure comes down on its own. Get help right away. Call your doctor now or seek immediate care if: 
  · Your blood pressure is much higher than normal (such as 180/120 or higher), but you don't have symptoms.  
  · You think high blood pressure is causing symptoms, such as: 
? Severe headache. 
? Blurry vision. Watch closely for changes in your health, and be sure to contact your doctor if: 
  · Your blood pressure measures higher than your doctor recommends at least 2 times. That means the top number is higher or the bottom number is higher, or both.  
  · You think you may be having side effects from your blood pressure medicine. Where can you learn more? Go to http://www.gray.com/ Enter J876 in the search box to learn more about \"High Blood Pressure: Care Instructions. \" Current as of: December 16, 2019               Content Version: 12.6 © 0513-2576 [employer]. Care instructions adapted under license by [employer] (which disclaims liability or warranty for this information). If you have questions about a medical condition or this instruction, always ask your healthcare professional. [place] [address] any warranty or liability for your use of this information.

## 2021-03-24 NOTE — PROGRESS NOTES
Patient is in the office today for a 4 month follow up. 1. Have you been to the ER, urgent care clinic since your last visit? Hospitalized since your last visit? No    2. Have you seen or consulted any other health care providers outside of the [address] [place] [address] since your last visit? Include any pap smears or colon screening.  No

## 2021-03-24 NOTE — PROGRESS NOTES
Subjective:       Chief Complaint  The patient presents for follow up of diabetes, hypertension and high cholesterol. FATEMEH Woodson is a 80 y.o. female seen for follow up of diabetes. Samuel has hypertension and hyperlipidemia. Diabetes well controlled, no significant medication side effects noted, off glucotrol XL 2.5 mg, will continue off medications for now,  hypertension  Uncontrolled today , no significant medication side effects noted, on current meds, which include Benicar 20 mg, Norvasc 10 mg, patient daughter will try  to monitor at home, compliance may be an issue with this 12-year-old lady. Hyperlipidemia, fairly uncontrolled on Pravachol 40 mg for age 80. Pt does have some myalgias/weakness in her legs which could be from the statin especially in 80year-old. Discussed with daughter to hold her statin for the next 4 months to see if any improvement in her leg weakness which has progressed since last office visit. Patient is using a walker on a regular basis now and she did not several months ago. Patient also may have some underlying sciatica since the right leg seems to be more weak. We will have physical therapy evaluate patient at home. Diet and Lifestyle: generally follows a low fat low cholesterol diet, follows a diabetic diet regularly, sedentary    Home BP Monitoring: is not monitored at home on a regular basis    Diabetic Review of Systems - home glucose monitoring: is well controlled at home when she takes it 1x/day    Other symptoms and concerns: Pt's CKD stage 3 is stable on current meds. Patient has palpitations that bother her at times. She has been taken off of beta-blockers by her cardiologist because of low heart rate. Patient is also followed by cardiology for hypertensive heart disease. Pt has chronic diastolic heart failure that is followed by cardiology and controlled on current meds.      Pt has generalized arthritis for which she uses aleve with mild improvement. Current Outpatient Medications   Medication Sig    pravastatin (PRAVACHOL) 40 mg tablet Take 1 Tab by mouth daily.  nitroglycerin (NITROSTAT) 0.4 mg SL tablet 1 q 5 minutes x 3 prn chest pain    amLODIPine (NORVASC) 10 mg tablet Take 1 Tab by mouth daily.  olmesartan (BENICAR) 20 mg tablet Take 1 Tab by mouth daily.  calcium-cholecalciferol, d3, (CALCIUM 600 + D) 600-125 mg-unit tab Take  by mouth.  diclofenac (VOLTAREN) 1 % gel Apply  to affected area four (4) times daily.  latanoprost (XALATAN) 0.005 % ophthalmic solution     RESTASIS 0.05 % ophthalmic emulsion Administer 1 Drop to both eyes every twelve (12) hours.  ascorbic acid (VITAMIN C) 500 mg tablet Take 1,000 mg by mouth daily.  CALCIUM PO Take 1,000 mg by mouth two (2) times a day.  Cholecalciferol, Vitamin D3, (VITAMIN D) 1,000 unit Cap Take 2,000 Units by mouth daily.  MULTIVITAMINS PO Take 1 Tab by mouth daily.  aspirin 81 mg chewable tablet Take 81 mg by mouth daily.  Rocklatan 0.02-0.005 % drop Administer 1 Drop to both eyes nightly.  LUMIGAN 0.01 % ophthalmic drops      No current facility-administered medications for this visit.               Review of Systems  Respiratory: negative for dyspnea on exertion  Cardiovascular: negative for chest pain    Objective:     Visit Vitals  BP (!) 173/64 (BP 1 Location: Left arm, BP Patient Position: Sitting, BP Cuff Size: Adult)   Pulse 64   Temp 97.2 °F (36.2 °C) (Temporal)   Resp 16   Ht 5' 1\" (1.549 m)   Wt 147 lb (66.7 kg)   SpO2 100%   BMI 27.78 kg/m²        General appearance - alert, well appearing, and in no distress  Neck - supple, no significant adenopathy, carotids upstroke normal bilaterally, no bruits  Chest - clear to auscultation, no wheezes, rales or rhonchi, symmetric air entry  Heart -normal S1, S2, no murmurs, rubs, clicks or gallops  Extremities -1+ bilateral pedal edema  Skin - normal coloration and turgor, no rashes, no suspicious skin lesions noted      Labs:   Lab Results   Component Value Date/Time    Hemoglobin A1c 6.8 (H) 03/12/2021 10:25 AM    Hemoglobin A1c 6.4 (H) 07/07/2020 10:50 AM    Hemoglobin A1c 6.2 (H) 03/03/2020 10:27 AM    Microalbumin/Creat ratio (mg/g creat) 56 (H) 03/03/2020 10:27 AM    Microalbumin,urine random 11.20 (H) 03/03/2020 10:27 AM    LDL, calculated 143.2 (H) 03/12/2021 10:25 AM    Creatinine 1.15 03/12/2021 10:25 AM      Lab Results   Component Value Date/Time    Cholesterol, total 238 (H) 03/12/2021 10:25 AM    HDL Cholesterol 74 (H) 03/12/2021 10:25 AM    LDL, calculated 143.2 (H) 03/12/2021 10:25 AM    Triglyceride 104 03/12/2021 10:25 AM    CHOL/HDL Ratio 3.2 03/12/2021 10:25 AM     Lab Results   Component Value Date/Time    Alk. phosphatase 96 03/12/2021 10:25 AM    Bilirubin, direct 0.1 03/11/2016 10:45 AM     Lab Results   Component Value Date/Time    GFR est AA 53 (L) 03/12/2021 10:25 AM    GFR est non-AA 44 (L) 03/12/2021 10:25 AM    Creatinine 1.15 03/12/2021 10:25 AM    BUN 23 (H) 03/12/2021 10:25 AM    Sodium 144 03/12/2021 10:25 AM    Potassium 4.9 03/12/2021 10:25 AM    Chloride 111 03/12/2021 10:25 AM    CO2 25 03/12/2021 10:25 AM      Lab Results   Component Value Date/Time    Glucose 130 (H) 03/12/2021 10:25 AM            Assessment / Plan     Diabetes well controlled, off medications. Will continue to monitor  Hypertension  uncontrolled, on current meds, concerned about compliance, patient daughter to monitor blood pressure at home  Hyperlipidemia  uncontrolled  On Pravachol,  Will stop statin due to myalgias and leg weakness. ICD-10-CM ICD-9-CM    1. Type 2 diabetes mellitus with nephropathy (HCC)  E11.21 250.40 HEMOGLOBIN A1C W/O EAG     583.81    2. Essential hypertension  A34 458.5 METABOLIC PANEL, COMPREHENSIVE   3. Dyslipidemia  E78.5 272.4 LIPID PANEL   4. Stage 3a chronic kidney disease  N18.31 585.3  stable on current medications   5.  Sciatica of right side  M54.31 724.3  we will try to see if any improvement with home physical therapy   6. Hypertensive heart disease with heart failure (HCC)  I11.0 402.91  patient currently stable but would like to get blood pressure better controlled. Patient to follow-up with cardiology     428.9               Diabetic issues reviewed with her: diabetic diet discussed in detail, and low cholesterol diet, weight control and daily exercise discussed. Follow-up and Dispositions    · Return in about 4 months (around 7/24/2021) for labs 1 week before. Reviewed plan of care. Patient has provided input and agrees with goals.

## 2021-04-05 ENCOUNTER — TELEPHONE (OUTPATIENT)
Dept: INTERNAL MEDICINE CLINIC | Age: 86
End: 2021-04-05

## 2021-04-05 NOTE — TELEPHONE ENCOUNTER
Spoke with Ms. Ana Wisdom she is aware Home PT was ordered. The order was refaxed, and Ms. Ana Wisdom was given the phone number to call Home Therapy and schedule an appointment.

## 2021-04-05 NOTE — TELEPHONE ENCOUNTER
Pt daughter called requesting physical therapy referral. She wants them to come to the house for her walking. She also wants to know the name of the gynecologist in [address],[place] [address] that you mentioned. She said she lost the address

## 2021-04-05 NOTE — TELEPHONE ENCOUNTER
I thought I did referral to [facility] Physical therapy at home. Can give her [place]  [facility] info

## 2021-04-14 ENCOUNTER — TELEPHONE (OUTPATIENT)
Dept: CARDIOLOGY CLINIC | Age: 86
End: 2021-04-14

## 2021-04-14 NOTE — TELEPHONE ENCOUNTER
Spoke to patient's daughter and informed her that her mother had a nuclear stress test on 11/13/20 and that the one scheduled for today, 4/14/21, was in error. I spoke to Deborah and she agreed that her nuclear stress test did not need to be done.

## 2021-05-04 ENCOUNTER — OFFICE VISIT (OUTPATIENT)
Dept: INTERNAL MEDICINE CLINIC | Age: 86
End: 2021-05-04
Payer: MEDICARE

## 2021-05-04 VITALS
TEMPERATURE: 97.8 F | BODY MASS INDEX: 27.75 KG/M2 | RESPIRATION RATE: 12 BRPM | HEIGHT: 61 IN | WEIGHT: 147 LBS | HEART RATE: 62 BPM | OXYGEN SATURATION: 97 % | SYSTOLIC BLOOD PRESSURE: 139 MMHG | DIASTOLIC BLOOD PRESSURE: 64 MMHG

## 2021-05-04 DIAGNOSIS — L72.0 INFECTED EPITHELIAL INCLUSION CYST: Primary | ICD-10-CM

## 2021-05-04 DIAGNOSIS — L08.9 INFECTED EPITHELIAL INCLUSION CYST: Primary | ICD-10-CM

## 2021-05-04 PROCEDURE — G8417 CALC BMI ABV UP PARAM F/U: HCPCS | Performed by: INTERNAL MEDICINE

## 2021-05-04 PROCEDURE — 1090F PRES/ABSN URINE INCON ASSESS: CPT | Performed by: INTERNAL MEDICINE

## 2021-05-04 PROCEDURE — 99213 OFFICE O/P EST LOW 20 MIN: CPT | Performed by: INTERNAL MEDICINE

## 2021-05-04 PROCEDURE — G8432 DEP SCR NOT DOC, RNG: HCPCS | Performed by: INTERNAL MEDICINE

## 2021-05-04 PROCEDURE — 1101F PT FALLS ASSESS-DOCD LE1/YR: CPT | Performed by: INTERNAL MEDICINE

## 2021-05-04 PROCEDURE — G8427 DOCREV CUR MEDS BY ELIG CLIN: HCPCS | Performed by: INTERNAL MEDICINE

## 2021-05-04 PROCEDURE — G0463 HOSPITAL OUTPT CLINIC VISIT: HCPCS | Performed by: INTERNAL MEDICINE

## 2021-05-04 PROCEDURE — G8536 NO DOC ELDER MAL SCRN: HCPCS | Performed by: INTERNAL MEDICINE

## 2021-05-04 RX ORDER — CEPHALEXIN 500 MG/1
500 CAPSULE ORAL 3 TIMES DAILY
Qty: 30 CAP | Refills: 0 | Status: SHIPPED | OUTPATIENT
Start: 2021-05-04 | End: 2021-05-14

## 2021-05-04 NOTE — PROGRESS NOTES
Sumi Scott presents today for   Chief Complaint   Patient presents with    Mass     Located on the back of the neck for several years. The are has started draining now. Coordination of Care:  1. Have you been to the ER, urgent care clinic since your last visit? Hospitalized since your last visit? no    2. Have you seen or consulted any other health care providers outside of the [address] [place] [address] since your last visit? Include any pap smears or colon screening.  no

## 2021-05-08 NOTE — PROGRESS NOTES
Juani Conde is a 80 y.o.  female and presents with Cyst (scalp )      SUBJECTIVE:    Patient has history of epidermoid cyst on the nape of her neck just inside her hairline. She has had it for several years with a draining in the distant past.  Over the last week it became swollen and started to drain and she has some associated tenderness to to palpation of it. She denies any fever any chills. She has been treating it with over-the-counter antibiotic cream.    Respiratory ROS: negative for - shortness of breath  Cardiovascular ROS: negative for - chest pain    Current Outpatient Medications   Medication Sig    cephALEXin (KEFLEX) 500 mg capsule Take 1 Cap by mouth three (3) times daily for 10 days.  pravastatin (PRAVACHOL) 40 mg tablet Take 1 Tab by mouth daily.  amLODIPine (NORVASC) 10 mg tablet Take 1 Tab by mouth daily.  Rocklatan 0.02-0.005 % drop Administer 1 Drop to both eyes nightly.  olmesartan (BENICAR) 20 mg tablet Take 1 Tab by mouth daily.  calcium-cholecalciferol, d3, (CALCIUM 600 + D) 600-125 mg-unit tab Take  by mouth.  diclofenac (VOLTAREN) 1 % gel Apply  to affected area four (4) times daily.  RESTASIS 0.05 % ophthalmic emulsion Administer 1 Drop to both eyes every twelve (12) hours.  ascorbic acid (VITAMIN C) 500 mg tablet Take 1,000 mg by mouth daily.  CALCIUM PO Take 1,000 mg by mouth two (2) times a day.  Cholecalciferol, Vitamin D3, (VITAMIN D) 1,000 unit Cap Take 2,000 Units by mouth daily.  MULTIVITAMINS PO Take 1 Tab by mouth daily.  aspirin 81 mg chewable tablet Take 81 mg by mouth daily.  nitroglycerin (NITROSTAT) 0.4 mg SL tablet 1 q 5 minutes x 3 prn chest pain    LUMIGAN 0.01 % ophthalmic drops     latanoprost (XALATAN) 0.005 % ophthalmic solution      No current facility-administered medications for this visit.           OBJECTIVE:  alert, well appearing, and in no distress and oriented to person, place, and time  Visit Vitals  /64 Pulse 62   Temp 97.8 °F (36.6 °C) (Temporal)   Resp 12   Ht 5' 1\" (1.549 m)   Wt 147 lb (66.7 kg)   SpO2 97%   BMI 27.78 kg/m²      well developed and well nourished  Skin -about 2 cm epidermoid cyst at the nape of her neck inside her hairline with minimal drainage. Minimal tenderness to palpation            Assessment/Plan      ICD-10-CM ICD-9-CM    1. Infected epithelial inclusion cyst  L72.0 706.2 Will treat with cephALEXin (KEFLEX) 500 mg capsule and probiotics and revaluate in 2 weeks to see if pt needs to see a plastic surgeon. L08.9       Follow-up and Dispositions    · Return in about 2 weeks (around 5/18/2021). Reviewed plan of care. Patient has provided input and agrees with goals.

## 2021-05-17 ENCOUNTER — OFFICE VISIT (OUTPATIENT)
Dept: CARDIOLOGY CLINIC | Age: 86
End: 2021-05-17
Payer: MEDICARE

## 2021-05-17 VITALS
OXYGEN SATURATION: 99 % | SYSTOLIC BLOOD PRESSURE: 164 MMHG | BODY MASS INDEX: 27.75 KG/M2 | HEART RATE: 54 BPM | DIASTOLIC BLOOD PRESSURE: 62 MMHG | WEIGHT: 147 LBS | HEIGHT: 61 IN

## 2021-05-17 DIAGNOSIS — R42 DIZZINESS: Primary | ICD-10-CM

## 2021-05-17 PROCEDURE — G8427 DOCREV CUR MEDS BY ELIG CLIN: HCPCS | Performed by: INTERNAL MEDICINE

## 2021-05-17 PROCEDURE — G8510 SCR DEP NEG, NO PLAN REQD: HCPCS | Performed by: INTERNAL MEDICINE

## 2021-05-17 PROCEDURE — G8417 CALC BMI ABV UP PARAM F/U: HCPCS | Performed by: INTERNAL MEDICINE

## 2021-05-17 PROCEDURE — 1101F PT FALLS ASSESS-DOCD LE1/YR: CPT | Performed by: INTERNAL MEDICINE

## 2021-05-17 PROCEDURE — 99214 OFFICE O/P EST MOD 30 MIN: CPT | Performed by: INTERNAL MEDICINE

## 2021-05-17 PROCEDURE — 1090F PRES/ABSN URINE INCON ASSESS: CPT | Performed by: INTERNAL MEDICINE

## 2021-05-17 PROCEDURE — G8536 NO DOC ELDER MAL SCRN: HCPCS | Performed by: INTERNAL MEDICINE

## 2021-05-17 NOTE — PROGRESS NOTES
Kaylah Clifford presents today for   Chief Complaint   Patient presents with    Follow-up     5 minth follow up        Kaylah Clifford preferred language for health care discussion is english/other. Is someone accompanying this pt? Yes, daughter    Is the patient using any DME equipment during OV? walker    Depression Screening:  3 most recent PHQ Screens 5/17/2021   PHQ Not Done -   Little interest or pleasure in doing things Not at all   Feeling down, depressed, irritable, or hopeless Not at all   Total Score PHQ 2 0       Learning Assessment:  Learning Assessment 8/13/2019   PRIMARY LEARNER Patient   HIGHEST LEVEL OF EDUCATION - PRIMARY LEARNER  GRADUATED HIGH SCHOOL OR GED   BARRIERS PRIMARY LEARNER NONE   CO-LEARNER CAREGIVER No   PRIMARY LANGUAGE ENGLISH   LEARNER PREFERENCE PRIMARY LISTENING   ANSWERED BY pt   RELATIONSHIP SELF       Abuse Screening:  Abuse Screening Questionnaire 5/17/2021   Do you ever feel afraid of your partner? N   Are you in a relationship with someone who physically or mentally threatens you? N   Is it safe for you to go home? Y       Fall Risk  Fall Risk Assessment, last 12 mths 5/17/2021   Able to walk? Yes   Fall in past 12 months? 0   Do you feel unsteady? 0   Are you worried about falling 0       Pt currently taking Anticoagulant therapy? no    Coordination of Care:  1. Have you been to the ER, urgent care clinic since your last visit? Hospitalized since your last visit? no    2. Have you seen or consulted any other health care providers outside of the [address] [place] [address] since your last visit? Include any pap smears or colon screening.  no

## 2021-05-19 ENCOUNTER — TELEPHONE (OUTPATIENT)
Dept: INTERNAL MEDICINE CLINIC | Age: 86
End: 2021-05-19

## 2021-05-19 DIAGNOSIS — H90.0 CONDUCTIVE HEARING LOSS, BILATERAL: Primary | ICD-10-CM

## 2021-05-24 NOTE — PROGRESS NOTES
Ac Mota is in the office today for cardiovascular evaluation of her hypertensive heart disease/ diastolic dysfunction of the left ventricle. She has a history of  hypertension, dyslipidemia, and  chronic diastolic heart failure. She has been followed by Dr. Rodrigo Fan through the years     She became quite bradycardic on beta-blocker therapy in the past necessitating discontinuation of the medication.     She was  in the hospital at [facility] in March of 2019 due to  weakness in her legs and while there she was found to have some renal insufficiency with BUN/creatinine of 30 and 2.3.  She  had an echocardiogram that demonstrated  normal left ventricular systolic function with moderate left atrial enlargement and moderate pulmonary hypertension with an estimated pulmonary artery pressure of 50 mmHg.      She had a nuclear test completed on November 13, 2020 which demonstrated normal perfusion of the left ventricle with normal left ventricular function and an ejection fraction of 54%. She has had  problems with dizziness, but her symptoms are not very frequent or well described and she really denies any other cardiovascular complaints. In the office today she says she is Elena Padron is nothing new. She is in the process of getting physical therapy. She has occasional shortness of breath. She is particularly short of breath if she is tired. She does try to work out on a treadmill. She has had no peripheral swelling. She has had no palpitations, near-syncope or syncope. Plan: This lady continues to have some occasional shortness of breath and fatigue. She does  try to exercise and stay physically active, particularly for someone 80years old. I would not be inclined to to aggressively control her blood pressure. She does report some occasional dizziness.     Her systolic blood pressures are elevated today at 164/62 and I have encouraged her to follow-up with Dr. Familia Cortez in that regard. .  Plan to see her back in 6 months. PCP: Zenobia Henry MD      Past Medical History:   Diagnosis Date    Arthritis of right knee     CAD (coronary artery disease), native coronary artery October 2010    mild disease    Cardiac cath 10/21/2010    dLM 20%. mLAD 25%. CX mild. pRCA 30%. EF 30-35%. Anterolateral, apical , diaphrag akin. Hypercontractile basal segments. Severe elev left-sided filling press.  Cardiac echocardiogram 02/02/2011    EF 50-55%. Mild apical hypk. Gr 1 DDfx. RVSP 40-45. Mild LAE.  Cardiac Holter monitor 06/10/2015    Sinus rhythm w/avg HR of 53 bpm (range 40-85 bpm). Occasional PACs. One 3-beat run of nonsustained SVT. Very few PVCs. One 3-beat run of nonsustained VT. Pt's HR was < 50 bpm for 8 hrs of the recorded time. No pauses noted. Two episodes of neck pain corresponded to sinus rhythm w/o ectopy.  Cardiovascular renal duplex 12/03/2012    No renal artery stenosis bilaterally. Bilateral intrinsic/med disease. Patent bilateral renal veins. Multiple cysts on both kidneys.  CKD (chronic kidney disease)     while on diuretics for difficult to control HTN    Diabetes mellitus type II 3/15/2010    Dyslipidemia 3/15/2010    Heart attack ([address]     2010    Heart failure, diastolic, chronic ([address]     History of heart attack     HTN (hypertension) 3/15/2010    Hypertensive heart disease     Loss of appetite     Osteopenia 7/13/2010    Other dyspnea and respiratory abnormality     Ringing in the ears     Stress-induced cardiomyopathy     EF 35% (LV angio 10/2010), then EF 50-55% (echo, Feb 2011)    Wears glasses        Past Surgical History:   Procedure Laterality Date    HX BLADDER SUSPENSION  2009    HX HYSTERECTOMY  1962       Current Outpatient Medications   Medication Sig    pravastatin (PRAVACHOL) 40 mg tablet Take 1 Tab by mouth daily.     nitroglycerin (NITROSTAT) 0.4 mg SL tablet 1 q 5 minutes x 3 prn chest pain    amLODIPine (NORVASC) 10 mg tablet Take 1 Tab by mouth daily.  Rocklatan 0.02-0.005 % drop Administer 1 Drop to both eyes nightly.  olmesartan (BENICAR) 20 mg tablet Take 1 Tab by mouth daily.  calcium-cholecalciferol, d3, (CALCIUM 600 + D) 600-125 mg-unit tab Take  by mouth.  diclofenac (VOLTAREN) 1 % gel Apply  to affected area four (4) times daily.  RESTASIS 0.05 % ophthalmic emulsion Administer 1 Drop to both eyes every twelve (12) hours.  ascorbic acid (VITAMIN C) 500 mg tablet Take 1,000 mg by mouth daily.  CALCIUM PO Take 1,000 mg by mouth two (2) times a day.  Cholecalciferol, Vitamin D3, (VITAMIN D) 1,000 unit Cap Take 2,000 Units by mouth daily.  MULTIVITAMINS PO Take 1 Tab by mouth daily.  aspirin 81 mg chewable tablet Take 81 mg by mouth daily. No current facility-administered medications for this visit. Allergies   Allergen Reactions    Lipitor [Atorvastatin] Myalgia    Spironolactone Other (comments)     Dizziness and fatigue       Social History:   Social History     Tobacco Use    Smoking status: Never Smoker    Smokeless tobacco: Never Used   Substance Use Topics    Alcohol use: No         Family history: family history includes Arthritis-osteo in an other family member; Hypertension in her mother. Review of Systems:  Constitutional: Positive for fatigue. Negative for chills, fever and weight loss. Respiratory: Positive for shortness of breath. Negative for cough, sputum production and wheezing. Cardiovascular: Negative. Gastrointestinal: Positive for melena. Negative for abdominal pain, blood in stool, constipation, diarrhea, heartburn, nausea and vomiting. Musculoskeletal: Negative. Negative for back pain, falls, joint pain and neck pain. Neurological: Positive for dizziness.      Physical Exam:   The patient is an alert and oriented  Visit Vitals  BP (!) 164/62 (BP 1 Location: Right upper arm, BP Patient Position: Sitting, BP Cuff Size: Adult)   Pulse (!) 54   Ht 5' 1\" (1.549 m)   Wt 66.7 kg (147 lb)   SpO2 99%   BMI 27.78 kg/m²      BP Readings from Last 3 Encounters:   05/17/21 (!) 164/62   05/04/21 139/64   03/24/21 (!) 173/64      Wt Readings from Last 3 Encounters:   05/17/21 66.7 kg (147 lb)   05/04/21 66.7 kg (147 lb)   03/24/21 66.7 kg (147 lb)     HEENT: Conjuctiva white and mucosa moist  NECK: Supple without masses, tenderness or thyromegaly. There was no jugular venous distention. Carotid are without bruits  CHEST: Symmetrical with good excursion. LUNGS: Clear to auscultation in all fields. HEART: Regular rate and rhythm. . There is a normal S1 and S2. There is a grade 2/6 MINI along the LSB with radiation to the base and neck without appreciable diastolic murmurs, rubs, clicks, or gallops auscultated. ABDOMEN: Soft without masses, tenderness or organomegaly. EXTREMITIES: 1 + peripheral pulses without peripheral edema. INTEGUMENT: Skin is warm and dry   NEUROLOGICAL: The patient was oriented x3 with motor function grossly intact. Review of Data: See PMH and Cardiology and Imaging sections for cardiac testing  Lab Results   Component Value Date/Time    Cholesterol, total 238 (H) 03/12/2021 10:25 AM    HDL Cholesterol 74 (H) 03/12/2021 10:25 AM    LDL, calculated 143.2 (H) 03/12/2021 10:25 AM    Triglyceride 104 03/12/2021 10:25 AM    CHOL/HDL Ratio 3.2 03/12/2021 10:25 AM       Results for orders placed or performed in visit on 12/22/20   AMB POC EKG ROUTINE W/ 12 LEADS, INTER & REP     Status: None    Narrative    Normal sinus rhythm, rate 65.  1 PAC. Is ST-T flattening with early T wave inversions anterolaterally on the high lateral leads. Compared to the EKG of November 4, 2020 there was little interval change. Michelle Vásquez MD [facility]  [facility]  [address] [place] [address].   [address] [facility] NOTE:  This document has been produced using voice recognition software. Unrecognized errors in transcription may be present.

## 2021-05-26 ENCOUNTER — OFFICE VISIT (OUTPATIENT)
Dept: INTERNAL MEDICINE CLINIC | Age: 86
End: 2021-05-26
Payer: MEDICARE

## 2021-05-26 VITALS
DIASTOLIC BLOOD PRESSURE: 71 MMHG | RESPIRATION RATE: 20 BRPM | SYSTOLIC BLOOD PRESSURE: 171 MMHG | WEIGHT: 146 LBS | BODY MASS INDEX: 27.56 KG/M2 | HEART RATE: 59 BPM | HEIGHT: 61 IN | OXYGEN SATURATION: 98 % | TEMPERATURE: 97.7 F

## 2021-05-26 DIAGNOSIS — L08.9 INFECTED EPITHELIAL INCLUSION CYST: Primary | ICD-10-CM

## 2021-05-26 DIAGNOSIS — L72.0 INFECTED EPITHELIAL INCLUSION CYST: Primary | ICD-10-CM

## 2021-05-26 PROCEDURE — 99212 OFFICE O/P EST SF 10 MIN: CPT | Performed by: INTERNAL MEDICINE

## 2021-05-26 PROCEDURE — G8536 NO DOC ELDER MAL SCRN: HCPCS | Performed by: INTERNAL MEDICINE

## 2021-05-26 PROCEDURE — G8417 CALC BMI ABV UP PARAM F/U: HCPCS | Performed by: INTERNAL MEDICINE

## 2021-05-26 PROCEDURE — 1101F PT FALLS ASSESS-DOCD LE1/YR: CPT | Performed by: INTERNAL MEDICINE

## 2021-05-26 PROCEDURE — G8427 DOCREV CUR MEDS BY ELIG CLIN: HCPCS | Performed by: INTERNAL MEDICINE

## 2021-05-26 PROCEDURE — G8432 DEP SCR NOT DOC, RNG: HCPCS | Performed by: INTERNAL MEDICINE

## 2021-05-26 PROCEDURE — 1090F PRES/ABSN URINE INCON ASSESS: CPT | Performed by: INTERNAL MEDICINE

## 2021-05-26 PROCEDURE — G0463 HOSPITAL OUTPT CLINIC VISIT: HCPCS | Performed by: INTERNAL MEDICINE

## 2021-05-26 NOTE — PROGRESS NOTES
Patient is in the office today for a 2 week  follow up. Patient states she is having hip pain. Do you have an Advance Directive no  Do you want more information : information given     1. Have you been to the ER, urgent care clinic since your last visit? Hospitalized since your last visit? No    2. Have you seen or consulted any other health care providers outside of the [address] [place] [address] since your last visit? Include any pap smears or colon screening.  No

## 2021-05-26 NOTE — PATIENT INSTRUCTIONS
Advance Directives: Care Instructions Overview An advance directive is a legal way to state your wishes at the end of your life. It tells your family and your doctor what to do if you can't say what you want. There are two main types of advance directives. You can change them any time your wishes change. Living will. This form tells your family and your doctor your wishes about life support and other treatment. The form is also called a declaration. Medical power of . This form lets you name a person to make treatment decisions for you when you can't speak for yourself. This person is called a health care agent (health care proxy, health care surrogate). The form is also called a durable power of  for health care. If you do not have an advance directive, decisions about your medical care may be made by a family member, or by a doctor or a  who doesn't know you. It may help to think of an advance directive as a gift to the people who care for you. If you have one, they won't have to make tough decisions by themselves. Follow-up care is a key part of your treatment and safety. Be sure to make and go to all appointments, and call your doctor if you are having problems. It's also a good idea to know your test results and keep a list of the medicines you take. What should you include in an advance directive? Many states have a unique advance directive form. (It may ask you to address specific issues.) Or you might use a universal form that's approved by many states. If your form doesn't tell you what to address, it may be hard to know what to include in your advance directive. Use the questions below to help you get started. · Who do you want to make decisions about your medical care if you are not able to? · What life-support measures do you want if you have a serious illness that gets worse over time or can't be cured? · What are you most afraid of that might happen?  (Maybe you're afraid of having pain, losing your independence, or being kept alive by machines.) · Where would you prefer to die? (Your home? A hospital? A nursing home?) · Do you want to donate your organs when you die? · Do you want certain Jain practices performed before you die? When should you call for help? Be sure to contact your doctor if you have any questions. Where can you learn more? Go to http://jason-tiera.info/ Enter R264 in the search box to learn more about \"Advance Directives: Care Instructions. \" Current as of: July 17, 2020               Content Version: 12.8 © 2766-5980 [employer], [email or web address]. Care instructions adapted under license by [employer] (which disclaims liability or warranty for this information). If you have questions about a medical condition or this instruction, always ask your healthcare professional. [address] any warranty or liability for your use of this information.

## 2021-05-28 NOTE — PROGRESS NOTES
Destiny Yu is a 80 y.o.  female and presents with Cyst (f/u)      SUBJECTIVE:    Patient has history of epidermoid cyst on the nape of her neck just inside her hairline. She has had it for many years and it became infected a few weeks ago and was swollen and draining. She was placed on Keflex and the cyst has significantly improved and is back to baseline without any discharge. She denies any fever or pain. BP is probably elevated today because pt is having arthritis pain. She will be revaluated in 2 months at her regular OV. Current Outpatient Medications   Medication Sig    pravastatin (PRAVACHOL) 40 mg tablet Take 1 Tab by mouth daily.  nitroglycerin (NITROSTAT) 0.4 mg SL tablet 1 q 5 minutes x 3 prn chest pain    amLODIPine (NORVASC) 10 mg tablet Take 1 Tab by mouth daily.  Rocklatan 0.02-0.005 % drop Administer 1 Drop to both eyes nightly.  olmesartan (BENICAR) 20 mg tablet Take 1 Tab by mouth daily.  calcium-cholecalciferol, d3, (CALCIUM 600 + D) 600-125 mg-unit tab Take  by mouth.  diclofenac (VOLTAREN) 1 % gel Apply  to affected area four (4) times daily.  RESTASIS 0.05 % ophthalmic emulsion Administer 1 Drop to both eyes every twelve (12) hours.  ascorbic acid (VITAMIN C) 500 mg tablet Take 1,000 mg by mouth daily.  CALCIUM PO Take 1,000 mg by mouth two (2) times a day.  Cholecalciferol, Vitamin D3, (VITAMIN D) 1,000 unit Cap Take 2,000 Units by mouth daily.  MULTIVITAMINS PO Take 1 Tab by mouth daily.  aspirin 81 mg chewable tablet Take 81 mg by mouth daily. No current facility-administered medications for this visit.          OBJECTIVE:  alert, well appearing, and in no distress and oriented to person, place, and time  Visit Vitals  BP (!) 171/71 (BP 1 Location: Left arm, BP Patient Position: Sitting, BP Cuff Size: Adult)   Pulse (!) 59   Temp 97.7 °F (36.5 °C) (Temporal)   Resp 20   Ht 5' 1\" (1.549 m)   Wt 146 lb (66.2 kg)   SpO2 98%   BMI 27.59 kg/m² well developed and well nourished  Skin -about 2 cm epidermoid cyst at the nape of her neck inside her hairline which is now flat without any drainage. No tenderness to palpation. Assessment/Plan      ICD-10-CM ICD-9-CM    1. Infected epithelial inclusion cyst neck L72.0 706.2 Resolved with antibiotics. L08.9         Follow-up and Dispositions    · Return if symptoms worsen or fail to improve. Reviewed plan of care. Patient has provided input and agrees with goals.

## 2021-06-24 ENCOUNTER — OFFICE VISIT (OUTPATIENT)
Dept: ORTHOPEDIC SURGERY | Age: 86
End: 2021-06-24
Payer: MEDICARE

## 2021-06-24 VITALS
TEMPERATURE: 97.8 F | OXYGEN SATURATION: 99 % | BODY MASS INDEX: 28.02 KG/M2 | WEIGHT: 148.4 LBS | HEART RATE: 64 BPM | HEIGHT: 61 IN

## 2021-06-24 DIAGNOSIS — M48.062 SPINAL STENOSIS OF LUMBAR REGION WITH NEUROGENIC CLAUDICATION: Primary | ICD-10-CM

## 2021-06-24 PROCEDURE — G8536 NO DOC ELDER MAL SCRN: HCPCS | Performed by: PHYSICAL MEDICINE & REHABILITATION

## 2021-06-24 PROCEDURE — G8427 DOCREV CUR MEDS BY ELIG CLIN: HCPCS | Performed by: PHYSICAL MEDICINE & REHABILITATION

## 2021-06-24 PROCEDURE — 1090F PRES/ABSN URINE INCON ASSESS: CPT | Performed by: PHYSICAL MEDICINE & REHABILITATION

## 2021-06-24 PROCEDURE — 1101F PT FALLS ASSESS-DOCD LE1/YR: CPT | Performed by: PHYSICAL MEDICINE & REHABILITATION

## 2021-06-24 PROCEDURE — G8432 DEP SCR NOT DOC, RNG: HCPCS | Performed by: PHYSICAL MEDICINE & REHABILITATION

## 2021-06-24 PROCEDURE — 99213 OFFICE O/P EST LOW 20 MIN: CPT | Performed by: PHYSICAL MEDICINE & REHABILITATION

## 2021-06-24 PROCEDURE — G8417 CALC BMI ABV UP PARAM F/U: HCPCS | Performed by: PHYSICAL MEDICINE & REHABILITATION

## 2021-06-24 RX ORDER — PREGABALIN 75 MG/1
75 CAPSULE ORAL 2 TIMES DAILY
Qty: 60 CAPSULE | Refills: 2 | Status: SHIPPED | OUTPATIENT
Start: 2021-06-24 | End: 2021-11-16

## 2021-06-24 NOTE — PROGRESS NOTES
[address]  [address]  [place], [address] [place] [address] Street  Phone: [phone]  Fax: [phone]        Chi Mejias  : 1923  PCP: Yael Elizabeth MD  2021    PROGRESS NOTE      HISTORY OF PRESENT ILLNESS  Yovana Saenz is a 80 y.o. female who was seen as a new patient 10/31/19 with c/o low back pain with BLE paraesthesia x 6 weeks. Pt reports a heaviness in her BLE with standing and walking. She reports a limited standing/walking tolerance. She finds relief with sitting. She also c/o some lateral right thigh pain. She ambulates with a cane. Dr. Clark Garibay recommended she use a walker for walking longer distances. Pt also c/o left sided functional deficits. She denies h/o stroke. She was intolerant to GABAPENTIN (felt sick) so she was rx Topamax. She notes significant benefit from PT (19-19; [employer] her walking improved. She states that her lateral thigh symptoms have improved. She noted improvement, but continued to feel some weakness in her knees. She did not feel her symptoms warranted further treatment at the time. She ended up not doing the MRI due to claustrophobia and does not wish to proceed with one in the future. Pt reports increased pain in her bilateral knees. She reports a new pain in her bilateral shoulders and elbows (R>L) x 2 weeks. She has found some benefit from using [employer] topical cream. Pt notes that she exercises her arms daily, and it does not help her pain much. Her pain is worse in the afternoons. She is unable to do her hair because of limited shoulder/scapular ROM. She felt weakness in her legs, especially with walking. She feels better walking with the rollator walker. She has been maintaining an HEP with benefit. Yovana Saenz comes in to the office today for f/u. She reports pain in her thighs radiating into the BLE.  Her symptoms improve with sitting, but she notes that she becomes stiff with prolonged sitting so she likes to stay moving. She completed PT about 1 month ago with some improvement, but continues to feel weak in her legs. She also c/o bilateral elbow pain. Pain Score: 9/10. PmHx: HTN, CAD, cardiomyopathy, DM, CKD  Medications tried: GABAPENTIN, TOPAMAX, NORTRIPTYLINE     ASSESSMENT  Deysi Preston is a 80year-old female with stenotic-type symptoms. Her low back and BLE symptoms are likely due to lumbar spinal stenosis with neurogenic claudication. This has not been confirmed with MRI as patient is claustrophobic. PLAN  1. Lumbar CT - evaluate lumbar spinal stenosis   2. Lyrica 75mg BID       Pt will f/u after lumbar CT or sooner as needed. Diagnoses and all orders for this visit:    1. Spinal stenosis of lumbar region with neurogenic claudication  -     CT SPINE LUMB WO CONT; Future  -     pregabalin (Lyrica) 75 mg capsule; Take 1 Capsule by mouth two (2) times a day. Max Daily Amount: 150 mg. PAST MEDICAL HISTORY   Past Medical History:   Diagnosis Date    Arthritis of right knee     CAD (coronary artery disease), native coronary artery October 2010    mild disease    Cardiac cath 10/21/2010    dLM 20%. mLAD 25%. CX mild. pRCA 30%. EF 30-35%. Anterolateral, apical , diaphrag akin. Hypercontractile basal segments. Severe elev left-sided filling press.  Cardiac echocardiogram 02/02/2011    EF 50-55%. Mild apical hypk. Gr 1 DDfx. RVSP 40-45. Mild LAE.  Cardiac Holter monitor 06/10/2015    Sinus rhythm w/avg HR of 53 bpm (range 40-85 bpm). Occasional PACs. One 3-beat run of nonsustained SVT. Very few PVCs. One 3-beat run of nonsustained VT. Pt's HR was < 50 bpm for 8 hrs of the recorded time. No pauses noted. Two episodes of neck pain corresponded to sinus rhythm w/o ectopy.  Cardiovascular renal duplex 12/03/2012    No renal artery stenosis bilaterally. Bilateral intrinsic/med disease. Patent bilateral renal veins.   Multiple cysts on both kidneys.  CKD (chronic kidney disease)     while on diuretics for difficult to control HTN    Diabetes mellitus type II 3/15/2010    Dyslipidemia 3/15/2010    Heart attack ([facility] [address]     2010    Heart failure, diastolic, chronic ([facility] [address]     History of heart attack     HTN (hypertension) 3/15/2010    Hypertensive heart disease     Loss of appetite     Osteopenia 7/13/2010    Other dyspnea and respiratory abnormality     Ringing in the ears     Stress-induced cardiomyopathy     EF 35% (LV angio 10/2010), then EF 50-55% (echo, Feb 2011)    Wears glasses        Past Surgical History:   Procedure Laterality Date    HX BLADDER SUSPENSION  2009    HX HYSTERECTOMY  1962   . MEDICATIONS    Current Outpatient Medications   Medication Sig Dispense Refill    pravastatin (PRAVACHOL) 40 mg tablet Take 1 Tab by mouth daily. 90 Tab 3    nitroglycerin (NITROSTAT) 0.4 mg SL tablet 1 q 5 minutes x 3 prn chest pain 1 Bottle 1    amLODIPine (NORVASC) 10 mg tablet Take 1 Tab by mouth daily. 90 Tab 3    Rocklatan 0.02-0.005 % drop Administer 1 Drop to both eyes nightly.  olmesartan (BENICAR) 20 mg tablet Take 1 Tab by mouth daily. 90 Tab 3    calcium-cholecalciferol, d3, (CALCIUM 600 + D) 600-125 mg-unit tab Take  by mouth.  diclofenac (VOLTAREN) 1 % gel Apply  to affected area four (4) times daily. 100 g 4    RESTASIS 0.05 % ophthalmic emulsion Administer 1 Drop to both eyes every twelve (12) hours.  ascorbic acid (VITAMIN C) 500 mg tablet Take 1,000 mg by mouth daily.  CALCIUM PO Take 1,000 mg by mouth two (2) times a day.  Cholecalciferol, Vitamin D3, (VITAMIN D) 1,000 unit Cap Take 2,000 Units by mouth daily. 180 Cap 3    MULTIVITAMINS PO Take 1 Tab by mouth daily.  aspirin 81 mg chewable tablet Take 81 mg by mouth daily.           ALLERGIES  Allergies   Allergen Reactions    Lipitor [Atorvastatin] Myalgia    Spironolactone Other (comments)     Dizziness and fatigue SOCIAL HISTORY    Social History     Socioeconomic History    Marital status:      Spouse name: Not on file    Number of children: Not on file    Years of education: Not on file    Highest education level: Not on file   Tobacco Use    Smoking status: Never Smoker    Smokeless tobacco: Never Used   Substance and Sexual Activity    Alcohol use: No    Drug use: No    Sexual activity: Never     Social Determinants of Health     Financial Resource Strain:     Difficulty of Paying Living Expenses:    Food Insecurity:     Worried About Running Out of Food in the Last Year:     [address] in the Last Year:    Transportation Needs:     Lack of Transportation (Medical):  Lack of Transportation (Non-Medical):    Physical Activity:     Days of Exercise per Week:     Minutes of Exercise per Session:    Stress:     Feeling of Stress :    Social Connections:     Frequency of Communication with Friends and Family:     Frequency of Social Gatherings with Friends and Family:     Attends Zoroastrianism Services:     Active Member of Clubs or Organizations:     Attends Club or Organization Meetings:     Marital Status:        FAMILY HISTORY  Family History   Problem Relation Age of Onset    Hypertension Mother     Arthritis-osteo Other          REVIEW OF SYSTEMS  Review of Systems   Constitutional: Negative for chills, fever and weight loss. Respiratory: Negative for shortness of breath. Cardiovascular: Negative for chest pain. Gastrointestinal: Negative for constipation. Negative for fecal incontinence    Genitourinary: Negative for dysuria. Negative for urinary incontinence   Musculoskeletal: Positive for back pain and joint pain ( bilateral elbows). Skin: Negative for rash. Neurological: Positive for tingling ( BLE). Negative for dizziness, tremors, focal weakness and headaches. Endo/Heme/Allergies: Does not bruise/bleed easily.    Psychiatric/Behavioral: The patient does not have insomnia. PHYSICAL EXAMINATION  Visit Vitals  Pulse 64   Temp 97.8 °F (36.6 °C) (Temporal)   Ht 5' 1\" (1.549 m)   Wt 148 lb 6.4 oz (67.3 kg)   SpO2 99%   BMI 28.04 kg/m²       Pain Assessment  6/24/2021   Location of Pain Leg;Arm   Location Modifiers Left;Right   Severity of Pain 9   Quality of Pain -   Quality of Pain Comment -   Duration of Pain -   Frequency of Pain Intermittent   Aggravating Factors Standing   Aggravating Factors Comment -   Limiting Behavior Yes   Relieving Factors Other (Comment); Rest   Relieving Factors Comment Sitting   Result of Injury -           Constitutional:  Well developed, well nourished, in no acute distress. Psychiatric: Affect and mood are appropriate. Integumentary: No rashes or abrasions noted on exposed areas. SPINE/MUSCULOSKELETAL EXAM    Cervical spine:  Neck is midline. Normal muscle tone. No focal atrophy is noted. ROM pain free. Shoulder ROM intact. No tenderness to palpation. Negative Spurling's sign. Negative Tinel's sign. Negative Coelho's sign.                                                                                                                             Sensation in the bilateral arms grossly intact to light touch.      Ulnar deviation of fingers on the left  Alto neck deformities on the left     No clear myelopathic or UMN signs     Lumbar spine:  No rash, ecchymosis, or gross obliquity. No fasciculations. No focal atrophy is noted. No pain with hip ROM. Full range of motion. No tenderness to palpation. No tenderness to palpation at the sciatic notch. SI joints non-tender. Trochanters non tender.     Sensation in the bilateral legs grossly intact to light touch.     Updates from 12/12/19:   Full ROM. Tenderness to palpation of bilateral trochanters.      Updates 7/28/2020:  No pain with neck ROM  Negative Brownlee sign bilaterally   Pain with scapular retraction.   No tenderness to palpation of cervicothoracic region       MOTOR:      Biceps  Triceps Deltoids Wrist Ext Wrist Flex Hand Intrin   Right 5/5 5/5 5/5 5/5 5/5 5/5   Left 5/5 5/5 5/5 5/5 5/5 5/5             Hip Flex  Quads Hamstrings Ankle DF EHL Ankle PF   Right 5/5 5/5 5/5 5/5 5/5 5/5   Left 5/5 5/5 5/5 5/5 5/5 5/5     DTRs are 1+ biceps, triceps, brachioradialis, patella, and Achilles.     Negative Straight Leg raise. Squat not tested. No difficulty with tandem gait.      Ambulation with rollator walker. FWB.       RADIOGRAPHS  Lumbar XR images taken on 10/31/19 personally reviewed with patient:  Facet sclerosis at lower levels  Significant Grade 2 anterolisthesis of L5 on S1 and L4 on L5   Atherosclerosis of aorta  No obvious compression fractures     15 minutes of face-to-face contact were spent with the patient during today's visit extensively discussing symptoms and treatment plan. All questions were answered. More than half of this visit today was spent on counseling.      Written by Samantha Garrett as dictated by Joss Casper MD

## 2021-07-01 DIAGNOSIS — M48.062 SPINAL STENOSIS OF LUMBAR REGION WITH NEUROGENIC CLAUDICATION: ICD-10-CM

## 2021-07-09 ENCOUNTER — HOSPITAL ENCOUNTER (OUTPATIENT)
Dept: CT IMAGING | Age: 86
Discharge: HOME | End: 2021-07-09
Attending: PHYSICAL MEDICINE & REHABILITATION
Payer: MEDICARE

## 2021-07-09 PROCEDURE — 72131 CT LUMBAR SPINE W/O DYE: CPT

## 2021-07-12 RX ORDER — OLMESARTAN MEDOXOMIL 20 MG/1
TABLET ORAL
Qty: 90 TABLET | Refills: 3 | Status: SHIPPED | OUTPATIENT
Start: 2021-07-12 | End: 2022-03-31 | Stop reason: SDUPTHER

## 2021-07-15 ENCOUNTER — HOSPITAL ENCOUNTER (OUTPATIENT)
Dept: LAB | Age: 86
Discharge: HOME | End: 2021-07-15
Payer: MEDICARE

## 2021-07-15 ENCOUNTER — APPOINTMENT (OUTPATIENT)
Dept: INTERNAL MEDICINE CLINIC | Age: 86
End: 2021-07-15

## 2021-07-15 DIAGNOSIS — I10 ESSENTIAL HYPERTENSION: ICD-10-CM

## 2021-07-15 DIAGNOSIS — E78.5 DYSLIPIDEMIA: ICD-10-CM

## 2021-07-15 DIAGNOSIS — E11.21 TYPE 2 DIABETES MELLITUS WITH NEPHROPATHY (HCC): ICD-10-CM

## 2021-07-15 LAB
ALBUMIN SERPL-MCNC: 3.8 G/DL (ref 3.4–5)
ALBUMIN/GLOB SERPL: 1.4 {RATIO} (ref 0.8–1.7)
ALP SERPL-CCNC: 77 U/L (ref 45–117)
ALT SERPL-CCNC: 37 U/L (ref 13–56)
ANION GAP SERPL CALC-SCNC: 8 MMOL/L (ref 3–18)
AST SERPL-CCNC: 30 U/L (ref 10–38)
BILIRUB SERPL-MCNC: 0.4 MG/DL (ref 0.2–1)
BUN SERPL-MCNC: 28 MG/DL (ref 7–18)
BUN/CREAT SERPL: 21 (ref 12–20)
CALCIUM SERPL-MCNC: 9.5 MG/DL (ref 8.5–10.1)
CHLORIDE SERPL-SCNC: 110 MMOL/L (ref 100–111)
CHOLEST SERPL-MCNC: 267 MG/DL
CO2 SERPL-SCNC: 25 MMOL/L (ref 21–32)
CREAT SERPL-MCNC: 1.33 MG/DL (ref 0.6–1.3)
GLOBULIN SER CALC-MCNC: 2.7 G/DL (ref 2–4)
GLUCOSE SERPL-MCNC: 132 MG/DL (ref 74–99)
HBA1C MFR BLD: 6.9 % (ref 4.2–5.6)
HDLC SERPL-MCNC: 65 MG/DL (ref 40–60)
HDLC SERPL: 4.1 {RATIO} (ref 0–5)
LDLC SERPL CALC-MCNC: 177.8 MG/DL (ref 0–100)
LIPID PROFILE,FLP: ABNORMAL
POTASSIUM SERPL-SCNC: 4.5 MMOL/L (ref 3.5–5.5)
PROT SERPL-MCNC: 6.5 G/DL (ref 6.4–8.2)
SODIUM SERPL-SCNC: 143 MMOL/L (ref 136–145)
TRIGL SERPL-MCNC: 121 MG/DL (ref ?–150)
VLDLC SERPL CALC-MCNC: 24.2 MG/DL

## 2021-07-15 PROCEDURE — 80061 LIPID PANEL: CPT

## 2021-07-15 PROCEDURE — 83036 HEMOGLOBIN GLYCOSYLATED A1C: CPT

## 2021-07-15 PROCEDURE — 36415 COLL VENOUS BLD VENIPUNCTURE: CPT

## 2021-07-15 PROCEDURE — 80053 COMPREHEN METABOLIC PANEL: CPT

## 2021-07-23 ENCOUNTER — VIRTUAL VISIT (OUTPATIENT)
Dept: INTERNAL MEDICINE CLINIC | Age: 86
End: 2021-07-23
Payer: MEDICARE

## 2021-07-23 DIAGNOSIS — I10 ESSENTIAL HYPERTENSION: ICD-10-CM

## 2021-07-23 DIAGNOSIS — E78.5 DYSLIPIDEMIA: ICD-10-CM

## 2021-07-23 DIAGNOSIS — E11.21 TYPE 2 DIABETES MELLITUS WITH NEPHROPATHY (HCC): Primary | ICD-10-CM

## 2021-07-23 DIAGNOSIS — N18.31 STAGE 3A CHRONIC KIDNEY DISEASE (HCC): ICD-10-CM

## 2021-07-23 PROCEDURE — 99214 OFFICE O/P EST MOD 30 MIN: CPT | Performed by: INTERNAL MEDICINE

## 2021-07-23 PROCEDURE — G8427 DOCREV CUR MEDS BY ELIG CLIN: HCPCS | Performed by: INTERNAL MEDICINE

## 2021-07-23 PROCEDURE — G8536 NO DOC ELDER MAL SCRN: HCPCS | Performed by: INTERNAL MEDICINE

## 2021-07-23 PROCEDURE — 1090F PRES/ABSN URINE INCON ASSESS: CPT | Performed by: INTERNAL MEDICINE

## 2021-07-23 PROCEDURE — G8417 CALC BMI ABV UP PARAM F/U: HCPCS | Performed by: INTERNAL MEDICINE

## 2021-07-23 PROCEDURE — G0463 HOSPITAL OUTPT CLINIC VISIT: HCPCS | Performed by: INTERNAL MEDICINE

## 2021-07-23 PROCEDURE — 1101F PT FALLS ASSESS-DOCD LE1/YR: CPT | Performed by: INTERNAL MEDICINE

## 2021-07-23 PROCEDURE — G8432 DEP SCR NOT DOC, RNG: HCPCS | Performed by: INTERNAL MEDICINE

## 2021-07-23 NOTE — PROGRESS NOTES
Johnson Villafana 80 y.o. female who was seen by synchronous (real-time) audio-video technology on 07/23/21    Consent:  sheand/or her healthcare decision maker is aware that this patient-initiated Telehealth encounter is a billable service, with coverage as determined by her insurance carrier. she is aware that she may receive a bill and has provided verbal consent to proceed: Yes I was in my office while conducting this encounter. The patient was in their home. Subjective:       Chief Complaint  The patient presents for follow up of diabetes, hypertension and high cholesterol. HPI  Johnson Villafana is a 80 y.o. female seen for follow up of diabetes. Shealso has hypertension and hyperlipidemia. Diabetes well controlled, no significant medication side effects noted, off glucotrol XL 2.5 mg, will continue off medications for now,  hypertension  Control uncertain , no significant medication side effects noted, on current meds, which include Benicar 20 mg, Norvasc 10 mg, patient daughter will try  to monitor at home, compliance may be an issue with this 59-year-old lady. Hyperlipidemia,  uncontrolled off Pravachol 40 mg which was held to see if any improvement in myalgias but according to patient there has not been any significant improvement. Patient was to have him to [facility] help her with her muscular conditioning at home. Diet and Lifestyle: generally follows a low fat low cholesterol diet, follows a diabetic diet regularly, sedentary    Home BP Monitoring: is not monitored at home on a regular basis    Diabetic Review of Systems - home glucose monitoring: is well controlled at home when she takes it 1x/day    Other symptoms and concerns: Pt's CKD stage 3 is stable on current meds. Patient has palpitations that bother her at times. She has been taken off of beta-blockers by her cardiologist because of low heart rate.     Patient is also followed by cardiology for hypertensive heart disease. Pt has chronic diastolic heart failure that is followed by cardiology and controlled on current meds. Pt has generalized arthritis for which she uses aleve with mild improvement. Current Outpatient Medications   Medication Sig    olmesartan (BENICAR) 20 mg tablet TAKE 1 TABLET BY MOUTH EVERY DAY    pregabalin (Lyrica) 75 mg capsule Take 1 Capsule by mouth two (2) times a day. Max Daily Amount: 150 mg.  pravastatin (PRAVACHOL) 40 mg tablet Take 1 Tab by mouth daily.  nitroglycerin (NITROSTAT) 0.4 mg SL tablet 1 q 5 minutes x 3 prn chest pain    amLODIPine (NORVASC) 10 mg tablet Take 1 Tab by mouth daily.  Rocklatan 0.02-0.005 % drop Administer 1 Drop to both eyes nightly.  calcium-cholecalciferol, d3, (CALCIUM 600 + D) 600-125 mg-unit tab Take  by mouth.  diclofenac (VOLTAREN) 1 % gel Apply  to affected area four (4) times daily.  RESTASIS 0.05 % ophthalmic emulsion Administer 1 Drop to both eyes every twelve (12) hours.  ascorbic acid (VITAMIN C) 500 mg tablet Take 1,000 mg by mouth daily.  CALCIUM PO Take 1,000 mg by mouth two (2) times a day.  Cholecalciferol, Vitamin D3, (VITAMIN D) 1,000 unit Cap Take 2,000 Units by mouth daily.  MULTIVITAMINS PO Take 1 Tab by mouth daily.  aspirin 81 mg chewable tablet Take 81 mg by mouth daily. No current facility-administered medications for this visit. Review of Systems  Respiratory: negative for dyspnea on exertion  Cardiovascular: negative for chest pain    Objective: There were no vitals taken for this visit.      General appearance - alert, well appearing, and in no distress      Labs:   Lab Results   Component Value Date/Time    Hemoglobin A1c 6.9 (H) 07/15/2021 10:37 AM    Hemoglobin A1c 6.8 (H) 03/12/2021 10:25 AM    Hemoglobin A1c 6.4 (H) 07/07/2020 10:50 AM    Microalbumin/Creat ratio (mg/g creat) 56 (H) 03/03/2020 10:27 AM    Microalbumin,urine random 11.20 (H) 03/03/2020 10:27 AM    LDL, calculated 177.8 (H) 07/15/2021 10:37 AM    Creatinine 1.33 (H) 07/15/2021 10:37 AM      Lab Results   Component Value Date/Time    Cholesterol, total 267 (H) 07/15/2021 10:37 AM    HDL Cholesterol 65 (H) 07/15/2021 10:37 AM    LDL, calculated 177.8 (H) 07/15/2021 10:37 AM    Triglyceride 121 07/15/2021 10:37 AM    CHOL/HDL Ratio 4.1 07/15/2021 10:37 AM     Lab Results   Component Value Date/Time    Alk. phosphatase 77 07/15/2021 10:37 AM    Bilirubin, direct 0.1 03/11/2016 10:45 AM     Lab Results   Component Value Date/Time    GFR est AA 45 (L) 07/15/2021 10:37 AM    GFR est non-AA 37 (L) 07/15/2021 10:37 AM    Creatinine 1.33 (H) 07/15/2021 10:37 AM    BUN 28 (H) 07/15/2021 10:37 AM    Sodium 143 07/15/2021 10:37 AM    Potassium 4.5 07/15/2021 10:37 AM    Chloride 110 07/15/2021 10:37 AM    CO2 25 07/15/2021 10:37 AM      Lab Results   Component Value Date/Time    Glucose 132 (H) 07/15/2021 10:37 AM            Assessment / Plan     Diabetes well controlled, off medications. Will continue to monitor  Hypertension control uncertain on current medications. Hyperlipidemia  uncontrolled  Off Pravachol, since no improvement in myalgias patient can restart her Pravachol      ICD-10-CM ICD-9-CM    1. Type 2 diabetes mellitus with nephropathy (HCC)  E11.21 250.40 HEMOGLOBIN A1C W/O EAG     583.81    2. Essential hypertension  G16 408.9 METABOLIC PANEL, COMPREHENSIVE   3. Dyslipidemia  E78.5 272.4 LIPID PANEL   4. Stage 3a chronic kidney disease (HCC)  N18.31 585.3  stable on current medications              Diabetic issues reviewed with her: diabetic diet discussed in detail, and low cholesterol diet, weight control and daily exercise discussed. Follow-up and Dispositions    · Return in about 3 months (around 10/23/2021) for labs 1 week before. Reviewed plan of care. Patient has provided input and agrees with goals.

## 2021-08-10 ENCOUNTER — TELEPHONE (OUTPATIENT)
Dept: INTERNAL MEDICINE CLINIC | Age: 86
End: 2021-08-10

## 2021-08-10 NOTE — TELEPHONE ENCOUNTER
Talked to daughter and agree that pt does have dementia that has made her become more paranoid but do not have much options for her except to refer to neurology for evaluation.  Daughter will let me know if she wants us to refer pt to see a neurologist

## 2021-08-10 NOTE — TELEPHONE ENCOUNTER
Patient daughter states her mom is acting out of the norm. She thinks she has possible dementia and wants to know is there anything that can be prescibed for her.  Please give her daughter a call [phone]

## 2021-08-12 ENCOUNTER — OFFICE VISIT (OUTPATIENT)
Dept: ORTHOPEDIC SURGERY | Age: 86
End: 2021-08-12
Payer: MEDICARE

## 2021-08-12 VITALS
HEIGHT: 61 IN | TEMPERATURE: 97.1 F | OXYGEN SATURATION: 97 % | HEART RATE: 65 BPM | BODY MASS INDEX: 27.56 KG/M2 | RESPIRATION RATE: 20 BRPM | WEIGHT: 146 LBS

## 2021-08-12 DIAGNOSIS — M48.062 SPINAL STENOSIS OF LUMBAR REGION WITH NEUROGENIC CLAUDICATION: Primary | ICD-10-CM

## 2021-08-12 PROCEDURE — 1090F PRES/ABSN URINE INCON ASSESS: CPT | Performed by: PHYSICAL MEDICINE & REHABILITATION

## 2021-08-12 PROCEDURE — G8432 DEP SCR NOT DOC, RNG: HCPCS | Performed by: PHYSICAL MEDICINE & REHABILITATION

## 2021-08-12 PROCEDURE — G8427 DOCREV CUR MEDS BY ELIG CLIN: HCPCS | Performed by: PHYSICAL MEDICINE & REHABILITATION

## 2021-08-12 PROCEDURE — G8417 CALC BMI ABV UP PARAM F/U: HCPCS | Performed by: PHYSICAL MEDICINE & REHABILITATION

## 2021-08-12 PROCEDURE — G8536 NO DOC ELDER MAL SCRN: HCPCS | Performed by: PHYSICAL MEDICINE & REHABILITATION

## 2021-08-12 PROCEDURE — 99213 OFFICE O/P EST LOW 20 MIN: CPT | Performed by: PHYSICAL MEDICINE & REHABILITATION

## 2021-08-12 PROCEDURE — 1101F PT FALLS ASSESS-DOCD LE1/YR: CPT | Performed by: PHYSICAL MEDICINE & REHABILITATION

## 2021-08-12 NOTE — PROGRESS NOTES
Baljit Jonesluis daniel Leonardsaygamaliel Nyedamian [facility] [address]  [address], [address]  [place], [facility] [address]  Phone: [phone]  Fax: [phone]        Magy Blevins  : 1923  PCP: Mamta Bravo MD  2021    PROGRESS NOTE      HISTORY OF PRESENT ILLNESS  Rose Soria is a 80 y.o. female  who was seen as a new patient 10/31/19 with c/o low back pain with BLE paraesthesia x 6 weeks. Pt reports a heaviness in her BLE with standing and walking. She reports a limited standing/walking tolerance. She finds relief with sitting. She also c/o some lateral right thigh pain. She ambulates with a cane. Dr. Antony Bee recommended she use a walker for walking longer distances. Pt also c/o left sided functional deficits. She denies h/o stroke. She was intolerant to GABAPENTIN (felt sick) so she was rx Topamax. She notes significant benefit from PT (19-19; [employer] her walking improved. She states that her lateral thigh symptoms have improved. She noted improvement, but continued to feel some weakness in her knees. She did not feel her symptoms warranted further treatment at the time. She ended up not doing the MRI due to claustrophobia and does not wish to proceed with one in the future. Pt reports increased pain in her bilateral knees. She reports a new pain in her bilateral shoulders and elbows (R>L) x 2 weeks. She has found some benefit from using [employer] topical cream. Pt notes that she exercises her arms daily, and it does not help her pain much. Her pain is worse in the afternoons. She is unable to do her hair because of limited shoulder/scapular ROM. She felt weakness in her legs, especially with walking. She feels better walking with the rollator walker. She has been maintaining an HEP with benefit. She reports pain in her thighs radiating into the BLE. Her symptoms improve with sitting, but she notes that she becomes stiff with prolonged sitting so she likes to stay moving. She completed PT about 1 month ago with some improvement, but continues to feel weak in her legs. She also c/o bilateral elbow pain. Jared Milligan comes in to the office today for f/u. Pt notes that sh was intolerant to [address] secondary to dizziness. Her daughter notes that she takes an excessive amount of Advil. I advised against this given her CKD. Lumbar spine CT dated 7/9/21 reviewed. Per report, Osteopenia limits evaluation of bony detail. No obvious acute fracture. Notable lower lumbar degenerative changes characterized by severe facet arthropathy, grade 1-2 anterolisthesis at L4-L5 and L5-S1, and notable disc herniations at L3-S1. Severe spinal canal stenosis at L4-L5, and mild to moderate spinal canal stenosis at L3-L4 and L5-S1. L4-L5 and L5-S1 mild to moderate foraminal stenoses with abutment of the exiting nerve roots. See level by level details above. Advanced SI joint degenerative changes noted. Sigmoid diverticulosis, bilateral renal cysts, and atherosclerosis incidentally noted. Pain Score: 5/10. PmHx: HTN, CAD, cardiomyopathy, DM, CKD stage 3  Medications tried: GABAPENTIN, TOPAMAX, NORTRIPTYLINE, LYRICA     ASSESSMENT  Jared Milligan is a 80year-old female with stenotic-type symptoms. Her low back and BLE symptoms are likely due to lumbar spinal stenosis with neurogenic claudication (severe L4-5 seen on CT).  This has not been confirmed with MRI as patient is claustrophobic, but suggested on CT. We discussed options of: L5-S1 PAT, surgical consult    PLAN  1. She can call if she would like to proceed with an L5-S1 PAT    Pt will f/u in 2-4 weeks after injection, if she decides to proceed or sooner as needed. Diagnoses and all orders for this visit:    1.  Spinal stenosis of lumbar region with neurogenic claudication         PAST MEDICAL HISTORY   Past Medical History:   Diagnosis Date    Arthritis of right knee     CAD (coronary artery disease), native coronary artery October 2010    mild disease    Cardiac cath 10/21/2010    dLM 20%. mLAD 25%. CX mild. pRCA 30%. EF 30-35%. Anterolateral, apical , diaphrag akin. Hypercontractile basal segments. Severe elev left-sided filling press.  Cardiac echocardiogram 02/02/2011    EF 50-55%. Mild apical hypk. Gr 1 DDfx. RVSP 40-45. Mild LAE.  Cardiac Holter monitor 06/10/2015    Sinus rhythm w/avg HR of 53 bpm (range 40-85 bpm). Occasional PACs. One 3-beat run of nonsustained SVT. Very few PVCs. One 3-beat run of nonsustained VT. Pt's HR was < 50 bpm for 8 hrs of the recorded time. No pauses noted. Two episodes of neck pain corresponded to sinus rhythm w/o ectopy.  Cardiovascular renal duplex 12/03/2012    No renal artery stenosis bilaterally. Bilateral intrinsic/med disease. Patent bilateral renal veins. Multiple cysts on both kidneys.  CKD (chronic kidney disease)     while on diuretics for difficult to control HTN    Diabetes mellitus type II 3/15/2010    Dyslipidemia 3/15/2010    Heart attack ([address]     2010    Heart failure, diastolic, chronic ([address]     History of heart attack     HTN (hypertension) 3/15/2010    Hypertensive heart disease     Loss of appetite     Osteopenia 7/13/2010    Other dyspnea and respiratory abnormality     Ringing in the ears     Stress-induced cardiomyopathy     EF 35% (LV angio 10/2010), then EF 50-55% (echo, Feb 2011)    Wears glasses        Past Surgical History:   Procedure Laterality Date    HX BLADDER SUSPENSION  2009    HX HYSTERECTOMY  1962   . MEDICATIONS    Current Outpatient Medications   Medication Sig Dispense Refill    olmesartan (BENICAR) 20 mg tablet TAKE 1 TABLET BY MOUTH EVERY DAY 90 Tablet 3    pregabalin (Lyrica) 75 mg capsule Take 1 Capsule by mouth two (2) times a day. Max Daily Amount: 150 mg. 60 Capsule 2    pravastatin (PRAVACHOL) 40 mg tablet Take 1 Tab by mouth daily.  90 Tab 3    nitroglycerin (NITROSTAT) 0.4 mg SL tablet 1 q 5 minutes x 3 prn chest pain 1 Bottle 1    amLODIPine (NORVASC) 10 mg tablet Take 1 Tab by mouth daily. 90 Tab 3    Rocklatan 0.02-0.005 % drop Administer 1 Drop to both eyes nightly.  calcium-cholecalciferol, d3, (CALCIUM 600 + D) 600-125 mg-unit tab Take  by mouth.  diclofenac (VOLTAREN) 1 % gel Apply  to affected area four (4) times daily. 100 g 4    RESTASIS 0.05 % ophthalmic emulsion Administer 1 Drop to both eyes every twelve (12) hours.  ascorbic acid (VITAMIN C) 500 mg tablet Take 1,000 mg by mouth daily.  CALCIUM PO Take 1,000 mg by mouth two (2) times a day.  Cholecalciferol, Vitamin D3, (VITAMIN D) 1,000 unit Cap Take 2,000 Units by mouth daily. 180 Cap 3    MULTIVITAMINS PO Take 1 Tab by mouth daily.  aspirin 81 mg chewable tablet Take 81 mg by mouth daily. ALLERGIES  Allergies   Allergen Reactions    Lipitor [Atorvastatin] Myalgia    Spironolactone Other (comments)     Dizziness and fatigue          SOCIAL HISTORY    Social History     Socioeconomic History    Marital status:      Spouse name: Not on file    Number of children: Not on file    Years of education: Not on file    Highest education level: Not on file   Tobacco Use    Smoking status: Never Smoker    Smokeless tobacco: Never Used   Substance and Sexual Activity    Alcohol use: No    Drug use: No    Sexual activity: Never     Social Determinants of Health     Financial Resource Strain:     Difficulty of Paying Living Expenses:    Food Insecurity:     Worried About Running Out of Food in the Last Year:     [address] in the Last Year:    Transportation Needs:     Lack of Transportation (Medical):      Lack of Transportation (Non-Medical):    Physical Activity:     Days of Exercise per Week:     Minutes of Exercise per Session:    Stress:     Feeling of Stress :    Social Connections:     Frequency of Communication with Friends and Family:     Frequency of Social Gatherings with Friends and Family:     Attends [employer] Services:     Active Member of Clubs or Organizations:     Attends Club or Organization Meetings:     Marital Status:        FAMILY HISTORY  Family History   Problem Relation Age of Onset    Hypertension Mother     Arthritis-osteo Other          REVIEW OF SYSTEMS  Review of Systems   Constitutional: Negative for chills, fever and weight loss. Respiratory: Negative for shortness of breath. Cardiovascular: Negative for chest pain. Gastrointestinal: Negative for constipation. Negative for fecal incontinence    Genitourinary: Negative for dysuria. Negative for urinary incontinence   Musculoskeletal: Positive for back pain. Joint pain:  bilateral elbows. Skin: Negative for rash. Neurological: Positive for tingling ( BLE). Negative for dizziness, tremors, focal weakness and headaches. Endo/Heme/Allergies: Does not bruise/bleed easily. Psychiatric/Behavioral: The patient does not have insomnia. PHYSICAL EXAMINATION  Visit Vitals  Pulse 65   Temp 97.1 °F (36.2 °C) (Temporal)   Resp 20   Ht 5' 1\" (1.549 m)   Wt 146 lb (66.2 kg)   SpO2 97%   BMI 27.59 kg/m²       Pain Assessment  8/12/2021   Location of Pain Back;Leg;Arm   Location Modifiers Right;Left   Severity of Pain 5   Quality of Pain Aching   Quality of Pain Comment -   Duration of Pain Persistent   Frequency of Pain Constant   Aggravating Factors Other (Comment)   Aggravating Factors Comment lying down   Limiting Behavior Some   Relieving Factors Exercises   Relieving Factors Comment -   Result of Injury -           Constitutional:  Well developed, well nourished, in no acute distress. Psychiatric: Affect and mood are appropriate. Integumentary: No rashes or abrasions noted on exposed areas. SPINE/MUSCULOSKELETAL EXAM    Cervical spine:  Neck is midline. Normal muscle tone. No focal atrophy is noted. ROM pain free. Shoulder ROM intact.    No tenderness to palpation. Negative Spurling's sign. Negative Tinel's sign. Negative Coelho's sign.                                                                                                                             Sensation in the bilateral arms grossly intact to light touch.      Ulnar deviation of fingers on the left  Merritt neck deformities on the left     No clear myelopathic or UMN signs     Lumbar spine:  No rash, ecchymosis, or gross obliquity. No fasciculations. No focal atrophy is noted. No pain with hip ROM. Full range of motion. No tenderness to palpation. No tenderness to palpation at the sciatic notch. SI joints non-tender. Trochanters non tender.     Sensation in the bilateral legs grossly intact to light touch.     Updates from 12/12/19:   Full ROM. Tenderness to palpation of bilateral trochanters.      Updates 7/28/2020:  No pain with neck ROM  Negative Brownlee sign bilaterally   Pain with scapular retraction. No tenderness to palpation of cervicothoracic region          MOTOR:      Biceps  Triceps Deltoids Wrist Ext Wrist Flex Hand Intrin   Right 5/5 5/5 5/5 5/5 5/5 5/5   Left 5/5 5/5 5/5 5/5 5/5 5/5             Hip Flex  Quads Hamstrings Ankle DF EHL Ankle PF   Right 5/5 5/5 5/5 5/5 5/5 5/5   Left 5/5 5/5 5/5 5/5 5/5 5/5     DTRs are 1+ biceps, triceps, brachioradialis, patella, and Achilles.     Negative Straight Leg raise. Squat not tested. No difficulty with tandem gait.      Ambulation with rollator walker. FWB.       RADIOGRAPHS  Lumbar Spine CT images taken on 7/9/2021 personally reviewed with patient:  General: Osteopenia limits evaluation of bony detail. Mild disc space loss and  endplate spurring at Y7-E6, L4-L5, and L5-S1. Grade 1-2 anterolisthesis L4-L5  and L5-S1. Lumbar lordosis maintained. No definite acute fracture identified. Multilevel spinous process abutment with associated degenerative changes. Lower  lumbar severe facet arthropathy.  No definite acute fracture identified. Advanced  bilateral SI joint degenerative changes.     Miscellaneous: Bilateral renal cysts, right more and left. Sigmoid  diverticulosis. Notable atherosclerosis.     Levels:     T12-L1, L1-L2: No significant disc disease or stenosis. Mild facet arthropathy.     L2-L3: Mild disc bulge. Mild facet arthropathy. No significant stenosis.     L3-L4: Mild to moderate disc bulge. Mild facet arthropathy. Mild to moderate  spinal canal stenosis. Mild foraminal stenoses.     L4-L5: Grade 1-2 anterolisthesis with uncovering of disc and mild to moderate  disc bulge. Severe facet arthropathy with ligamentum flavum bulge. Severe spinal  canal stenosis. Mild to moderate foraminal stenoses with mild abutment of the  exiting L4 nerves.     L5-S1: Grade 1-2 anterolisthesis with uncovering of disc and mild to moderate  disc bulge. Severe facet arthropathy. Mild to moderate spinal canal stenosis. Mild to moderate left greater and right foraminal stenoses with mild abutment of  the exiting L5 nerves.     Imaged sacrum: Sacral canal unremarkable. Advanced SI joint degenerative  changes.     IMPRESSION     Osteopenia limits evaluation of bony detail. No obvious acute fracture.     Notable lower lumbar degenerative changes characterized by severe facet  arthropathy, grade 1-2 anterolisthesis at L4-L5 and L5-S1, and notable disc  herniations at L3-S1.  -Severe spinal canal stenosis at L4-L5, and mild to moderate spinal canal  stenosis at L3-L4 and L5-S1.  -L4-L5 and L5-S1 mild to moderate foraminal stenoses with abutment of the  exiting nerve roots.     See level by level details above.     Advanced SI joint degenerative changes noted. Sigmoid diverticulosis, bilateral  renal cysts, and atherosclerosis incidentally noted.     Lumbar XR images taken on 10/31/19 personally reviewed with patient:  Facet sclerosis at lower levels  Significant Grade 2 anterolisthesis of L5 on S1 and L4 on L5   Atherosclerosis of aorta  No obvious compression fractures    15 minutes of face-to-face contact were spent with the patient during today's visit extensively discussing symptoms and treatment plan. All questions were answered. More than half of this visit today was spent on counseling.      Written by Jael Shipley as dictated by Cesar Blair MD

## 2021-08-12 NOTE — PATIENT INSTRUCTIONS
Learning About Lumbar Epidural Steroid Injections  What is a lumbar epidural steroid injection? A lumbar epidural injection is a shot into the epidural spacethe area in your back around the spinal cord. The shot may help reduce pain, tingling, or numbness in your back, buttock, or leg. The shot may have a steroid to reduce pain and swelling and a local anesthetic to numb nerves. How is a lumbar epidural steroid injection done? The doctor may use an imaging test before or during your injection. This can be an MRI, a CT scan, or an X-ray. These tests can show where your nerve problems are. After finding the right spot, the doctor may inject a numbing medicine into the skin where you will get the steroid injection. Then he or she puts the needle for the steroid into the numbed area. You may feel some pressure. You could feel some stinging or burning during the injection. How long does an epidural steroid injection take? It takes about 10 to 15 minutes to get this injection. You will probably go home about 20 to 30 minutes after you get it. What can you expect after a lumbar epidural steroid injection? If your injection had local anesthetic and a steroid, your legs may feel heavy or numb right after. You will probably be able to walk. But you may need to be extra careful. Take care not to lose your balance, and be sure to follow your doctor's instructions. If your injection contained local anesthetic, you may feel better right away. But this pain relief will last only a few hours. Your pain will probably return. This is because the steroids have not started working yet. Before the steroids start to work, your back may be sore for a few days. These injections don't always work. When they do, it takes 1 to 5 days. This pain relief can last for several days to a few months or longer. You may want to do less than normal for a few days. But you may also be able to return to your daily routine.   Some people are dizzy or feel sick to their stomach after getting this injection. These symptoms usually don't last very long. If your pain is better, you may be able to keep doing your normal activities or physical therapy. But try not to overdo it, even if your back pain has improved a lot. If your pain is only a little better or if it comes back, your doctor may recommend another injection in a few weeks. If your pain has not changed, talk to your doctor about other treatment choices. Follow-up care is a key part of your treatment and safety. Be sure to make and go to all appointments, and call your doctor if you are having problems. It's also a good idea to know your test results and keep a list of the medicines you take. Where can you learn more? Go to http://www.gray.com/  Enter H162 in the search box to learn more about \"Learning About Lumbar Epidural Steroid Injections. \"  Current as of: November 16, 2020               Content Version: 12.8  © 2006-2021 [employer]. Care instructions adapted under license by [employer] (which disclaims liability or warranty for this information). If you have questions about a medical condition or this instruction, always ask your healthcare professional. [address] any warranty or liability for your use of this information.

## 2021-08-16 ENCOUNTER — TELEPHONE (OUTPATIENT)
Dept: INTERNAL MEDICINE CLINIC | Age: 86
End: 2021-08-16

## 2021-08-16 DIAGNOSIS — F02.818 LATE ONSET ALZHEIMER'S DEMENTIA WITH BEHAVIORAL DISTURBANCE (HCC): Primary | ICD-10-CM

## 2021-08-16 DIAGNOSIS — G30.1 LATE ONSET ALZHEIMER'S DEMENTIA WITH BEHAVIORAL DISTURBANCE (HCC): Primary | ICD-10-CM

## 2021-08-17 NOTE — TELEPHONE ENCOUNTER
Pts daughter said her mother needs to see a Neurologist because her Dementia is getting worse.  Thank you

## 2021-09-10 ENCOUNTER — PATIENT OUTREACH (OUTPATIENT)
Dept: CASE MANAGEMENT | Age: 86
End: 2021-09-10

## 2021-09-13 ENCOUNTER — TRANSCRIBE ORDER (OUTPATIENT)
Dept: SCHEDULING | Age: 86
End: 2021-09-13

## 2021-09-13 DIAGNOSIS — Z12.31 VISIT FOR SCREENING MAMMOGRAM: Primary | ICD-10-CM

## 2021-09-23 NOTE — TELEPHONE ENCOUNTER
HPI:  The patient came to the clinic with her daughter for a joint session.  the patient reported that she is feeling \"okay\".  Denies having  insomnia or hypersomnia.  Does not complain  having vivid dreams with nightmares anymore.  denies hearing voices from both men and women anymore.    denies having paranoia,  denies delusions and denies having visual hallucinations.    The patient has been taking her psychotropic medication as directed.  She does report having fatigue and dizziness.       According to her daughter the patient has been calm and cooperative with her care.    Since the pt was placed on tramadol 50 mg 4 times daily as needed the pain has been better controlled.  The daughter also reported that the patient is able to sleep through the night.  In general if the pt has good night sleep she would calmer and less irritable.   In addition to taking psychotropic medications the patient is continuously taking Sinemet  3 times daily by her neurologist .     In terms of her past psychiatric history  the patient reported that she has chronic depression. About 40 years ago she started taking antidepressant.  She remembered that she used to take Effexor 225 mg daily for more than 30 years.  In the summer of 2016, the patient seemed to have a psychotic episode that might have been triggered by multiple psychosocial stressors.  According to the report from her daughter in January 2015, the patient lost her .  In April 2015, she had cardiac surgery.  The daughter reported that after the pt had surgery and  lost her , she started showing deterioration in her mental status and her physical condition.  The patient started having paranoia with delusional thought.  In the first week of August 2016, the patient left her daughter's house without notifying her daughter.  She was dressed improperly in the hot and humid weather, and she was wandering  for several hours outside.  Her daughter had to call the  I called and spoke to the pt's daughter, Lor Mcduffie. A chart review was done and she is on the pt's HIPAA list for all access. She was asked of the pt has had any history of glaucoma or kidney stones. She confirmed that the pt did not have a history of either. The new medication name and directions were given to Ms. Tyson Celeste. She is ok with this medication be prescribed for her mother. She was informed that this medication request will be sent over to the nurse practitioner that recommended the medication. Once she approves the medication, it will be sent over to the [facility] on [facility] as requested. Ms. Tyson eCleste verbalized understanding and will contact the office if the pt has any issues with this medication. No questions or concerns were voiced at this time. police to file a missing person report.  Finally, the patient was located by the police and she seemed to be psychotic and confused.  When she was brought to a local  hospital for evaluation, the patient seemed to be dehydrated.  Additionally, she showed psychotic symptoms in the form of paranoia with delusions and hallucinations.  She claimed that she was going to meet her  and her younger son, both were .  In the ER, the patient was irritable and angry.  After she was medically cleared, the patient was admitted to inpatient psychiatric facility in the [facility].  She stayed in the hospital for 2 weeks.  She was diagnosed with major depressive disorder with psychotic features.  Her medication regimen was modified.  The dose of Effexor was increased from 225 mg daily to 150 mg twice a day.  Additionally, the patient was started on Depakote 500 mg twice a day, risperidone 2 mg in the morning, and olanzapine 10 mg at night.  After she was released from the hospital, her daughter went to [place] and transferred the patient to the [facility] because the patient needed supervision.    She was taking the following psychotropic medications before she established her care with this provider at [facility]:    Effexor 150 mg twice a day, olanzapine 10 mg at night, and Depakote 500 mg twice a day.      According to her daughter, the patient used to drink intensively.  After she lost her  in 2015, patient stopped drinking.  She is still smoking cigar from time to time.  The patient denied ever using any recreational drugs.           Patient Active Problem List   Diagnosis   • HTN (hypertension)   • S/P ICD (internal cardiac defibrillator) procedure   • HLD (hyperlipidemia)   • Depressive psychosis (HCC)   • Depression, major, recurrent, severe with psychosis ([facility])   • GERD (gastroesophageal reflux disease)   • Hypertrophic obstructive cardiomyopathy (HOCM) ([facility])   • H/O myomectomy   •  Cataract, nuclear   • Fuchs' corneal dystrophy       Current Outpatient Medications   Medication Sig Dispense Refill   • losartan (COZAAR) 50 MG tablet Take 1 tablet by mouth daily. 30 tablet 5   • isosorbide mononitrate (IMDUR) 30 MG 24 hr tablet Take 2 tabs in the morning 60 tablet 11   • metoPROLOL succinate (TOPROL-XL) 50 MG 24 hr tablet Take 2 tablets by mouth daily. 60 tablet 5   • atorvastatin (LIPITOR) 10 MG tablet Take 1 tablet by mouth daily. 90 tablet 2   • metFORMIN (GLUCOPHAGE-XR) 750 MG 24 hr tablet Take 1 tablet by mouth daily (with breakfast). 180 tablet 3   • QUEtiapine (SEROquel) 100 MG tablet Take 1 tablet by mouth nightly. 30 tablet 2   • clonazePAM (KlonoPIN) 0.5 MG tablet Take 1 tablet by mouth nightly. 30 tablet 2   • prazosin (Minipress) 1 MG capsule Take 1-2 capsules by mouth nightly. 60 capsule 2   • gabapentin (NEURONTIN) 300 MG capsule Take 1 capsule by mouth every morning, and 1 capsule nightly. 180 capsule 0   • venlafaxine XR (EFFEXOR XR) 150 MG 24 hr capsule Take 1 capsule by mouth daily. 90 capsule 1   • ketoconazole (NIZORAL) 2 % cream Apply topically daily. Use 1-2 x daily for 2-3 weeks. 15 g 0   • furosemide (LASIX) 40 MG tablet Take 1 tablet by mouth daily. 90 tablet 3   • lidocaine (LIDODERM) 5 % patch Place 1 patch onto the skin every 24 hours. Remove patch 12 hours after applying 20 patch 1   • traMADol (ULTRAM) 50 MG tablet Take 1 tablet by mouth every 6 hours as needed for Pain. 30 tablet 0   • sodium chloride, hypertonic, (YAMIL 128 / ADSORBNAC) 2 % ophthalmic solution Place 1 drop into both eyes daily. 15 mL 0   • pantoprazole (Protonix) 40 MG tablet Take 1 tablet by mouth daily. 30 tablet 0   • ondansetron (Zofran) 4 MG tablet Take 1 tablet by mouth 2 times daily as needed for Nausea. 60 tablet 0   • nitroGLYcerin (NITROSTAT) 0.4 MG sublingual tablet Place 1 tablet under the tongue every 5 minutes as needed for Chest pain. 30 tablet 0   • lidocaine (Aspercreme w/Lidocaine)  4 % cream prn costochondritis and low back pain 1 Tube 0   • fluticasone (FLONASE) 50 MCG/ACT nasal spray Spray 2 sprays in each nostril daily. 16 g 0   • carbidopa-levodopa (SINEMET)  MG per tablet Take 1 tablet by mouth 3 times daily. 90 tablet 0   • aspirin (ECOTRIN) 81 MG EC tablet Take 1 tablet by mouth daily. 30 tablet 0   • acetaminophen (TYLENOL) 500 MG tablet Take 2 tablets by mouth every 8 hours.     • docusate sodium-sennosides (SENOKOT S) 50-8.6 MG per tablet Take 2 tablets by mouth nightly.     • polyethylene glycol (MIRALAX) 17 g packet Take 17 g by mouth daily. Do not start before 2021. Stir and dissolve powder in any 4 to 8 ounces of beverage, then drink.     • cholecalciferol (VITAMIN D3) 1000 UNITS tablet Take 2,000 Units by mouth daily.        No current facility-administered medications for this visit.     Facility-Administered Medications Ordered in Other Visits   Medication Dose Route Frequency Provider Last Rate Last Admin   • barium sulfate 2 % suspension 900 mL  900 mL Oral Once Jonathon Gonzalez MD              Psychosocial history:  The patient is a high school graduate.  She used to work at a factory for many years.  After she got , she decided to stay home to raise her family.  She has 2 daughters and 2 sons.  Her   in 2015.  In 2016, the patient moved to [facility] to live with her daughter because the patient needed supervision.    Her son- Phillip, used to take multiple pain medications before  he .        MENTAL STATUS EXAMINATION:    The patient came to the office today with her daughter.  She is able to ambulate without assistance.  she seemed to be calm and cooperative.   her mood is \"fine\"    Her affect is restricted and she is able to be engaged in conversation.   Her speech is coherent   thought process seemed to be sequential   Thought content: Significant  for concerning having fatigue.  denied having paranoia, delusions  or hallucinations.    Cognitive assessment: The patient is not oriented to day, date, month, year . she shows impairment in short-term memory and long-term memory .      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  Major depressive disorder with psychotic features; ISATU; R/O BPD-II; still symptomatic  Continue  Effexor 150 mg daily   Continue quetiapine  100 mg night  Discontinue  Minipress 1 to 2 mg night for nightmares  Continue gabapentin 300 mg qd and 300 mg qhs   Continue clonazepam  0.5 mg nightly ONLY as needed    #2.  DJD/OA, improved  Continue gabapentin 300 mg qd and 300  mg qhs     #3.  Parkinsonism, improved  Continue Sinemet 1 tab 3 times daily from her neurologist    #4. dementia with multiple etiologies, stable      During session the patient is encouraged  to express her feelings and concerns by providing empathetic listening.  The potential coping mechanism explored  Discussed with the patient and her daughter about the potential etiology of having pervasive fatigue with dizziness.   Discussed with the patient and her daughter about psychopharmacology of  dopaminergic agents in treating patients with Parkinson's disease versus  parkinsonism, potential side effects of parkinsonian  agent.      We discussed the risk of fall in seniors and fall prevention.         Also  discussed with the patient about  clinical indications of taking her psychotropic medications including Effexor, benzodiazepine,  , Depakote, SGA, gabapentin and Minipress, potential side effects of those psychotropic medications.    The patient is scheduled to return to clinic in 4  months  for followup.        31   minutes spent face-to-face with the patient and her daughter in counseling, discussion of her diagnosis, management, progression and  prognosis and charting    Electronically signed  by  Azalia Almendarez MD, Certified by the American Board of Psychiatry and Neurology

## 2021-09-24 ENCOUNTER — HOSPITAL ENCOUNTER (OUTPATIENT)
Dept: MAMMOGRAPHY | Age: 86
Discharge: HOME | End: 2021-09-24
Attending: INTERNAL MEDICINE
Payer: MEDICARE

## 2021-09-24 DIAGNOSIS — Z12.31 VISIT FOR SCREENING MAMMOGRAM: ICD-10-CM

## 2021-09-24 PROCEDURE — 77067 SCR MAMMO BI INCL CAD: CPT

## 2021-10-04 RX ORDER — AMLODIPINE BESYLATE 10 MG/1
TABLET ORAL
Qty: 90 TABLET | Refills: 3 | Status: SHIPPED | OUTPATIENT
Start: 2021-10-04 | End: 2022-03-31 | Stop reason: SDUPTHER

## 2021-10-19 ENCOUNTER — OFFICE VISIT (OUTPATIENT)
Dept: CARDIOLOGY CLINIC | Age: 86
End: 2021-10-19
Payer: MEDICARE

## 2021-10-19 VITALS
DIASTOLIC BLOOD PRESSURE: 72 MMHG | BODY MASS INDEX: 27 KG/M2 | WEIGHT: 143 LBS | SYSTOLIC BLOOD PRESSURE: 150 MMHG | OXYGEN SATURATION: 98 % | HEIGHT: 61 IN | HEART RATE: 62 BPM

## 2021-10-19 DIAGNOSIS — R00.2 PALPITATIONS: Primary | ICD-10-CM

## 2021-10-19 PROCEDURE — 99214 OFFICE O/P EST MOD 30 MIN: CPT | Performed by: INTERNAL MEDICINE

## 2021-10-19 PROCEDURE — G8510 SCR DEP NEG, NO PLAN REQD: HCPCS | Performed by: INTERNAL MEDICINE

## 2021-10-19 PROCEDURE — 1101F PT FALLS ASSESS-DOCD LE1/YR: CPT | Performed by: INTERNAL MEDICINE

## 2021-10-19 PROCEDURE — G8427 DOCREV CUR MEDS BY ELIG CLIN: HCPCS | Performed by: INTERNAL MEDICINE

## 2021-10-19 PROCEDURE — G8536 NO DOC ELDER MAL SCRN: HCPCS | Performed by: INTERNAL MEDICINE

## 2021-10-19 PROCEDURE — G8417 CALC BMI ABV UP PARAM F/U: HCPCS | Performed by: INTERNAL MEDICINE

## 2021-10-19 PROCEDURE — 1090F PRES/ABSN URINE INCON ASSESS: CPT | Performed by: INTERNAL MEDICINE

## 2021-10-19 NOTE — PROGRESS NOTES
Ema Gomes presents today for   Chief Complaint   Patient presents with    Follow-up     6 months       Ema Gomes preferred language for health care discussion is english/other. Is someone accompanying this pt? Daughter     Is the patient using any DME equipment during [address]? [place]     Depression Screening:  3 most recent PHQ Screens 10/19/2021   PHQ Not Done -   Little interest or pleasure in doing things Not at all   Feeling down, depressed, irritable, or hopeless Not at all   Total Score PHQ 2 0       Learning Assessment:  Learning Assessment 8/13/2019   PRIMARY LEARNER Patient   HIGHEST LEVEL OF EDUCATION - PRIMARY LEARNER  GRADUATED HIGH SCHOOL OR GED   BARRIERS PRIMARY LEARNER NONE   CO-LEARNER CAREGIVER No   PRIMARY LANGUAGE ENGLISH   LEARNER PREFERENCE PRIMARY LISTENING   ANSWERED BY pt   RELATIONSHIP SELF       Abuse Screening:  Abuse Screening Questionnaire 5/17/2021   Do you ever feel afraid of your partner? N   Are you in a relationship with someone who physically or mentally threatens you? N   Is it safe for you to go home? Y       Fall Risk  Fall Risk Assessment, last 12 mths 10/19/2021   Able to walk? Yes   Fall in past 12 months? 0   Do you feel unsteady? 0   Are you worried about falling 0       Pt currently taking Anticoagulant therapy? ASA 81mg every day     Coordination of Care:  1. Have you been to the ER, urgent care clinic since your last visit? Hospitalized since your last visit? no    2. Have you seen or consulted any other health care providers outside of the [address] [place] [address] since your last visit? Include any pap smears or colon screening.  no

## 2021-10-21 ENCOUNTER — HOSPITAL ENCOUNTER (OUTPATIENT)
Dept: LAB | Age: 86
Discharge: HOME | End: 2021-10-21
Payer: MEDICARE

## 2021-10-21 ENCOUNTER — APPOINTMENT (OUTPATIENT)
Dept: INTERNAL MEDICINE CLINIC | Age: 86
End: 2021-10-21

## 2021-10-21 DIAGNOSIS — I10 ESSENTIAL HYPERTENSION: ICD-10-CM

## 2021-10-21 DIAGNOSIS — E11.21 TYPE 2 DIABETES MELLITUS WITH NEPHROPATHY (HCC): ICD-10-CM

## 2021-10-21 DIAGNOSIS — E78.5 DYSLIPIDEMIA: ICD-10-CM

## 2021-10-21 LAB
ALBUMIN SERPL-MCNC: 3.5 G/DL (ref 3.4–5)
ALBUMIN/GLOB SERPL: 1.2 {RATIO} (ref 0.8–1.7)
ALP SERPL-CCNC: 79 U/L (ref 45–117)
ALT SERPL-CCNC: 18 U/L (ref 13–56)
ANION GAP SERPL CALC-SCNC: 7 MMOL/L (ref 3–18)
AST SERPL-CCNC: 23 U/L (ref 10–38)
BILIRUB SERPL-MCNC: 0.2 MG/DL (ref 0.2–1)
BUN SERPL-MCNC: 22 MG/DL (ref 7–18)
BUN/CREAT SERPL: 17 (ref 12–20)
CALCIUM SERPL-MCNC: 9.4 MG/DL (ref 8.5–10.1)
CHLORIDE SERPL-SCNC: 109 MMOL/L (ref 100–111)
CHOLEST SERPL-MCNC: 231 MG/DL
CO2 SERPL-SCNC: 26 MMOL/L (ref 21–32)
CREAT SERPL-MCNC: 1.28 MG/DL (ref 0.6–1.3)
GLOBULIN SER CALC-MCNC: 2.9 G/DL (ref 2–4)
GLUCOSE SERPL-MCNC: 139 MG/DL (ref 74–99)
HBA1C MFR BLD: 6.6 % (ref 4.2–5.6)
HDLC SERPL-MCNC: 64 MG/DL (ref 40–60)
HDLC SERPL: 3.6 {RATIO} (ref 0–5)
LDLC SERPL CALC-MCNC: 148.8 MG/DL (ref 0–100)
LIPID PROFILE,FLP: ABNORMAL
POTASSIUM SERPL-SCNC: 4.5 MMOL/L (ref 3.5–5.5)
PROT SERPL-MCNC: 6.4 G/DL (ref 6.4–8.2)
SODIUM SERPL-SCNC: 142 MMOL/L (ref 136–145)
TRIGL SERPL-MCNC: 91 MG/DL (ref ?–150)
VLDLC SERPL CALC-MCNC: 18.2 MG/DL

## 2021-10-21 PROCEDURE — 83036 HEMOGLOBIN GLYCOSYLATED A1C: CPT

## 2021-10-21 PROCEDURE — 80061 LIPID PANEL: CPT

## 2021-10-21 PROCEDURE — 80053 COMPREHEN METABOLIC PANEL: CPT

## 2021-10-24 NOTE — PROGRESS NOTES
Maria Luz Alcaraz is in the office today for cardiovascular evaluation of her hypertensive heart disease/ diastolic dysfunction of the left ventricle. She has a history of  hypertension, dyslipidemia, and  chronic diastolic heart failure. She was followed by Dr. Yanira Perez through the years     She became quite bradycardic on beta-blocker therapy in the past necessitating discontinuation of the medication.     She was  in the hospital at [facility] in March of 2019 due to  weakness in her legs. She was found to have  renal insufficiency with BUN/creatinine of 30 and 2.3.  She  had an echocardiogram that demonstrated  normal left ventricular systolic function with moderate left atrial enlargement and moderate pulmonary hypertension with an estimated pulmonary artery pressure of 50 mmHg.      She had a nuclear test completed on November 13, 2020 which demonstrated normal perfusion of the left ventricle with normal left ventricular function and an ejection fraction of 54%. She has had  problems with dizziness, but her symptoms are not very frequent or well described and she really denies any other cardiovascular complaints. In the office today she once again says she is \"weak \". The weakness \"comes and goes \". .  She has occasional shortness of breath. She is particularly short of breath if she is tired. She has had no peripheral swelling. She has had some recent palpitations which she describes as \"heart quivers \". Plan: This lady continues to have some occasional intermittent weakness. She does  try to  stay physically active, particularly for someone 80years old. I would not be inclined to to aggressively control her blood pressure. Her systolic blood pressures are mildly elevated today at 150/74. Will order a 48-hour Holter monitor just to be certain she is having an intermittent arrhythmia. . We will follow up in 3 weeks.     PCP: Sandra Wong MD      Past Medical History: Diagnosis Date    Arthritis of right knee     CAD (coronary artery disease), native coronary artery October 2010    mild disease    Cardiac cath 10/21/2010    dLM 20%. mLAD 25%. CX mild. pRCA 30%. EF 30-35%. Anterolateral, apical , diaphrag akin. Hypercontractile basal segments. Severe elev left-sided filling press.  Cardiac echocardiogram 02/02/2011    EF 50-55%. Mild apical hypk. Gr 1 DDfx. RVSP 40-45. Mild LAE.  Cardiac Holter monitor 06/10/2015    Sinus rhythm w/avg HR of 53 bpm (range 40-85 bpm). Occasional PACs. One 3-beat run of nonsustained SVT. Very few PVCs. One 3-beat run of nonsustained VT. Pt's HR was < 50 bpm for 8 hrs of the recorded time. No pauses noted. Two episodes of neck pain corresponded to sinus rhythm w/o ectopy.  Cardiovascular renal duplex 12/03/2012    No renal artery stenosis bilaterally. Bilateral intrinsic/med disease. Patent bilateral renal veins. Multiple cysts on both kidneys.  CKD (chronic kidney disease)     while on diuretics for difficult to control HTN    Diabetes mellitus type II 3/15/2010    Dyslipidemia 3/15/2010    Heart attack ([address]     2010    Heart failure, diastolic, chronic ([address]     History of heart attack     HTN (hypertension) 3/15/2010    Hypertensive heart disease     Loss of appetite     Osteopenia 7/13/2010    Other dyspnea and respiratory abnormality     Ringing in the ears     Stress-induced cardiomyopathy     EF 35% (LV angio 10/2010), then EF 50-55% (echo, Feb 2011)    Wears glasses        Past Surgical History:   Procedure Laterality Date    HX BLADDER SUSPENSION  2009    HX HYSTERECTOMY  1962       Current Outpatient Medications   Medication Sig    amLODIPine (NORVASC) 10 mg tablet TAKE 1 TABLET BY MOUTH EVERY DAY    olmesartan (BENICAR) 20 mg tablet TAKE 1 TABLET BY MOUTH EVERY DAY    pregabalin (Lyrica) 75 mg capsule Take 1 Capsule by mouth two (2) times a day.  Max Daily Amount: 150 mg.   Lopez pravastatin (PRAVACHOL) 40 mg tablet Take 1 Tab by mouth daily.  nitroglycerin (NITROSTAT) 0.4 mg SL tablet 1 q 5 minutes x 3 prn chest pain    Rocklatan 0.02-0.005 % drop Administer 1 Drop to both eyes nightly.  calcium-cholecalciferol, d3, (CALCIUM 600 + D) 600-125 mg-unit tab Take  by mouth.  diclofenac (VOLTAREN) 1 % gel Apply  to affected area four (4) times daily.  RESTASIS 0.05 % ophthalmic emulsion Administer 1 Drop to both eyes every twelve (12) hours.  ascorbic acid (VITAMIN C) 500 mg tablet Take 1,000 mg by mouth daily.  CALCIUM PO Take 1,000 mg by mouth two (2) times a day.  Cholecalciferol, Vitamin D3, (VITAMIN D) 1,000 unit Cap Take 2,000 Units by mouth daily.  MULTIVITAMINS PO Take 1 Tab by mouth daily.  aspirin 81 mg chewable tablet Take 81 mg by mouth daily. No current facility-administered medications for this visit. Allergies   Allergen Reactions    Lipitor [Atorvastatin] Myalgia    Spironolactone Other (comments)     Dizziness and fatigue       Social History:   Social History     Tobacco Use    Smoking status: Never Smoker    Smokeless tobacco: Never Used   Substance Use Topics    Alcohol use: No         Family history: family history includes Arthritis-osteo in an other family member; Hypertension in her mother. Review of Systems:  Constitutional: Positive for fatigue. Negative for chills, fever and weight loss. Respiratory: Positive for shortness of breath. Negative for cough, sputum production and wheezing. Cardiovascular: Negative. Gastrointestinal: Positive for melena. Negative for abdominal pain, blood in stool, constipation, diarrhea, heartburn, nausea and vomiting. Musculoskeletal: Negative. Negative for back pain, falls, joint pain and neck pain. Neurological: Positive for dizziness.      Physical Exam:   The patient is an alert and oriented  Visit Vitals  BP (!) 150/72   Pulse 62   Ht 5' 1\" (1.549 m)   Wt 64.9 kg (143 lb) SpO2 98%   BMI 27.02 kg/m²      BP Readings from Last 3 Encounters:   10/19/21 (!) 150/72   05/26/21 (!) 171/71   05/17/21 (!) 164/62      Wt Readings from Last 3 Encounters:   10/19/21 64.9 kg (143 lb)   08/12/21 66.2 kg (146 lb)   06/24/21 67.3 kg (148 lb 6.4 oz)     HEENT: Conjuctiva white and mucosa moist  NECK: Supple without masses, tenderness or thyromegaly. There was no jugular venous distention. Carotid are without bruits  CHEST: Symmetrical with good excursion. LUNGS: Clear to auscultation   HEART: Regular rate and rhythm. . There is a normal S1 and S2. There is a grade 2/6 MINI along the LSB with radiation to the base and neck without appreciable diastolic murmurs, rubs, clicks, or gallops   ABDOMEN: Soft without masses, tenderness or organomegaly. EXTREMITIES: No  peripheral edema. INTEGUMENT: Skin is warm and dry   NEUROLOGICAL: The patient was oriented x3 with motor function grossly intact. Review of Data: See PMH and Cardiology and Imaging sections for cardiac testing  Lab Results   Component Value Date/Time    Cholesterol, total  231 (H) 10/21/2021 10:30 AM    HDL Cholesterol 64 (H) 10/21/2021 10:30 AM    LDL, calculated 148.8 (H) 10/21/2021 10:30 AM    Triglyceride 91 10/21/2021 10:30 AM    CHOL/HDL Ratio 3.6 10/21/2021 10:30 AM       Results for orders placed or performed in visit on 12/22/20   AMB POC EKG ROUTINE W/ 12 LEADS, INTER & REP     Status: None    Narrative    Normal sinus rhythm, rate 65.  1 PAC. Is ST-T flattening with early T wave inversions anterolaterally on the high lateral leads. Compared to the EKG of November 4, 2020 there was little interval change. Eual Rubinstein, MD F.A.C.C. [facility]  [facility]  [address] [place] [address]. [address] [place] [address]  [phone]    PLEASE NOTE:  This document has been produced using voice recognition software.  Unrecognized errors in transcription may be present.

## 2021-11-02 ENCOUNTER — TELEPHONE (OUTPATIENT)
Dept: CARDIOLOGY CLINIC | Age: 86
End: 2021-11-02

## 2021-11-02 NOTE — TELEPHONE ENCOUNTER
Reached out to daughter. She was able to get the monitor placed on her mother. She is to wear monitor for 48hours. Her appt is on 11/16, but may need to be r/s due to not getting results.

## 2021-11-02 NOTE — TELEPHONE ENCOUNTER
Patient's daughter called to office in regard to Holter Monitor. Daughter stated this was the 2nd attempt to get readings on the monitor and they were told to contact office to speak with staff. HBV patient.

## 2021-11-04 ENCOUNTER — OFFICE VISIT (OUTPATIENT)
Dept: ORTHOPEDIC SURGERY | Age: 86
End: 2021-11-04
Payer: MEDICARE

## 2021-11-04 VITALS — HEART RATE: 65 BPM | WEIGHT: 144.2 LBS | OXYGEN SATURATION: 97 % | TEMPERATURE: 97.8 F | BODY MASS INDEX: 27.25 KG/M2

## 2021-11-04 DIAGNOSIS — M48.062 SPINAL STENOSIS OF LUMBAR REGION WITH NEUROGENIC CLAUDICATION: Primary | ICD-10-CM

## 2021-11-04 PROCEDURE — 99213 OFFICE O/P EST LOW 20 MIN: CPT | Performed by: PHYSICAL MEDICINE & REHABILITATION

## 2021-11-04 PROCEDURE — 1090F PRES/ABSN URINE INCON ASSESS: CPT | Performed by: PHYSICAL MEDICINE & REHABILITATION

## 2021-11-04 PROCEDURE — G8417 CALC BMI ABV UP PARAM F/U: HCPCS | Performed by: PHYSICAL MEDICINE & REHABILITATION

## 2021-11-04 PROCEDURE — G8432 DEP SCR NOT DOC, RNG: HCPCS | Performed by: PHYSICAL MEDICINE & REHABILITATION

## 2021-11-04 PROCEDURE — G8427 DOCREV CUR MEDS BY ELIG CLIN: HCPCS | Performed by: PHYSICAL MEDICINE & REHABILITATION

## 2021-11-04 PROCEDURE — G8536 NO DOC ELDER MAL SCRN: HCPCS | Performed by: PHYSICAL MEDICINE & REHABILITATION

## 2021-11-04 PROCEDURE — 1101F PT FALLS ASSESS-DOCD LE1/YR: CPT | Performed by: PHYSICAL MEDICINE & REHABILITATION

## 2021-11-04 NOTE — H&P (VIEW-ONLY)
[address], [address]  [place], [address] [place] [address] Street  Phone: [phone]  Fax: [phone]        Evangelista Lindo  : 1923  PCP: Delaney Christine MD  2021    PROGRESS NOTE      HISTORY OF PRESENT ILLNESS  Karine Lopez is a 80 y.o. female who was seen as a new patient 10/31/19 with c/o low back pain with BLE paraesthesia x 6 weeks. Pt reports a heaviness in her BLE with standing and walking. She reports a limited standing/walking tolerance. She finds relief with sitting. She also c/o some lateral right thigh pain. She ambulates with a cane. Dr. Farideh Brown recommended she use a walker for walking longer distances. Pt also c/o left sided functional deficits. She denies h/o stroke. She was intolerant to GABAPENTIN (felt sick) so she was rx Topamax. She notes significant benefit from PT (19-19; [employer] her walking improved. She states that her lateral thigh symptoms have improved. She noted improvement, but continued to feel some weakness in her knees. She did not feel her symptoms warranted further treatment at the time. She ended up not doing the MRI due to claustrophobia and does not wish to proceed with one in the future. Pt reports increased pain in her bilateral knees. She reports a new pain in her bilateral shoulders and elbows (R>L) x 2 weeks. She has found some benefit from using [employer] topical cream. Pt notes that she exercises her arms daily, and it does not help her pain much. Her pain is worse in the afternoons. She is unable to do her hair because of limited shoulder/scapular ROM. She felt weakness in her legs, especially with walking. She feels better walking with the rollator walker. She has been maintaining an HEP with benefit. She reports pain in her thighs radiating into the BLE. Her symptoms improve with sitting, but she notes that she becomes stiff with prolonged sitting so she likes to stay moving.  She completed PT about 1 month ago with some improvement, but continues to feel weak in her legs. She also c/o bilateral elbow pain. She was intolerant to [address] secondary to dizziness. Her daughter noted that she takes an excessive amount of Advil. I advised against this given her CKD. Lumbar Spine CT dated 7/9/21 reviewed. Per report, Osteopenia limits evaluation of bony detail. No obvious acute fracture. Notable lower lumbar degenerative changes characterized by severe facet arthropathy, grade 1-2 anterolisthesis at L4-L5 and L5-S1, and notable disc herniations at L3-S1. Severe spinal canal stenosis at L4-L5, and mild to moderate spinal canal stenosis at L3-L4 and L5-S1. L4-L5 and L5-S1 mild to moderate foraminal stenoses with abutment of the exiting nerve roots. See level by level details above. Advanced SI joint degenerative changes noted. Sigmoid diverticulosis, bilateral renal cysts, and atherosclerosis incidentally noted. Ema Gomes comes in to the office today for f/u. She is interested in discussing the option of an PAT. She continues to have low back pain radiating into the BLE with a limited standing tolerance. Pain Score: 10/10. PmHx: HTN, CAD, cardiomyopathy, DM, CKD stage 3  Medications tried: GABAPENTIN, TOPAMAX, NORTRIPTYLINE, LYRICA    ASSESSMENT  Ema Gomes is a 80 y.o. female with stenotic-type symptoms. Her low back and BLE symptoms are likely due to lumbar spinal stenosis with neurogenic claudication (severe L4-5 seen on CT).  This has not been confirmed with MRI as patient is claustrophobic, but suggested on CT. PLAN  1. L5-S1 PAT    Pt will f/u in 2-4 weeks after injection or sooner as needed. Diagnoses and all orders for this visit:    1.  Spinal stenosis of lumbar region with neurogenic claudication         PAST MEDICAL HISTORY   Past Medical History:   Diagnosis Date    Arthritis of right knee     CAD (coronary artery disease), native coronary artery October 2010 mild disease    Cardiac cath 10/21/2010    dLM 20%. mLAD 25%. CX mild. pRCA 30%. EF 30-35%. Anterolateral, apical , diaphrag akin. Hypercontractile basal segments. Severe elev left-sided filling press.  Cardiac echocardiogram 02/02/2011    EF 50-55%. Mild apical hypk. Gr 1 DDfx. RVSP 40-45. Mild LAE.  Cardiac Holter monitor 06/10/2015    Sinus rhythm w/avg HR of 53 bpm (range 40-85 bpm). Occasional PACs. One 3-beat run of nonsustained SVT. Very few PVCs. One 3-beat run of nonsustained VT. Pt's HR was < 50 bpm for 8 hrs of the recorded time. No pauses noted. Two episodes of neck pain corresponded to sinus rhythm w/o ectopy.  Cardiovascular renal duplex 12/03/2012    No renal artery stenosis bilaterally. Bilateral intrinsic/med disease. Patent bilateral renal veins. Multiple cysts on both kidneys.  CKD (chronic kidney disease)     while on diuretics for difficult to control HTN    Diabetes mellitus type II 3/15/2010    Dyslipidemia 3/15/2010    Heart attack ([address]     2010    Heart failure, diastolic, chronic ([address]     History of heart attack     HTN (hypertension) 3/15/2010    Hypertensive heart disease     Loss of appetite     Osteopenia 7/13/2010    Other dyspnea and respiratory abnormality     Ringing in the ears     Stress-induced cardiomyopathy     EF 35% (LV angio 10/2010), then EF 50-55% (echo, Feb 2011)    Wears glasses        Past Surgical History:   Procedure Laterality Date    HX BLADDER SUSPENSION  2009    HX HYSTERECTOMY  1962   . MEDICATIONS    Current Outpatient Medications   Medication Sig Dispense Refill    amLODIPine (NORVASC) 10 mg tablet TAKE 1 TABLET BY MOUTH EVERY DAY 90 Tablet 3    olmesartan (BENICAR) 20 mg tablet TAKE 1 TABLET BY MOUTH EVERY DAY 90 Tablet 3    pregabalin (Lyrica) 75 mg capsule Take 1 Capsule by mouth two (2) times a day.  Max Daily Amount: 150 mg. 60 Capsule 2    pravastatin (PRAVACHOL) 40 mg tablet Take 1 Tab by mouth daily. 90 Tab 3    nitroglycerin (NITROSTAT) 0.4 mg SL tablet 1 q 5 minutes x 3 prn chest pain 1 Bottle 1    Rocklatan 0.02-0.005 % drop Administer 1 Drop to both eyes nightly.  calcium-cholecalciferol, d3, (CALCIUM 600 + D) 600-125 mg-unit tab Take  by mouth.  diclofenac (VOLTAREN) 1 % gel Apply  to affected area four (4) times daily. 100 g 4    RESTASIS 0.05 % ophthalmic emulsion Administer 1 Drop to both eyes every twelve (12) hours.  ascorbic acid (VITAMIN C) 500 mg tablet Take 1,000 mg by mouth daily.  CALCIUM PO Take 1,000 mg by mouth two (2) times a day.  Cholecalciferol, Vitamin D3, (VITAMIN D) 1,000 unit Cap Take 2,000 Units by mouth daily. 180 Cap 3    MULTIVITAMINS PO Take 1 Tab by mouth daily.  aspirin 81 mg chewable tablet Take 81 mg by mouth daily. ALLERGIES  Allergies   Allergen Reactions    Lipitor [Atorvastatin] Myalgia    Spironolactone Other (comments)     Dizziness and fatigue          SOCIAL HISTORY    Social History     Socioeconomic History    Marital status:    Tobacco Use    Smoking status: Never Smoker    Smokeless tobacco: Never Used   Substance and Sexual Activity    Alcohol use: No    Drug use: No    Sexual activity: Never       FAMILY HISTORY  Family History   Problem Relation Age of Onset    Hypertension Mother     Arthritis-osteo Other          REVIEW OF SYSTEMS  Review of Systems   Constitutional: Negative for chills, fever and weight loss. Respiratory: Negative for shortness of breath. Cardiovascular: Negative for chest pain. Gastrointestinal: Negative for constipation. Negative for fecal incontinence    Genitourinary: Negative for dysuria. Negative for urinary incontinence   Musculoskeletal: Positive for back pain. Skin: Negative for rash. Neurological: Positive for tingling ( BLE). Negative for dizziness, tremors, focal weakness and headaches. Endo/Heme/Allergies: Does not bruise/bleed easily. Psychiatric/Behavioral: The patient does not have insomnia. PHYSICAL EXAMINATION  Visit Vitals  Pulse 65   Temp 97.8 °F (36.6 °C) (Temporal)   Wt 144 lb 3.2 oz (65.4 kg)   SpO2 97%   BMI 27.25 kg/m²       Pain Assessment  11/4/2021   Location of Pain Back;Hip;Leg   Location Modifiers Right;Left   Severity of Pain 10   Quality of Pain Other (Comment)   Quality of Pain Comment -   Duration of Pain Other (Comment)   Frequency of Pain Intermittent   Aggravating Factors Other (Comment)   Aggravating Factors Comment -   Limiting Behavior Some   Relieving Factors Other (Comment)   Relieving Factors Comment topical cream   Result of Injury -           Constitutional:  Well developed, well nourished, in no acute distress. Psychiatric: Affect and mood are appropriate. Integumentary: No rashes or abrasions noted on exposed areas. SPINE/MUSCULOSKELETAL EXAM    Cervical spine:  Neck is midline. Normal muscle tone. No focal atrophy is noted. ROM pain free. Shoulder ROM intact. No tenderness to palpation. Negative Spurling's sign. Negative Tinel's sign. Negative Coelho's sign.                                                                                                                             Sensation in the bilateral arms grossly intact to light touch.      Ulnar deviation of fingers on the left  [place] neck deformities on the left     No clear myelopathic or UMN signs     Lumbar spine:  No rash, ecchymosis, or gross obliquity. No fasciculations. No focal atrophy is noted. No pain with hip ROM. Full range of motion. No tenderness to palpation. No tenderness to palpation at the sciatic notch. SI joints non-tender. Trochanters non tender.     Sensation in the bilateral legs grossly intact to light touch.     Updates from 12/12/19:   Full ROM.    Tenderness to palpation of bilateral trochanters.      Updates 7/28/2020:  No pain with neck ROM  Negative Brownlee sign bilaterally Pain with scapular retraction. No tenderness to palpation of cervicothoracic region       MOTOR:      Biceps  Triceps Deltoids Wrist Ext Wrist Flex Hand Intrin   Right 5/5 5/5 5/5 5/5 5/5 5/5   Left 5/5 5/5 5/5 5/5 5/5 5/5             Hip Flex  Quads Hamstrings Ankle DF EHL Ankle PF   Right 5/5 5/5 5/5 5/5 5/5 5/5   Left 5/5 5/5 5/5 5/5 5/5 5/5     DTRs are 1+ biceps, triceps, brachioradialis, patella, and Achilles.     Negative Straight Leg raise. Squat not tested. No difficulty with tandem gait.      Ambulation with rollator walker. FWB.       RADIOGRAPHS  Lumbar Spine CT images taken on 7/9/2021 personally reviewed with patient:  General: Osteopenia limits evaluation of bony detail. Mild disc space loss and  endplate spurring at P6-V2, L4-L5, and L5-S1. Grade 1-2 anterolisthesis L4-L5  and L5-S1. Lumbar lordosis maintained. No definite acute fracture identified. Multilevel spinous process abutment with associated degenerative changes. Lower  lumbar severe facet arthropathy. No definite acute fracture identified. Advanced  bilateral SI joint degenerative changes.     Miscellaneous: Bilateral renal cysts, right more and left. Sigmoid  diverticulosis. Notable atherosclerosis.     Levels:     T12-L1, L1-L2: No significant disc disease or stenosis. Mild facet arthropathy.     L2-L3: Mild disc bulge. Mild facet arthropathy. No significant stenosis.     L3-L4: Mild to moderate disc bulge. Mild facet arthropathy. Mild to moderate  spinal canal stenosis. Mild foraminal stenoses.     L4-L5: Grade 1-2 anterolisthesis with uncovering of disc and mild to moderate  disc bulge. Severe facet arthropathy with ligamentum flavum bulge. Severe spinal  canal stenosis. Mild to moderate foraminal stenoses with mild abutment of the  exiting L4 nerves.     L5-S1: Grade 1-2 anterolisthesis with uncovering of disc and mild to moderate  disc bulge. Severe facet arthropathy. Mild to moderate spinal canal stenosis.   Mild to moderate left greater and right foraminal stenoses with mild abutment of  the exiting L5 nerves.     Imaged sacrum: Sacral canal unremarkable. Advanced SI joint degenerative  changes.     IMPRESSION     Osteopenia limits evaluation of bony detail. No obvious acute fracture.     Notable lower lumbar degenerative changes characterized by severe facet  arthropathy, grade 1-2 anterolisthesis at L4-L5 and L5-S1, and notable disc  herniations at L3-S1.  -Severe spinal canal stenosis at L4-L5, and mild to moderate spinal canal  stenosis at L3-L4 and L5-S1.  -L4-L5 and L5-S1 mild to moderate foraminal stenoses with abutment of the  exiting nerve roots.     See level by level details above.     Advanced SI joint degenerative changes noted. Sigmoid diverticulosis, bilateral  renal cysts, and atherosclerosis incidentally noted. Lumbar XR images taken on 10/31/19 personally reviewed with patient:  Facet sclerosis at lower levels  Significant Grade 2 anterolisthesis of L5 on S1 and L4 on L5   Atherosclerosis of aorta  No obvious compression fractures    12 minutes of face-to-face contact were spent with the patient during today's visit extensively discussing symptoms and treatment plan. All questions were answered. More than half of this visit today was spent on counseling.      Written by gIor Sanchez as dictated by Kristen Smith MD

## 2021-11-04 NOTE — PROGRESS NOTES
[address]  [address], [address]  [place], [facility]  Phone: [phone]  Fax: [phone]        Dany Diver  : 1923  PCP: Kierra Ling MD  2021    PROGRESS NOTE      HISTORY OF PRESENT ILLNESS  Catrachito Lauren is a 80 y.o. female who was seen as a new patient 10/31/19 with c/o low back pain with BLE paraesthesia x 6 weeks. Pt reports a heaviness in her BLE with standing and walking. She reports a limited standing/walking tolerance. She finds relief with sitting. She also c/o some lateral right thigh pain. She ambulates with a cane. Dr. Ashely Roberson recommended she use a walker for walking longer distances. Pt also c/o left sided functional deficits. She denies h/o stroke. She was intolerant to GABAPENTIN (felt sick) so she was rx Topamax. She notes significant benefit from PT (19-19; [employer] her walking improved. She states that her lateral thigh symptoms have improved. She noted improvement, but continued to feel some weakness in her knees. She did not feel her symptoms warranted further treatment at the time. She ended up not doing the MRI due to claustrophobia and does not wish to proceed with one in the future. Pt reports increased pain in her bilateral knees. She reports a new pain in her bilateral shoulders and elbows (R>L) x 2 weeks. She has found some benefit from using [employer] topical cream. Pt notes that she exercises her arms daily, and it does not help her pain much. Her pain is worse in the afternoons. She is unable to do her hair because of limited shoulder/scapular ROM. She felt weakness in her legs, especially with walking. She feels better walking with the rollator walker. She has been maintaining an HEP with benefit. She reports pain in her thighs radiating into the BLE. Her symptoms improve with sitting, but she notes that she becomes stiff with prolonged sitting so she likes to stay moving.  She completed PT about 1 month ago with some improvement, but continues to feel weak in her legs. She also c/o bilateral elbow pain. She was intolerant to [address] secondary to dizziness. Her daughter noted that she takes an excessive amount of Advil. I advised against this given her CKD. Lumbar Spine CT dated 7/9/21 reviewed. Per report, Osteopenia limits evaluation of bony detail. No obvious acute fracture. Notable lower lumbar degenerative changes characterized by severe facet arthropathy, grade 1-2 anterolisthesis at L4-L5 and L5-S1, and notable disc herniations at L3-S1. Severe spinal canal stenosis at L4-L5, and mild to moderate spinal canal stenosis at L3-L4 and L5-S1. L4-L5 and L5-S1 mild to moderate foraminal stenoses with abutment of the exiting nerve roots. See level by level details above. Advanced SI joint degenerative changes noted. Sigmoid diverticulosis, bilateral renal cysts, and atherosclerosis incidentally noted. Bouchra Mcduffie comes in to the office today for f/u. She is interested in discussing the option of an PAT. She continues to have low back pain radiating into the BLE with a limited standing tolerance. Pain Score: 10/10. PmHx: HTN, CAD, cardiomyopathy, DM, CKD stage 3  Medications tried: GABAPENTIN, TOPAMAX, NORTRIPTYLINE, LYRICA    ASSESSMENT  Bouchra Mcduffie is a 80 y.o. female with stenotic-type symptoms. Her low back and BLE symptoms are likely due to lumbar spinal stenosis with neurogenic claudication (severe L4-5 seen on CT).  This has not been confirmed with MRI as patient is claustrophobic, but suggested on CT. PLAN  1. L5-S1 PAT    Pt will f/u in 2-4 weeks after injection or sooner as needed. Diagnoses and all orders for this visit:    1.  Spinal stenosis of lumbar region with neurogenic claudication         PAST MEDICAL HISTORY   Past Medical History:   Diagnosis Date    Arthritis of right knee     CAD (coronary artery disease), native coronary artery October 2010 mild disease    Cardiac cath 10/21/2010    dLM 20%. mLAD 25%. CX mild. pRCA 30%. EF 30-35%. Anterolateral, apical , diaphrag akin. Hypercontractile basal segments. Severe elev left-sided filling press.  Cardiac echocardiogram 02/02/2011    EF 50-55%. Mild apical hypk. Gr 1 DDfx. RVSP 40-45. Mild LAE.  Cardiac Holter monitor 06/10/2015    Sinus rhythm w/avg HR of 53 bpm (range 40-85 bpm). Occasional PACs. One 3-beat run of nonsustained SVT. Very few PVCs. One 3-beat run of nonsustained VT. Pt's HR was < 50 bpm for 8 hrs of the recorded time. No pauses noted. Two episodes of neck pain corresponded to sinus rhythm w/o ectopy.  Cardiovascular renal duplex 12/03/2012    No renal artery stenosis bilaterally. Bilateral intrinsic/med disease. Patent bilateral renal veins. Multiple cysts on both kidneys.  CKD (chronic kidney disease)     while on diuretics for difficult to control HTN    Diabetes mellitus type II 3/15/2010    Dyslipidemia 3/15/2010    Heart attack ([address]     2010    Heart failure, diastolic, chronic ([address]     History of heart attack     HTN (hypertension) 3/15/2010    Hypertensive heart disease     Loss of appetite     Osteopenia 7/13/2010    Other dyspnea and respiratory abnormality     Ringing in the ears     Stress-induced cardiomyopathy     EF 35% (LV angio 10/2010), then EF 50-55% (echo, Feb 2011)    Wears glasses        Past Surgical History:   Procedure Laterality Date    HX BLADDER SUSPENSION  2009    HX HYSTERECTOMY  1962   . MEDICATIONS    Current Outpatient Medications   Medication Sig Dispense Refill    amLODIPine (NORVASC) 10 mg tablet TAKE 1 TABLET BY MOUTH EVERY DAY 90 Tablet 3    olmesartan (BENICAR) 20 mg tablet TAKE 1 TABLET BY MOUTH EVERY DAY 90 Tablet 3    pregabalin (Lyrica) 75 mg capsule Take 1 Capsule by mouth two (2) times a day.  Max Daily Amount: 150 mg. 60 Capsule 2    pravastatin (PRAVACHOL) 40 mg tablet Take 1 Tab by mouth daily. 90 Tab 3    nitroglycerin (NITROSTAT) 0.4 mg SL tablet 1 q 5 minutes x 3 prn chest pain 1 Bottle 1    Rocklatan 0.02-0.005 % drop Administer 1 Drop to both eyes nightly.  calcium-cholecalciferol, d3, (CALCIUM 600 + D) 600-125 mg-unit tab Take  by mouth.  diclofenac (VOLTAREN) 1 % gel Apply  to affected area four (4) times daily. 100 g 4    RESTASIS 0.05 % ophthalmic emulsion Administer 1 Drop to both eyes every twelve (12) hours.  ascorbic acid (VITAMIN C) 500 mg tablet Take 1,000 mg by mouth daily.  CALCIUM PO Take 1,000 mg by mouth two (2) times a day.  Cholecalciferol, Vitamin D3, (VITAMIN D) 1,000 unit Cap Take 2,000 Units by mouth daily. 180 Cap 3    MULTIVITAMINS PO Take 1 Tab by mouth daily.  aspirin 81 mg chewable tablet Take 81 mg by mouth daily. ALLERGIES  Allergies   Allergen Reactions    Lipitor [Atorvastatin] Myalgia    Spironolactone Other (comments)     Dizziness and fatigue          SOCIAL HISTORY    Social History     Socioeconomic History    Marital status:    Tobacco Use    Smoking status: Never Smoker    Smokeless tobacco: Never Used   Substance and Sexual Activity    Alcohol use: No    Drug use: No    Sexual activity: Never       FAMILY HISTORY  Family History   Problem Relation Age of Onset    Hypertension Mother     Arthritis-osteo Other          REVIEW OF SYSTEMS  Review of Systems   Constitutional: Negative for chills, fever and weight loss. Respiratory: Negative for shortness of breath. Cardiovascular: Negative for chest pain. Gastrointestinal: Negative for constipation. Negative for fecal incontinence    Genitourinary: Negative for dysuria. Negative for urinary incontinence   Musculoskeletal: Positive for back pain. Skin: Negative for rash. Neurological: Positive for tingling ( BLE). Negative for dizziness, tremors, focal weakness and headaches. Endo/Heme/Allergies: Does not bruise/bleed easily. Psychiatric/Behavioral: The patient does not have insomnia. PHYSICAL EXAMINATION  Visit Vitals  Pulse 65   Temp 97.8 °F (36.6 °C) (Temporal)   Wt 144 lb 3.2 oz (65.4 kg)   SpO2 97%   BMI 27.25 kg/m²       Pain Assessment  11/4/2021   Location of Pain Back;Hip;Leg   Location Modifiers Right;Left   Severity of Pain 10   Quality of Pain Other (Comment)   Quality of Pain Comment -   Duration of Pain Other (Comment)   Frequency of Pain Intermittent   Aggravating Factors Other (Comment)   Aggravating Factors Comment -   Limiting Behavior Some   Relieving Factors Other (Comment)   Relieving Factors Comment topical cream   Result of Injury -           Constitutional:  Well developed, well nourished, in no acute distress. Psychiatric: Affect and mood are appropriate. Integumentary: No rashes or abrasions noted on exposed areas. SPINE/MUSCULOSKELETAL EXAM    Cervical spine:  Neck is midline. Normal muscle tone. No focal atrophy is noted. ROM pain free. Shoulder ROM intact. No tenderness to palpation. Negative Spurling's sign. Negative Tinel's sign. Negative Coelho's sign.                                                                                                                             Sensation in the bilateral arms grossly intact to light touch.      Ulnar deviation of fingers on the left  [place] neck deformities on the left     No clear myelopathic or UMN signs     Lumbar spine:  No rash, ecchymosis, or gross obliquity. No fasciculations. No focal atrophy is noted. No pain with hip ROM. Full range of motion. No tenderness to palpation. No tenderness to palpation at the sciatic notch. SI joints non-tender. Trochanters non tender.     Sensation in the bilateral legs grossly intact to light touch.     Updates from 12/12/19:   Full ROM.    Tenderness to palpation of bilateral trochanters.      Updates 7/28/2020:  No pain with neck ROM  Negative Brownlee sign bilaterally Pain with scapular retraction. No tenderness to palpation of cervicothoracic region       MOTOR:      Biceps  Triceps Deltoids Wrist Ext Wrist Flex Hand Intrin   Right 5/5 5/5 5/5 5/5 5/5 5/5   Left 5/5 5/5 5/5 5/5 5/5 5/5             Hip Flex  Quads Hamstrings Ankle DF EHL Ankle PF   Right 5/5 5/5 5/5 5/5 5/5 5/5   Left 5/5 5/5 5/5 5/5 5/5 5/5     DTRs are 1+ biceps, triceps, brachioradialis, patella, and Achilles.     Negative Straight Leg raise. Squat not tested. No difficulty with tandem gait.      Ambulation with rollator walker. FWB.       RADIOGRAPHS  Lumbar Spine CT images taken on 7/9/2021 personally reviewed with patient:  General: Osteopenia limits evaluation of bony detail. Mild disc space loss and  endplate spurring at N2-C0, L4-L5, and L5-S1. Grade 1-2 anterolisthesis L4-L5  and L5-S1. Lumbar lordosis maintained. No definite acute fracture identified. Multilevel spinous process abutment with associated degenerative changes. Lower  lumbar severe facet arthropathy. No definite acute fracture identified. Advanced  bilateral SI joint degenerative changes.     Miscellaneous: Bilateral renal cysts, right more and left. Sigmoid  diverticulosis. Notable atherosclerosis.     Levels:     T12-L1, L1-L2: No significant disc disease or stenosis. Mild facet arthropathy.     L2-L3: Mild disc bulge. Mild facet arthropathy. No significant stenosis.     L3-L4: Mild to moderate disc bulge. Mild facet arthropathy. Mild to moderate  spinal canal stenosis. Mild foraminal stenoses.     L4-L5: Grade 1-2 anterolisthesis with uncovering of disc and mild to moderate  disc bulge. Severe facet arthropathy with ligamentum flavum bulge. Severe spinal  canal stenosis. Mild to moderate foraminal stenoses with mild abutment of the  exiting L4 nerves.     L5-S1: Grade 1-2 anterolisthesis with uncovering of disc and mild to moderate  disc bulge. Severe facet arthropathy. Mild to moderate spinal canal stenosis.   Mild to moderate left greater and right foraminal stenoses with mild abutment of  the exiting L5 nerves.     Imaged sacrum: Sacral canal unremarkable. Advanced SI joint degenerative  changes.     IMPRESSION     Osteopenia limits evaluation of bony detail. No obvious acute fracture.     Notable lower lumbar degenerative changes characterized by severe facet  arthropathy, grade 1-2 anterolisthesis at L4-L5 and L5-S1, and notable disc  herniations at L3-S1.  -Severe spinal canal stenosis at L4-L5, and mild to moderate spinal canal  stenosis at L3-L4 and L5-S1.  -L4-L5 and L5-S1 mild to moderate foraminal stenoses with abutment of the  exiting nerve roots.     See level by level details above.     Advanced SI joint degenerative changes noted. Sigmoid diverticulosis, bilateral  renal cysts, and atherosclerosis incidentally noted. Lumbar XR images taken on 10/31/19 personally reviewed with patient:  Facet sclerosis at lower levels  Significant Grade 2 anterolisthesis of L5 on S1 and L4 on L5   Atherosclerosis of aorta  No obvious compression fractures    12 minutes of face-to-face contact were spent with the patient during today's visit extensively discussing symptoms and treatment plan. All questions were answered. More than half of this visit today was spent on counseling.      Written by Anand Justice as dictated by Moira Brock MD

## 2021-11-04 NOTE — PATIENT INSTRUCTIONS
Learning About Lumbar Epidural Steroid Injections  What is a lumbar epidural steroid injection? A lumbar epidural injection is a shot into the epidural spacethe area in your back around the spinal cord. The shot may help reduce pain, tingling, or numbness in your back, buttock, or leg. The shot may have a steroid to reduce pain and swelling and a local anesthetic to numb nerves. How is a lumbar epidural steroid injection done? The doctor may use an imaging test before or during your injection. This can be an MRI, a CT scan, or an X-ray. These tests can show where your nerve problems are. After finding the right spot, the doctor may inject a numbing medicine into the skin where you will get the steroid injection. Then he or she puts the needle for the steroid into the numbed area. You may feel some pressure. You could feel some stinging or burning during the injection. How long does an epidural steroid injection take? It takes about 10 to 15 minutes to get this injection. You will probably go home about 20 to 30 minutes after you get it. What can you expect after a lumbar epidural steroid injection? If your injection had local anesthetic and a steroid, your legs may feel heavy or numb right after. You will probably be able to walk. But you may need to be extra careful. Take care not to lose your balance, and be sure to follow your doctor's instructions. If your injection contained local anesthetic, you may feel better right away. But this pain relief will last only a few hours. Your pain will probably return. This is because the steroids have not started working yet. Before the steroids start to work, your back may be sore for a few days. These injections don't always work. When they do, it takes 1 to 5 days. This pain relief can last for several days to a few months or longer. You may want to do less than normal for a few days. But you may also be able to return to your daily routine.   Some people are dizzy or feel sick to their stomach after getting this injection. These symptoms usually don't last very long. If your pain is better, you may be able to keep doing your normal activities or physical therapy. But try not to overdo it, even if your back pain has improved a lot. If your pain is only a little better or if it comes back, your doctor may recommend another injection in a few weeks. If your pain has not changed, talk to your doctor about other treatment choices. Follow-up care is a key part of your treatment and safety. Be sure to make and go to all appointments, and call your doctor if you are having problems. It's also a good idea to know your test results and keep a list of the medicines you take. Where can you learn more? Go to http://www.gray.com/  Enter H162 in the search box to learn more about \"Learning About Lumbar Epidural Steroid Injections. \"  Current as of: July 1, 2021               Content Version: 13.0  © 2006-2021 [employer]. Care instructions adapted under license by [employer] (which disclaims liability or warranty for this information). If you have questions about a medical condition or this instruction, always ask your healthcare professional. [place] [address] any warranty or liability for your use of this information.

## 2021-11-16 ENCOUNTER — OFFICE VISIT (OUTPATIENT)
Dept: CARDIOLOGY CLINIC | Age: 86
End: 2021-11-16
Payer: MEDICARE

## 2021-11-16 VITALS
DIASTOLIC BLOOD PRESSURE: 80 MMHG | WEIGHT: 142 LBS | BODY MASS INDEX: 26.81 KG/M2 | HEART RATE: 66 BPM | HEIGHT: 61 IN | SYSTOLIC BLOOD PRESSURE: 152 MMHG

## 2021-11-16 DIAGNOSIS — I10 ESSENTIAL HYPERTENSION: Primary | ICD-10-CM

## 2021-11-16 PROCEDURE — 1101F PT FALLS ASSESS-DOCD LE1/YR: CPT | Performed by: INTERNAL MEDICINE

## 2021-11-16 PROCEDURE — G8417 CALC BMI ABV UP PARAM F/U: HCPCS | Performed by: INTERNAL MEDICINE

## 2021-11-16 PROCEDURE — G8427 DOCREV CUR MEDS BY ELIG CLIN: HCPCS | Performed by: INTERNAL MEDICINE

## 2021-11-16 PROCEDURE — G8510 SCR DEP NEG, NO PLAN REQD: HCPCS | Performed by: INTERNAL MEDICINE

## 2021-11-16 PROCEDURE — G8536 NO DOC ELDER MAL SCRN: HCPCS | Performed by: INTERNAL MEDICINE

## 2021-11-16 PROCEDURE — 99214 OFFICE O/P EST MOD 30 MIN: CPT | Performed by: INTERNAL MEDICINE

## 2021-11-16 PROCEDURE — 1090F PRES/ABSN URINE INCON ASSESS: CPT | Performed by: INTERNAL MEDICINE

## 2021-11-16 NOTE — PROGRESS NOTES
Catrachitoleah Lauren presents today for   Chief Complaint   Patient presents with    Follow-up     3 weeks        Catrachito Lauren preferred language for health care discussion is english/other. Is someone accompanying this pt? daughter    Is the patient using any DME equipment during OV? Walker     Depression Screening:  3 most recent PHQ Screens 11/16/2021   PHQ Not Done -   Little interest or pleasure in doing things Not at all   Feeling down, depressed, irritable, or hopeless Not at all   Total Score PHQ 2 0       Learning Assessment:  Learning Assessment 8/13/2019   PRIMARY LEARNER Patient   HIGHEST LEVEL OF EDUCATION - PRIMARY LEARNER  GRADUATED HIGH SCHOOL OR GED   BARRIERS PRIMARY LEARNER NONE   CO-LEARNER CAREGIVER No   PRIMARY LANGUAGE ENGLISH   LEARNER PREFERENCE PRIMARY LISTENING   ANSWERED BY pt   RELATIONSHIP SELF       Abuse Screening:  Abuse Screening Questionnaire 5/17/2021   Do you ever feel afraid of your partner? N   Are you in a relationship with someone who physically or mentally threatens you? N   Is it safe for you to go home? Y       Fall Risk  Fall Risk Assessment, last 12 mths 11/16/2021   Able to walk? Yes   Fall in past 12 months? 0   Do you feel unsteady? 0   Are you worried about falling 0       Pt currently taking Anticoagulant therapy? ASA 81mg every day     Coordination of Care:  1. Have you been to the ER, urgent care clinic since your last visit? Hospitalized since your last visit? No     2. Have you seen or consulted any other health care providers outside of the [address] [place] [address] since your last visit? Include any pap smears or colon screening.  no

## 2021-11-18 ENCOUNTER — OFFICE VISIT (OUTPATIENT)
Dept: INTERNAL MEDICINE CLINIC | Age: 86
End: 2021-11-18
Payer: MEDICARE

## 2021-11-18 VITALS
SYSTOLIC BLOOD PRESSURE: 152 MMHG | HEIGHT: 61 IN | RESPIRATION RATE: 18 BRPM | HEART RATE: 57 BPM | BODY MASS INDEX: 27 KG/M2 | DIASTOLIC BLOOD PRESSURE: 60 MMHG | TEMPERATURE: 98.1 F | OXYGEN SATURATION: 97 % | WEIGHT: 143 LBS

## 2021-11-18 DIAGNOSIS — E11.22 TYPE 2 DIABETES MELLITUS WITH STAGE 3A CHRONIC KIDNEY DISEASE, WITHOUT LONG-TERM CURRENT USE OF INSULIN (HCC): ICD-10-CM

## 2021-11-18 DIAGNOSIS — E78.5 DYSLIPIDEMIA: ICD-10-CM

## 2021-11-18 DIAGNOSIS — Z00.00 MEDICARE ANNUAL WELLNESS VISIT, SUBSEQUENT: Primary | ICD-10-CM

## 2021-11-18 DIAGNOSIS — I11.0 HYPERTENSIVE HEART DISEASE WITH HEART FAILURE (HCC): ICD-10-CM

## 2021-11-18 DIAGNOSIS — I10 ESSENTIAL HYPERTENSION: ICD-10-CM

## 2021-11-18 DIAGNOSIS — M79.10 MYALGIA DUE TO STATIN: ICD-10-CM

## 2021-11-18 DIAGNOSIS — Z23 NEEDS FLU SHOT: ICD-10-CM

## 2021-11-18 DIAGNOSIS — N18.31 TYPE 2 DIABETES MELLITUS WITH STAGE 3A CHRONIC KIDNEY DISEASE, WITHOUT LONG-TERM CURRENT USE OF INSULIN (HCC): ICD-10-CM

## 2021-11-18 DIAGNOSIS — T46.6X5A MYALGIA DUE TO STATIN: ICD-10-CM

## 2021-11-18 PROCEDURE — G0463 HOSPITAL OUTPT CLINIC VISIT: HCPCS | Performed by: INTERNAL MEDICINE

## 2021-11-18 PROCEDURE — G8417 CALC BMI ABV UP PARAM F/U: HCPCS | Performed by: INTERNAL MEDICINE

## 2021-11-18 PROCEDURE — 99214 OFFICE O/P EST MOD 30 MIN: CPT | Performed by: INTERNAL MEDICINE

## 2021-11-18 PROCEDURE — 90694 VACC AIIV4 NO PRSRV 0.5ML IM: CPT | Performed by: INTERNAL MEDICINE

## 2021-11-18 PROCEDURE — G0439 PPPS, SUBSEQ VISIT: HCPCS | Performed by: INTERNAL MEDICINE

## 2021-11-18 PROCEDURE — G8427 DOCREV CUR MEDS BY ELIG CLIN: HCPCS | Performed by: INTERNAL MEDICINE

## 2021-11-18 PROCEDURE — G8510 SCR DEP NEG, NO PLAN REQD: HCPCS | Performed by: INTERNAL MEDICINE

## 2021-11-18 PROCEDURE — G8536 NO DOC ELDER MAL SCRN: HCPCS | Performed by: INTERNAL MEDICINE

## 2021-11-18 PROCEDURE — 1101F PT FALLS ASSESS-DOCD LE1/YR: CPT | Performed by: INTERNAL MEDICINE

## 2021-11-18 PROCEDURE — 1090F PRES/ABSN URINE INCON ASSESS: CPT | Performed by: INTERNAL MEDICINE

## 2021-11-18 NOTE — PROGRESS NOTES
Subjective:       Chief Complaint  The patient presents for follow up of diabetes, hypertension and high cholesterol. HPI  Dottie Alfonso is a 80 y.o. female seen for follow up of diabetes. Shealso has hypertension and hyperlipidemia. Diabetes well controlled off medications for now,  hypertension  Borderline controlled , no significant medication side effects noted, on current meds, which include Benicar 20 mg, Norvasc 10 mg, patient daughter will try  to monitor at home, compliance may be an issue with this 19-year-old lady. Hyperlipidemia, borderline controlled on Pravachol 40 mg. Pt does have some myalgias/weakness in her legs which could be from the statin especially in 80year-old. Discussed with daughter tostop statin since risks do not out weight benefits at 80 yrs old. Pt is getting a spinal injection for her back pain/sciatica. Diet and Lifestyle: generally follows a low fat low cholesterol diet, follows a diabetic diet regularly, sedentary    Home BP Monitoring: is not monitored at home on a regular basis    Diabetic Review of Systems - home glucose monitoring: is well controlled at home when she takes it 1x/day    Other symptoms and concerns: Pt's CKD stage 3 is stable on current meds. Patient has palpitations that bother her at times. She has been taken off of beta-blockers by her cardiologist because of low heart rate. Patient is also followed by cardiology for hypertensive heart disease. Pt has chronic diastolic heart failure that is followed by cardiology and controlled on current meds. Pt has generalized arthritis for which she uses aleve with mild improvement.        Current Outpatient Medications   Medication Sig    amLODIPine (NORVASC) 10 mg tablet TAKE 1 TABLET BY MOUTH EVERY DAY    olmesartan (BENICAR) 20 mg tablet TAKE 1 TABLET BY MOUTH EVERY DAY    nitroglycerin (NITROSTAT) 0.4 mg SL tablet 1 q 5 minutes x 3 prn chest pain    Rocklatan 0.02-0.005 % drop Administer 1 Drop to both eyes nightly.  calcium-cholecalciferol, d3, (CALCIUM 600 + D) 600-125 mg-unit tab Take  by mouth.  RESTASIS 0.05 % ophthalmic emulsion Administer 1 Drop to both eyes every twelve (12) hours.  ascorbic acid (VITAMIN C) 500 mg tablet Take 1,000 mg by mouth daily.  MULTIVITAMINS PO Take 1 Tab by mouth daily.  aspirin 81 mg chewable tablet Take 81 mg by mouth daily.  diclofenac (VOLTAREN) 1 % gel Apply  to affected area four (4) times daily. (Patient not taking: Reported on 11/18/2021)     No current facility-administered medications for this visit.              Review of Systems  Respiratory: negative for dyspnea on exertion  Cardiovascular: negative for chest pain    Objective:     Visit Vitals  BP (!) 152/60 (BP 1 Location: Right arm, BP Patient Position: Sitting, BP Cuff Size: Adult)   Pulse (!) 57   Temp 98.1 °F (36.7 °C) (Temporal)   Resp 18   Ht 5' 1\" (1.549 m)   Wt 143 lb (64.9 kg)   SpO2 97%   BMI 27.02 kg/m²        General appearance - alert, well appearing, and in no distress  Neck - supple, no significant adenopathy, carotids upstroke normal bilaterally, no bruits  Chest - clear to auscultation, no wheezes, rales or rhonchi, symmetric air entry  Heart -normal S1, S2, no murmurs, rubs, clicks or gallops  Extremities -1+ bilateral pedal edema  Skin - normal coloration and turgor, no rashes, no suspicious skin lesions noted      Labs:   Lab Results   Component Value Date/Time    Hemoglobin A1c 6.6 (H) 10/21/2021 10:30 AM    Hemoglobin A1c 6.9 (H) 07/15/2021 10:37 AM    Hemoglobin A1c 6.8 (H) 03/12/2021 10:25 AM    Microalbumin/Creat ratio (mg/g creat) 56 (H) 03/03/2020 10:27 AM    Microalbumin,urine random 11.20 (H) 03/03/2020 10:27 AM    LDL, calculated 148.8 (H) 10/21/2021 10:30 AM    Creatinine 1.28 10/21/2021 10:30 AM      Lab Results   Component Value Date/Time    Cholesterol, total 231 (H) 10/21/2021 10:30 AM    HDL Cholesterol 64 (H) 10/21/2021 10:30 AM    LDL, calculated 148.8 (H) 10/21/2021 10:30 AM    Triglyceride 91 10/21/2021 10:30 AM    CHOL/HDL Ratio 3.6 10/21/2021 10:30 AM     Lab Results   Component Value Date/Time    Alk. phosphatase 79 10/21/2021 10:30 AM    Bilirubin, direct 0.1 03/11/2016 10:45 AM     Lab Results   Component Value Date/Time    GFR est AA 47 (L) 10/21/2021 10:30 AM    GFR est non-AA 39 (L) 10/21/2021 10:30 AM    Creatinine 1.28 10/21/2021 10:30 AM    BUN 22 (H) 10/21/2021 10:30 AM    Sodium 142 10/21/2021 10:30 AM    Potassium 4.5 10/21/2021 10:30 AM    Chloride 109 10/21/2021 10:30 AM    CO2 26 10/21/2021 10:30 AM      Lab Results   Component Value Date/Time    Glucose 139 (H) 10/21/2021 10:30 AM            Assessment / Plan     Diabetes well controlled, off medications. Will continue to monitor  Hypertension  Borderline controlled, on current meds, concerned about compliance, patient daughter to monitor blood pressure at home  Hyperlipidemia  uncontrolled  On Pravachol,  Will stop statin due to myalgias and leg weakness. ICD-10-CM ICD-9-CM    1. Medicare annual wellness visit, subsequent  Z00.00 V70.0    2. Type 2 diabetes mellitus with stage 3a chronic kidney disease, without long-term current use of insulin (HCC)  E11.22 250.40 HEMOGLOBIN A1C W/O EAG    N18.31 585.3 MICROALBUMIN, UR, RAND W/ MICROALB/CREAT RATIO   3. Essential hypertension  J07 795.2 METABOLIC PANEL, COMPREHENSIVE   4. Dyslipidemia  E78.5 272.4 LIPID PANEL   5. Hypertensive heart disease with heart failure (HCC)  I11.0 402.91 Stable on current meds and followed by cardiology      428.9    6. Myalgia due to statin  M79.10 729.1 Will stop Pravachol     T46.6X5A E942.2    7. Needs flu shot  Z23 V04.81 FLU (FLUAD QUAD INFLUENZA VACCINE,QUAD,ADJUVANTED)              Diabetic issues reviewed with her: diabetic diet discussed in detail, and low cholesterol diet, weight control and daily exercise discussed.      Follow-up and Dispositions    · Return in about 4 months (around 3/18/2022) for labs 1 week before. Reviewed plan of care. Patient has provided input and agrees with goals.

## 2021-11-18 NOTE — PROGRESS NOTES
This is the Subsequent Medicare Annual Wellness Exam, performed 12 months or more after the Initial AWV or the last Subsequent AWV    I have reviewed the patient's medical history in detail and updated the computerized patient record. Assessment/Plan   Education and counseling provided:  Are appropriate based on today's review and evaluation  End-of-Life planning (with patient's consent)    1. Medicare annual wellness visit, subsequent  2. Type 2 diabetes mellitus with stage 3a chronic kidney disease, without long-term current use of insulin (HCC)  -     HEMOGLOBIN A1C W/O EAG; Future  -     MICROALBUMIN, UR, RAND W/ MICROALB/CREAT RATIO; Future  3. Essential hypertension  -     METABOLIC PANEL, COMPREHENSIVE; Future  4. Dyslipidemia  -     LIPID PANEL; Future  5. Hypertensive heart disease with heart failure ([facility] [address]  6. Myalgia due to statin  7. Needs flu shot  -     FLU (FLUAD QUAD INFLUENZA VACCINE,QUAD,ADJUVANTED)       Depression Risk Factor Screening     3 most recent PHQ Screens 11/18/2021   PHQ Not Done -   Little interest or pleasure in doing things Not at all   Feeling down, depressed, irritable, or hopeless Not at all   Total Score PHQ 2 0       Alcohol Risk Screen    Do you average more than 1 drink per night or more than 7 drinks a week:  No    On any one occasion in the past three months have you have had more than 3 drinks containing alcohol:  No        Functional Ability and Level of Safety    Hearing: Hearing is good. The patient wears hearing aids. Activities of Daily Living: The home contains: no safety equipment. Patient does total self care      Ambulation: with mild difficulty     Fall Risk:  Fall Risk Assessment, last 12 mths 11/16/2021   Able to walk? Yes   Fall in past 12 months? 0   Do you feel unsteady?  0   Are you worried about falling 0      Abuse Screen:  Patient is not abused       Cognitive Screening    Has your family/caregiver stated any concerns about your memory: no Cognitive Screening: Abnormal - needs neuropsych testing     Health Maintenance Due     Health Maintenance Due   Topic Date Due    Foot Exam Q1  Never done    DTaP/Tdap/Td series (1 - Tdap) Never done    Shingrix Vaccine Age 50> (1 of 2) Never done    Pneumococcal 65+ years (2 of 2 - PPSV23) 05/27/2016    MICROALBUMIN Q1  03/03/2021    COVID-19 Vaccine (2 - Moderna 3-dose booster series) 03/16/2021    Eye Exam Retinal or Dilated  08/29/2021       Patient Care Team   Patient Care Team:  Parker De Jesus MD as PCP - General (Internal Medicine)  Parker De Jesus MD as PCP - [facility] Empaneled Provider  Sriram Martin MD (Ophthalmology)  Ann Estrada MD (Obstetrics & Gynecology)  Chen Ramsay DO (Cardiology)  Glaucoma Screening-  Dr Oro Colon every 4 months  for glaucoma    Pneumonia Vaccine-  UTD   Shingles Vaccine- did not have chicken pox  so declines  Tdap Vaccine-  Declines due to cost    Colonoscopy-   >10 yrs ago had Colonoscopy. Not a candidate  due to age  Mammogram  9/2021  Advance Directive-  on file        History     Patient Active Problem List   Diagnosis Code    Diabetes mellitus, type 2 ([address] E11.9    Osteopenia M85.80    Hypertensive heart disease I11.9    CAD (coronary artery disease), native coronary artery I25.10    Cardiomyopathy, secondary ([address] I42.9    Heart failure, diastolic, chronic (HCC) L76.59    CKD (chronic kidney disease) N18.9    Dyslipidemia E78.5    Essential hypertension I10    Arthritis of right knee M17.11    ACP (advance care planning) Z71.89    Type 2 diabetes mellitus with nephropathy ([address] E11.21    Palpitations R00.2     Past Medical History:   Diagnosis Date    Arthritis of right knee     CAD (coronary artery disease), native coronary artery October 2010    mild disease    Cardiac cath 10/21/2010    dLM 20%. mLAD 25%. CX mild. pRCA 30%. EF 30-35%. Anterolateral, apical , diaphrag akin. Hypercontractile basal segments.   Severe elev left-sided filling press.  Cardiac echocardiogram 02/02/2011    EF 50-55%. Mild apical hypk. Gr 1 DDfx. RVSP 40-45. Mild LAE.  Cardiac Holter monitor 06/10/2015    Sinus rhythm w/avg HR of 53 bpm (range 40-85 bpm). Occasional PACs. One 3-beat run of nonsustained SVT. Very few PVCs. One 3-beat run of nonsustained VT. Pt's HR was < 50 bpm for 8 hrs of the recorded time. No pauses noted. Two episodes of neck pain corresponded to sinus rhythm w/o ectopy.  Cardiovascular renal duplex 12/03/2012    No renal artery stenosis bilaterally. Bilateral intrinsic/med disease. Patent bilateral renal veins. Multiple cysts on both kidneys.  CKD (chronic kidney disease)     while on diuretics for difficult to control HTN    Diabetes mellitus type II 3/15/2010    Dyslipidemia 3/15/2010    Heart attack ([facility] [address]     2010    Heart failure, diastolic, chronic (HCC)     History of heart attack     HTN (hypertension) 3/15/2010    Hypertensive heart disease     Loss of appetite     Osteopenia 7/13/2010    Other dyspnea and respiratory abnormality     Ringing in the ears     Stress-induced cardiomyopathy     EF 35% (LV angio 10/2010), then EF 50-55% (echo, Feb 2011)    Wears glasses       Past Surgical History:   Procedure Laterality Date    HX BLADDER SUSPENSION  2009    HX HYSTERECTOMY  1962     Current Outpatient Medications   Medication Sig Dispense Refill    amLODIPine (NORVASC) 10 mg tablet TAKE 1 TABLET BY MOUTH EVERY DAY 90 Tablet 3    olmesartan (BENICAR) 20 mg tablet TAKE 1 TABLET BY MOUTH EVERY DAY 90 Tablet 3    nitroglycerin (NITROSTAT) 0.4 mg SL tablet 1 q 5 minutes x 3 prn chest pain 1 Bottle 1    Rocklatan 0.02-0.005 % drop Administer 1 Drop to both eyes nightly.  calcium-cholecalciferol, d3, (CALCIUM 600 + D) 600-125 mg-unit tab Take  by mouth.  RESTASIS 0.05 % ophthalmic emulsion Administer 1 Drop to both eyes every twelve (12) hours.       ascorbic acid (VITAMIN C) 500 mg tablet Take 1,000 mg by mouth daily.  MULTIVITAMINS PO Take 1 Tab by mouth daily.  aspirin 81 mg chewable tablet Take 81 mg by mouth daily.  diclofenac (VOLTAREN) 1 % gel Apply  to affected area four (4) times daily. (Patient not taking: Reported on 11/18/2021) 100 g 4     Allergies   Allergen Reactions    Lipitor [Atorvastatin] Myalgia    Spironolactone Other (comments)     Dizziness and fatigue       Family History   Problem Relation Age of Onset    Hypertension Mother     Arthritis-osteo Other      Social History     Tobacco Use    Smoking status: Never Smoker    Smokeless tobacco: Never Used   Substance Use Topics    Alcohol use: No     A comprehensive 5 year plan for medical care and screening exams was reviewed with pt and they received a copy of it.         Leilani Wood LPN

## 2021-11-18 NOTE — PATIENT INSTRUCTIONS
Medicare Wellness Visit, Female     The best way to live healthy is to have a lifestyle where you eat a well-balanced diet, exercise regularly, limit alcohol use, and quit all forms of tobacco/nicotine, if applicable. Regular preventive services are another way to keep healthy. Preventive services (vaccines, screening tests, monitoring & exams) can help personalize your care plan, which helps you manage your own care. Screening tests can find health problems at the earliest stages, when they are easiest to treat. [facility] follows the current, evidence-based guidelines published by the [facility] ([facility]) when recommending preventive services for our patients. Because we follow these guidelines, sometimes recommendations change over time as research supports it. (For example, mammograms used to be recommended annually. Even though Medicare will still pay for an annual mammogram, the newer guidelines recommend a mammogram every two years for women of average risk). Of course, you and your doctor may decide to screen more often for some diseases, based on your risk and your co-morbidities (chronic disease you are already diagnosed with). Preventive services for you include:  - Medicare offers their members a free annual wellness visit, which is time for you and your primary care provider to discuss and plan for your preventive service needs. Take advantage of this benefit every year!  -All adults over the age of 72 should receive the recommended pneumonia vaccines. Current USPSTF guidelines recommend a series of two vaccines for the best pneumonia protection.   -All adults should have a flu vaccine yearly and a tetanus vaccine every 10 years.   -All adults age 48 and older should receive the shingles vaccines (series of two vaccines).       -All adults age 38-68 who are overweight should have a diabetes screening test once every three years.   -All adults born between [address] and 1965 should be screened once for Hepatitis C.  -Other screening tests and preventive services for persons with diabetes include: an eye exam to screen for diabetic retinopathy, a kidney function test, a foot exam, and stricter control over your cholesterol.   -Cardiovascular screening for adults with routine risk involves an electrocardiogram (ECG) at intervals determined by your doctor.   -Colorectal cancer screenings should be done for adults age 54-65 with no increased risk factors for colorectal cancer. There are a number of acceptable methods of screening for this type of cancer. Each test has its own benefits and drawbacks. Discuss with your doctor what is most appropriate for you during your annual wellness visit. The different tests include: colonoscopy (considered the best screening method), a fecal occult blood test, a fecal DNA test, and sigmoidoscopy.    -A bone mass density test is recommended when a woman turns 65 to screen for osteoporosis. This test is only recommended one time, as a screening. Some providers will use this same test as a disease monitoring tool if you already have osteoporosis. -Breast cancer screenings are recommended every other year for women of normal risk, age 54-69.  -Cervical cancer screenings for women over age 72 are only recommended with certain risk factors.      Here is a list of your current Health Maintenance items (your personalized list of preventive services) with a due date:  Health Maintenance Due   Topic Date Due    Diabetic Foot Care  Never done    DTaP/Tdap/Td  (1 - Tdap) Never done    Shingles Vaccine (1 of 2) Never done    Pneumococcal Vaccine (2 of 2 - PPSV23) 05/27/2016    Albumin Urine Test  03/03/2021    COVID-19 Vaccine (2 - Moderna 3-dose booster series) 03/16/2021    Eye Exam  08/29/2021

## 2021-11-18 NOTE — PROGRESS NOTES
Patient is in the office today for a 4 month follow up, and Medicare Wellness Vist.    Do you have an Advance Directive no  Do you want more information : information given     1. \"Have you been to the ER, urgent care clinic since your last visit? Hospitalized since your last visit? \" No    2. \"Have you seen or consulted any other health care providers outside of the [address] [place] [address] since your last visit? \" yes, Dr. Eladio Hitchcock. Verbal order read back per Dr. Caroline Jung flu vaccine. Patient received flu vaccine in right deltoid. Patient was observed and no signs or symptoms of an allergic reaction noted at this time. Patient tolerated well and left without complaints. Patient received flu VIS.

## 2021-11-21 NOTE — PROGRESS NOTES
Lester Agudelo is in the office today for cardiovascular evaluation of her hypertensive heart disease/ diastolic dysfunction of the left ventricle. She has a history of  hypertension, dyslipidemia, and  chronic diastolic heart failure. She has been followed by Dr. Geeta Cross through the years     She became quite bradycardic on beta-blocker therapy in the past necessitating discontinuation of the medication.     She was hospitalized in [facility] in March of 2019 for evaluation of weakness in her legs. She was found to have  renal insufficiency with BUN/creatinine of 30 and 2.3.  She  had an echocardiogram that demonstrated  normal left ventricular systolic function with moderate left atrial enlargement and moderate pulmonary hypertension with an estimated pulmonary artery pressure of 50 mmHg.      She had a nuclear test completed on November 13, 2020 which demonstrated normal perfusion of the left ventricle with normal left ventricular function and an ejection fraction of 54%. She has had  problems with dizziness, but her symptoms are not very frequent or well described and she really denies any other cardiovascular complaints. In the office 10/19/2021 she said that she was \"weak \". The weakness \"comes and goes \". .  She reported occasional shortness of breath. She is particularly short of breath when she is tired. She  had no peripheral swelling. She  had some recent palpitations which she describes as \"heart quivers \". Plan: This lady continues to have occasional intermittent weakness. In the office today, she says she is weak \"all the time \". She has had no dizziness, near syncope or syncope. She has had no falls. She does try to stay physically active, particularly for someone 80years old. She is scheduled for an epidural in the next week. She is hopeful that this will help her become more active again    Her systolic blood pressures are mildly elevated today at 152/80.     At the last visit, a 48-hour Holter monitor just to be certain she is having an intermittent arrhythmia was ordered but not completed. . We will plan to reorder and have the patient return in 6 months. PCP: Demetrio Prescott MD      Past Medical History:   Diagnosis Date    Arthritis of right knee     CAD (coronary artery disease), native coronary artery October 2010    mild disease    Cardiac cath 10/21/2010    dLM 20%. mLAD 25%. CX mild. pRCA 30%. EF 30-35%. Anterolateral, apical , diaphrag akin. Hypercontractile basal segments. Severe elev left-sided filling press.  Cardiac echocardiogram 02/02/2011    EF 50-55%. Mild apical hypk. Gr 1 DDfx. RVSP 40-45. Mild LAE.  Cardiac Holter monitor 06/10/2015    Sinus rhythm w/avg HR of 53 bpm (range 40-85 bpm). Occasional PACs. One 3-beat run of nonsustained SVT. Very few PVCs. One 3-beat run of nonsustained VT. Pt's HR was < 50 bpm for 8 hrs of the recorded time. No pauses noted. Two episodes of neck pain corresponded to sinus rhythm w/o ectopy.  Cardiovascular renal duplex 12/03/2012    No renal artery stenosis bilaterally. Bilateral intrinsic/med disease. Patent bilateral renal veins. Multiple cysts on both kidneys.     CKD (chronic kidney disease)     while on diuretics for difficult to control HTN    Diabetes mellitus type II 3/15/2010    Dyslipidemia 3/15/2010    Heart attack ([address]     2010    Heart failure, diastolic, chronic ([address]     History of heart attack     HTN (hypertension) 3/15/2010    Hypertensive heart disease     Loss of appetite     Osteopenia 7/13/2010    Other dyspnea and respiratory abnormality     Ringing in the ears     Stress-induced cardiomyopathy     EF 35% (LV angio 10/2010), then EF 50-55% (echo, Feb 2011)    Wears glasses        Past Surgical History:   Procedure Laterality Date    HX BLADDER SUSPENSION  2009    HX HYSTERECTOMY  1962       Current Outpatient Medications   Medication Sig    amLODIPine (NORVASC) 10 mg tablet TAKE 1 TABLET BY MOUTH EVERY DAY    olmesartan (BENICAR) 20 mg tablet TAKE 1 TABLET BY MOUTH EVERY DAY    nitroglycerin (NITROSTAT) 0.4 mg SL tablet 1 q 5 minutes x 3 prn chest pain    Rocklatan 0.02-0.005 % drop Administer 1 Drop to both eyes nightly.  calcium-cholecalciferol, d3, (CALCIUM 600 + D) 600-125 mg-unit tab Take  by mouth.  diclofenac (VOLTAREN) 1 % gel Apply  to affected area four (4) times daily. (Patient not taking: Reported on 11/18/2021)    RESTASIS 0.05 % ophthalmic emulsion Administer 1 Drop to both eyes every twelve (12) hours.  ascorbic acid (VITAMIN C) 500 mg tablet Take 1,000 mg by mouth daily.  MULTIVITAMINS PO Take 1 Tab by mouth daily.  aspirin 81 mg chewable tablet Take 81 mg by mouth daily. No current facility-administered medications for this visit. Allergies   Allergen Reactions    Lipitor [Atorvastatin] Myalgia    Spironolactone Other (comments)     Dizziness and fatigue       Social History:   Social History     Tobacco Use    Smoking status: Never Smoker    Smokeless tobacco: Never Used   Substance Use Topics    Alcohol use: No         Family history: family history includes Arthritis-osteo in an other family member; Hypertension in her mother. Review of Systems:  Constitutional: Positive for fatigue. Negative for chills, fever and weight loss. Respiratory: Positive for shortness of breath. Negative for cough, sputum production and wheezing. Cardiovascular: Negative. Gastrointestinal: Positive for melena. Negative for abdominal pain, blood in stool, constipation, diarrhea, heartburn, nausea and vomiting. Musculoskeletal: Negative. Negative for back pain, falls, joint pain and neck pain. Neurological: Positive for dizziness.      Physical Exam:   The patient is an alert and oriented  Visit Vitals  BP (!) 152/80 (BP 1 Location: Left upper arm, BP Patient Position: Sitting, BP Cuff Size: Adult)   Pulse 66   Ht 5' 1\" (1.549 m)   Wt 64.4 kg (142 lb)   BMI 26.83 kg/m²      BP Readings from Last 3 Encounters:   11/18/21 (!) 152/60   11/16/21 (!) 152/80   10/19/21 (!) 150/72      Wt Readings from Last 3 Encounters:   11/18/21 64.9 kg (143 lb)   11/16/21 64.4 kg (142 lb)   11/04/21 65.4 kg (144 lb 3.2 oz)     HEENT: Conjuctiva white and mucosa moist  NECK: Supple without masses, tenderness or thyromegaly. There was no jugular venous distention. Carotid are without bruits  CHEST: Symmetrical with good excursion. LUNGS: Clear to auscultation   HEART: Regular rate and rhythm. . There is a normal S1 and S2. There is a grade 2/6 MINI along the LSB. No diastolic murmurs, rubs, clicks, or gallops   ABDOMEN: Soft without masses, tenderness or organomegaly. EXTREMITIES: No  peripheral edema. INTEGUMENT: Skin is warm and dry   NEUROLOGICAL: The patient was oriented x3 with motor function grossly intact. Review of Data: See PMH and Cardiology and Imaging sections for cardiac testing  Lab Results   Component Value Date/Time    Cholesterol, total  231 (H) 10/21/2021 10:30 AM    HDL Cholesterol 64 (H) 10/21/2021 10:30 AM    LDL, calculated 148.8 (H) 10/21/2021 10:30 AM    Triglyceride 91 10/21/2021 10:30 AM    CHOL/HDL Ratio 3.6 10/21/2021 10:30 AM       Results for orders placed or performed in visit on 12/22/20   AMB POC EKG ROUTINE W/ 12 LEADS, INTER & REP     Status: None    Narrative    Normal sinus rhythm, rate 65.  1 PAC. Is ST-T flattening with early T wave inversions anterolaterally on the high lateral leads. Compared to the EKG of November 4, 2020 there was little interval change. Jessica Ribeiro MD [facility]  [facility] [place]  [facility] [address] [facility]  [phone]    PLEASE NOTE:  This document has been produced using voice recognition software.  Unrecognized errors in transcription may be present.

## 2021-11-23 ENCOUNTER — HOSPITAL ENCOUNTER (OUTPATIENT)
Age: 86
Setting detail: OUTPATIENT SURGERY
Discharge: HOME | End: 2021-11-23
Attending: PHYSICAL MEDICINE & REHABILITATION | Admitting: PHYSICAL MEDICINE & REHABILITATION
Payer: MEDICARE

## 2021-11-23 ENCOUNTER — APPOINTMENT (OUTPATIENT)
Dept: GENERAL RADIOLOGY | Age: 86
End: 2021-11-23
Attending: PHYSICAL MEDICINE & REHABILITATION
Payer: MEDICARE

## 2021-11-23 VITALS
TEMPERATURE: 98.5 F | OXYGEN SATURATION: 96 % | HEART RATE: 70 BPM | SYSTOLIC BLOOD PRESSURE: 124 MMHG | DIASTOLIC BLOOD PRESSURE: 74 MMHG | RESPIRATION RATE: 16 BRPM

## 2021-11-23 PROCEDURE — 62323 NJX INTERLAMINAR LMBR/SAC: CPT | Performed by: PHYSICAL MEDICINE & REHABILITATION

## 2021-11-23 PROCEDURE — 74011250637 HC RX REV CODE- 250/637: Performed by: PHYSICAL MEDICINE & REHABILITATION

## 2021-11-23 PROCEDURE — 74011000250 HC RX REV CODE- 250: Performed by: PHYSICAL MEDICINE & REHABILITATION

## 2021-11-23 PROCEDURE — 2709999900 HC NON-CHARGEABLE SUPPLY: Performed by: PHYSICAL MEDICINE & REHABILITATION

## 2021-11-23 PROCEDURE — 77030014124 HC TY EPDRL BBMI -A: Performed by: PHYSICAL MEDICINE & REHABILITATION

## 2021-11-23 PROCEDURE — 74011250636 HC RX REV CODE- 250/636: Performed by: PHYSICAL MEDICINE & REHABILITATION

## 2021-11-23 PROCEDURE — 76010000009 HC PAIN MGT 0 TO 30 MIN PROC: Performed by: PHYSICAL MEDICINE & REHABILITATION

## 2021-11-23 PROCEDURE — 74011000636 HC RX REV CODE- 636: Performed by: PHYSICAL MEDICINE & REHABILITATION

## 2021-11-23 RX ORDER — LIDOCAINE HYDROCHLORIDE 10 MG/ML
INJECTION, SOLUTION EPIDURAL; INFILTRATION; INTRACAUDAL; PERINEURAL AS NEEDED
Status: DISCONTINUED | OUTPATIENT
Start: 2021-11-23 | End: 2021-11-23 | Stop reason: HOSPADM

## 2021-11-23 RX ORDER — DIAZEPAM 5 MG/1
5-20 TABLET ORAL ONCE
Status: COMPLETED | OUTPATIENT
Start: 2021-11-23 | End: 2021-11-23

## 2021-11-23 RX ORDER — DEXAMETHASONE SODIUM PHOSPHATE 100 MG/10ML
INJECTION INTRAMUSCULAR; INTRAVENOUS AS NEEDED
Status: DISCONTINUED | OUTPATIENT
Start: 2021-11-23 | End: 2021-11-23 | Stop reason: HOSPADM

## 2021-11-23 RX ADMIN — DIAZEPAM 5 MG: 5 TABLET ORAL at 09:55

## 2021-11-23 NOTE — PERIOP NOTES
Patient tolerated procedure well. No complications noted. VSS. No redness, swelling, or bleeding from injection site. Dressing dry and intact. Armband removed and shredded. Patient wheeled  to main entrance and discharged by RN  alive and well, in stable condition.

## 2021-11-23 NOTE — INTERVAL H&P NOTE
Update History & Physical    The Patient's History and Physical of November 4, 2021 was reviewed. There was no change. The surgical site was confirmed by the patient and me. Plan:  The risk, benefits, expected outcome, and alternative to the recommended procedure have been discussed with the patient. Patient understands and wants to proceed with the procedure.     Electronically signed by Colletta Medico, MD on 11/23/2021 at 10:23 AM

## 2021-11-23 NOTE — DISCHARGE INSTRUCTIONS
[facility]   ([facility])  Dr. Brody Patel, Dr. Davenport, Dr. Leila Lopez Spinal Procedure (Block) Instructions    * Do not drive a car, operate heavy machinery or dangerous equipment, or make important decisions for 12-24 hours. * Light activity as tolerated; may rest for the remainder of the day. * Resume pre-block medications including those from your other doctors. * Do not drink alcoholic beverages for 24 hours. Alcohol and the medications you have received may interact and cause an adverse reaction. * You may feel better this evening and worse tomorrow, as the numbing medications wears off and the steroid has yet to begin to work. After 48-72 hrs the steroid should begin to release bringing you relief. If you had a medial branch block, no steroids were used. The medial branch block is a test to see if you are a candidate for radiofrequency ablation (RFA). The anesthetic (numbing medicine)  will wear off by the next day. * You may shower this evening and remove any bandages. * Avoid hot tubs/pools/tub soaks and heating pads for 24 hours. You may use cold packs on the procedure site as tolerated for the first 24 hours. * If a headache develops, drink plenty of fluids and rest.  Take over the counter medications for headache if needed. If the headache continues longer than 24 hours, call MD at the [address] [place] [address] Avenue. [phone]    * Continue taking pain medications as needed. * You may resume your regular diet if tolerated. Otherwise, start with sips of water and advance slowly. * If Diabetic: check your blood sugar three times a day for the next 3 days. If your sugar is greater than 300 call your family doctor. If your sugar is greater than 400, have someone transport you to the nearest Emergency Room. * If you experience any of the following problems, Please Call the [address] [place] [address] Avenue at [phone].         * Excessive pain, swelling, redness or odor at or around the surgical area    * Fever of 101 or higher    * Nausea / Vomiting lasting longer than 4 hours or if unable to take medications. * Severe Headache    * Weakness or numbness in arms or legs that is not      resolving   * Any NEW signs of decreased circulation or nerve impairment in leg: change in color, swelling, persistent numbness, tingling                    * Prolonged increase in pain greater than 4 days      PATIENT INSTRUCTIONS:    After oral sedation, for 12-24 hours or while taking prescription Narcotics:  · Limit your activities  · Do not drive and operate hazardous machinery  · Do not make important personal or business decisions  · Do  not drink alcoholic beverages  · If you have not urinated within 8 hours after discharge, please contact your surgeon on call. *  Please give a list of your current medications to your Primary Care Provider. *  Please update this list whenever your medications are discontinued, doses are      changed, or new medications (including over-the-counter products) are added. *  Please carry medication information at all times in case of emergency situations. These are general instructions for a healthy lifestyle:    No smoking/ No tobacco products/ Avoid exposure to second hand smoke    Surgeon General's Warning:  Quitting smoking now greatly reduces serious risk to your health. Obesity, smoking, and sedentary lifestyle greatly increases your risk for illness    A healthy diet, regular physical exercise & weight monitoring are important for maintaining a healthy lifestyle    You may be retaining fluid if you have a history of heart failure or if you experience any of the following symptoms:  Weight gain of 3 pounds or more overnight or 5 pounds in a week, increased swelling in our hands or feet or shortness of breath while lying flat in bed.   Please call your doctor as soon as you notice any of these symptoms; do not wait until your next office visit. Recognize signs and symptoms of STROKE:    F-face looks uneven    A-arms unable to move or move unevenly    S-speech slurred or non-existent    T-time-call 911 as soon as signs and symptoms begin-DO NOT go       Back to bed or wait to see if you get better-TIME IS BRAIN.

## 2021-11-23 NOTE — PROCEDURES
Intralaminar Epidural Steroid Procedure Note        Patient Name   Ori Conte  Date of Procedure: November 23, 2021  Preoperative Diagnosis: Lumbar spinal stenosis  Postoperative Diagnosis: Same  Location [facility], [address]      Procedure:  Epidural Steroid Injection    Consent:  Informed consent was obtained prior to the procedure. In addition to the potential risks associated with the procedure itself, the patient was informed both verbally and in writing of the potential side effects of the use of glucocorticoid. The patient appeared to comprehend the informed consent and desired to have the procedure performed. Procedure in Detail:  The patient was taken to the procedure suite and placed in the prone position on the operating table on appropriate padding. The posterior lumbar region was prepped and draped in the usual sterile fashion. Intraoperative fluoroscopy was used to localize the L5-S1 interspace. The skin was infiltrated with 1% lidocaine. An 18-gauge standard spinal Tuohy needle was advanced into the epidural space at L5-S1 under fluoroscopic guidance using the loss of resistance technique. No cerebrospinal fluid was seen throughout the procedure. Yes  A small amount of Isovue was injected into the epidural space, confirming appropriated needle placement on fluoroscopy. No vascular uptake was identified. Next, 2ml of 1% Lidocaine and 10mg of preservative free Dexamethasone were injected via the Tuohy needle. The needle was removed from the patient. The patient tolerated the procedure well and was discharged home with designated  and care instructions. Patient reported jose-procedural pain on Visual Analog Scale:  pre-9; post-0.       Signed By: Denzel Bruno MD                      November 23, 2021

## 2021-12-16 ENCOUNTER — VIRTUAL VISIT (OUTPATIENT)
Dept: ORTHOPEDIC SURGERY | Age: 86
End: 2021-12-16
Payer: MEDICARE

## 2021-12-16 DIAGNOSIS — M25.551 RIGHT HIP PAIN: ICD-10-CM

## 2021-12-16 DIAGNOSIS — M48.062 SPINAL STENOSIS OF LUMBAR REGION WITH NEUROGENIC CLAUDICATION: Primary | ICD-10-CM

## 2021-12-16 PROCEDURE — 99441 PR PHYS/QHP TELEPHONE EVALUATION 5-10 MIN: CPT | Performed by: PHYSICAL MEDICINE & REHABILITATION

## 2021-12-16 NOTE — PROGRESS NOTES
[address]  [address], [address]  [address]  Phone: [phone]  Fax: [phone]        Abid Muhammad  : 1923  PCP: Tigre Jarvis MD  2021    PROGRESS NOTE    Consent: Mariela Potts is a 80 y.o. female who was seen by synchronous (real-time) audio-video technology, and/or her healthcare decision maker, is aware that this patient-initiated, Telehealth encounter on 2021 is a billable service, with coverage as determined by his/her insurance carrier. He/She is aware that he/she may receive a bill and has provided verbal consent to proceed: Yes. The visit was conducted in the office with the patient being in home through a Telephone platform. Visit time start: 12:05 PM end: 12:11 PM      [address] [place] [address]  Mariela Potts is a 80 y.o. female who was seen as a new patient 10/31/19 with c/o low back pain with BLE paraesthesia x 6 weeks. Pt reports a heaviness in her BLE with standing and walking. She reports a limited standing/walking tolerance. She finds relief with sitting. She also c/o some lateral right thigh pain. She ambulates with a cane. Dr. Cece Rivas recommended she use a walker for walking longer distances. Pt also c/o left sided functional deficits. She denies h/o stroke. She was intolerant to GABAPENTIN (felt sick) so she was rx Topamax. She notes significant benefit from PT (19-19; [employer] her walking improved. She states that her lateral thigh symptoms have improved. She noted improvement, but continued to feel some weakness in her knees. She did not feel her symptoms warranted further treatment at the time. She ended up not doing the MRI due to claustrophobia and does not wish to proceed with one in the future. Pt reports increased pain in her bilateral knees. She reports a new pain in her bilateral shoulders and elbows (R>L) x 2 weeks.  She has found some benefit from using [employer] topical cream. Pt notes that she exercises her arms daily, and it does not help her pain much. Her pain is worse in the afternoons. She is unable to do her hair because of limited shoulder/scapular ROM. She felt weakness in her legs, especially with walking. She feels better walking with the rollator walker. She has been maintaining an HEP with benefit. She reports pain in her thighs radiating into the BLE. Her symptoms improve with sitting, but she notes that she becomes stiff with prolonged sitting so she likes to stay moving. She completed PT about 1 month ago with some improvement, but continues to feel weak in her legs. She also c/o bilateral elbow pain. She was intolerant to [address] secondary to dizziness. Her daughter noted that she takes an excessive amount of Advil. I advised against this given her CKD. Lumbar Spine CT dated 7/9/21 reviewed. Per report, Osteopenia limits evaluation of bony detail. No obvious acute fracture. Notable lower lumbar degenerative changes characterized by severe facet arthropathy, grade 1-2 anterolisthesis at L4-L5 and L5-S1, and notable disc herniations at L3-S1. Severe spinal canal stenosis at L4-L5, and mild to moderate spinal canal stenosis at L3-L4 and L5-S1. L4-L5 and L5-S1 mild to moderate foraminal stenoses with abutment of the exiting nerve roots. See level by level details above. Advanced SI joint degenerative changes noted. Sigmoid diverticulosis, bilateral renal cysts, and atherosclerosis incidentally noted. She continued to have low back pain radiating into the BLE with a limited standing tolerance. Juhi Dross is seen virtually for f/u. She underwent an L5-S1 APT (11/23/21; Dr. Javier Giles) with significant benefit of her back pain. She notes that she has had some pain in her right thigh. She has been taking Tylenol without benefit. She has some pain with internal rotation of the hip. She does daily exercises.     Pain Scale: /10    PmHx: HTN, CAD, cardiomyopathy, DM, CKD stage 3  Medications tried: GABAPENTIN, TOPAMAX, NORTRIPTYLINE, LYRICA    ASSESSMENT  Daisy Lunsford is a 80 y.o. female with stenotic-type symptoms. Her low back and BLE symptoms are likely due to lumbar spinal stenosis with neurogenic claudication (severe L4-5 seen on CT).  This has not been confirmed with MRI as patient is claustrophobic, but suggested on CT. PLAN  1. She may want to see Dr. Shanta Matson again for right hip and thigh pain. Pt will f/u in 3 months or sooner as needed. Diagnoses and all orders for this visit:    1. Spinal stenosis of lumbar region with neurogenic claudication    2. Right hip pain               PAST MEDICAL HISTORY   Past Medical History:   Diagnosis Date    Arthritis of right knee     CAD (coronary artery disease), native coronary artery October 2010    mild disease    Cardiac cath 10/21/2010    dLM 20%. mLAD 25%. CX mild. pRCA 30%. EF 30-35%. Anterolateral, apical , diaphrag akin. Hypercontractile basal segments. Severe elev left-sided filling press.  Cardiac echocardiogram 02/02/2011    EF 50-55%. Mild apical hypk. Gr 1 DDfx. RVSP 40-45. Mild LAE.  Cardiac Holter monitor 06/10/2015    Sinus rhythm w/avg HR of 53 bpm (range 40-85 bpm). Occasional PACs. One 3-beat run of nonsustained SVT. Very few PVCs. One 3-beat run of nonsustained VT. Pt's HR was < 50 bpm for 8 hrs of the recorded time. No pauses noted. Two episodes of neck pain corresponded to sinus rhythm w/o ectopy.  Cardiovascular renal duplex 12/03/2012    No renal artery stenosis bilaterally. Bilateral intrinsic/med disease. Patent bilateral renal veins. Multiple cysts on both kidneys.     CKD (chronic kidney disease)     while on diuretics for difficult to control HTN    Diabetes mellitus type II 3/15/2010    Dyslipidemia 3/15/2010    Heart attack ([address]     2010    Heart failure, diastolic, chronic ([address]     History of heart attack     HTN (hypertension) 3/15/2010    Hypertensive heart disease     Loss of appetite     Osteopenia 7/13/2010    Other dyspnea and respiratory abnormality     Ringing in the ears     Stress-induced cardiomyopathy     EF 35% (LV angio 10/2010), then EF 50-55% (echo, Feb 2011)    Wears glasses        Past Surgical History:   Procedure Laterality Date    HX BLADDER SUSPENSION  2009    HX [facility]   . MEDICATIONS    Current Outpatient Medications   Medication Sig Dispense Refill    amLODIPine (NORVASC) 10 mg tablet TAKE 1 TABLET BY MOUTH EVERY DAY 90 Tablet 3    olmesartan (BENICAR) 20 mg tablet TAKE 1 TABLET BY MOUTH EVERY DAY 90 Tablet 3    nitroglycerin (NITROSTAT) 0.4 mg SL tablet 1 q 5 minutes x 3 prn chest pain (Patient not taking: Reported on 11/23/2021) 1 Bottle 1    Rocklatan 0.02-0.005 % drop Administer 1 Drop to both eyes nightly.  calcium-cholecalciferol, d3, (CALCIUM 600 + D) 600-125 mg-unit tab Take  by mouth.  diclofenac (VOLTAREN) 1 % gel Apply  to affected area four (4) times daily. (Patient not taking: Reported on 11/18/2021) 100 g 4    RESTASIS 0.05 % ophthalmic emulsion Administer 1 Drop to both eyes every twelve (12) hours.  ascorbic acid (VITAMIN C) 500 mg tablet Take 1,000 mg by mouth daily.  MULTIVITAMINS PO Take 1 Tab by mouth daily.  aspirin 81 mg chewable tablet Take 81 mg by mouth daily.           ALLERGIES  Allergies   Allergen Reactions    Lipitor [Atorvastatin] Myalgia    Spironolactone Other (comments)     Dizziness and fatigue          SOCIAL HISTORY    Social History     Socioeconomic History    Marital status:    Tobacco Use    Smoking status: Never Smoker    Smokeless tobacco: Never Used   Substance and Sexual Activity    Alcohol use: No    Drug use: No    Sexual activity: Never       FAMILY HISTORY  Family History   Problem Relation Age of Onset    Hypertension Mother     OSTEOARTHRITIS Other          REVIEW OF SYSTEMS  Review of Systems   Constitutional: Negative for chills, fever and weight loss. Respiratory: Negative for shortness of breath. Cardiovascular: Negative for chest pain. Gastrointestinal: Negative for constipation. Negative for fecal incontinence    Genitourinary: Negative for dysuria. Negative for urinary incontinence   Musculoskeletal: Positive for back pain. Right thigh pain   Skin: Negative for rash. Neurological: Positive for tingling ( BLE). Negative for dizziness, tremors, focal weakness and headaches. Endo/Heme/Allergies: Does not bruise/bleed easily. Psychiatric/Behavioral: The patient does not have insomnia. PHYSICAL EXAMINATION  There were no vitals taken for this visit. Pain Assessment  11/4/2021   Location of Pain Back;Hip;Leg   Location Modifiers Right;Left   Severity of Pain 10   Quality of Pain Other (Comment)   Quality of Pain Comment -   Duration of Pain Other (Comment)   Frequency of Pain Intermittent   Aggravating Factors Other (Comment)   Aggravating Factors Comment -   Limiting Behavior Some   Relieving Factors Other (Comment)   Relieving Factors Comment topical cream   Result of Injury -       The limitations of a telemedicine visit including the inability to perform a detailed physical examination may miss significant findings was presented to the patient, and the patient still elected to proceed with the telemedicine visit. RADIOGRAPHS  Lumbar Spine CT images taken on 7/9/2021 personally reviewed with patient:  General: Osteopenia limits evaluation of bony detail. Mild disc space loss and  endplate spurring at N1-X5, L4-L5, and L5-S1. Grade 1-2 anterolisthesis L4-L5  and L5-S1. Lumbar lordosis maintained. No definite acute fracture identified. Multilevel spinous process abutment with associated degenerative changes. Lower  lumbar severe facet arthropathy. No definite acute fracture identified.  Advanced  bilateral SI joint degenerative changes.     Miscellaneous: Bilateral renal cysts, right more and left. Sigmoid  diverticulosis. Notable atherosclerosis.     Levels:     T12-L1, L1-L2: No significant disc disease or stenosis. Mild facet arthropathy.     L2-L3: Mild disc bulge. Mild facet arthropathy. No significant stenosis.     L3-L4: Mild to moderate disc bulge. Mild facet arthropathy. Mild to moderate  spinal canal stenosis. Mild foraminal stenoses.     L4-L5: Grade 1-2 anterolisthesis with uncovering of disc and mild to moderate  disc bulge. Severe facet arthropathy with ligamentum flavum bulge. Severe spinal  canal stenosis. Mild to moderate foraminal stenoses with mild abutment of the  exiting L4 nerves.     L5-S1: Grade 1-2 anterolisthesis with uncovering of disc and mild to moderate  disc bulge. Severe facet arthropathy. Mild to moderate spinal canal stenosis. Mild to moderate left greater and right foraminal stenoses with mild abutment of  the exiting L5 nerves.     Imaged sacrum: Sacral canal unremarkable. Advanced SI joint degenerative  changes.     IMPRESSION     Osteopenia limits evaluation of bony detail. No obvious acute fracture.     Notable lower lumbar degenerative changes characterized by severe facet  arthropathy, grade 1-2 anterolisthesis at L4-L5 and L5-S1, and notable disc  herniations at L3-S1.  -Severe spinal canal stenosis at L4-L5, and mild to moderate spinal canal  stenosis at L3-L4 and L5-S1.  -L4-L5 and L5-S1 mild to moderate foraminal stenoses with abutment of the  exiting nerve roots.     See level by level details above.     Advanced SI joint degenerative changes noted. Sigmoid diverticulosis, bilateral  renal cysts, and atherosclerosis incidentally noted.     Lumbar XR images taken on 10/31/19 personally reviewed with patient:  Facet sclerosis at lower levels  Significant Grade 2 anterolisthesis of L5 on S1 and L4 on L5   Atherosclerosis of aorta  No obvious compression fractures     reviewed    Ms. Arnold Griffith has a reminder for a \"due or due soon\" health maintenance. I have asked that she contact her primary care provider for follow-up on this health maintenance. Pursuant to the emergency declaration under the [address] [place] [address], [facility] waiver authority and the [employer] and [employer] Act, this Virtual  Visit was conducted, with patient's consent, to reduce the patient's risk of exposure to COVID-19 and provide continuity of care for an established patient. Services were provided through a video synchronous discussion virtually to substitute for in-person clinic visit. CPT Codes 48918-81932 for Established Patients may apply to this Telehealth Visit  Time-based coding, delete if not needed: I spent at least 5 minutes with this established patient, and >50% of the time was spent counseling and/or coordinating care. Written by Pankaj Cortes, as dictated by Dr. Xavi Chavez.

## 2022-01-07 ENCOUNTER — OFFICE VISIT (OUTPATIENT)
Dept: ORTHOPEDIC SURGERY | Age: 87
End: 2022-01-07
Payer: MEDICARE

## 2022-01-07 VITALS — TEMPERATURE: 97.1 F | OXYGEN SATURATION: 100 % | HEART RATE: 58 BPM | WEIGHT: 144 LBS | BODY MASS INDEX: 27.21 KG/M2

## 2022-01-07 DIAGNOSIS — M25.562 CHRONIC PAIN OF BOTH KNEES: Primary | ICD-10-CM

## 2022-01-07 DIAGNOSIS — M17.0 BILATERAL PRIMARY OSTEOARTHRITIS OF KNEE: ICD-10-CM

## 2022-01-07 DIAGNOSIS — M25.561 CHRONIC PAIN OF BOTH KNEES: Primary | ICD-10-CM

## 2022-01-07 DIAGNOSIS — G89.29 CHRONIC PAIN OF BOTH KNEES: Primary | ICD-10-CM

## 2022-01-07 PROCEDURE — 20610 DRAIN/INJ JOINT/BURSA W/O US: CPT | Performed by: ORTHOPAEDIC SURGERY

## 2022-01-07 PROCEDURE — G8427 DOCREV CUR MEDS BY ELIG CLIN: HCPCS | Performed by: ORTHOPAEDIC SURGERY

## 2022-01-07 PROCEDURE — G8536 NO DOC ELDER MAL SCRN: HCPCS | Performed by: ORTHOPAEDIC SURGERY

## 2022-01-07 PROCEDURE — G8417 CALC BMI ABV UP PARAM F/U: HCPCS | Performed by: ORTHOPAEDIC SURGERY

## 2022-01-07 PROCEDURE — 73564 X-RAY EXAM KNEE 4 OR MORE: CPT | Performed by: ORTHOPAEDIC SURGERY

## 2022-01-07 PROCEDURE — G8432 DEP SCR NOT DOC, RNG: HCPCS | Performed by: ORTHOPAEDIC SURGERY

## 2022-01-07 PROCEDURE — 1090F PRES/ABSN URINE INCON ASSESS: CPT | Performed by: ORTHOPAEDIC SURGERY

## 2022-01-07 PROCEDURE — 99214 OFFICE O/P EST MOD 30 MIN: CPT | Performed by: ORTHOPAEDIC SURGERY

## 2022-01-07 PROCEDURE — 1101F PT FALLS ASSESS-DOCD LE1/YR: CPT | Performed by: ORTHOPAEDIC SURGERY

## 2022-01-07 RX ORDER — BETAMETHASONE SODIUM PHOSPHATE AND BETAMETHASONE ACETATE 3; 3 MG/ML; MG/ML
12 INJECTION, SUSPENSION INTRA-ARTICULAR; INTRALESIONAL; INTRAMUSCULAR; SOFT TISSUE ONCE
Status: COMPLETED | OUTPATIENT
Start: 2022-01-07 | End: 2022-01-07

## 2022-01-07 RX ADMIN — BETAMETHASONE SODIUM PHOSPHATE AND BETAMETHASONE ACETATE 12 MG: 3; 3 INJECTION, SUSPENSION INTRA-ARTICULAR; INTRALESIONAL; INTRAMUSCULAR; SOFT TISSUE at 09:38

## 2022-01-07 NOTE — PROGRESS NOTES
Patient: Christy Scanlon                MRN: [id number]       SSN: [id number]  YOB: 1923        AGE: 80 y.o. SEX: female  Body mass index is 27.21 kg/m². PCP: Ciara Rust MD  01/07/22    HISTORY:  Ms. Jasson Crockett presents with bilateral knee pain, moderate. She is 89 and still remaining fairly active. Just recently had a birthday. She is requesting injections for both knees. Pain is moderate, aching and non radicular. PHYSICAL EXAMINATION:  Very pleasant lady in no acute distress. She is actually quite sharp mentally and alert and oriented to place and time. The hips rotate adequately. Slight varicosity. She does have some seborrheic keratoses over the knees themselves. Nothing looks infected, nothing looks malignant. Calves non tender. [place]' sign negative. Mild evidence of neuropathy distally. Slight effusion in each knee. She is in varus. A couple degree fixed flexion deformity and bends fairly well to about 105 degrees. RADIOGRAPHS:  Review of the x-rays today on 01/07/22, four views of both knees confirm fairly severe arthritis. PLAN:  As per protocol, both knees injected, well tolerated. Return to see us in about four months time, sooner if there is any problem. It has been a pleasure to share in her care. There was a discussion regarding surgery and I would not recommend surgery for her at this time. REVIEW OF SYSTEMS:      CON: negative  EYE: negative   ENT: negative  RESP: negative  GI:    negative   :  negative  MSK: Positive  A twelve point review of systems was completed, positives noted and all other systems were reviewed and are negative          Past Medical History:   Diagnosis Date    Arthritis of right knee     CAD (coronary artery disease), native coronary artery October 2010    mild disease    Cardiac cath 10/21/2010    dLM 20%. mLAD 25%. CX mild. pRCA 30%. EF 30-35%. Anterolateral, apical , diaphrag akin.   Hypercontractile basal segments. Severe elev left-sided filling press.  Cardiac echocardiogram 02/02/2011    EF 50-55%. Mild apical hypk. Gr 1 DDfx. RVSP 40-45. Mild LAE.  Cardiac Holter monitor 06/10/2015    Sinus rhythm w/avg HR of 53 bpm (range 40-85 bpm). Occasional PACs. One 3-beat run of nonsustained SVT. Very few PVCs. One 3-beat run of nonsustained VT. Pt's HR was < 50 bpm for 8 hrs of the recorded time. No pauses noted. Two episodes of neck pain corresponded to sinus rhythm w/o ectopy.  Cardiovascular renal duplex 12/03/2012    No renal artery stenosis bilaterally. Bilateral intrinsic/med disease. Patent bilateral renal veins. Multiple cysts on both kidneys.  CKD (chronic kidney disease)     while on diuretics for difficult to control HTN    Diabetes mellitus type II 3/15/2010    Dyslipidemia 3/15/2010    Heart attack ([address]     2010    Heart failure, diastolic, chronic (HCC)     History of heart attack     HTN (hypertension) 3/15/2010    Hypertensive heart disease     Loss of appetite     Osteopenia 7/13/2010    Other dyspnea and respiratory abnormality     Ringing in the ears     Stress-induced cardiomyopathy     EF 35% (LV angio 10/2010), then EF 50-55% (echo, Feb 2011)    Wears glasses        Family History   Problem Relation Age of Onset    Hypertension Mother     OSTEOARTHRITIS Other        Current Outpatient Medications   Medication Sig Dispense Refill    amLODIPine (NORVASC) 10 mg tablet TAKE 1 TABLET BY MOUTH EVERY DAY 90 Tablet 3    olmesartan (BENICAR) 20 mg tablet TAKE 1 TABLET BY MOUTH EVERY DAY 90 Tablet 3    Rocklatan 0.02-0.005 % drop Administer 1 Drop to both eyes nightly.  calcium-cholecalciferol, d3, (CALCIUM 600 + D) 600-125 mg-unit tab Take  by mouth.  RESTASIS 0.05 % ophthalmic emulsion Administer 1 Drop to both eyes every twelve (12) hours.  ascorbic acid (VITAMIN C) 500 mg tablet Take 1,000 mg by mouth daily.       MULTIVITAMINS PO Take 1 Tab by mouth daily.  aspirin 81 mg chewable tablet Take 81 mg by mouth daily. Allergies   Allergen Reactions    Lipitor [Atorvastatin] Myalgia    Spironolactone Other (comments)     Dizziness and fatigue       Past Surgical History:   Procedure Laterality Date    HX BLADDER SUSPENSION  2009    HX HYSTERECTOMY  1962       Social History     Socioeconomic History    Marital status:      Spouse name: Not on file    Number of children: Not on file    Years of education: Not on file    Highest education level: Not on file   Occupational History    Not on file   Tobacco Use    Smoking status: Never Smoker    Smokeless tobacco: Never Used   Substance and Sexual Activity    Alcohol use: No    Drug use: No    Sexual activity: Never   Other Topics Concern    Not on file   Social History Narrative    Not on file     Social Determinants of Health     Financial Resource Strain:     Difficulty of Paying Living Expenses: Not on file   Food Insecurity:     Worried About Running Out of Food in the Last Year: Not on file    [place] of Food in the Last Year: Not on file   Transportation Needs:     Lack of Transportation (Medical): Not on file    Lack of Transportation (Non-Medical):  Not on file   Physical Activity:     Days of Exercise per Week: Not on file    Minutes of Exercise per Session: Not on file   Stress:     Feeling of Stress : Not on file   Social Connections:     Frequency of Communication with Friends and Family: Not on file    Frequency of Social Gatherings with Friends and Family: Not on file    Attends [employer] Services: Not on file    Active Member of Clubs or Organizations: Not on file    Attends Club or Organization Meetings: Not on file    Marital Status: Not on file   Intimate Partner Violence:     Fear of Current or Ex-Partner: Not on file    Emotionally Abused: Not on file    Physically Abused: Not on file    Sexually Abused: Not on file   Housing Stability:  Unable to Pay for Housing in the Last Year: Not on file    Number of Places Lived in the Last Year: Not on file    Unstable Housing in the Last Year: Not on file       Visit Vitals  Pulse (!) 58   Temp 97.1 °F (36.2 °C) (Temporal)   Wt 65.3 kg (144 lb)   SpO2 100%   BMI 27.21 kg/m²         PHYSICAL EXAMINATION:  GENERAL: Alert and oriented x3, in no acute distress, well-developed, well-nourished, afebrile. HEART: No JVD. EYES: No scleral icterus   NECK: No significant lymphadenopathy   LUNGS: No respiratory compromise or indrawing  ABDOMEN: Soft, non-tender, non-distended. Note: This note was completed using voice recognition software. Any typographical/name errors or mistakes are unintentional.    Electronically signed by: MD ARIANE Ferguson, Cyrus De Jesus M.D., have reviewed the history, physical, and have updated the allergic reactions for [facility]. TIME OUT performed immediately prior to the start of procedure:  Dillon Chavez M.D., have performed the following reviews on [facility] prior to the start of the procedure:    - Patient was identified by name and date of birth  - Agreement on procedure being performed was verified  - Risks and benefits explained to the patient  - Patient was positioned for comfort  - Consent was signed and verified  - Patient was advised regarding risks of bruising, bleeding, infection and pain    Time: 9:34 AM     Body Part: intra-articular injection of bilateral knees. Medication and Dose: Each knee injected with 1mL Celestone preparation, i.e. 6 mg, and 3 mL 1% lidocaine    Date of Procedure: 01/07/22    PROCEDURE PERFORMED BY : William De Jesus M.D., [facility])    Provider Assisted by: Ysabel Gamboa    Patient assisted by: self    Patient tolerated procedure well with no complications

## 2022-03-19 PROBLEM — E11.21 TYPE 2 DIABETES MELLITUS WITH NEPHROPATHY (HCC): Status: ACTIVE | Noted: 2018-01-26

## 2022-03-19 PROBLEM — R00.2 PALPITATIONS: Status: ACTIVE | Noted: 2019-03-14

## 2022-03-24 ENCOUNTER — HOSPITAL ENCOUNTER (OUTPATIENT)
Dept: LAB | Age: 87
Discharge: HOME | End: 2022-03-24
Payer: MEDICARE

## 2022-03-24 ENCOUNTER — APPOINTMENT (OUTPATIENT)
Dept: INTERNAL MEDICINE CLINIC | Age: 87
End: 2022-03-24

## 2022-03-24 DIAGNOSIS — E11.22 TYPE 2 DIABETES MELLITUS WITH STAGE 3A CHRONIC KIDNEY DISEASE, WITHOUT LONG-TERM CURRENT USE OF INSULIN (HCC): ICD-10-CM

## 2022-03-24 DIAGNOSIS — E78.5 DYSLIPIDEMIA: ICD-10-CM

## 2022-03-24 DIAGNOSIS — I10 ESSENTIAL HYPERTENSION: ICD-10-CM

## 2022-03-24 DIAGNOSIS — N18.31 TYPE 2 DIABETES MELLITUS WITH STAGE 3A CHRONIC KIDNEY DISEASE, WITHOUT LONG-TERM CURRENT USE OF INSULIN (HCC): ICD-10-CM

## 2022-03-24 LAB
ALBUMIN SERPL-MCNC: 3.7 G/DL (ref 3.4–5)
ALBUMIN/GLOB SERPL: 1.2 {RATIO} (ref 0.8–1.7)
ALP SERPL-CCNC: 73 U/L (ref 45–117)
ALT SERPL-CCNC: 25 U/L (ref 13–56)
ANION GAP SERPL CALC-SCNC: 7 MMOL/L (ref 3–18)
AST SERPL-CCNC: 28 U/L (ref 10–38)
BILIRUB SERPL-MCNC: 0.3 MG/DL (ref 0.2–1)
BUN SERPL-MCNC: 25 MG/DL (ref 7–18)
BUN/CREAT SERPL: 18 (ref 12–20)
CALCIUM SERPL-MCNC: 10.2 MG/DL (ref 8.5–10.1)
CHLORIDE SERPL-SCNC: 110 MMOL/L (ref 100–111)
CHOLEST SERPL-MCNC: 293 MG/DL
CO2 SERPL-SCNC: 24 MMOL/L (ref 21–32)
CREAT SERPL-MCNC: 1.38 MG/DL (ref 0.6–1.3)
CREAT UR-MCNC: 220 MG/DL (ref 30–125)
GLOBULIN SER CALC-MCNC: 3 G/DL (ref 2–4)
GLUCOSE SERPL-MCNC: 138 MG/DL (ref 74–99)
HBA1C MFR BLD: 6.5 % (ref 4.2–5.6)
HDLC SERPL-MCNC: 69 MG/DL (ref 40–60)
HDLC SERPL: 4.2 {RATIO} (ref 0–5)
LDLC SERPL CALC-MCNC: 192 MG/DL (ref 0–100)
LIPID PROFILE,FLP: ABNORMAL
MICROALBUMIN UR-MCNC: 14.2 MG/DL (ref 0–3)
MICROALBUMIN/CREAT UR-RTO: 65 MG/G (ref 0–30)
POTASSIUM SERPL-SCNC: 4.6 MMOL/L (ref 3.5–5.5)
PROT SERPL-MCNC: 6.7 G/DL (ref 6.4–8.2)
SODIUM SERPL-SCNC: 141 MMOL/L (ref 136–145)
TRIGL SERPL-MCNC: 160 MG/DL (ref ?–150)
VLDLC SERPL CALC-MCNC: 32 MG/DL

## 2022-03-24 PROCEDURE — 80053 COMPREHEN METABOLIC PANEL: CPT

## 2022-03-24 PROCEDURE — 80061 LIPID PANEL: CPT

## 2022-03-24 PROCEDURE — 83036 HEMOGLOBIN GLYCOSYLATED A1C: CPT

## 2022-03-24 PROCEDURE — 82043 UR ALBUMIN QUANTITATIVE: CPT

## 2022-03-24 PROCEDURE — 36415 COLL VENOUS BLD VENIPUNCTURE: CPT

## 2022-03-24 NOTE — MR AVS SNAPSHOT
[facility] 
 
 
 [address] [place] [address] 
[phone] Patient: Jovani Adamr MRN: [id number] QPS:[birth year]60 Visit Information Date & Time Provider Department Dept. Phone Encounter #  
 7/2/2018 10:00 AM Porsche Montemayor, [address] [place] [address] [phone] [id number] Follow-up Instructions Return in about 4 months (around 11/2/2018) for labs 1 week before. Your Appointments 7/26/2018  2:10 PM  
Follow Up with Tully Severin, PA-C  
[facility] ([facility]) Appt Note: rt shoulder 2 mo f/u; shoulder/ hip f/u aware HV Loc daughter Comfort Platt made appt; rt shoulder 2 mo f/u shoulder/ hip f/u aware HV Loc* Patient was rescheduled due to the provider being out of the office on 7/13/18; rt shoulder 2 mo f/u r/s from 07/19/18 provider out 07/13/18 [facility], [address] [place] [phone] [address] [place] [address], [facility] [address] Upcoming Health Maintenance Date Due  
 FOOT EXAM Q1 1/5/1933 DTaP/Tdap/Td series (1 - Tdap) 1/5/1944 ZOSTER VACCINE AGE 60> 11/5/1982 Pneumococcal 65+ Low/Medium Risk (2 of 2 - PPSV23) 5/27/2016 MICROALBUMIN Q1 11/7/2017 HEMOGLOBIN A1C Q6M 7/8/2018 Influenza Age 5 to Adult 8/1/2018 MEDICARE YEARLY EXAM 11/14/2018 EYE EXAM RETINAL OR DILATED Q1 6/4/2019 LIPID PANEL Q1 6/26/2019 GLAUCOMA SCREENING Q2Y 6/4/2020 Allergies as of 7/2/2018  Review Complete On: 7/2/2018 By: Porsche Montemayor MD  
  
 Severity Noted Reaction Type Reactions Lipitor [Atorvastatin]  08/04/2016    Myalgia Spironolactone  05/09/2014    Other (comments) Dizziness and fatigue Current Immunizations  Reviewed on 11/14/2016 Name Date Influenza Vaccine (Quad) PF 11/14/2016, 11/13/2015 Influenza Vaccine Split 10/5/2010 Pneumococcal Conjugate (PCV-13) 5/27/2015 Pneumococcal Vaccine (Pcv) 1/20/2007 Not reviewed this visit You Were Diagnosed With   
  
 Codes Comments Type 2 diabetes mellitus with nephropathy ([facility]    -  Primary ICD-10-CM: E11.21 
ICD-9-CM: 250.40, 583.81 Essential hypertension     ICD-10-CM: I10 
ICD-9-CM: 401.9 Dyslipidemia     ICD-10-CM: E78.5 ICD-9-CM: 272.4 Heart failure, diastolic, chronic (HCC)     ICD-10-CM: I50.32 
ICD-9-CM: 428.32 Palpitations     ICD-10-CM: R00.2 ICD-9-CM: 785.1 Bradycardia     ICD-10-CM: R00.1 ICD-9-CM: 427.89 Vitals BP Pulse Temp Resp Height(growth percentile) Weight(growth percentile) 147/66 (BP 1 Location: Left arm, BP Patient Position: Sitting) (!) 40 98.3 °F (36.8 °C) (Oral) 17 5' 1\" (1.549 m) 144 lb 3.2 oz (65.4 kg) SpO2 BMI OB Status Smoking Status 98% 27.25 kg/m2 Hysterectomy Never Smoker BMI and BSA Data Body Mass Index Body Surface Area  
 27.25 kg/m 2 1.68 m 2 Preferred Pharmacy Pharmacy Name Phone [facility], [address] [place] [address] [phone] Your Updated Medication List  
  
   
This list is accurate as of 7/2/18 10:43 AM.  Always use your most recent med list. ADVIL PO Take 1 Tab by mouth as needed. alcohol swabs Padm Commonly known as:  ALCOHOL PREP PADS  
1 Dose by Apply Externally route daily as needed (Glucose testing). amLODIPine 10 mg tablet Commonly known as:  Hilary Bradford TAKE 1 TABLET BY MOUTH EVERY DAY  
  
 aspirin 81 mg chewable tablet Take 81 mg by mouth daily. Blood-Glucose Meter monitoring kit  
by Does Not Apply route. Use daily as directed CALCIUM PO Take 1,000 mg by mouth two (2) times a day. cholecalciferol 1,000 unit Cap Commonly known as:  VITAMIN D3 Take 2,000 Units by mouth daily. COMBIGAN 0.2-0.5 % Drop ophthalmic solution Generic drug:  brimonidine-timolol Administer 1 Drop to both eyes every twelve (12) hours. glipiZIDE SR 2.5 mg CR tablet Commonly known as:  GLUCOTROL XL  
TAKE 1 TABLET BY MOUTH EVERY DAY  
  
 glucose blood VI test strips strip Commonly known as:  blood glucose test  
by Does Not Apply route. Use daily as directed Lancets Misc  
by Does Not Apply route. Use daily as directed  
  
 latanoprost 0.005 % ophthalmic solution Commonly known as:  XALATAN  
  
 losartan 100 mg tablet Commonly known as:  COZAAR  
TAKE ONE TABLET BY MOUTH DIALY MULTIVITAMINS PO Take 1 Tab by mouth daily. nitroglycerin 0.4 mg SL tablet Commonly known as:  NITROSTAT  
1 Tab by SubLINGual route every five (5) minutes as needed. pravastatin 40 mg tablet Commonly known as:  PRAVACHOL  
TAKE 1 TAB BY MOUTH DAILY. RESTASIS 0.05 % ophthalmic emulsion Generic drug:  cycloSPORINE Administer 1 Drop to both eyes every twelve (12) hours. sulfanilamide 15 % vaginal cream  
Commonly known as:  AVC Insert 1 Applicator into vagina daily. VITAMIN C 500 mg tablet Generic drug:  ascorbic acid (vitamin C) Take 1,000 mg by mouth daily. We Performed the Following AMB POC HEMOGLOBIN A1C [89260 CPT(R)] REFERRAL TO CARDIOLOGY [DBP13 Custom] Comments:  
 Refer for bradycardia and palpitations Follow-up Instructions Return in about 4 months (around 11/2/2018) for labs 1 week before. To-Do List   
 07/20/2018 9:30 AM  
  Appointment with JOEL FELIPE RM 1 at [address] [place] [address] ([phone]) OUTSIDE FILMS  - Any outside films related to the study being scheduled should be brought with you on the day of the exam.  If this cannot be done there may be a delay in the reading of the study.   MEDICATIONS  - Patient must bring a complete list of all medications currently taking to include prescriptions, over-the-counter meds, herbals, vitamins & any dietary supplements  GENERAL INSTRUCTIONS  - On the day of your exam do not use any bath powder, deodorant or lotions on the armpit area. -Tenderness of breasts may cause an increase of discomfort during procedure. If you are experiencing breast tenderness on the day of your appointment and would like to reschedule, please call [phone].  
  
 12/31/2018 Lab:  MICROALBUMIN, UR, RAND W/ MICROALB/CREAT RATIO Referral Information Referral ID Referred By Referred To  
  
 [id number] [facility] [place], [address] [place], [address] Phone: [phone] Fax: [phone] Visits Status Start Date End Date 1 New Request 7/2/18 7/2/19 If your referral has a status of pending review or denied, additional information will be sent to support the outcome of this decision. Introducing [facility]! Shanda Lopez introduces [employer] patient portal. Now you can access parts of your medical record, email your doctor's office, and request medication refills online. 1. In your internet browser, go to [email or web address] [email or web address]/Jack [email or web address] 2. Click on the First Time User? Click Here link in the Sign In box. You will see the New Member Sign Up page. 3. Enter your [employer] Access Code exactly as it appears below. You will not need to use this code after youve completed the sign-up process. If you do not sign up before the expiration date, you must request a new code. · [employer] Access Code: [email or web address] Expires: 7/17/2018 10:22 AM 
 
4. Enter the last four digits of your Social Security Number (xxxx) and Date of Birth (mm/dd/yyyy) as indicated and click Submit. You will be taken to the next sign-up page. 5. Create a THEMAt ID. This will be your [employer] login ID and cannot be changed, so think of one that is secure and easy to remember. 6. Create a [employer] password. You can change your password at any time. 7. Enter your Password Reset Question and Answer. This can be used at a later time if you forget your password. 8. Enter your e-mail address. You will receive e-mail notification when new information is available in [address] 9. Click Sign Up. You can now view and download portions of your medical record. 10. Click the Download Summary menu link to download a portable copy of your medical information. If you have questions, please visit the Frequently Asked Questions section of the [employer] website. Remember, [employer] is NOT to be used for urgent needs. For medical emergencies, dial 911. Now available from your iPhone and Android! Please provide this summary of care documentation to your next provider. Your primary care clinician is listed as [facility]. If you have any questions after today's visit, please call [phone]. .

## 2022-03-28 ENCOUNTER — TELEPHONE (OUTPATIENT)
Dept: CARDIOLOGY CLINIC | Age: 87
End: 2022-03-28

## 2022-03-28 NOTE — TELEPHONE ENCOUNTER
Patient has been having a hard time sleeping at night because her heart is racing.  Patients daughter is concerned bc she has not been able to get a full nights rest.

## 2022-03-29 NOTE — TELEPHONE ENCOUNTER
Called and left message with daughter. Per Dr. Maria Eugenia Alvarado, patient can wear a monitor for 2 days again like before so we can tell what is going on at night. She can also make an appt with Maria Eugenia Alvarado or NP here at [facility] if they would rather come in and be seen.

## 2022-03-31 ENCOUNTER — OFFICE VISIT (OUTPATIENT)
Dept: INTERNAL MEDICINE CLINIC | Age: 87
End: 2022-03-31
Payer: MEDICARE

## 2022-03-31 VITALS
RESPIRATION RATE: 16 BRPM | BODY MASS INDEX: 27.19 KG/M2 | WEIGHT: 144 LBS | HEART RATE: 90 BPM | OXYGEN SATURATION: 100 % | SYSTOLIC BLOOD PRESSURE: 147 MMHG | TEMPERATURE: 97.5 F | HEIGHT: 61 IN | DIASTOLIC BLOOD PRESSURE: 74 MMHG

## 2022-03-31 DIAGNOSIS — E11.22 TYPE 2 DIABETES MELLITUS WITH STAGE 3A CHRONIC KIDNEY DISEASE, WITHOUT LONG-TERM CURRENT USE OF INSULIN (HCC): Primary | ICD-10-CM

## 2022-03-31 DIAGNOSIS — I10 ESSENTIAL HYPERTENSION: ICD-10-CM

## 2022-03-31 DIAGNOSIS — E78.5 DYSLIPIDEMIA: ICD-10-CM

## 2022-03-31 DIAGNOSIS — R00.2 PALPITATIONS: ICD-10-CM

## 2022-03-31 DIAGNOSIS — N18.31 TYPE 2 DIABETES MELLITUS WITH STAGE 3A CHRONIC KIDNEY DISEASE, WITHOUT LONG-TERM CURRENT USE OF INSULIN (HCC): Primary | ICD-10-CM

## 2022-03-31 DIAGNOSIS — I11.0 HYPERTENSIVE HEART DISEASE WITH HEART FAILURE (HCC): ICD-10-CM

## 2022-03-31 PROCEDURE — G8536 NO DOC ELDER MAL SCRN: HCPCS | Performed by: INTERNAL MEDICINE

## 2022-03-31 PROCEDURE — G8427 DOCREV CUR MEDS BY ELIG CLIN: HCPCS | Performed by: INTERNAL MEDICINE

## 2022-03-31 PROCEDURE — G0463 HOSPITAL OUTPT CLINIC VISIT: HCPCS | Performed by: INTERNAL MEDICINE

## 2022-03-31 PROCEDURE — 3044F HG A1C LEVEL LT 7.0%: CPT | Performed by: INTERNAL MEDICINE

## 2022-03-31 PROCEDURE — 1090F PRES/ABSN URINE INCON ASSESS: CPT | Performed by: INTERNAL MEDICINE

## 2022-03-31 PROCEDURE — G8510 SCR DEP NEG, NO PLAN REQD: HCPCS | Performed by: INTERNAL MEDICINE

## 2022-03-31 PROCEDURE — 1101F PT FALLS ASSESS-DOCD LE1/YR: CPT | Performed by: INTERNAL MEDICINE

## 2022-03-31 PROCEDURE — G8417 CALC BMI ABV UP PARAM F/U: HCPCS | Performed by: INTERNAL MEDICINE

## 2022-03-31 PROCEDURE — 99214 OFFICE O/P EST MOD 30 MIN: CPT | Performed by: INTERNAL MEDICINE

## 2022-03-31 RX ORDER — AMLODIPINE BESYLATE 10 MG/1
10 TABLET ORAL DAILY
Qty: 90 TABLET | Refills: 3 | Status: SHIPPED | OUTPATIENT
Start: 2022-03-31 | End: 2022-05-11

## 2022-03-31 RX ORDER — OLMESARTAN MEDOXOMIL 20 MG/1
20 TABLET ORAL DAILY
Qty: 90 TABLET | Refills: 3 | Status: SHIPPED | OUTPATIENT
Start: 2022-03-31 | End: 2022-04-06 | Stop reason: SDUPTHER

## 2022-03-31 NOTE — PROGRESS NOTES
Patient is in the office today for a 4 month follow up. Do you have an Advance Directive no  Do you want more information : information given     1. \"Have you been to the ER, urgent care clinic since your last visit? Hospitalized since your last visit? \" No    2. \"Have you seen or consulted any other health care providers outside of the [address] [place] [address] since your last visit? \" yes, Dr. Erik Wilder. 3. For patients aged 39-70: Has the patient had a colonoscopy / FIT/ Cologuard? NA - based on age      If the patient is female:    4. For patients aged 41-77: Has the patient had a mammogram within the past 2 years? NA - based on age or sex      11. For patients aged 21-65: Has the patient had a pap smear?  NA - based on age or sex

## 2022-03-31 NOTE — PATIENT INSTRUCTIONS
High Blood Pressure: Care Instructions  Overview     It's normal for blood pressure to go up and down throughout the day. But if it stays up, you have high blood pressure. Another name for high blood pressure is hypertension. Despite what a lot of people think, high blood pressure usually doesn't cause headaches or make you feel dizzy or lightheaded. It usually has no symptoms. But it does increase your risk of stroke, heart attack, and other problems. You and your doctor will talk about your risks of these problems based on your blood pressure. Your doctor will give you a goal for your blood pressure. Your goal will be based on your health and your age. Lifestyle changes, such as eating healthy and being active, are always important to help lower blood pressure. You might also take medicine to reach your blood pressure goal.  Follow-up care is a key part of your treatment and safety. Be sure to make and go to all appointments, and call your doctor if you are having problems. It's also a good idea to know your test results and keep a list of the medicines you take. How can you care for yourself at home? Medical treatment  · If you stop taking your medicine, your blood pressure will go back up. You may take one or more types of medicine to lower your blood pressure. Be safe with medicines. Take your medicine exactly as prescribed. Call your doctor if you think you are having a problem with your medicine. · Talk to your doctor before you start taking aspirin every day. Aspirin can help certain people lower their risk of a heart attack or stroke. But taking aspirin isn't right for everyone, because it can cause serious bleeding. · See your doctor regularly. You may need to see the doctor more often at first or until your blood pressure comes down. · If you are taking blood pressure medicine, talk to your doctor before you take decongestants or anti-inflammatory medicine, such as ibuprofen.  Some of these medicines can raise blood pressure. · Learn how to check your blood pressure at home. Lifestyle changes  · Stay at a healthy weight. This is especially important if you put on weight around the waist. Losing even 10 pounds can help you lower your blood pressure. · If your doctor recommends it, get more exercise. Walking is a good choice. Bit by bit, increase the amount you walk every day. Try for at least 30 minutes on most days of the week. You also may want to swim, bike, or do other activities. · Avoid or limit alcohol. Talk to your doctor about whether you can drink any alcohol. · Try to limit how much sodium you eat to less than 2,300 milligrams (mg) a day. Your doctor may ask you to try to eat less than 1,500 mg a day. · Eat plenty of fruits (such as bananas and oranges), vegetables, legumes, whole grains, and low-fat dairy products. · Lower the amount of saturated fat in your diet. Saturated fat is found in animal products such as milk, cheese, and meat. Limiting these foods may help you lose weight and also lower your risk for heart disease. · Do not smoke. Smoking increases your risk for heart attack and stroke. If you need help quitting, talk to your doctor about stop-smoking programs and medicines. These can increase your chances of quitting for good. When should you call for help? Call 911  anytime you think you may need emergency care. This may mean having symptoms that suggest that your blood pressure is causing a serious heart or blood vessel problem. Your blood pressure may be over 180/120. For example, call 911 if:    · You have symptoms of a heart attack. These may include:  ? Chest pain or pressure, or a strange feeling in the chest.  ? Sweating. ? Shortness of breath. ? Nausea or vomiting. ? Pain, pressure, or a strange feeling in the back, neck, jaw, or upper belly or in one or both shoulders or arms. ? Lightheadedness or sudden weakness.   ? A fast or irregular heartbeat.     · You have symptoms of a stroke. These may include:  ? Sudden numbness, tingling, weakness, or loss of movement in your face, arm, or leg, especially on only one side of your body. ? Sudden vision changes. ? Sudden trouble speaking. ? Sudden confusion or trouble understanding simple statements. ? Sudden problems with walking or balance. ? A sudden, severe headache that is different from past headaches.     · You have severe back or belly pain. Do not wait until your blood pressure comes down on its own. Get help right away. Call your doctor now or seek immediate care if:    · Your blood pressure is much higher than normal (such as 180/120 or higher), but you don't have symptoms.     · You think high blood pressure is causing symptoms, such as:  ? Severe headache.  ? Blurry vision. Watch closely for changes in your health, and be sure to contact your doctor if:    · Your blood pressure measures higher than your doctor recommends at least 2 times. That means the top number is higher or the bottom number is higher, or both.     · You think you may be having side effects from your blood pressure medicine. Where can you learn more? Go to http://www.gray.com/  Enter [id number] in the search box to learn more about \"High Blood Pressure: Care Instructions. \"  Current as of: January 10, 2022               Content Version: 13.2  © 2006-2022 [employer]. Care instructions adapted under license by [employer] ? (which disclaims liability or warranty for this information). If you have questions about a medical condition or this instruction, always ask your healthcare professional. [place] [address] any warranty or liability for your use of this information.

## 2022-04-06 ENCOUNTER — TELEPHONE (OUTPATIENT)
Dept: INTERNAL MEDICINE CLINIC | Age: 87
End: 2022-04-06

## 2022-04-06 RX ORDER — OLMESARTAN MEDOXOMIL 20 MG/1
20 TABLET ORAL DAILY
Qty: 90 TABLET | Refills: 3 | Status: SHIPPED | OUTPATIENT
Start: 2022-04-06

## 2022-04-06 NOTE — TELEPHONE ENCOUNTER
Patient's daughter is calling stating the pharmacy did not receive the rx for the Olmesartan.  [facility]

## 2022-04-07 NOTE — PROGRESS NOTES
Subjective:       Chief Complaint  The patient presents for follow up of diabetes, hypertension and high cholesterol. FATEMEH Piña is a 80 y.o. female seen for follow up of diabetes. Shealso has hypertension and hyperlipidemia. Diabetes well controlled off medications for now,  hypertension  uncontrolled , no significant medication side effects noted, on current meds, which include Benicar 20 mg, Norvasc 10 mg, patient daughter will try  to monitor at home, compliance may be an issue with this 20-year-old lady. Hyperlipidemia, uncontrolled off Pravachol 40 mg which caused her to have myalgias. Diet and Lifestyle: generally follows a low fat low cholesterol diet, follows a diabetic diet regularly, sedentary    Home BP Monitoring: is not monitored at home on a regular basis    Diabetic Review of Systems - home glucose monitoring: is well controlled at home when she takes it 1x/day    Other symptoms and concerns: Pt's CKD stage 3 is stable on current meds. Patient has palpitations that bother her at times. She is especially complaining of them currently mostly at night. .  She has been taken off of beta-blockers by her cardiologist because of low heart rate. Patient is also followed by cardiology for hypertensive heart disease. Pt has chronic diastolic heart failure that is followed by cardiology and controlled on current meds. Pt has generalized arthritis for which she uses aleve with mild improvement. Current Outpatient Medications   Medication Sig    amLODIPine (NORVASC) 10 mg tablet Take 1 Tablet by mouth daily.  Rocklatan 0.02-0.005 % drop Administer 1 Drop to both eyes nightly.  calcium-cholecalciferol, d3, (CALCIUM 600 + D) 600-125 mg-unit tab Take  by mouth.  RESTASIS 0.05 % ophthalmic emulsion Administer 1 Drop to both eyes every twelve (12) hours.  ascorbic acid (VITAMIN C) 500 mg tablet Take 1,000 mg by mouth daily.     MULTIVITAMINS PO Take 1 Tab by mouth daily.  aspirin 81 mg chewable tablet Take 81 mg by mouth daily.  olmesartan (BENICAR) 20 mg tablet Take 1 Tablet by mouth daily. No current facility-administered medications for this visit. Review of Systems  Respiratory: negative for dyspnea on exertion  Cardiovascular: negative for chest pain    Objective:     Visit Vitals  BP (!) 147/74   Pulse 90   Temp 97.5 °F (36.4 °C) (Temporal)   Resp 16   Ht 5' 1\" (1.549 m)   Wt 144 lb (65.3 kg)   SpO2 100%   BMI 27.21 kg/m²        General appearance - alert, well appearing, and in no distress  Neck - supple, no significant adenopathy, carotids upstroke normal bilaterally, no bruits  Chest - clear to auscultation, no wheezes, rales or rhonchi, symmetric air entry  Heart -normal S1, S2, no murmurs, rubs, clicks or gallops  Extremities -1+ bilateral pedal edema  Skin - normal coloration and turgor, no rashes, no suspicious skin lesions noted      Labs:   Lab Results   Component Value Date/Time    Hemoglobin A1c 6.5 (H) 03/24/2022 10:31 AM    Hemoglobin A1c 6.6 (H) 10/21/2021 10:30 AM    Hemoglobin A1c 6.9 (H) 07/15/2021 10:37 AM    Microalbumin/Creat ratio (mg/g creat) 65 (H) 03/24/2022 10:31 AM    Microalbumin,urine random 14.20 (H) 03/24/2022 10:31 AM    LDL, calculated 192 (H) 03/24/2022 10:31 AM    Creatinine 1.38 (H) 03/24/2022 10:31 AM      Lab Results   Component Value Date/Time    Cholesterol, total 293 (H) 03/24/2022 10:31 AM    HDL Cholesterol 69 (H) 03/24/2022 10:31 AM    LDL, calculated 192 (H) 03/24/2022 10:31 AM    Triglyceride 160 (H) 03/24/2022 10:31 AM    CHOL/HDL Ratio 4.2 03/24/2022 10:31 AM     Lab Results   Component Value Date/Time    Alk.  phosphatase 73 03/24/2022 10:31 AM    Bilirubin, direct 0.1 03/11/2016 10:45 AM     Lab Results   Component Value Date/Time    GFR est AA 43 (L) 03/24/2022 10:31 AM    GFR est non-AA 35 (L) 03/24/2022 10:31 AM    Creatinine 1.38 (H) 03/24/2022 10:31 AM    BUN 25 (H) 03/24/2022 10:31 AM Sodium 141 03/24/2022 10:31 AM    Potassium 4.6 03/24/2022 10:31 AM    Chloride 110 03/24/2022 10:31 AM    CO2 24 03/24/2022 10:31 AM      Lab Results   Component Value Date/Time    Glucose 138 (H) 03/24/2022 10:31 AM            Assessment / Plan     Diabetes well controlled, off medications. Will continue to monitor  Hypertension  uncontrolled, on current meds, concerned about compliance, patient daughter to monitor blood pressure at home  Hyperlipidemia  uncontrolled on Pravachol which caused myalgias. ICD-10-CM ICD-9-CM    1. Type 2 diabetes mellitus with stage 3a chronic kidney disease, without long-term current use of insulin (HCC)  E11.22 250.40 HEMOGLOBIN A1C W/O EAG    N18.31 585.3    2. Essential hypertension  K37 550.1 METABOLIC PANEL, COMPREHENSIVE   3. Dyslipidemia  E78.5 272.4 LIPID PANEL   4. Hypertensive heart disease with heart failure (HCC)  I11.0 402.91  stable on current medications patient is followed by cardiology. 428.9    5. Palpitations  R00.2 785.1  cardiology wanted to place monitor and patient and to follow-up with the office for this. If palpitations persist may consider restarting a low-dose beta-blocker again with her heart rate today being relatively elevated at 90. Diabetic issues reviewed with her: diabetic diet discussed in detail, and low cholesterol diet, weight control and daily exercise discussed. Follow-up and Dispositions    · Return in about 2 weeks (around 4/14/2022) for BP check, palpitations. Reviewed plan of care. Patient has provided input and agrees with goals.

## 2022-04-14 ENCOUNTER — OFFICE VISIT (OUTPATIENT)
Dept: INTERNAL MEDICINE CLINIC | Age: 87
End: 2022-04-14
Payer: MEDICARE

## 2022-04-14 VITALS
WEIGHT: 141 LBS | DIASTOLIC BLOOD PRESSURE: 75 MMHG | RESPIRATION RATE: 20 BRPM | SYSTOLIC BLOOD PRESSURE: 144 MMHG | BODY MASS INDEX: 26.62 KG/M2 | HEART RATE: 74 BPM | OXYGEN SATURATION: 98 % | HEIGHT: 61 IN | TEMPERATURE: 97.7 F

## 2022-04-14 DIAGNOSIS — E78.5 DYSLIPIDEMIA: ICD-10-CM

## 2022-04-14 DIAGNOSIS — R00.2 PALPITATIONS: ICD-10-CM

## 2022-04-14 DIAGNOSIS — I10 ESSENTIAL HYPERTENSION: Primary | ICD-10-CM

## 2022-04-14 PROCEDURE — G0463 HOSPITAL OUTPT CLINIC VISIT: HCPCS | Performed by: INTERNAL MEDICINE

## 2022-04-14 PROCEDURE — 99214 OFFICE O/P EST MOD 30 MIN: CPT | Performed by: INTERNAL MEDICINE

## 2022-04-14 PROCEDURE — G8417 CALC BMI ABV UP PARAM F/U: HCPCS | Performed by: INTERNAL MEDICINE

## 2022-04-14 PROCEDURE — G8536 NO DOC ELDER MAL SCRN: HCPCS | Performed by: INTERNAL MEDICINE

## 2022-04-14 PROCEDURE — G8427 DOCREV CUR MEDS BY ELIG CLIN: HCPCS | Performed by: INTERNAL MEDICINE

## 2022-04-14 PROCEDURE — 1101F PT FALLS ASSESS-DOCD LE1/YR: CPT | Performed by: INTERNAL MEDICINE

## 2022-04-14 PROCEDURE — G8432 DEP SCR NOT DOC, RNG: HCPCS | Performed by: INTERNAL MEDICINE

## 2022-04-14 PROCEDURE — 1090F PRES/ABSN URINE INCON ASSESS: CPT | Performed by: INTERNAL MEDICINE

## 2022-04-14 RX ORDER — METOPROLOL TARTRATE 25 MG/1
TABLET, FILM COATED ORAL
COMMUNITY
Start: 2022-04-13 | End: 2022-05-05 | Stop reason: SINTOL

## 2022-04-14 RX ORDER — ROSUVASTATIN CALCIUM 10 MG/1
TABLET, COATED ORAL
COMMUNITY
Start: 2022-04-13 | End: 2022-04-18 | Stop reason: SINTOL

## 2022-04-14 NOTE — PATIENT INSTRUCTIONS
High Blood Pressure: Care Instructions  Overview     It's normal for blood pressure to go up and down throughout the day. But if it stays up, you have high blood pressure. Another name for high blood pressure is hypertension. Despite what a lot of people think, high blood pressure usually doesn't cause headaches or make you feel dizzy or lightheaded. It usually has no symptoms. But it does increase your risk of stroke, heart attack, and other problems. You and your doctor will talk about your risks of these problems based on your blood pressure. Your doctor will give you a goal for your blood pressure. Your goal will be based on your health and your age. Lifestyle changes, such as eating healthy and being active, are always important to help lower blood pressure. You might also take medicine to reach your blood pressure goal.  Follow-up care is a key part of your treatment and safety. Be sure to make and go to all appointments, and call your doctor if you are having problems. It's also a good idea to know your test results and keep a list of the medicines you take. How can you care for yourself at home? Medical treatment  · If you stop taking your medicine, your blood pressure will go back up. You may take one or more types of medicine to lower your blood pressure. Be safe with medicines. Take your medicine exactly as prescribed. Call your doctor if you think you are having a problem with your medicine. · Talk to your doctor before you start taking aspirin every day. Aspirin can help certain people lower their risk of a heart attack or stroke. But taking aspirin isn't right for everyone, because it can cause serious bleeding. · See your doctor regularly. You may need to see the doctor more often at first or until your blood pressure comes down. · If you are taking blood pressure medicine, talk to your doctor before you take decongestants or anti-inflammatory medicine, such as ibuprofen.  Some of these medicines can raise blood pressure. · Learn how to check your blood pressure at home. Lifestyle changes  · Stay at a healthy weight. This is especially important if you put on weight around the waist. Losing even 10 pounds can help you lower your blood pressure. · If your doctor recommends it, get more exercise. Walking is a good choice. Bit by bit, increase the amount you walk every day. Try for at least 30 minutes on most days of the week. You also may want to swim, bike, or do other activities. · Avoid or limit alcohol. Talk to your doctor about whether you can drink any alcohol. · Try to limit how much sodium you eat to less than 2,300 milligrams (mg) a day. Your doctor may ask you to try to eat less than 1,500 mg a day. · Eat plenty of fruits (such as bananas and oranges), vegetables, legumes, whole grains, and low-fat dairy products. · Lower the amount of saturated fat in your diet. Saturated fat is found in animal products such as milk, cheese, and meat. Limiting these foods may help you lose weight and also lower your risk for heart disease. · Do not smoke. Smoking increases your risk for heart attack and stroke. If you need help quitting, talk to your doctor about stop-smoking programs and medicines. These can increase your chances of quitting for good. When should you call for help? Call 911  anytime you think you may need emergency care. This may mean having symptoms that suggest that your blood pressure is causing a serious heart or blood vessel problem. Your blood pressure may be over 180/120. For example, call 911 if:    · You have symptoms of a heart attack. These may include:  ? Chest pain or pressure, or a strange feeling in the chest.  ? Sweating. ? Shortness of breath. ? Nausea or vomiting. ? Pain, pressure, or a strange feeling in the back, neck, jaw, or upper belly or in one or both shoulders or arms. ? Lightheadedness or sudden weakness.   ? A fast or irregular heartbeat.     · You have symptoms of a stroke. These may include:  ? Sudden numbness, tingling, weakness, or loss of movement in your face, arm, or leg, especially on only one side of your body. ? Sudden vision changes. ? Sudden trouble speaking. ? Sudden confusion or trouble understanding simple statements. ? Sudden problems with walking or balance. ? A sudden, severe headache that is different from past headaches.     · You have severe back or belly pain. Do not wait until your blood pressure comes down on its own. Get help right away. Call your doctor now or seek immediate care if:    · Your blood pressure is much higher than normal (such as 180/120 or higher), but you don't have symptoms.     · You think high blood pressure is causing symptoms, such as:  ? Severe headache.  ? Blurry vision. Watch closely for changes in your health, and be sure to contact your doctor if:    · Your blood pressure measures higher than your doctor recommends at least 2 times. That means the top number is higher or the bottom number is higher, or both.     · You think you may be having side effects from your blood pressure medicine. Where can you learn more? Go to http://www.gray.com/  Enter [id number] in the search box to learn more about \"High Blood Pressure: Care Instructions. \"  Current as of: January 10, 2022               Content Version: 13.2  © 2006-2022 [employer]. Care instructions adapted under license by [employer] (which disclaims liability or warranty for this information). If you have questions about a medical condition or this instruction, always ask your healthcare professional. [place] [address] any warranty or liability for your use of this information.

## 2022-04-14 NOTE — PROGRESS NOTES
Patient is in the office today for a 2 week. Patient wants to know if she has to take all her BP pills. Do you have an Advance Directive no  Do you want more information : information given     1. \"Have you been to the ER, urgent care clinic since your last visit? Hospitalized since your last visit? \" No    2. \"Have you seen or consulted any other health care providers outside of the [address] [place] [address] since your last visit? \" No     3. For patients aged 39-70: Has the patient had a colonoscopy / FIT/ Cologuard? NA - based on age      If the patient is female:    4. For patients aged 41-77: Has the patient had a mammogram within the past 2 years? NA - based on age or sex      11. For patients aged 21-65: Has the patient had a pap smear?  NA - based on age or sex

## 2022-04-18 NOTE — PROGRESS NOTES
Carlos Crawford is a 80 y.o.  female and presents with Blood Pressure Check, Cholesterol Problem, and Palpitations      SUBJECTIVE:    Patient has transition to a new cardiologist and is seeing Dr. Miguel Galvan.  He started her metoprolol 25 mg twice daily and Crestor 10 mg daily. Patient has not started either medication but at age 80 advise her that the benefit of starting a statin does not outweigh the risk of myalgias especially since she has had myalgias in the past with statins. She has also had issues with beta-blockers in the past so we will start her  on metoprolol 25 mg at bedtime and then reevaluate in 2 weeks to see how she is tolerating the medication. She also has palpitations for which the beta-blocker would be beneficial in helping improve. Respiratory ROS: negative for - shortness of breath  Cardiovascular ROS: negative for - chest pain    Current Outpatient Medications   Medication Sig    metoprolol tartrate (LOPRESSOR) 25 mg tablet Prescribed by Dr. Miguel Galvan Cardiology    olmesartan (BENICAR) 20 mg tablet Take 1 Tablet by mouth daily.  amLODIPine (NORVASC) 10 mg tablet Take 1 Tablet by mouth daily.  Rocklatan 0.02-0.005 % drop Administer 1 Drop to both eyes nightly.  calcium-cholecalciferol, d3, (CALCIUM 600 + D) 600-125 mg-unit tab Take  by mouth.  RESTASIS 0.05 % ophthalmic emulsion Administer 1 Drop to both eyes every twelve (12) hours.  ascorbic acid (VITAMIN C) 500 mg tablet Take 1,000 mg by mouth daily.  MULTIVITAMINS PO Take 1 Tab by mouth daily.  aspirin 81 mg chewable tablet Take 81 mg by mouth daily. No current facility-administered medications for this visit.          OBJECTIVE:  alert, well appearing, and in no distress  Visit Vitals  BP (!) 144/75 (BP 1 Location: Left arm, BP Patient Position: Sitting, BP Cuff Size: Adult)   Pulse 74   Temp 97.7 °F (36.5 °C) (Temporal)   Resp 20   Ht 5' 1\" (1.549 m)   Wt 141 lb (64 kg)   SpO2 98%   BMI 26.64 kg/m²      well developed and well nourished  Chest - clear to auscultation, no wheezes, rales or rhonchi, symmetric air entry  Heart - normal rate, regular rhythm, normal S1, S2, no murmurs, rubs, clicks or gallops          Assessment/Plan      ICD-10-CM ICD-9-CM    1. Essential hypertension  I10 401.9  uncontrolled we will start metoprolol tartrate (LOPRESSOR) 25 mg tablet nightly in addition to Benicar 20 mg daily and Norvasc 10 mg daily   2. Palpitations  R00.2 785.1  will treat with metoprolol tartrate (LOPRESSOR) 25 mg tablet nightly. 3. Dyslipidemia  E78.5 272.4  due to patient's age at 80 and history of myalgias with statins in the past would not have her start Crestor as recommended by Dr. Osmani Pino who she saw for the first time in the last week. Follow-up and Dispositions    · Return in about 3 weeks (around 5/5/2022) for BP check. Reviewed plan of care. Patient has provided input and agrees with goals.

## 2022-05-05 ENCOUNTER — OFFICE VISIT (OUTPATIENT)
Dept: INTERNAL MEDICINE CLINIC | Age: 87
End: 2022-05-05
Payer: MEDICARE

## 2022-05-05 VITALS
TEMPERATURE: 97.8 F | BODY MASS INDEX: 26.24 KG/M2 | HEIGHT: 61 IN | HEART RATE: 78 BPM | RESPIRATION RATE: 20 BRPM | SYSTOLIC BLOOD PRESSURE: 176 MMHG | OXYGEN SATURATION: 96 % | DIASTOLIC BLOOD PRESSURE: 89 MMHG | WEIGHT: 139 LBS

## 2022-05-05 DIAGNOSIS — I10 ESSENTIAL HYPERTENSION: Primary | ICD-10-CM

## 2022-05-05 DIAGNOSIS — R00.2 PALPITATIONS: ICD-10-CM

## 2022-05-05 PROCEDURE — G0463 HOSPITAL OUTPT CLINIC VISIT: HCPCS | Performed by: INTERNAL MEDICINE

## 2022-05-05 PROCEDURE — 1090F PRES/ABSN URINE INCON ASSESS: CPT | Performed by: INTERNAL MEDICINE

## 2022-05-05 PROCEDURE — 1101F PT FALLS ASSESS-DOCD LE1/YR: CPT | Performed by: INTERNAL MEDICINE

## 2022-05-05 PROCEDURE — G8417 CALC BMI ABV UP PARAM F/U: HCPCS | Performed by: INTERNAL MEDICINE

## 2022-05-05 PROCEDURE — 99213 OFFICE O/P EST LOW 20 MIN: CPT | Performed by: INTERNAL MEDICINE

## 2022-05-05 PROCEDURE — G8427 DOCREV CUR MEDS BY ELIG CLIN: HCPCS | Performed by: INTERNAL MEDICINE

## 2022-05-05 PROCEDURE — G8432 DEP SCR NOT DOC, RNG: HCPCS | Performed by: INTERNAL MEDICINE

## 2022-05-05 PROCEDURE — G8536 NO DOC ELDER MAL SCRN: HCPCS | Performed by: INTERNAL MEDICINE

## 2022-05-05 RX ORDER — DILTIAZEM HYDROCHLORIDE 120 MG/1
120 CAPSULE, EXTENDED RELEASE ORAL DAILY
COMMUNITY
End: 2022-05-17

## 2022-05-05 NOTE — PATIENT INSTRUCTIONS
High Blood Pressure: Care Instructions  Overview     It's normal for blood pressure to go up and down throughout the day. But if it stays up, you have high blood pressure. Another name for high blood pressure is hypertension. Despite what a lot of people think, high blood pressure usually doesn't cause headaches or make you feel dizzy or lightheaded. It usually has no symptoms. But it does increase your risk of stroke, heart attack, and other problems. You and your doctor will talk about your risks of these problems based on your blood pressure. Your doctor will give you a goal for your blood pressure. Your goal will be based on your health and your age. Lifestyle changes, such as eating healthy and being active, are always important to help lower blood pressure. You might also take medicine to reach your blood pressure goal.  Follow-up care is a key part of your treatment and safety. Be sure to make and go to all appointments, and call your doctor if you are having problems. It's also a good idea to know your test results and keep a list of the medicines you take. How can you care for yourself at home? Medical treatment  · If you stop taking your medicine, your blood pressure will go back up. You may take one or more types of medicine to lower your blood pressure. Be safe with medicines. Take your medicine exactly as prescribed. Call your doctor if you think you are having a problem with your medicine. · Talk to your doctor before you start taking aspirin every day. Aspirin can help certain people lower their risk of a heart attack or stroke. But taking aspirin isn't right for everyone, because it can cause serious bleeding. · See your doctor regularly. You may need to see the doctor more often at first or until your blood pressure comes down. · If you are taking blood pressure medicine, talk to your doctor before you take decongestants or anti-inflammatory medicine, such as ibuprofen.  Some of these medicines can raise blood pressure. · Learn how to check your blood pressure at home. Lifestyle changes  · Stay at a healthy weight. This is especially important if you put on weight around the waist. Losing even 10 pounds can help you lower your blood pressure. · If your doctor recommends it, get more exercise. Walking is a good choice. Bit by bit, increase the amount you walk every day. Try for at least 30 minutes on most days of the week. You also may want to swim, bike, or do other activities. · Avoid or limit alcohol. Talk to your doctor about whether you can drink any alcohol. · Try to limit how much sodium you eat to less than 2,300 milligrams (mg) a day. Your doctor may ask you to try to eat less than 1,500 mg a day. · Eat plenty of fruits (such as bananas and oranges), vegetables, legumes, whole grains, and low-fat dairy products. · Lower the amount of saturated fat in your diet. Saturated fat is found in animal products such as milk, cheese, and meat. Limiting these foods may help you lose weight and also lower your risk for heart disease. · Do not smoke. Smoking increases your risk for heart attack and stroke. If you need help quitting, talk to your doctor about stop-smoking programs and medicines. These can increase your chances of quitting for good. When should you call for help? Call 911  anytime you think you may need emergency care. This may mean having symptoms that suggest that your blood pressure is causing a serious heart or blood vessel problem. Your blood pressure may be over 180/120. For example, call 911 if:    · You have symptoms of a heart attack. These may include:  ? Chest pain or pressure, or a strange feeling in the chest.  ? Sweating. ? Shortness of breath. ? Nausea or vomiting. ? Pain, pressure, or a strange feeling in the back, neck, jaw, or upper belly or in one or both shoulders or arms. ? Lightheadedness or sudden weakness.   ? A fast or irregular heartbeat.     · You have symptoms of a stroke. These may include:  ? Sudden numbness, tingling, weakness, or loss of movement in your face, arm, or leg, especially on only one side of your body. ? Sudden vision changes. ? Sudden trouble speaking. ? Sudden confusion or trouble understanding simple statements. ? Sudden problems with walking or balance. ? A sudden, severe headache that is different from past headaches.     · You have severe back or belly pain. Do not wait until your blood pressure comes down on its own. Get help right away. Call your doctor now or seek immediate care if:    · Your blood pressure is much higher than normal (such as 180/120 or higher), but you don't have symptoms.     · You think high blood pressure is causing symptoms, such as:  ? Severe headache.  ? Blurry vision. Watch closely for changes in your health, and be sure to contact your doctor if:    · Your blood pressure measures higher than your doctor recommends at least 2 times. That means the top number is higher or the bottom number is higher, or both.     · You think you may be having side effects from your blood pressure medicine. Where can you learn more? Go to http://www.gray.com/  Enter [id number] in the search box to learn more about \"High Blood Pressure: Care Instructions. \"  Current as of: January 10, 2022               Content Version: 13.2  © 2006-2022 [employer]. Care instructions adapted under license by [employer] (which disclaims liability or warranty for this information). If you have questions about a medical condition or this instruction, always ask your healthcare professional. [place] [address] any warranty or liability for your use of this information.

## 2022-05-05 NOTE — PROGRESS NOTES
Patient is in the office today for a 4 week  follow up. Do you have an Advance Directive no    Do you want more information : information given     1. \"Have you been to the ER, urgent care clinic since your last visit? Hospitalized since your last visit? \" No    2. \"Have you seen or consulted any other health care providers outside of the [address] [place] [address] since your last visit? \" yes Dr. Keshav Mohr Cardiology. 3. For patients aged 39-70: Has the patient had a colonoscopy / FIT/ Cologuard? NA - based on age      If the patient is female:    4. For patients aged 41-77: Has the patient had a mammogram within the past 2 years? NA - based on age or sex      11. For patients aged 21-65: Has the patient had a pap smear?  NA - based on age or sex\

## 2022-05-11 NOTE — PROGRESS NOTES
Jan Ortega is a 80 y.o.  female and presents with Blood Pressure Check and Palpitations      SUBJECTIVE:    Patient has transition to a new cardiologist and is seeing Dr. Torres Cr.  He started her metoprolol 25 mg twice daily and Crestor 10 mg daily. Patient did not take the Crestor because she has had problems with myalgias and age 80 did not think that the benefit outweighed the risk. Patient appears to have tried Lopressor but had side effects with it and was transitioned to Cardizem  mg daily. Blood pressure in the office today still on the high side but encouraged daughter to monitor blood pressure at home to see if it is better controlled. Patient's palpitations also have improved with Cardizem CD and she is feeling better overall. Patient continues on Benicar 20 mg daily for blood pressure control. Respiratory ROS: negative for - shortness of breath  Cardiovascular ROS: negative for - chest pain    Current Outpatient Medications   Medication Sig    dilTIAZem ER (TIAZAC) 120 mg capsule Take 120 mg by mouth daily.  olmesartan (BENICAR) 20 mg tablet Take 1 Tablet by mouth daily.  Rocklatan 0.02-0.005 % drop Administer 1 Drop to both eyes nightly.  calcium-cholecalciferol, d3, (CALCIUM 600 + D) 600-125 mg-unit tab Take  by mouth.  RESTASIS 0.05 % ophthalmic emulsion Administer 1 Drop to both eyes every twelve (12) hours.  ascorbic acid (VITAMIN C) 500 mg tablet Take 1,000 mg by mouth daily.  MULTIVITAMINS PO Take 1 Tab by mouth daily.  aspirin 81 mg chewable tablet Take 81 mg by mouth daily. No current facility-administered medications for this visit.          OBJECTIVE:  alert, well appearing, and in no distress  Visit Vitals  BP (!) 176/89 (BP 1 Location: Left arm, BP Patient Position: Sitting, BP Cuff Size: Adult)   Pulse 78   Temp 97.8 °F (36.6 °C) (Temporal)   Resp 20   Ht 5' 1\" (1.549 m)   Wt 139 lb (63 kg)   SpO2 96%   BMI 26.26 kg/m²      well developed and well nourished  Chest - clear to auscultation, no wheezes, rales or rhonchi, symmetric air entry  Heart - normal rate, regular rhythm, normal S1, S2, no murmurs, rubs, clicks or gallops          Assessment/Plan      ICD-10-CM ICD-9-CM    1. Essential hypertension  I10 401.9  not optimally controlled on Benicar 20 mg daily and diltiazem  mg daily. We will see how patient does over the next few months with daughter trying to monitor the pressure making sure patient takes the medications consistently. Patient has a follow-up with her cardiologist soon   2. Palpitations  R00.2 785.1  improved on diltiazem  mg daily       Follow-up and Dispositions    · Return in about 3 months (around 8/5/2022) for labs 1 week before. Reviewed plan of care. Patient has provided input and agrees with goals.

## 2022-05-13 ENCOUNTER — TELEPHONE (OUTPATIENT)
Dept: INTERNAL MEDICINE CLINIC | Age: 87
End: 2022-05-13

## 2022-05-13 NOTE — TELEPHONE ENCOUNTER
Ms. Daphne Ayala is aware if patient eats something but does not feel any better she should be taken to the ED. Ms. Seble Meléndez understanding.

## 2022-05-13 NOTE — TELEPHONE ENCOUNTER
Donald Albarado, daughter calling. Thinks she may need to take pt to hospital.  Pt is telling her she can't walk but yet pt walked around the bed. Pt said her body hurts but she is moving her limbs and is eating o.k. Katia Carson just doesn't know what to do.     Please advise her at [phone]

## 2022-05-15 PROBLEM — R07.9 ACUTE CHEST PAIN: Status: ACTIVE | Noted: 2022-05-15

## 2022-05-16 PROBLEM — R07.9 CHEST PAIN: Status: ACTIVE | Noted: 2022-05-16

## 2022-05-18 ENCOUNTER — PATIENT OUTREACH (OUTPATIENT)
Dept: CASE MANAGEMENT | Age: 87
End: 2022-05-18

## 2022-05-18 RX ORDER — DEXTROMETHORPHAN HYDROBROMIDE, GUAIFENESIN 5; 100 MG/5ML; MG/5ML
650 LIQUID ORAL EVERY 8 HOURS
COMMUNITY

## 2022-05-18 NOTE — PROGRESS NOTES
Care Transitions Initial Call    Call within 2 business days of discharge: Yes     Patient: Sujit Emerson Patient : 1923 MRN: [id number]    Last Discharge [address]       Complaint Diagnosis Description Type Department Provider    5/15/22 Shortness of Breath Acute chest pain . .. ED to Hosp-Admission (Discharged) (ADMIT) Genna Justice MD; Carmine Lowe.. Was this an external facility discharge? No Discharge Facility: N/a    Challenges to be reviewed by the provider   Additional needs identified to be addressed with provider: no  none         Method of communication with provider : chart routing    Discussed COVID-19 related testing which was not done at this time. Test results were not done. Patient informed of results, if available? no     Advance Care Planning:   Does patient have an Advance Directive: yes, reviewed and current. Inpatient Readmission Risk score: Unplanned Readmit Risk Score: 10.8 ( )    Was this a readmission? no   Patient stated reason for the admission: N/A    Care Transition Nurse (CTN) contacted the patient by telephone to perform post hospital discharge assessment. Home number listed for patient no longer belongs to patient. CTN removed number. Contacted patient on mobile number listed. Number belongs to Millie Cochran (listed on PHI). Left HIPPA complaint message. Contact info provided. Contacted Zee, sister (listed on PHI). Daughter voiced patient is at her sister's home Millie Cochran). CTN provided contact info and requested to have patient or sister return call. Will attempt to reach at a later time.

## 2022-05-18 NOTE — PROGRESS NOTES
Care Transitions Initial Call    Call within 2 business days of discharge: Yes     Patient: Carlie Mireles Patient : 1923 MRN: [id number]    Last Discharge [address]       Complaint Diagnosis Description Type Department Provider    5/15/22 Shortness of Breath Acute chest pain . .. ED to Hosp-Admission (Discharged) (ADMIT) Jennie Ag MD; Pricila Suarez. Was this an external facility discharge? No Discharge Facility: N/A    Challenges to be reviewed by the provider   Additional needs identified to be addressed with provider: no  none         Method of communication with provider : chart routing    Discussed COVID-19 related testing which was not done at this time. Test results were not done. Patient informed of results, if available? N/A     Advance Care Planning:   Does patient have an Advance Directive: yes, reviewed and current. Inpatient Readmission Risk score: Unplanned Readmit Risk Score: 10.8 ( )    Was this a readmission? no   Patient stated reason for the admission: N/A    Patients top risk factors for readmission: medical condition-CP   Interventions to address risk factors: Obtained and reviewed discharge summary and/or continuity of care documents    Rk English, daughter returned Care Transition Nurse (CTN)  by telephone to perform post hospital discharge assessment. Verified name and  with family as identifiers. Provided introduction to self, and explanation of the CTN role. CTN reviewed discharge instructions, medical action plan and red flags with family who verbalized understanding. Were discharge instructions available to patient? yes. Reviewed appropriate site of care based on symptoms and resources available to patient including: PCP, Specialist, When to call 911 and CTN. Family given an opportunity to ask questions and does not have any further questions or concerns at this time.  The family agrees to contact the PCP office for questions related to their healthcare. Medication reconciliation was performed with family, who verbalizes understanding of administration of home medications. Advised obtaining a 90-day supply of all daily and as-needed medications. Referral to Pharm D needed: no     Home Health/Outpatient orders at discharge: PT, SN and [address]: [facility]   Date of initial visit: TBD    CTN provided family with contact info for [facility]. Durable Medical Equipment ordered at discharge: None      Was patient discharged with a pulse oximeter? no    Discussed follow-up appointments. If no appointment was previously scheduled, appointment scheduling offered: no. Is follow up appointment scheduled within 7 days of discharge? yes. [facility] follow up appointment(s):   Future Appointments   Date Time Provider Jean Marie Sorenson   5/24/2022  1:00 PM José Miguel Kline MD [facility]   5/27/2022  9:15 AM Manny Peterson MD [facility]   8/8/2022 11:05 AM [facility] LAB VISIT [facility]   8/15/2022 11:40 AM Lulú Canchola MD [facility] AMB     Non-[facility] follow up appointment(s): CTN suggested contacting Dr. Stefanie Fischer (cardiology) for follow up    Plan for follow-up call in 7-10 days based on severity of symptoms and risk factors. Plan for next call: symptom management-assess for CP/MI, follow up appointment-verify attendance GÓMEZ appt and scheduling of cardiology follow up and medication management-assess for side effects of Norvasc  CTN provided contact information for future needs. Goals Addressed                 This Visit's Progress     Attends follow-up appointments as directed. Goal: Patient will attend all appointments scheduled within the next 30 days.       Ensure provider appt is scheduled within 7 business days post-discharge; 5/18: GÓMEZ appt w/in 7 days post d/c; 5/24 @ 1140 AM.    Confirm patient attended post-discharge provider appt   Complete post-visit call to confirm attendance and update care needs           Prevent complications post hospitalization. Goal: No admissions post 30 days from discharge of 5/17/22. Review/educate common or potential \"red flags\" of condition worsening; 5/18: Reviewed/educated on signs and symptoms of heart attack to monitor and report to doctor:    Squeezing, pressure, or pain in your chest  Discomfort or pain in your back, neck, jaw, stomach, or arm  Shortness of breath  Nausea or vomiting  Lightheadedness or a sudden cold sweat    If you experience any of theses s/s, go to the nearest ED for evaluation. Instruct on adherence to medications as ordered and assess for therapeutic response and side-effects; 5/18: Reviewed s/e of Norvasc to monitor for and report: dizziness upon standing, nausea, abd pain, swelling of feet/ankles.    Review the Home Visit or Home Health documentation for coordinating care and goals; 5/18: Referred to  Isaura Shah (SN/OT/[facility])    Communicate visits and goals between multiple providers and services; 5/18: CTN suggested scheduling follow up appt with Dr. Tatiana Augustin (cardiology)      Observe for trends in symptoms and measures, provide direction to patient, and notify provider as needed

## 2022-05-24 ENCOUNTER — OFFICE VISIT (OUTPATIENT)
Dept: INTERNAL MEDICINE CLINIC | Age: 87
End: 2022-05-24
Payer: MEDICARE

## 2022-05-24 VITALS
HEART RATE: 78 BPM | SYSTOLIC BLOOD PRESSURE: 131 MMHG | WEIGHT: 141 LBS | RESPIRATION RATE: 20 BRPM | DIASTOLIC BLOOD PRESSURE: 77 MMHG | HEIGHT: 61 IN | OXYGEN SATURATION: 97 % | TEMPERATURE: 97.6 F | BODY MASS INDEX: 26.62 KG/M2

## 2022-05-24 DIAGNOSIS — E11.22 TYPE 2 DIABETES MELLITUS WITH STAGE 3A CHRONIC KIDNEY DISEASE, WITHOUT LONG-TERM CURRENT USE OF INSULIN (HCC): ICD-10-CM

## 2022-05-24 DIAGNOSIS — I10 ESSENTIAL HYPERTENSION: Primary | ICD-10-CM

## 2022-05-24 DIAGNOSIS — N18.31 TYPE 2 DIABETES MELLITUS WITH STAGE 3A CHRONIC KIDNEY DISEASE, WITHOUT LONG-TERM CURRENT USE OF INSULIN (HCC): ICD-10-CM

## 2022-05-24 DIAGNOSIS — R00.2 PALPITATIONS: ICD-10-CM

## 2022-05-24 DIAGNOSIS — Z09 HOSPITAL DISCHARGE FOLLOW-UP: ICD-10-CM

## 2022-05-24 PROBLEM — N18.30 CHRONIC RENAL DISEASE, STAGE III (HCC): Status: ACTIVE | Noted: 2022-05-24

## 2022-05-24 PROCEDURE — G8427 DOCREV CUR MEDS BY ELIG CLIN: HCPCS | Performed by: INTERNAL MEDICINE

## 2022-05-24 PROCEDURE — 1090F PRES/ABSN URINE INCON ASSESS: CPT | Performed by: INTERNAL MEDICINE

## 2022-05-24 PROCEDURE — 1101F PT FALLS ASSESS-DOCD LE1/YR: CPT | Performed by: INTERNAL MEDICINE

## 2022-05-24 PROCEDURE — G8432 DEP SCR NOT DOC, RNG: HCPCS | Performed by: INTERNAL MEDICINE

## 2022-05-24 PROCEDURE — 1111F DSCHRG MED/CURRENT MED MERGE: CPT | Performed by: INTERNAL MEDICINE

## 2022-05-24 PROCEDURE — G8536 NO DOC ELDER MAL SCRN: HCPCS | Performed by: INTERNAL MEDICINE

## 2022-05-24 PROCEDURE — 1123F ACP DISCUSS/DSCN MKR DOCD: CPT | Performed by: INTERNAL MEDICINE

## 2022-05-24 PROCEDURE — G8417 CALC BMI ABV UP PARAM F/U: HCPCS | Performed by: INTERNAL MEDICINE

## 2022-05-24 PROCEDURE — G0463 HOSPITAL OUTPT CLINIC VISIT: HCPCS | Performed by: INTERNAL MEDICINE

## 2022-05-24 PROCEDURE — 3044F HG A1C LEVEL LT 7.0%: CPT | Performed by: INTERNAL MEDICINE

## 2022-05-24 PROCEDURE — 99214 OFFICE O/P EST MOD 30 MIN: CPT | Performed by: INTERNAL MEDICINE

## 2022-05-24 NOTE — PATIENT INSTRUCTIONS
Continue present medication regimen  Follow-up with Dr. Potts Sessions as scheduled and as needed. You are due to see him in May and we have an appointment reminder in your chart.   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

## 2022-05-24 NOTE — PATIENT INSTRUCTIONS
Preventing Falls: Care Instructions  Your Care Instructions     Getting around your home safely can be a challenge if you have injuries or health problems that make it easy for you to fall. Loose rugs and furniture in walkways are among the dangers for many older people who have problems walking or who have poor eyesight. People who have conditions such as arthritis, osteoporosis, or dementia also have to be careful not to fall. You can make your home safer with a few simple measures. Follow-up care is a key part of your treatment and safety. Be sure to make and go to all appointments, and call your doctor if you are having problems. It's also a good idea to know your test results and keep a list of the medicines you take. How can you care for yourself at home? Taking care of yourself  · Exercise regularly to improve your strength, muscle tone, and balance. Walk if you can. Swimming may be a good choice if you cannot walk easily. · Have your vision and hearing checked each year or any time you notice a change. If you have trouble seeing and hearing, you might not be able to avoid objects and could lose your balance. · Know the side effects of the medicines you take. Ask your doctor or pharmacist whether the medicines you take can affect your balance. Sleeping pills or sedatives can affect your balance. · Limit the amount of alcohol you drink. Alcohol can impair your balance and other senses. · Ask your doctor whether calluses or corns on your feet need to be removed. If you wear loose-fitting shoes because of calluses or corns, you can lose your balance and fall. · Talk to your doctor if you have numbness in your feet. · You may get dizzy if you do not drink enough water. To prevent dehydration, drink plenty of fluids. Choose water and other clear liquids.  If you have kidney, heart, or liver disease and have to limit fluids, talk with your doctor before you increase the amount of fluids you drink.  Preventing falls at home  · Remove raised doorway thresholds, throw rugs, and clutter. Repair loose carpet or raised areas in the floor. · Move furniture and electrical cords to keep them out of walking paths. · Use nonskid floor wax, and wipe up spills right away, especially on ceramic tile floors. · If you use a walker or cane, put rubber tips on it. If you use crutches, clean the bottoms of them regularly with an abrasive pad, such as steel wool. · Keep your house well lit, especially Hey[address] Elías, and outside walkways. Use night-lights in areas such as hallways and bathrooms. Add extra light switches or use remote switches (such as switches that go on or off when you clap your hands) to make it easier to turn lights on if you have to get up during the night. · Install sturdy handrails on stairways. · Move items in your cabinets so that the things you use a lot are on the lower shelves (about waist level). · Keep a cordless phone and a flashlight with new batteries by your bed. If possible, put a phone in each of the main rooms of your house, or carry a cell phone in case you fall and cannot reach a phone. Or, you can wear a device around your neck or wrist. You push a button that sends a signal for help. · Wear low-heeled shoes that fit well and give your feet good support. Use footwear with nonskid soles. Check the heels and soles of your shoes for wear. Repair or replace worn heels or soles. · Do not wear socks without shoes on wood floors. · Walk on the grass when the sidewalks are slippery. If you live in an area that gets snow and ice in the winter, sprinkle salt on slippery steps and sidewalks. Or ask a family member or friend to do this for you. Preventing falls in the bath  · Install grab bars and nonskid mats inside and outside your shower or tub and near the toilet and sinks. · Use shower chairs and bath benches.   · Use a hand-held shower head that will allow you to sit while showering. · Get into a tub or shower by putting the weaker leg in first. Get out of a tub or shower with your strong side first.  · Repair loose toilet seats and consider installing a raised toilet seat to make getting on and off the toilet easier. · Keep your bathroom door unlocked while you are in the shower. Where can you learn more? Go to http://www.brown.com/  Enter G117 in the search box to learn more about \"Preventing Falls: Care Instructions. \"  Current as of: September 8, 2021               Content Version: 13.2  © 2300-5435 Healthwise, [employer]. Care instructions adapted under license by [employer] (which disclaims liability or warranty for this information). If you have questions about a medical condition or this instruction, always ask your healthcare professional. [address] any warranty or liability for your use of this information.

## 2022-05-24 NOTE — PROGRESS NOTES
Patient is in the office today for a hospital  follow up. Do you have an Advance Directive no  Do you want more information : information given     1. \"Have you been to the ER, urgent care clinic since your last visit? Hospitalized since your last visit? \" yes, [address] [place] [address]     2. \"Have you seen or consulted any other health care providers outside of the [address] [place] [address] since your last visit? \" yes, Dr. Josee Carrillo Cardiology     3. For patients aged 39-70: Has the patient had a colonoscopy / FIT/ Cologuard? no      If the patient is female:    4. For patients aged 41-77: Has the patient had a mammogram within the past 2 years? NA - based on age or sex      11. For patients aged 21-65: Has the patient had a pap smear?  NA - based on age or sex

## 2022-05-25 ENCOUNTER — PATIENT OUTREACH (OUTPATIENT)
Dept: CASE MANAGEMENT | Age: 87
End: 2022-05-25

## 2022-05-25 NOTE — PROGRESS NOTES
Transitional Care Management Progress Note    Patient: Eugene Estrada  : 1923  PCP: Brittney Forrest MD    Date of admission: 5/15  Date of discharge:     Patient was contacted by Transitional Care Management services within two days after her discharge: No. This encounter and supporting documentation was reviewed if available. Medication reconciliation was performed today (2022). Assessment/Plan:   Diagnoses and all orders for this visit:    1. Essential hypertension    2. Type 2 diabetes mellitus with stage 3a chronic kidney disease, without long-term current use of insulin (HCC)    3. Palpitations    4. Hospital discharge follow-up  -     MA DISCHARGE MEDS RECONCILED W/ CURRENT OUTPATIENT MED LIST         Subjective:   Eugene Estrada is a 80 y.o. female presenting today for follow-up after being discharged from [facility]. The discharge summary was reviewed or requested. The main problem requiring admission was chest pain. Complications during admission: none    Interval history/Current status: Patient was admitted to the hospital for chest pain. EKG was without any evidence of acute ischemia and troponin was negative. Due to age at 80 and with resolution of chest pain cardiology did not recommend any further ischemic work-up. Patient was placed on Norvasc for antianginal effects. She has been doing well at home without any chest pain. Blood pressures well controlled on Norvasc 5 mg daily and Benicar 20 mg daily. Patient's not on a statin since there side effects outweigh the benefits at age 80. Patient has follow-up with her cardiologist Dr. Jonas.  Patient is complaining now of hip pain and has follow-up with orthopedic within the next few days. Overall for age 80 patient is ambulating with cane and a walker if needed and doing extremely well for age. She has chronic kidney disease stage III that is stable.   Patient does have diabetes that she is trying to control with diet currently. Patient has a history of palpitations that seem to be controlled on current medications. Admitting symptoms have: improved      Medications marked \"taking\" at this time:  Home Medications    Medication Sig Start Date End Date Taking? Authorizing Provider   acetaminophen (TYLENOL) 650 mg TbER Take 650 mg by mouth every eight (8) hours. Yes Provider, Historical   amLODIPine (NORVASC) 5 mg tablet Take 1 Tablet by mouth daily. 5/17/22  Yes Shiv Franco MD   olmesartan (BENICAR) 20 mg tablet Take 1 Tablet by mouth daily. 4/6/22  Yes Hong Canchola MD   Rocklatan 0.02-0.005 % drop Administer 1 Drop to both eyes nightly. 7/10/20  Yes Provider, Historical   calcium-cholecalciferol, d3, (CALCIUM 600 + D) 600-125 mg-unit tab Take 1 Tablet by mouth daily. Yes Provider, Historical   RESTASIS 0.05 % ophthalmic emulsion Administer 1 Drop to both eyes every twelve (12) hours. 7/5/14  Yes Provider, Historical   ascorbic acid (VITAMIN C) 500 mg tablet Take 1,000 mg by mouth daily. Yes Provider, Historical   MULTIVITAMINS PO Take 1 Tab by mouth daily. Yes Provider, Historical   aspirin 81 mg chewable tablet Take 81 mg by mouth daily.    Yes Provider, Historical        Review of Systems:  Respiratory ROS: no cough, shortness of breath, or wheezing  Cardiovascular ROS: no chest pain or dyspnea on exertion       Patient Active Problem List    Diagnosis Date Noted    Chronic renal disease, stage III 05/24/2022    Chest pain 05/16/2022    Acute chest pain 05/15/2022    Palpitations 03/14/2019    Type 2 diabetes mellitus with nephropathy ([address] 01/26/2018    ACP (advance care planning) 06/15/2016    Arthritis of right knee     Essential hypertension 07/20/2015    CKD (chronic kidney disease)     Hypertensive heart disease     Cardiomyopathy, secondary ([address]     Heart failure, diastolic, chronic ([address]     CAD (coronary artery disease), native coronary artery 10/01/2010    Osteopenia 07/13/2010    Diabetes mellitus, type 2 ([facility] 03/15/2010    Dyslipidemia 03/15/2010          Objective:   /77 (BP 1 Location: Left arm, BP Patient Position: Sitting, BP Cuff Size: Adult)   Pulse 78   Temp 97.6 °F (36.4 °C) (Temporal)   Resp 20   Ht 5' 1\" (1.549 m)   Wt 141 lb (64 kg)   SpO2 97%   BMI 26.64 kg/m²      Physical Examination: Chest - clear to auscultation, no wheezes, rales or rhonchi, symmetric air entry  Heart - normal rate, regular rhythm, normal S1, S2, no murmurs, rubs, clicks or gallops  Extremities - pedal edema trace +      We discussed the expected course, resolution and complications of the diagnosis(es) in detail. Medication risks, benefits, costs, interactions, and alternatives were discussed as indicated. I advised her to contact the office if her condition worsens, changes or fails to improve as anticipated. She expressed understanding with the diagnosis(es) and plan.      Deepa Pérez MD

## 2022-05-25 NOTE — PROGRESS NOTES
Care Transitions Follow Up Call    Challenges to be reviewed by the provider   Additional needs identified to be addressed with provider: no  none           Method of communication with provider : none    Care Transition Nurse (CTN) contacted the patient by telephone to follow up after admission on 5/15/22. Verified name and  with patient as identifiers. Addressed changes since last contact: none  Follow up appointment completed? yes. Was follow up appointment scheduled within 7 days of discharge? yes. Advance Care Planning:   Does patient have an Advance Directive:  on file    CTN reviewed discharge instructions, medical action plan and red flags with patient and discussed any barriers to care and/or understanding of plan of care after discharge. Discussed appropriate site of care based on symptoms and resources available to patient including: PCP, Specialist, When to call 911 and CTN. The patient agrees to contact the PCP office for questions related to their healthcare. Patients top risk factors for readmission: medical condition-CP   Interventions to address risk factors: Assessed patient for s/s of MI. Patient denied SOB, lightheadedness, cold sweats, or N/V. Patient voiced she feels good besides the arthritis in her hip and leg. Patient reported she has not had any more fluttering or palpitations in her heart at night and has been able to sleep through the night. Patient voiced she has a cardiology appt next week. [address] follow up appointment(s):   Future Appointments   Date Time Provider Jean Marie Sorenson   2022  4:00 PM Anali Schulte MD [facility] BS AMB   2022 11:05 AM [facility] LAB VISIT [facility] BS AMB   8/15/2022 11:40 AM Marco Canchola MD [facility]     Non-[facility] follow up appointment(s): Dr. Jennifer Garcia (cardiology) 22    CTN provided contact information for future needs. Plan for follow-up call in 10-14 days based on severity of symptoms and risk factors.   Plan for next call: symptom management-assess for s/s of MI and follow up appointment-verify attendance of cardiology appt w/ Dr. Dias on 5/31     Goals Addressed                 This Visit's Progress     Attends follow-up appointments as directed. On track     Goal: Patient will attend all appointments scheduled within the next 30 days. Ensure provider appt is scheduled within 7 business days post-discharge; 5/18: GÓMEZ appt w/in 7 days post d/c; 5/24 @ 1140 AM.    Confirm patient attended post-discharge provider appt ; 5/25: Attended GÓMEZ appt on 5/24. Cardiology appt scheduled for 5/31. Complete post-visit call to confirm attendance and update care needs; 5/25: Done.  Prevent complications post hospitalization. On track     Goal: No admissions post 30 days from discharge of 5/17/22. Review/educate common or potential \"red flags\" of condition worsening; 5/18: Reviewed/educated on signs and symptoms of heart attack to monitor and report to doctor:    Squeezing, pressure, or pain in your chest  Discomfort or pain in your back, neck, jaw, stomach, or arm  Shortness of breath  Nausea or vomiting  Lightheadedness or a sudden cold sweat    If you experience any of theses s/s, go to the nearest ED for evaluation. Instruct on adherence to medications as ordered and assess for therapeutic response and side-effects; 5/18: Reviewed s/e of Norvasc to monitor for and report: dizziness upon standing, nausea, abd pain, swelling of feet/ankles.    Review the Home Visit or Home Health documentation for coordinating care and goals; 5/18: Referred to  Isaura Shah (SN/OT/[facility])    Communicate visits and goals between multiple providers and services; 5/18: CTN suggested scheduling follow up appt with Dr. Jesus Arthur (cardiology)

## 2022-06-08 ENCOUNTER — PATIENT OUTREACH (OUTPATIENT)
Dept: CASE MANAGEMENT | Age: 87
End: 2022-06-08

## 2022-06-08 NOTE — PROGRESS NOTES
Care Transitions Follow Up Call    Challenges to be reviewed by the provider   Additional needs identified to be addressed with provider: no  none           Method of communication with provider : none    Care Transition Nurse (CTN) contacted the patient by telephone to follow up after admission on 5/15/22. No answer. Left HIPPA compliant message. Name, role and contact information provided. Requested return call. Will attempt to contact at a later time.

## 2022-06-16 ENCOUNTER — PATIENT OUTREACH (OUTPATIENT)
Dept: CASE MANAGEMENT | Age: 87
End: 2022-06-16

## 2022-06-16 NOTE — PROGRESS NOTES
Care Transitions Follow Up Call    Challenges to be reviewed by the provider   Additional needs identified to be addressed with provider: no  none           Method of communication with provider : none    Care Transition Nurse (CTN) contacted the patient by telephone to follow up after admission on 5/15/22. Verified name and  with patient as identifiers. Addressed changes since last contact: none  Follow up appointment completed? yes. Was follow up appointment scheduled within 7 days of discharge? yes. Advance Care Planning:   Does patient have an Advance Directive:  yes; reviewed and current     CTN reviewed discharge instructions, medical action plan and red flags with patient and discussed any barriers to care and/or understanding of plan of care after discharge. Discussed appropriate site of care based on symptoms and resources available to patient including: PCP, Specialist and CTN. The patient agrees to contact the PCP office for questions related to their healthcare. Patients top risk factors for readmission: medical condition-chest pain   Interventions to address risk factors: Assessed patien for CP. Patient denied CP, SOB, dizziness. Patient voiced she has not had anymore trouble and has been ableto sleep at night. Patient c/o arthritic pain to hips adn legs. Has ortho follow on . Taking Tylenol. [facility] follow up appointment(s):   Future Appointments   Date Time Provider Jean Marie Sorenson   2022 10:30 AM Veda Meza MD [facility]   2022 11:05 AM [facility] LAB VISIT [facility]   8/15/2022 11:40 AM Guy Canchloa MD [facility]     Non-[facility] follow up appointment(s): none    CTN provided contact information for future needs. No further follow-up call indicated based on severity of symptoms and risk factors. Plan for next call: N/a     Goals Addressed                 This Visit's Progress     Attends follow-up appointments as directed.    On track     Goal: Patient will attend all appointments scheduled within the next 30 days. Ensure provider appt is scheduled within 7 business days post-discharge; 5/18: GÓEMZ appt w/in 7 days post d/c; 5/24 @ 1140 AM.    Confirm patient attended post-discharge provider appt ; 5/25: Attended GÓMEZ appt on 5/24. Cardiology appt scheduled for 5/31. Complete post-visit call to confirm attendance and update care needs; 5/25: Done. 6/8: Attempted. 6/16: Done.  Prevent complications post hospitalization. On track     Goal: No admissions post 30 days from discharge of 5/17/22. Review/educate common or potential \"red flags\" of condition worsening; 5/18: Reviewed/educated on signs and symptoms of heart attack to monitor and report to doctor:    Squeezing, pressure, or pain in your chest  Discomfort or pain in your back, neck, jaw, stomach, or arm  Shortness of breath  Nausea or vomiting  Lightheadedness or a sudden cold sweat    If you experience any of theses s/s, go to the nearest ED for evaluation. Instruct on adherence to medications as ordered and assess for therapeutic response and side-effects; 5/18: Reviewed s/e of Norvasc to monitor for and report: dizziness upon standing, nausea, abd pain, swelling of feet/ankles.    Review the Home Visit or Home Health documentation for coordinating care and goals; 5/18: Referred to Radha hSah (SN/OT/[facility])    Communicate visits and goals between multiple providers and services; 5/18: CTN suggested scheduling follow up appt with Dr. Flaco Guajardo (cardiology)

## 2022-06-28 ENCOUNTER — OFFICE VISIT (OUTPATIENT)
Dept: ORTHOPEDIC SURGERY | Age: 87
End: 2022-06-28
Payer: MEDICARE

## 2022-06-28 VITALS — HEIGHT: 61 IN | BODY MASS INDEX: 26.62 KG/M2 | WEIGHT: 141 LBS | TEMPERATURE: 97.7 F

## 2022-06-28 DIAGNOSIS — M17.0 BILATERAL PRIMARY OSTEOARTHRITIS OF KNEE: ICD-10-CM

## 2022-06-28 DIAGNOSIS — G89.29 CHRONIC PAIN OF BOTH KNEES: Primary | ICD-10-CM

## 2022-06-28 DIAGNOSIS — M25.561 CHRONIC PAIN OF BOTH KNEES: Primary | ICD-10-CM

## 2022-06-28 DIAGNOSIS — M25.562 CHRONIC PAIN OF BOTH KNEES: Primary | ICD-10-CM

## 2022-06-28 DIAGNOSIS — M54.16 LUMBAR RADICULOPATHY: ICD-10-CM

## 2022-06-28 PROCEDURE — 20610 DRAIN/INJ JOINT/BURSA W/O US: CPT | Performed by: ORTHOPAEDIC SURGERY

## 2022-06-28 RX ORDER — BETAMETHASONE SODIUM PHOSPHATE AND BETAMETHASONE ACETATE 3; 3 MG/ML; MG/ML
6 INJECTION, SUSPENSION INTRA-ARTICULAR; INTRALESIONAL; INTRAMUSCULAR; SOFT TISSUE ONCE
Status: COMPLETED | OUTPATIENT
Start: 2022-06-28 | End: 2022-06-28

## 2022-06-28 RX ADMIN — BETAMETHASONE SODIUM PHOSPHATE AND BETAMETHASONE ACETATE 6 MG: 3; 3 INJECTION, SUSPENSION INTRA-ARTICULAR; INTRALESIONAL; INTRAMUSCULAR; SOFT TISSUE at 10:55

## 2022-06-28 NOTE — PROGRESS NOTES
Patient: Paola Bocanegra                MRN: [id number]       SSN: [id number]  YOB: 1923        AGE: 80 y.o. SEX: female  Body mass index is 26.64 kg/m². PCP: Alayna Simms MD  06/28/22    Mrs. Jose Sanchez returns for reevaluation of bilateral knee pain and back pain with radiculopathy denies fevers or chills she still remarkably sharp minded to an active she has known advanced arthritis in both knees and she has had some injections by Dr. Henry Ramires for her spinal stenosis and arthritis. [place]manation today her hips rotate nicely both feet warm and well-perfused no cyanosis or clubbing calf nontender and Homans' sign is negative there is significant evidence of neuropathy distally she is in varus bilaterally -3 degrees extension and bends to 115 degrees. Her skin looks very good and no peripheral edema    Previous x-rays confirm advanced to severe arthritis involving both knees and she has known arthritis of the spine and spinal stenosis both knees were injected as per protocol there was a discussion regarding surgery and and it was decided that is not recommended. We will see her back in about 3 months time to repeat the injections and she is can a follow-up with the spine center little sooner than later    REVIEW OF SYSTEMS:      CON: negative  EYE: negative   ENT: negative  RESP: negative  GI:    negative   :  negative  MSK: Positive  A twelve point review of systems was completed, positives noted and all other systems were reviewed and are negative          Past Medical History:   Diagnosis Date    Arthritis of right knee     CAD (coronary artery disease), native coronary artery October 2010    mild disease    Cardiac cath 10/21/2010    dLM 20%. mLAD 25%. CX mild. pRCA 30%. EF 30-35%. Anterolateral, apical , diaphrag akin. Hypercontractile basal segments. Severe elev left-sided filling press.  Cardiac echocardiogram 02/02/2011    EF 50-55%. Mild apical hypk. Gr 1 DDfx. RVSP 40-45. Mild LAE.  Cardiac Holter monitor 06/10/2015    Sinus rhythm w/avg HR of 53 bpm (range 40-85 bpm). Occasional PACs. One 3-beat run of nonsustained SVT. Very few PVCs. One 3-beat run of nonsustained VT. Pt's HR was < 50 bpm for 8 hrs of the recorded time. No pauses noted. Two episodes of neck pain corresponded to sinus rhythm w/o ectopy.  Cardiovascular renal duplex 12/03/2012    No renal artery stenosis bilaterally. Bilateral intrinsic/med disease. Patent bilateral renal veins. Multiple cysts on both kidneys.  CKD (chronic kidney disease)     while on diuretics for difficult to control HTN    Diabetes mellitus type II 3/15/2010    Dyslipidemia 3/15/2010    Heart attack ([address]     2010    Heart failure, diastolic, chronic ([address]     History of heart attack     HTN (hypertension) 3/15/2010    Hypertensive heart disease     Loss of appetite     Osteopenia 7/13/2010    Other dyspnea and respiratory abnormality     Ringing in the ears     Stress-induced cardiomyopathy     EF 35% (LV angio 10/2010), then EF 50-55% (echo, Feb 2011)    Wears glasses        Family History   Problem Relation Age of Onset    Hypertension Mother     OSTEOARTHRITIS Other        Current Outpatient Medications   Medication Sig Dispense Refill    acetaminophen (TYLENOL) 650 mg TbER Take 650 mg by mouth every eight (8) hours.  amLODIPine (NORVASC) 5 mg tablet Take 1 Tablet by mouth daily. 30 Tablet 2    olmesartan (BENICAR) 20 mg tablet Take 1 Tablet by mouth daily. 90 Tablet 3    Rocklatan 0.02-0.005 % drop Administer 1 Drop to both eyes nightly.  calcium-cholecalciferol, d3, (CALCIUM 600 + D) 600-125 mg-unit tab Take 1 Tablet by mouth daily.  RESTASIS 0.05 % ophthalmic emulsion Administer 1 Drop to both eyes every twelve (12) hours.  ascorbic acid (VITAMIN C) 500 mg tablet Take 1,000 mg by mouth daily.  MULTIVITAMINS PO Take 1 Tab by mouth daily.       aspirin 81 mg chewable tablet Take 81 mg by mouth daily. Allergies   Allergen Reactions    Lipitor [Atorvastatin] Myalgia    Spironolactone Other (comments)     Dizziness and fatigue       Past Surgical History:   Procedure Laterality Date    HX BLADDER SUSPENSION  2009    HX HYSTERECTOMY  1962       Social History     Socioeconomic History    Marital status:      Spouse name: Not on file    Number of children: Not on file    Years of education: Not on file    Highest education level: Not on file   Occupational History    Not on file   Tobacco Use    Smoking status: Never Smoker    Smokeless tobacco: Never Used   Substance and Sexual Activity    Alcohol use: No    Drug use: No    Sexual activity: Never   Other Topics Concern    Not on file   Social History Narrative    Not on file     Social Determinants of Health     Financial Resource Strain:     Difficulty of Paying Living Expenses: Not on file   Food Insecurity:     Worried About Running Out of Food in the Last Year: Not on file    Michael of Food in the Last Year: Not on file   Transportation Needs:     Lack of Transportation (Medical): Not on file    Lack of Transportation (Non-Medical):  Not on file   Physical Activity:     Days of Exercise per Week: Not on file    Minutes of Exercise per Session: Not on file   Stress:     Feeling of Stress : Not on file   Social Connections:     Frequency of Communication with Friends and Family: Not on file    Frequency of Social Gatherings with Friends and Family: Not on file    Attends Restorationist Services: Not on file    Active Member of Clubs or Organizations: Not on file    Attends Club or Organization Meetings: Not on file    Marital Status: Not on file   Intimate Partner Violence:     Fear of Current or Ex-Partner: Not on file    Emotionally Abused: Not on file    Physically Abused: Not on file    Sexually Abused: Not on file   Housing Stability:     Unable to Pay for Housing in the Last Year: Not on file    Number of Places Lived in the Last Year: Not on file    Unstable Housing in the Last Year: Not on file       Visit Vitals  Temp 97.7 °F (36.5 °C) (Temporal)   Ht 5' 1\" (1.549 m)   Wt 64 kg (141 lb)   BMI 26.64 kg/m²         PHYSICAL EXAMINATION:  GENERAL: Alert and oriented x3, in no acute distress, well-developed, well-nourished, afebrile. HEART: No JVD. EYES: No scleral icterus   NECK: No significant lymphadenopathy   LUNGS: No respiratory compromise or indrawing  ABDOMEN: Soft, non-tender, non-distended. Note: This note was completed using voice recognition software. Any typographical/name errors or mistakes are unintentional.    Electronically signed by: MD ARIANE Emmanuel, Quince Humbles Vilma Krabbe, M.D., have reviewed the history, physical, and have updated the allergic reactions for [facility]. TIME OUT performed immediately prior to the start of procedure:  Nora Maradiaga M.D., have performed the following reviews on [facility] prior to the start of the procedure:    - Patient was identified by name and date of birth  - Agreement on procedure being performed was verified  - Risks and benefits explained to the patient  - Patient was positioned for comfort  - Consent was signed and verified  - Patient was advised regarding risks of bruising, bleeding, infection and pain    Time: 10:51 AM     Body Part: intra-articular injection of bilateral  knee    Medication and Dose: Each injected with 1mL Celestone preparation, i.e. 6 mg, and 3 mL 1% lidocaine    Date of Procedure: 06/28/22    PROCEDURE PERFORMED BY : Willye Clore L. Vilma Krabbe, M.D., [facility])    Provider Assisted by: Jessica Cortez    Patient assisted by: self    Patient tolerated procedure well with no complications

## 2022-07-14 ENCOUNTER — OFFICE VISIT (OUTPATIENT)
Dept: ORTHOPEDIC SURGERY | Age: 87
End: 2022-07-14
Payer: MEDICARE

## 2022-07-14 VITALS
HEIGHT: 61 IN | OXYGEN SATURATION: 97 % | HEART RATE: 79 BPM | BODY MASS INDEX: 26.62 KG/M2 | WEIGHT: 141 LBS | TEMPERATURE: 97.5 F

## 2022-07-14 DIAGNOSIS — M54.16 LUMBAR RADICULOPATHY: Primary | ICD-10-CM

## 2022-07-14 DIAGNOSIS — M48.062 SPINAL STENOSIS OF LUMBAR REGION WITH NEUROGENIC CLAUDICATION: ICD-10-CM

## 2022-07-14 PROCEDURE — G8536 NO DOC ELDER MAL SCRN: HCPCS | Performed by: NURSE PRACTITIONER

## 2022-07-14 PROCEDURE — G8417 CALC BMI ABV UP PARAM F/U: HCPCS | Performed by: NURSE PRACTITIONER

## 2022-07-14 PROCEDURE — G8427 DOCREV CUR MEDS BY ELIG CLIN: HCPCS | Performed by: NURSE PRACTITIONER

## 2022-07-14 PROCEDURE — 1101F PT FALLS ASSESS-DOCD LE1/YR: CPT | Performed by: NURSE PRACTITIONER

## 2022-07-14 PROCEDURE — G8432 DEP SCR NOT DOC, RNG: HCPCS | Performed by: NURSE PRACTITIONER

## 2022-07-14 PROCEDURE — 1123F ACP DISCUSS/DSCN MKR DOCD: CPT | Performed by: NURSE PRACTITIONER

## 2022-07-14 PROCEDURE — 99213 OFFICE O/P EST LOW 20 MIN: CPT | Performed by: NURSE PRACTITIONER

## 2022-07-14 PROCEDURE — 1090F PRES/ABSN URINE INCON ASSESS: CPT | Performed by: NURSE PRACTITIONER

## 2022-07-14 RX ORDER — METOPROLOL TARTRATE 25 MG/1
TABLET, FILM COATED ORAL
COMMUNITY
Start: 2022-07-10 | End: 2022-08-16 | Stop reason: SDUPTHER

## 2022-07-14 RX ORDER — ROSUVASTATIN CALCIUM 10 MG/1
TABLET, COATED ORAL
COMMUNITY
Start: 2022-07-10 | End: 2022-08-15 | Stop reason: SINTOL

## 2022-07-14 NOTE — PROGRESS NOTES
Chief complaint   Chief Complaint   Patient presents with    Back Pain       History of Present Illness:  Luigi Birmingham is a  80 y.o.  female who comes in today for evaluation of her low back pain. She gets pain across her low back that goes into the bilateral buttocks. Back on November 23, 2021 she had a L5-S1 epidural steroid injection which gave her 90% relief up until now. She would like to have that repeated since she had such good benefit with it. She has tried and failed Topamax, gabapentin, Lyrica, nortriptyline. She denies fever bowel bladder dysfunction. Physical Exam: The patient is a 19-year-old female well-developed well-nourished who is alert and oriented with a normal mood and affect. She is able to get from sitting to standing on her own. She has a full weightbearing slow but nonantalgic gait. She has a rolling walker. She has 4 out of 5 strength bilateral lower extremities. Negative straight leg raise. She does have some pain with hyperextension lumbar spine. Assessment and Plan: This is a patient who has grade 1 through 2 anterolisthesis at L4-5 and L5-S1 with disc herniations from L3-S1. She also has severe spinal canal stenosis at L4-5 and mild to moderate spinal canal stenosis at L3-4 and L5-S1. She did very well with the L5-S1 epidural steroid injection last year. I will go ahead and be set that up for her. We will see her back after the block. Medications:  Current Outpatient Medications   Medication Sig Dispense Refill    metoprolol tartrate (LOPRESSOR) 25 mg tablet       acetaminophen (TYLENOL) 650 mg TbER Take 650 mg by mouth every eight (8) hours.  amLODIPine (NORVASC) 5 mg tablet Take 1 Tablet by mouth daily. 30 Tablet 2    olmesartan (BENICAR) 20 mg tablet Take 1 Tablet by mouth daily. 90 Tablet 3    Rocklatan 0.02-0.005 % drop Administer 1 Drop to both eyes nightly.       calcium-cholecalciferol, d3, (CALCIUM 600 + D) 600-125 mg-unit tab Take 1 Tablet by mouth daily.  RESTASIS 0.05 % ophthalmic emulsion Administer 1 Drop to both eyes every twelve (12) hours.  ascorbic acid (VITAMIN C) 500 mg tablet Take 1,000 mg by mouth daily.  MULTIVITAMINS PO Take 1 Tab by mouth daily.  aspirin 81 mg chewable tablet Take 81 mg by mouth daily.  rosuvastatin (CRESTOR) 10 mg tablet  (Patient not taking: Reported on 7/14/2022)             Review of systems:    Past Medical History:   Diagnosis Date    Arthritis of right knee     CAD (coronary artery disease), native coronary artery October 2010    mild disease    Cardiac cath 10/21/2010    dLM 20%. mLAD 25%. CX mild. pRCA 30%. EF 30-35%. Anterolateral, apical , diaphrag akin. Hypercontractile basal segments. Severe elev left-sided filling press.  Cardiac echocardiogram 02/02/2011    EF 50-55%. Mild apical hypk. Gr 1 DDfx. RVSP 40-45. Mild LAE.  Cardiac Holter monitor 06/10/2015    Sinus rhythm w/avg HR of 53 bpm (range 40-85 bpm). Occasional PACs. One 3-beat run of nonsustained SVT. Very few PVCs. One 3-beat run of nonsustained VT. Pt's HR was < 50 bpm for 8 hrs of the recorded time. No pauses noted. Two episodes of neck pain corresponded to sinus rhythm w/o ectopy.  Cardiovascular renal duplex 12/03/2012    No renal artery stenosis bilaterally. Bilateral intrinsic/med disease. Patent bilateral renal veins. Multiple cysts on both kidneys.     CKD (chronic kidney disease)     while on diuretics for difficult to control HTN    Diabetes mellitus type II 3/15/2010    Dyslipidemia 3/15/2010    Heart attack ([address]     2010    Heart failure, diastolic, chronic (HCC)     History of heart attack     HTN (hypertension) 3/15/2010    Hypertensive heart disease     Loss of appetite     Osteopenia 7/13/2010    Other dyspnea and respiratory abnormality     Ringing in the ears     Stress-induced cardiomyopathy     EF 35% (LV angio 10/2010), then EF 50-55% (echo, Feb 2011)    Wears glasses      Past Surgical History:   Procedure Laterality Date    HX BLADDER SUSPENSION  2009    HX HYSTERECTOMY  1962     Social History     Socioeconomic History    Marital status:      Spouse name: Not on file    Number of children: Not on file    Years of education: Not on file    Highest education level: Not on file   Occupational History    Not on file   Tobacco Use    Smoking status: Never Smoker    Smokeless tobacco: Never Used   Substance and Sexual Activity    Alcohol use: No    Drug use: No    Sexual activity: Never   Other Topics Concern    Not on file   Social History Narrative    Not on file     Social Determinants of Health     Financial Resource Strain:     Difficulty of Paying Living Expenses: Not on file   Food Insecurity:     Worried About Running Out of Food in the Last Year: Not on file    Michael of Food in the Last Year: Not on file   Transportation Needs:     Lack of Transportation (Medical): Not on file    Lack of Transportation (Non-Medical):  Not on file   Physical Activity:     Days of Exercise per Week: Not on file    Minutes of Exercise per Session: Not on file   Stress:     Feeling of Stress : Not on file   Social Connections:     Frequency of Communication with Friends and Family: Not on file    Frequency of Social Gatherings with Friends and Family: Not on file    Attends Anabaptism Services: Not on file    Active Member of [address] [place] [address] [employer] or Organizations: Not on file    Attends Club or Organization Meetings: Not on file    Marital Status: Not on file   Intimate Partner Violence:     Fear of Current or Ex-Partner: Not on file    Emotionally Abused: Not on file    Physically Abused: Not on file    Sexually Abused: Not on file   Housing Stability:     Unable to Pay for Housing in the Last Year: Not on file    Number of [place] in the Last Year: Not on file    Unstable Housing in the Last Year: Not on file     Family History   Problem Relation Age of Onset    Hypertension Mother     OSTEOARTHRITIS Other        Physical Exam:  Visit Vitals  Pulse 79   Temp 97.5 °F (36.4 °C) (Temporal)   Ht 5' 1\" (1.549 m)   Wt 141 lb (64 kg)   SpO2 97%   BMI 26.64 kg/m²     Pain Scale: 9/10       has been . reviewed and is appropriate          Diagnoses and all orders for this visit:    1. Lumbar radiculopathy  -     SCHEDULE SURGERY    2. Spinal stenosis of lumbar region with neurogenic claudication  -     SCHEDULE SURGERY            Follow-up and Dispositions    · Return for After the block. We have informed Uriel Garcia to notify us for immediate appointment if she has any worsening neurogical symptoms or if an emergency situation presents, then call 911    Please note that this dictation was completed with [employer], the computer voice recognition software. Quite often unanticipated grammatical, syntax, homophones, and other interpretive errors are inadvertently transcribed by the computer software. Please disregard these errors. Please excuse any errors that have escaped final proofreading.

## 2022-07-14 NOTE — PROGRESS NOTES
Pancho Oliveros presents today for   Chief Complaint   Patient presents with    Back Pain       Is someone accompanying this pt? yes    Is the patient using any DME equipment during OV? yes    Depression Screening:  3 most recent PHQ Screens 3/31/2022   PHQ Not Done -   Little interest or pleasure in doing things Not at all   Feeling down, depressed, irritable, or hopeless Not at all   Total Score PHQ 2 0       Learning Assessment:  Learning Assessment 8/13/2019   PRIMARY LEARNER Patient   HIGHEST LEVEL OF EDUCATION - PRIMARY LEARNER  GRADUATED HIGH SCHOOL OR GED   BARRIERS PRIMARY LEARNER NONE   CO-LEARNER CAREGIVER No   PRIMARY LANGUAGE ENGLISH   LEARNER PREFERENCE PRIMARY LISTENING   ANSWERED BY pt   RELATIONSHIP SELF       Abuse Screening:  Abuse Screening Questionnaire 5/17/2021   Do you ever feel afraid of your partner? N   Are you in a relationship with someone who physically or mentally threatens you? N   Is it safe for you to go home? Y       Fall Risk  Fall Risk Assessment, last 12 mths 3/31/2022   Able to walk? Yes   Fall in past 12 months? 0   Do you feel unsteady? 0   Are you worried about falling 0       OPIOID RISK TOOL  No flowsheet data found. Coordination of Care:  1. Have you been to the ER, urgent care clinic since your last visit? Yes May 7 something wrong with heart  Hospitalized since your last visit? Yes 3 days    2. Have you seen or consulted any other health care providers outside of the [address] [place] [address] since your last visit? no Include any pap smears or colon screening.  no

## 2022-07-19 ENCOUNTER — TELEPHONE (OUTPATIENT)
Dept: ORTHOPEDIC SURGERY | Age: 87
End: 2022-07-19

## 2022-08-08 ENCOUNTER — APPOINTMENT (OUTPATIENT)
Dept: INTERNAL MEDICINE CLINIC | Age: 87
End: 2022-08-08

## 2022-08-08 ENCOUNTER — HOSPITAL ENCOUNTER (OUTPATIENT)
Dept: LAB | Age: 87
Discharge: HOME | End: 2022-08-08
Payer: MEDICARE

## 2022-08-08 DIAGNOSIS — E78.5 DYSLIPIDEMIA: ICD-10-CM

## 2022-08-08 DIAGNOSIS — N18.31 TYPE 2 DIABETES MELLITUS WITH STAGE 3A CHRONIC KIDNEY DISEASE, WITHOUT LONG-TERM CURRENT USE OF INSULIN (HCC): ICD-10-CM

## 2022-08-08 DIAGNOSIS — E11.22 TYPE 2 DIABETES MELLITUS WITH STAGE 3A CHRONIC KIDNEY DISEASE, WITHOUT LONG-TERM CURRENT USE OF INSULIN (HCC): ICD-10-CM

## 2022-08-08 DIAGNOSIS — I10 ESSENTIAL HYPERTENSION: ICD-10-CM

## 2022-08-08 LAB
ALBUMIN SERPL-MCNC: 3.6 G/DL (ref 3.4–5)
ALBUMIN/GLOB SERPL: 1.3 {RATIO} (ref 0.8–1.7)
ALP SERPL-CCNC: 67 U/L (ref 45–117)
ALT SERPL-CCNC: 27 U/L (ref 13–56)
ANION GAP SERPL CALC-SCNC: 8 MMOL/L (ref 3–18)
AST SERPL-CCNC: 21 U/L (ref 10–38)
BILIRUB SERPL-MCNC: 0.3 MG/DL (ref 0.2–1)
BUN SERPL-MCNC: 29 MG/DL (ref 7–18)
BUN/CREAT SERPL: 20 (ref 12–20)
CALCIUM SERPL-MCNC: 9.9 MG/DL (ref 8.5–10.1)
CHLORIDE SERPL-SCNC: 108 MMOL/L (ref 100–111)
CHOLEST SERPL-MCNC: 278 MG/DL
CO2 SERPL-SCNC: 24 MMOL/L (ref 21–32)
CREAT SERPL-MCNC: 1.48 MG/DL (ref 0.6–1.3)
GLOBULIN SER CALC-MCNC: 2.8 G/DL (ref 2–4)
GLUCOSE SERPL-MCNC: 136 MG/DL (ref 74–99)
HBA1C MFR BLD: 6.8 % (ref 4.2–5.6)
HDLC SERPL-MCNC: 61 MG/DL (ref 40–60)
HDLC SERPL: 4.6 {RATIO} (ref 0–5)
LDLC SERPL CALC-MCNC: 185.8 MG/DL (ref 0–100)
LIPID PROFILE,FLP: ABNORMAL
POTASSIUM SERPL-SCNC: 4.4 MMOL/L (ref 3.5–5.5)
PROT SERPL-MCNC: 6.4 G/DL (ref 6.4–8.2)
SODIUM SERPL-SCNC: 140 MMOL/L (ref 136–145)
TRIGL SERPL-MCNC: 156 MG/DL (ref ?–150)
VLDLC SERPL CALC-MCNC: 31.2 MG/DL

## 2022-08-08 PROCEDURE — 80053 COMPREHEN METABOLIC PANEL: CPT

## 2022-08-08 PROCEDURE — 83036 HEMOGLOBIN GLYCOSYLATED A1C: CPT

## 2022-08-08 PROCEDURE — 36415 COLL VENOUS BLD VENIPUNCTURE: CPT

## 2022-08-08 PROCEDURE — 80061 LIPID PANEL: CPT

## 2022-08-15 ENCOUNTER — OFFICE VISIT (OUTPATIENT)
Dept: INTERNAL MEDICINE CLINIC | Age: 87
End: 2022-08-15
Payer: MEDICARE

## 2022-08-15 VITALS
OXYGEN SATURATION: 98 % | HEIGHT: 61 IN | RESPIRATION RATE: 20 BRPM | BODY MASS INDEX: 26.06 KG/M2 | SYSTOLIC BLOOD PRESSURE: 184 MMHG | DIASTOLIC BLOOD PRESSURE: 93 MMHG | HEART RATE: 84 BPM | WEIGHT: 138 LBS | TEMPERATURE: 97.8 F

## 2022-08-15 DIAGNOSIS — F02.818 LATE ONSET ALZHEIMER'S DEMENTIA WITH BEHAVIORAL DISTURBANCE (HCC): ICD-10-CM

## 2022-08-15 DIAGNOSIS — E11.22 TYPE 2 DIABETES MELLITUS WITH STAGE 3A CHRONIC KIDNEY DISEASE, WITHOUT LONG-TERM CURRENT USE OF INSULIN (HCC): Primary | ICD-10-CM

## 2022-08-15 DIAGNOSIS — R00.2 PALPITATIONS: ICD-10-CM

## 2022-08-15 DIAGNOSIS — N18.31 TYPE 2 DIABETES MELLITUS WITH STAGE 3A CHRONIC KIDNEY DISEASE, WITHOUT LONG-TERM CURRENT USE OF INSULIN (HCC): Primary | ICD-10-CM

## 2022-08-15 DIAGNOSIS — G30.1 LATE ONSET ALZHEIMER'S DEMENTIA WITH BEHAVIORAL DISTURBANCE (HCC): ICD-10-CM

## 2022-08-15 DIAGNOSIS — I10 ESSENTIAL HYPERTENSION: ICD-10-CM

## 2022-08-15 DIAGNOSIS — E78.5 DYSLIPIDEMIA: ICD-10-CM

## 2022-08-15 PROCEDURE — 1101F PT FALLS ASSESS-DOCD LE1/YR: CPT | Performed by: INTERNAL MEDICINE

## 2022-08-15 PROCEDURE — G8536 NO DOC ELDER MAL SCRN: HCPCS | Performed by: INTERNAL MEDICINE

## 2022-08-15 PROCEDURE — G8510 SCR DEP NEG, NO PLAN REQD: HCPCS | Performed by: INTERNAL MEDICINE

## 2022-08-15 PROCEDURE — G0463 HOSPITAL OUTPT CLINIC VISIT: HCPCS | Performed by: INTERNAL MEDICINE

## 2022-08-15 PROCEDURE — G8417 CALC BMI ABV UP PARAM F/U: HCPCS | Performed by: INTERNAL MEDICINE

## 2022-08-15 PROCEDURE — G8427 DOCREV CUR MEDS BY ELIG CLIN: HCPCS | Performed by: INTERNAL MEDICINE

## 2022-08-15 PROCEDURE — 1090F PRES/ABSN URINE INCON ASSESS: CPT | Performed by: INTERNAL MEDICINE

## 2022-08-15 PROCEDURE — 1123F ACP DISCUSS/DSCN MKR DOCD: CPT | Performed by: INTERNAL MEDICINE

## 2022-08-15 PROCEDURE — 99214 OFFICE O/P EST MOD 30 MIN: CPT | Performed by: INTERNAL MEDICINE

## 2022-08-15 PROCEDURE — 3044F HG A1C LEVEL LT 7.0%: CPT | Performed by: INTERNAL MEDICINE

## 2022-08-15 RX ORDER — DIAZEPAM 5 MG/1
5-20 TABLET ORAL ONCE
Status: CANCELLED | OUTPATIENT
Start: 2022-08-15 | End: 2022-08-15

## 2022-08-15 NOTE — PROGRESS NOTES
Patient is in the office today for a 3 month follow up. Do you have an Advance Directive no  Do you want more information : information given   1. \"Have you been to the ER, urgent care clinic since your last visit? Hospitalized since your last visit? \" No    2. \"Have you seen or consulted any other health care providers outside of the [address] [place] [address] since your last visit? \" Yes, Dr. Raleigh Kussmaul. 3. For patients aged 39-70: Has the patient had a colonoscopy / FIT/ Cologuard? NA - based on age      If the patient is female:    4. For patients aged 41-77: Has the patient had a mammogram within the past 2 years? NA - based on age or sex      11. For patients aged 21-65: Has the patient had a pap smear?  NA - based on age or sex

## 2022-08-16 ENCOUNTER — HOSPITAL ENCOUNTER (OUTPATIENT)
Age: 87
Setting detail: OUTPATIENT SURGERY
Discharge: HOME | End: 2022-08-16
Attending: PHYSICAL MEDICINE & REHABILITATION | Admitting: PHYSICAL MEDICINE & REHABILITATION

## 2022-08-16 VITALS
SYSTOLIC BLOOD PRESSURE: 162 MMHG | OXYGEN SATURATION: 98 % | HEART RATE: 64 BPM | RESPIRATION RATE: 16 BRPM | TEMPERATURE: 98.5 F | DIASTOLIC BLOOD PRESSURE: 74 MMHG

## 2022-08-16 RX ORDER — METOPROLOL TARTRATE 25 MG/1
25 TABLET, FILM COATED ORAL 2 TIMES DAILY
Qty: 180 TABLET | Refills: 3 | Status: SHIPPED | OUTPATIENT
Start: 2022-08-16

## 2022-08-16 NOTE — TELEPHONE ENCOUNTER
Requested Prescriptions     Pending Prescriptions Disp Refills    metoprolol tartrate (LOPRESSOR) 25 mg tablet          Per pt's daughter they thought they had some of this medication at home, but do not.     Said had ov with Dr Saravanan Bull yesterday and he wanted pt to start taking this again    Request send prescription authorization to:    Sofie Hanks

## 2022-08-16 NOTE — TELEPHONE ENCOUNTER
This med is listed as historical and was last given by another provider. Please sign if appropriate. Last Visit: 8/15/22 with MD Canchola  Next Appointment: 11/21/22 with MD Canchola    Requested Prescriptions     Pending Prescriptions Disp Refills    metoprolol tartrate (LOPRESSOR) 25 mg tablet 180 Tablet 3     Sig: Take 1 Tablet by mouth two (2) times a day. For Guillermo Perdomo in place:   Recommendation Provided To:    Intervention Detail: New Rx: 1, reason: Patient Preference  Gap Closed?:   Intervention Accepted By:   Time Spent (min): 5

## 2022-08-16 NOTE — H&P
79yo female presents for PAT. She did not stop her ASA x 5 days as instructed. Procedure needs to be rescheduled.

## 2022-08-16 NOTE — PERIOP NOTES
Elevated BP and continued use of aspirin. MD notified, plan is to reschedule appointment. Patient and family member educated on taking BP medications as prescribed and given medication list on what to stop taking prior to appointment.

## 2022-08-18 RX ORDER — AMLODIPINE BESYLATE 5 MG/1
5 TABLET ORAL DAILY
Qty: 90 TABLET | Refills: 3 | Status: SHIPPED | OUTPATIENT
Start: 2022-08-18

## 2022-08-18 NOTE — TELEPHONE ENCOUNTER
This was last given by a hospitalist. Please sign if appropriate. Last Visit: 8/15/22 with MD Canchola  Next Appointment: 11/21/22 with MD aCnchola  Previous Refill Encounter(s): 5/17/22 #30 with 2 refills    Requested Prescriptions     Pending Prescriptions Disp Refills    amLODIPine (NORVASC) 5 mg tablet 90 Tablet 3     Sig: Take 1 Tablet by mouth daily. For [id number] [facility] in place:   Recommendation Provided To:    Intervention Detail: New Rx: 1, reason: Patient Preference  Gap Closed?:   Intervention Accepted By:   Time Spent (min): 5

## 2022-08-20 NOTE — PROGRESS NOTES
Subjective:       Chief Complaint  The patient presents for follow up of diabetes, hypertension and high cholesterol. HPI  Ori Conte is a 80 y.o. female seen for follow up of diabetes. Shealso has hypertension and hyperlipidemia. Diabetes well controlled off medications for now,  hypertension  uncontrolled , no significant medication side effects noted, on current meds, which include Benicar 20 mg, Norvasc 5 mg, patient daughter will try  to monitor at home, compliance may be an issue with this 30-year-old lady. She has not been taking Lopressor 25 mg BID which she will restart since it also helps her palpitations. Hyperlipidemia, uncontrolled off Pravachol 40 mg which caused her to have myalgias. Diet and Lifestyle: generally follows a low fat low cholesterol diet, follows a diabetic diet regularly, sedentary    Home BP Monitoring: is not monitored at home on a regular basis    Diabetic Review of Systems - home glucose monitoring: is well controlled at home when she takes it 1x/day    Other symptoms and concerns: Pt's CKD stage 3b is stable on current meds. Patient is also followed by cardiology for hypertensive heart disease. Pt has chronic diastolic heart failure that is followed by cardiology and controlled on current meds. Pt has generalized arthritis for which she uses aleve with mild improvement. Patient also has mild to moderate dementia with her being confused at times as well as having a lot of paranoid thoughts. Patient's daughters are struggling to keep her at home. Current Outpatient Medications   Medication Sig    acetaminophen (TYLENOL) 650 mg TbER Take 650 mg by mouth every eight (8) hours. olmesartan (BENICAR) 20 mg tablet Take 1 Tablet by mouth daily. Rocklatan 0.02-0.005 % drop Administer 1 Drop to both eyes nightly. calcium-cholecalciferol, d3, (CALCIUM 600 + D) 600-125 mg-unit tab Take 1 Tablet by mouth daily.  (Patient not taking: Reported on 8/16/2022)    RESTASIS 0.05 % ophthalmic emulsion Administer 1 Drop to both eyes every twelve (12) hours. ascorbic acid, vitamin C, (VITAMIN C) 500 mg tablet Take 1,000 mg by mouth daily. MULTIVITAMINS PO Take 1 Tab by mouth daily. aspirin 81 mg chewable tablet Take 81 mg by mouth daily. amLODIPine (NORVASC) 5 mg tablet Take 1 Tablet by mouth daily. metoprolol tartrate (LOPRESSOR) 25 mg tablet Take 1 Tablet by mouth two (2) times a day. No current facility-administered medications for this visit.              Review of Systems  Respiratory: negative for dyspnea on exertion  Cardiovascular: negative for chest pain    Objective:     Visit Vitals  BP (!) 184/93   Pulse 84   Temp 97.8 °F (36.6 °C) (Temporal)   Resp 20   Ht 5' 1\" (1.549 m)   Wt 138 lb (62.6 kg)   SpO2 98%   BMI 26.07 kg/m²        General appearance - alert, well appearing, and in no distress  Neck - supple, no significant adenopathy, carotids upstroke normal bilaterally, no bruits  Chest - clear to auscultation, no wheezes, rales or rhonchi, symmetric air entry  Heart -normal S1, S2, no murmurs, rubs, clicks or gallops  Extremities -1+ bilateral pedal edema  Skin - normal coloration and turgor, no rashes, no suspicious skin lesions noted      Labs:   Lab Results   Component Value Date/Time    Hemoglobin A1c 6.8 (H) 08/08/2022 11:14 AM    Hemoglobin A1c 6.5 (H) 03/24/2022 10:31 AM    Hemoglobin A1c 6.6 (H) 10/21/2021 10:30 AM    Microalbumin/Creat ratio (mg/g creat) 65 (H) 03/24/2022 10:31 AM    Microalbumin,urine random 14.20 (H) 03/24/2022 10:31 AM    LDL, calculated 185.8 (H) 08/08/2022 11:14 AM    Creatinine 1.48 (H) 08/08/2022 11:14 AM      Lab Results   Component Value Date/Time    Cholesterol, total 278 (H) 08/08/2022 11:14 AM    HDL Cholesterol 61 (H) 08/08/2022 11:14 AM    LDL, calculated 185.8 (H) 08/08/2022 11:14 AM    Triglyceride 156 (H) 08/08/2022 11:14 AM    CHOL/HDL Ratio 4.6 08/08/2022 11:14 AM     Lab Results Component Value Date/Time    Alk. phosphatase 67 08/08/2022 11:14 AM    Bilirubin, direct 0.1 03/11/2016 10:45 AM     Lab Results   Component Value Date/Time    GFR est AA 39 (L) 08/08/2022 11:14 AM    GFR est non-AA 33 (L) 08/08/2022 11:14 AM    Creatinine 1.48 (H) 08/08/2022 11:14 AM    BUN 29 (H) 08/08/2022 11:14 AM    Sodium 140 08/08/2022 11:14 AM    Potassium 4.4 08/08/2022 11:14 AM    Chloride 108 08/08/2022 11:14 AM    CO2 24 08/08/2022 11:14 AM      Lab Results   Component Value Date/Time    Glucose 136 (H) 08/08/2022 11:14 AM            Assessment / Plan     Diabetes well controlled, off medications. Will continue to monitor  Hypertension  uncontrolled, on current meds, concerned about compliance, patient daughter to monitor blood pressure at home and will restart Lopressor 25 mg BID   Hyperlipidemia  uncontrolled off Pravachol which caused myalgias. ICD-10-CM ICD-9-CM    1. Type 2 diabetes mellitus with stage 3a chronic kidney disease, without long-term current use of insulin ([facility])  E11.22 250.40 HEMOGLOBIN A1C W/O EAG    N18.31 585.3       2. Essential hypertension  M70 909.4 METABOLIC PANEL, COMPREHENSIVE      3. Dyslipidemia  E78.5 272.4 LIPID PANEL      4. Palpitations  R00.2 785.1 Patient to restart Lopressor 25 mg twice daily      5. Late onset [facility]'s dementia with [facility]  G30.1 331.0 Patient continues at home but daughter still struggling to keep it here. Patient is a good candidate for assisted living but patient is reluctant to do this. F02.81 294.11                  Diabetic issues reviewed with her: diabetic diet discussed in detail, and low cholesterol diet, weight control and daily exercise discussed. Follow-up and Dispositions    Return in about 3 months (around 11/15/2022) for labs 1 week before. Reviewed plan of care. Patient has provided input and agrees with goals.

## 2022-08-30 ENCOUNTER — HOSPITAL ENCOUNTER (OUTPATIENT)
Age: 87
Setting detail: OUTPATIENT SURGERY
Discharge: HOME | End: 2022-08-30
Attending: PHYSICAL MEDICINE & REHABILITATION | Admitting: PHYSICAL MEDICINE & REHABILITATION
Payer: MEDICARE

## 2022-08-30 ENCOUNTER — APPOINTMENT (OUTPATIENT)
Dept: GENERAL RADIOLOGY | Age: 87
End: 2022-08-30
Attending: PHYSICAL MEDICINE & REHABILITATION
Payer: MEDICARE

## 2022-08-30 VITALS
TEMPERATURE: 98.7 F | HEART RATE: 86 BPM | DIASTOLIC BLOOD PRESSURE: 80 MMHG | OXYGEN SATURATION: 96 % | RESPIRATION RATE: 16 BRPM | SYSTOLIC BLOOD PRESSURE: 160 MMHG

## 2022-08-30 LAB — GLUCOSE BLD STRIP.AUTO-MCNC: 161 MG/DL (ref 70–110)

## 2022-08-30 PROCEDURE — 77030014124 HC TY EPDRL BBMI -A: Performed by: PHYSICAL MEDICINE & REHABILITATION

## 2022-08-30 PROCEDURE — 82962 GLUCOSE BLOOD TEST: CPT

## 2022-08-30 PROCEDURE — 74011250636 HC RX REV CODE- 250/636: Performed by: PHYSICAL MEDICINE & REHABILITATION

## 2022-08-30 PROCEDURE — 74011000636 HC RX REV CODE- 636: Performed by: PHYSICAL MEDICINE & REHABILITATION

## 2022-08-30 PROCEDURE — 2709999900 HC NON-CHARGEABLE SUPPLY: Performed by: PHYSICAL MEDICINE & REHABILITATION

## 2022-08-30 PROCEDURE — 74011000250 HC RX REV CODE- 250: Performed by: PHYSICAL MEDICINE & REHABILITATION

## 2022-08-30 PROCEDURE — 76010000009 HC PAIN MGT 0 TO 30 MIN PROC: Performed by: PHYSICAL MEDICINE & REHABILITATION

## 2022-08-30 PROCEDURE — 62323 NJX INTERLAMINAR LMBR/SAC: CPT | Performed by: PHYSICAL MEDICINE & REHABILITATION

## 2022-08-30 RX ORDER — DIAZEPAM 5 MG/1
5-20 TABLET ORAL ONCE
Status: DISCONTINUED | OUTPATIENT
Start: 2022-08-30 | End: 2022-08-30 | Stop reason: HOSPADM

## 2022-08-30 RX ORDER — LIDOCAINE HYDROCHLORIDE 10 MG/ML
INJECTION, SOLUTION EPIDURAL; INFILTRATION; INTRACAUDAL; PERINEURAL AS NEEDED
Status: DISCONTINUED | OUTPATIENT
Start: 2022-08-30 | End: 2022-08-30 | Stop reason: HOSPADM

## 2022-08-30 RX ORDER — DEXAMETHASONE SODIUM PHOSPHATE 100 MG/10ML
INJECTION INTRAMUSCULAR; INTRAVENOUS AS NEEDED
Status: DISCONTINUED | OUTPATIENT
Start: 2022-08-30 | End: 2022-08-30 | Stop reason: HOSPADM

## 2022-08-30 NOTE — INTERVAL H&P NOTE
Update History & Physical    The Patient's History and Physical of July 14, 2022 was reviewed. There was no change. The surgical site was confirmed by the patient and me. Plan:  The risk, benefits, expected outcome, and alternative to the recommended procedure have been discussed with the patient. Patient understands and wants to proceed with the procedure.     Electronically signed by Nesha Llanos MD on 8/30/2022 at 9:16 AM

## 2022-08-30 NOTE — H&P
Office Visit  7/14/2022  [facility]  Miranda Anguiano NP  Nurse Practitioner Lumbar radiculopathy +1 more  Dx Back Pain; Referred by Drea Márquez MD  Reason for Visit     Progress Notes  Miranda Anguiano NP (Nurse Practitioner)   Nurse Practitioner     Chief complaint       Chief Complaint   Patient presents with    Back Pain         History of Present Illness:  Tae Roldan is a  80 y.o.  female who comes in today for evaluation of her low back pain. She gets pain across her low back that goes into the bilateral buttocks. Back on November 23, 2021 she had a L5-S1 epidural steroid injection which gave her 90% relief up until now. She would like to have that repeated since she had such good benefit with it. She has tried and failed Topamax, gabapentin, Lyrica, nortriptyline. She denies fever bowel bladder dysfunction. Physical Exam: The patient is a 79-year-old female well-developed well-nourished who is alert and oriented with a normal mood and affect. She is able to get from sitting to standing on her own. She has a full weightbearing slow but nonantalgic gait. She has a rolling walker. She has 4 out of 5 strength bilateral lower extremities. Negative straight leg raise. She does have some pain with hyperextension lumbar spine. Assessment and Plan: This is a patient who has grade 1 through 2 anterolisthesis at L4-5 and L5-S1 with disc herniations from L3-S1. She also has severe spinal canal stenosis at L4-5 and mild to moderate spinal canal stenosis at L3-4 and L5-S1. She did very well with the L5-S1 epidural steroid injection last year. I will go ahead and be set that up for her. We will see her back after the block. Medications:         Current Outpatient Medications   Medication Sig Dispense Refill    metoprolol tartrate (LOPRESSOR) 25 mg tablet          acetaminophen (TYLENOL) 650 mg TbER Take 650 mg by mouth every eight (8) hours. amLODIPine (NORVASC) 5 mg tablet Take 1 Tablet by mouth daily. 30 Tablet 2    olmesartan (BENICAR) 20 mg tablet Take 1 Tablet by mouth daily. 90 Tablet 3    Rocklatan 0.02-0.005 % drop Administer 1 Drop to both eyes nightly. calcium-cholecalciferol, d3, (CALCIUM 600 + D) 600-125 mg-unit tab Take 1 Tablet by mouth daily. RESTASIS 0.05 % ophthalmic emulsion Administer 1 Drop to both eyes every twelve (12) hours. ascorbic acid (VITAMIN C) 500 mg tablet Take 1,000 mg by mouth daily. MULTIVITAMINS PO Take 1 Tab by mouth daily. aspirin 81 mg chewable tablet Take 81 mg by mouth daily. rosuvastatin (CRESTOR) 10 mg tablet  (Patient not taking: Reported on 7/14/2022)                   Review of systems:          Past Medical History:   Diagnosis Date    Arthritis of right knee      CAD (coronary artery disease), native coronary artery October 2010     mild disease    Cardiac cath 10/21/2010     dLM 20%. mLAD 25%. CX mild. pRCA 30%. EF 30-35%. Anterolateral, apical , diaphrag akin. Hypercontractile basal segments. Severe elev left-sided filling press. Cardiac echocardiogram 02/02/2011     EF 50-55%. Mild apical hypk. Gr 1 DDfx. RVSP 40-45. Mild LAE. Cardiac Holter monitor 06/10/2015     Sinus rhythm w/avg HR of 53 bpm (range 40-85 bpm). Occasional PACs. One 3-beat run of nonsustained SVT. Very few PVCs. One 3-beat run of nonsustained VT. Pt's HR was < 50 bpm for 8 hrs of the recorded time. No pauses noted. Two episodes of neck pain corresponded to sinus rhythm w/o ectopy. Cardiovascular renal duplex 12/03/2012     No renal artery stenosis bilaterally. Bilateral intrinsic/med disease. Patent bilateral renal veins. Multiple cysts on both kidneys.     CKD (chronic kidney disease)       while on diuretics for difficult to control HTN    Diabetes mellitus type II 3/15/2010    Dyslipidemia 3/15/2010    Heart attack ([facility]       2010    Heart failure, diastolic, chronic (HCC)      History of heart attack      HTN (hypertension) 3/15/2010    Hypertensive heart disease      Loss of appetite      Osteopenia 7/13/2010    Other dyspnea and respiratory abnormality      Ringing in the ears      Stress-induced cardiomyopathy       EF 35% (LV angio 10/2010), then EF 50-55% (echo, Feb 2011)    Wears glasses              Past Surgical History:   Procedure Laterality Date    HX BLADDER SUSPENSION   2009    HX HYSTERECTOMY   1962      Social History            Socioeconomic History    Marital status:        Spouse name: Not on file    Number of children: Not on file    Years of education: Not on file    Highest education level: Not on file   Occupational History    Not on file   Tobacco Use    Smoking status: Never Smoker    Smokeless tobacco: Never Used   Substance and Sexual Activity    Alcohol use: No    Drug use: No    Sexual activity: Never   Other Topics Concern    Not on file   Social History Narrative    Not on file      Social Determinants of Health          Financial Resource Strain:     Difficulty of Paying Living Expenses: Not on file   Food Insecurity:     Worried About Running Out of Food in the Last Year: Not on file    Ran Out of Food in the Last Year: Not on file   Transportation Needs:     Lack of Transportation (Medical): Not on file    Lack of Transportation (Non-Medical):  Not on file   Physical Activity:     Days of Exercise per Week: Not on file    Minutes of Exercise per Session: Not on file   Stress:     Feeling of Stress : Not on file   Social Connections:     Frequency of Communication with Friends and Family: Not on file    Frequency of Social Gatherings with Friends and Family: Not on file    Attends Caodaism Services: Not on file    Active Member of Clubs or Organizations: Not on file    Attends Club or Organization Meetings: Not on file    Marital Status: Not on file   Intimate Partner Violence:     Fear of Current or Ex-Partner: Not on file    Emotionally Abused: Not on file    Physically Abused: Not on file    Sexually Abused: Not on file   Housing Stability:     Unable to Pay for Housing in the Last Year: Not on file    Number of Places Lived in the Last Year: Not on file    Unstable Housing in the Last Year: Not on file            Family History   Problem Relation Age of Onset    Hypertension Mother      OSTEOARTHRITIS Other           Physical Exam:  Visit Vitals  Pulse 79   Temp 97.5 °F (36.4 °C) (Temporal)   Ht 5' 1\" (1.549 m)   Wt 141 lb (64 kg)   SpO2 97%   BMI 26.64 kg/m²      Pain Scale: 9/10         has been . reviewed and is appropriate              Diagnoses and all orders for this visit:     1. Lumbar radiculopathy  -     SCHEDULE SURGERY     2. Spinal stenosis of lumbar region with neurogenic claudication  -     SCHEDULE SURGERY                 Follow-up and Dispositions    Return for After the block. We have informed Uriel Garcia to notify us for immediate appointment if she has any worsening neurogical symptoms or if an emergency situation presents, then call 911     Please note that this dictation was completed with Washio, the [employer] voice recognition software. Quite often unanticipated grammatical, syntax, homophones, and other interpretive errors are inadvertently transcribed by the computer software. Please disregard these errors. Please excuse any errors that have escaped final proofreading. Note Details     Other Notes      Progress Notes from Sherrell Easley  Instructions      Return for After the block.   To Take With You (Automatic SnapShot taken 7/14/2022)  Additional Documentation    Vitals:  Pulse 79  Temp 97.5 °F (36.4 °C) (Temporal)  Ht 5' 1\" (1.549 m)  Wt 141 lb (64 kg)  SpO2 97%  BMI 26.64 kg/m²  BSA 1.66 m²  Pain Sc   9 (Loc: Back)    More Vitals   Flowsheets:  Fluid Management,  Pain Assessment     Encounter Info:  Billing Info,  History,  Allergies,  Detailed Report Media  From this encounter  Scan on 7/25/2022 1330 by [place] More[place]: Internal Documents/[facility]/KANDICE/7-14-22     BestPractice Advisories    Click to view BestPractice Advisory history     Encounter Messages    No messages in this encounter     Orders Placed    SCHEDULE SURGERY  Outpatient Medications at End of Encounter as of 7/14/2022    metoprolol tartrate (LOPRESSOR) 25 mg tablet (Taking)    acetaminophen (TYLENOL) 650 mg TbER (Taking) Take 650 mg by mouth every eight (8) hours. amLODIPine (NORVASC) 5 mg tablet (Taking) Take 1 Tablet by mouth daily. olmesartan (BENICAR) 20 mg tablet (Taking) Take 1 Tablet by mouth daily. Rocklatan 0.02-0.005 % drop (Taking) Administer 1 Drop to both eyes nightly. calcium-cholecalciferol, d3, (CALCIUM 600 + D) 600-125 mg-unit tab (Taking) Take 1 Tablet by mouth daily. RESTASIS 0.05 % ophthalmic emulsion (Taking) Administer 1 Drop to both eyes every twelve (12) hours. ascorbic acid, vitamin C, (VITAMIN C) 500 mg tablet (Taking) Take 1,000 mg by mouth daily. MULTIVITAMINS PO (Taking) Take 1 Tab by mouth daily. aspirin 81 mg chewable tablet (Taking) Take 81 mg by mouth daily.    rosuvastatin (CRESTOR) 10 mg tablet      Visit Diagnoses        Lumbar radiculopathy        Spinal stenosis of lumbar region with neurogenic claudication       Problem List

## 2022-08-30 NOTE — PROCEDURES
Intralaminar Epidural Steroid Procedure Note        Patient Name   Monty Bishop  Date of Procedure: August 30, 2022  Preoperative Diagnosis: Lumbar spinal stenosis  Postoperative Diagnosis: Same  Location [facility], [address]      Procedure:  Epidural Steroid Injection    Consent:  Informed consent was obtained prior to the procedure. In addition to the potential risks associated with the procedure itself, the patient was informed both verbally and in writing of the potential side effects of the use of glucocorticoid. The patient appeared to comprehend the informed consent and desired to have the procedure performed. Procedure in Detail:  The patient was taken to the procedure suite and placed in the prone position on the operating table on appropriate padding. The posterior lumbar region was prepped and draped in the usual sterile fashion. Intraoperative fluoroscopy was used to localize the L5-S1 interspace. The skin was infiltrated with 1% lidocaine. An 18-gauge standard spinal Tuohy needle was advanced into the epidural space at L5-S1 under fluoroscopic guidance using the loss of resistance technique. No cerebrospinal fluid was seen throughout the procedure. Yes  A small amount of Isovue was injected into the epidural space, confirming appropriated needle placement on fluoroscopy. No vascular uptake was identified. Next, 2ml of 1% Lidocaine and 10mg of preservative free Dexamethasone were injected via the Tuohy needle. The needle was removed from the patient. The patient tolerated the procedure well and was discharged home with designated  and care instructions. Patient reported jose-procedural pain on Visual Analog Scale:  pre-9; post-0.       Signed By: Mariela Whitaker MD                      August 30, 2022

## 2022-09-07 ENCOUNTER — VIRTUAL VISIT (OUTPATIENT)
Dept: ORTHOPEDIC SURGERY | Age: 87
End: 2022-09-07
Payer: MEDICARE

## 2022-09-07 DIAGNOSIS — M48.062 SPINAL STENOSIS OF LUMBAR REGION WITH NEUROGENIC CLAUDICATION: Primary | ICD-10-CM

## 2022-09-07 PROCEDURE — 99442 PR PHYS/QHP TELEPHONE EVALUATION 11-20 MIN: CPT | Performed by: PHYSICAL MEDICINE & REHABILITATION

## 2022-09-07 NOTE — PROGRESS NOTES
Luigi Birmingham presents today for   Chief Complaint   Patient presents with    Leg Pain     bilateral       Is someone accompanying this pt? Yes, daughter on the phone    Is the patient using any DME equipment during [address]? no    Depression Screening:  3 most recent PHQ Screens 8/15/2022   PHQ Not Done -   Little interest or pleasure in doing things Not at all   Feeling down, depressed, irritable, or hopeless Not at all   Total Score PHQ 2 0       Learning Assessment:  Learning Assessment 8/13/2019   PRIMARY LEARNER Patient   HIGHEST LEVEL OF EDUCATION - PRIMARY LEARNER  GRADUATED HIGH SCHOOL OR GED   BARRIERS PRIMARY LEARNER NONE   CO-LEARNER CAREGIVER No   PRIMARY LANGUAGE ENGLISH   LEARNER PREFERENCE PRIMARY LISTENING   ANSWERED BY pt   RELATIONSHIP SELF       Abuse Screening:  Abuse Screening Questionnaire 5/17/2021   Do you ever feel afraid of your partner? N   Are you in a relationship with someone who physically or mentally threatens you? N   Is it safe for you to go home? Y       Fall Risk  Fall Risk Assessment, last 12 mths 8/15/2022   Able to walk? Yes   Fall in past 12 months? 0   Do you feel unsteady? 0   Are you worried about falling 0         Coordination of Care:  1. Have you been to the ER, urgent care clinic since your last visit? no  Hospitalized since your last visit? no    2. Have you seen or consulted any other health care providers outside of the [address] [place] [address] since your last visit? no Include any pap smears or colon screening.  no

## 2022-09-07 NOTE — PROGRESS NOTES
[address] [address]  [address], [address]  [address]  Phone: [phone]  Fax: [phone]      Hipolito Fontanez is a 80 y.o. female was evaluated through a synchronous (real-time) audio encounter. The patient (or guardian if applicable) is aware that this is a billable service, which includes applicable co-pays. This Virtual Visit was conducted with patient's (and/or legal guardian's) consent. The visit was conducted pursuant to the emergency declaration under the [facility], [address] [place] [address] authority and the [employer] Act. Patient identification was verified, and a caregiver was present when appropriate. The patient was located at: Home: [place] [phone]  The provider was located at: Facility (Appt Department): [address] [place] [address] & [address]     Diagnoses and all orders for this visit:    1. Spinal stenosis of lumbar region with neurogenic claudication       80-year-old active female with lumbar stenosis. She gets benefit from periodic PAT. Advised to continue HEP as tolerated. Patient may call for future injections if needed. Continue PRN Tylenol, may take twice daily as needed    Follow-up and Dispositions    Return if symptoms worsen or fail to improve. HISTORY OF PRESENT ILLNESS  Hipolito Fontanez is a 80 y.o. female. Pt presents to the office for a telephone f/u visit for low back pain. Pt received PAT L5-S1 8/30/2022 with benefit. Denies side effects. She continues to have low back pain exacerbated with walking. Her pain is relieved with sitting down. Pt takes Tylenol PRN with some benefit. Denies side effects. She is compliant with her HEP as tolerated.      Pain Assessment  9/7/2022   Location of Pain (No Data)   Pain Location Comment bilateral legs   Location Modifiers -   Severity of Pain 9 Quality of Pain (No Data)   Quality of Pain Comment just pain, cannot describe it   Duration of Pain Persistent   Frequency of Pain Intermittent   Date Pain First Started -   Date Pain First Started Comment -   Aggravating Factors Walking   Aggravating Factors Comment -   Limiting Behavior -   Relieving Factors (No Data)   Relieving Factors Comment sitting   Result of Injury -       Investigations:   L CT 7/2021: degenerative changes with severe FA L4-5 L5-S1, grade 1-2 listhesis L4-5 L5-S1  Spine surgery consult: none    Treatments:  Physical therapy: 2020  Spinal injections: PAT L5-S1 8/30/2022 with benefit. Spinal surgery- no  Beneficial medications: Tylenol  Failed medications: Topamax, Gabapentin, Lyrica, Pamelor    Work Status: retired  Pertinent PMHx:  CAD, DM, MI, HTN, osteopenia, cardiac catheter          Consent:  She and/or health care decision maker is aware that that she may receive a bill for this telephone service, depending on her insurance coverage, and has provided verbal consent to proceed: Yes    I affirm this is a Patient Initiated Episode with an Established Patient who has not had a related appointment within my department in the past 7 days or scheduled within the next 24 hours. Total Time: minutes: 11-20 minutes    Note: not billable if this call serves to triage the patient into an appointment for the relevant concern    Portions of this document were created using Dragon voice recognition software. Unintended errors bridgette be present.   Amando Bishop MD       Written by Radha Rodriguez, as dictated by Aurelia Curtis MD.

## 2022-09-08 PROBLEM — M48.062 SPINAL STENOSIS OF LUMBAR REGION WITH NEUROGENIC CLAUDICATION: Status: ACTIVE | Noted: 2022-09-08

## 2022-11-14 ENCOUNTER — HOSPITAL ENCOUNTER (OUTPATIENT)
Dept: LAB | Age: 87
Discharge: HOME | End: 2022-11-14
Payer: MEDICARE

## 2022-11-14 ENCOUNTER — APPOINTMENT (OUTPATIENT)
Dept: INTERNAL MEDICINE CLINIC | Age: 87
End: 2022-11-14

## 2022-11-14 DIAGNOSIS — I10 ESSENTIAL HYPERTENSION: ICD-10-CM

## 2022-11-14 DIAGNOSIS — E78.5 DYSLIPIDEMIA: ICD-10-CM

## 2022-11-14 DIAGNOSIS — N18.31 TYPE 2 DIABETES MELLITUS WITH STAGE 3A CHRONIC KIDNEY DISEASE, WITHOUT LONG-TERM CURRENT USE OF INSULIN (HCC): ICD-10-CM

## 2022-11-14 DIAGNOSIS — E11.22 TYPE 2 DIABETES MELLITUS WITH STAGE 3A CHRONIC KIDNEY DISEASE, WITHOUT LONG-TERM CURRENT USE OF INSULIN (HCC): ICD-10-CM

## 2022-11-14 LAB
ALBUMIN SERPL-MCNC: 3.7 G/DL (ref 3.4–5)
ALBUMIN/GLOB SERPL: 1.3 {RATIO} (ref 0.8–1.7)
ALP SERPL-CCNC: 72 U/L (ref 45–117)
ALT SERPL-CCNC: 22 U/L (ref 13–56)
ANION GAP SERPL CALC-SCNC: 7 MMOL/L (ref 3–18)
AST SERPL-CCNC: 24 U/L (ref 10–38)
BILIRUB SERPL-MCNC: 0.5 MG/DL (ref 0.2–1)
BUN SERPL-MCNC: 20 MG/DL (ref 7–18)
BUN/CREAT SERPL: 16 (ref 12–20)
CALCIUM SERPL-MCNC: 9.9 MG/DL (ref 8.5–10.1)
CHLORIDE SERPL-SCNC: 105 MMOL/L (ref 100–111)
CHOLEST SERPL-MCNC: 311 MG/DL
CO2 SERPL-SCNC: 27 MMOL/L (ref 21–32)
CREAT SERPL-MCNC: 1.23 MG/DL (ref 0.6–1.3)
GLOBULIN SER CALC-MCNC: 2.8 G/DL (ref 2–4)
GLUCOSE SERPL-MCNC: 126 MG/DL (ref 74–99)
HBA1C MFR BLD: 6.6 % (ref 4.2–5.6)
HDLC SERPL-MCNC: 71 MG/DL (ref 40–60)
HDLC SERPL: 4.4 {RATIO} (ref 0–5)
LDLC SERPL CALC-MCNC: 202.2 MG/DL (ref 0–100)
LIPID PROFILE,FLP: ABNORMAL
POTASSIUM SERPL-SCNC: 4.5 MMOL/L (ref 3.5–5.5)
PROT SERPL-MCNC: 6.5 G/DL (ref 6.4–8.2)
SODIUM SERPL-SCNC: 139 MMOL/L (ref 136–145)
TRIGL SERPL-MCNC: 189 MG/DL (ref ?–150)
VLDLC SERPL CALC-MCNC: 37.8 MG/DL

## 2022-11-14 PROCEDURE — 83036 HEMOGLOBIN GLYCOSYLATED A1C: CPT

## 2022-11-14 PROCEDURE — 80053 COMPREHEN METABOLIC PANEL: CPT

## 2022-11-14 PROCEDURE — 80061 LIPID PANEL: CPT

## 2022-11-14 PROCEDURE — 36415 COLL VENOUS BLD VENIPUNCTURE: CPT

## 2022-11-21 ENCOUNTER — OFFICE VISIT (OUTPATIENT)
Dept: INTERNAL MEDICINE CLINIC | Age: 87
End: 2022-11-21
Payer: MEDICARE

## 2022-11-21 VITALS
DIASTOLIC BLOOD PRESSURE: 84 MMHG | SYSTOLIC BLOOD PRESSURE: 190 MMHG | WEIGHT: 138 LBS | BODY MASS INDEX: 26.06 KG/M2 | TEMPERATURE: 98.4 F | OXYGEN SATURATION: 97 % | RESPIRATION RATE: 20 BRPM | HEART RATE: 91 BPM | HEIGHT: 61 IN

## 2022-11-21 DIAGNOSIS — Z00.00 MEDICARE ANNUAL WELLNESS VISIT, SUBSEQUENT: Primary | ICD-10-CM

## 2022-11-21 DIAGNOSIS — N18.32 TYPE 2 DIABETES MELLITUS WITH STAGE 3B CHRONIC KIDNEY DISEASE, WITHOUT LONG-TERM CURRENT USE OF INSULIN (HCC): ICD-10-CM

## 2022-11-21 DIAGNOSIS — E11.22 TYPE 2 DIABETES MELLITUS WITH STAGE 3B CHRONIC KIDNEY DISEASE, WITHOUT LONG-TERM CURRENT USE OF INSULIN (HCC): ICD-10-CM

## 2022-11-21 DIAGNOSIS — E78.5 DYSLIPIDEMIA: ICD-10-CM

## 2022-11-21 DIAGNOSIS — I10 ESSENTIAL HYPERTENSION: ICD-10-CM

## 2022-11-21 DIAGNOSIS — G30.1 LATE ONSET ALZHEIMER'S DEMENTIA WITH BEHAVIORAL DISTURBANCE (HCC): ICD-10-CM

## 2022-11-21 DIAGNOSIS — F02.818 LATE ONSET ALZHEIMER'S DEMENTIA WITH BEHAVIORAL DISTURBANCE (HCC): ICD-10-CM

## 2022-11-21 PROCEDURE — G8510 SCR DEP NEG, NO PLAN REQD: HCPCS | Performed by: INTERNAL MEDICINE

## 2022-11-21 PROCEDURE — 99214 OFFICE O/P EST MOD 30 MIN: CPT | Performed by: INTERNAL MEDICINE

## 2022-11-21 PROCEDURE — G8427 DOCREV CUR MEDS BY ELIG CLIN: HCPCS | Performed by: INTERNAL MEDICINE

## 2022-11-21 PROCEDURE — 1101F PT FALLS ASSESS-DOCD LE1/YR: CPT | Performed by: INTERNAL MEDICINE

## 2022-11-21 PROCEDURE — G0439 PPPS, SUBSEQ VISIT: HCPCS | Performed by: INTERNAL MEDICINE

## 2022-11-21 PROCEDURE — G8536 NO DOC ELDER MAL SCRN: HCPCS | Performed by: INTERNAL MEDICINE

## 2022-11-21 PROCEDURE — 3044F HG A1C LEVEL LT 7.0%: CPT | Performed by: INTERNAL MEDICINE

## 2022-11-21 PROCEDURE — G0463 HOSPITAL OUTPT CLINIC VISIT: HCPCS | Performed by: INTERNAL MEDICINE

## 2022-11-21 PROCEDURE — 1123F ACP DISCUSS/DSCN MKR DOCD: CPT | Performed by: INTERNAL MEDICINE

## 2022-11-21 PROCEDURE — 1090F PRES/ABSN URINE INCON ASSESS: CPT | Performed by: INTERNAL MEDICINE

## 2022-11-21 PROCEDURE — G8417 CALC BMI ABV UP PARAM F/U: HCPCS | Performed by: INTERNAL MEDICINE

## 2022-11-21 NOTE — PROGRESS NOTES
This is the Subsequent Medicare Annual Wellness Exam, performed 12 months or more after the Initial AWV or the last Subsequent AWV    I have reviewed the patient's medical history in detail and updated the computerized patient record. Assessment/Plan   Education and counseling provided:  Are appropriate based on today's review and evaluation    1. Medicare annual wellness visit, subsequent  2. Essential hypertension  -     METABOLIC PANEL, COMPREHENSIVE; Future  3. Dyslipidemia  -     LIPID PANEL; Future  4. Type 2 diabetes mellitus with stage 3b chronic kidney disease, without long-term current use of insulin ([facility])  -     HEMOGLOBIN A1C W/O EAG; Future  5. Late onset Alzheimer's dementia with behavioral disturbance ([facility] [address]     Depression Risk Factor Screening     3 most recent PHQ Screens 11/21/2022   PHQ Not Done -   Little interest or pleasure in doing things Not at all   Feeling down, depressed, irritable, or hopeless Not at all   Total Score PHQ 2 0       Alcohol & Drug Abuse Risk Screen    Do you average more than 1 drink per night or more than 7 drinks a week:  No    On any one occasion in the past three months have you have had more than 3 drinks containing alcohol:  No          Functional Ability and Level of Safety    Hearing: Hearing is good. Activities of Daily Living: The home contains: handrails and grab bars  Patient needs help with:  transportation and preparing meals      Ambulation: with difficulty, uses a walker     Fall Risk:  Fall Risk Assessment, last 12 mths 11/21/2022   Able to walk? Yes   Fall in past 12 months? 0   Do you feel unsteady?  0   Are you worried about falling 0      Abuse Screen:  Patient is not abused       Cognitive Screening    Has your family/caregiver stated any concerns about your memory: yes - pt has some memory decline according to Daughters     Cognitive Screening: Abnormal - needs to neuropsych testing to confirm     Health Maintenance Due     Health Maintenance Due   Topic Date Due    Foot Exam Q1  Never done    DTaP/Tdap/Td series (1 - Tdap) Never done    Shingrix Vaccine Age 50> (1 of 2) Never done    Pneumococcal 65+ years (2 - PPSV23 if available, else PCV20) 05/27/2016    COVID-19 Vaccine (2 - Moderna series) 03/16/2021    Eye Exam Retinal or Dilated  08/29/2021    Flu Vaccine (1) 08/01/2022     Glaucoma Screening-  Dr Chavez Kapadia every 4 months  for glaucoma    Pneumonia Vaccine-  UTD   Shingles Vaccine- did not have chicken pox  so declines  Tdap Vaccine-  Declines due to cost    Colonoscopy-   >10 yrs ago had Colonoscopy.  Not a candidate  due to age  [de-identified]  9/2021 no further testing due to age   Advance Directive-  on file   Patient Care Team   Patient Care Team:  Fady Garcia MD as PCP - General (Internal Medicine Physician)  Fady Garcia MD as PCP - [facility]  Sarah Barcenas MD (Ophthalmology)  Luciana Martinez MD (Obstetrics & Gynecology)  Edwin Mathur MD (Orthopedic Surgery)  Manny Hernandez MD (Cardio Vascular Surgery)  Karma Rivera NP (Nurse Practitioner)    History     Patient Active Problem List   Diagnosis Code    Diabetes mellitus, type 2 ([address] E11.9    Osteopenia M85.80    Hypertensive heart disease I11.9    CAD (coronary artery disease), native coronary artery I25.10    Cardiomyopathy, secondary ([address] I42.9    Heart failure, diastolic, chronic ([address] S97.25    CKD (chronic kidney disease) N18.9    Dyslipidemia E78.5    Essential hypertension I10    Arthritis of right knee M17.11    ACP (advance care planning) Z71.89    Type 2 diabetes mellitus with nephropathy ([address] E11.21    Palpitations R00.2    Acute chest pain R07.9    Chest pain R07.9    Chronic renal disease, stage III N18.30    Spinal stenosis of lumbar region with neurogenic claudication M48.062     Past Medical History:   Diagnosis Date    Arthritis of right knee     CAD (coronary artery disease), native coronary artery October 2010    mild disease    Cardiac cath 10/21/2010    dLM 20%. mLAD 25%. CX mild. pRCA 30%. EF 30-35%. Anterolateral, apical , diaphrag akin. Hypercontractile basal segments. Severe elev left-sided filling press. Cardiac echocardiogram 02/02/2011    EF 50-55%. Mild apical hypk. Gr 1 DDfx. RVSP 40-45. Mild LAE. Cardiac Holter monitor 06/10/2015    Sinus rhythm w/avg HR of 53 bpm (range 40-85 bpm). Occasional PACs. One 3-beat run of nonsustained SVT. Very few PVCs. One 3-beat run of nonsustained VT. Pt's HR was < 50 bpm for 8 hrs of the recorded time. No pauses noted. Two episodes of neck pain corresponded to sinus rhythm w/o ectopy. Cardiovascular renal duplex 12/03/2012    No renal artery stenosis bilaterally. Bilateral intrinsic/med disease. Patent bilateral renal veins. Multiple cysts on both kidneys. CKD (chronic kidney disease)     while on diuretics for difficult to control HTN    Diabetes mellitus type II 3/15/2010    Dyslipidemia 3/15/2010    Heart attack ([address]     2010    Heart failure, diastolic, chronic ([address]     History of heart attack     HTN (hypertension) 3/15/2010    Hypertensive heart disease     Loss of appetite     Osteopenia 7/13/2010    Other dyspnea and respiratory abnormality     Ringing in the ears     Stress-induced cardiomyopathy     EF 35% (LV angio 10/2010), then EF 50-55% (echo, Feb 2011)    Wears glasses       Past Surgical History:   Procedure Laterality Date    HX BLADDER SUSPENSION  2009    HX HYSTERECTOMY  1962     Current Outpatient Medications   Medication Sig Dispense Refill    amLODIPine (NORVASC) 5 mg tablet Take 1 Tablet by mouth daily. 90 Tablet 3    metoprolol tartrate (LOPRESSOR) 25 mg tablet Take 1 Tablet by mouth two (2) times a day. 180 Tablet 3    acetaminophen (TYLENOL) 650 mg TbER Take 650 mg by mouth every eight (8) hours. olmesartan (BENICAR) 20 mg tablet Take 1 Tablet by mouth daily.  90 Tablet 3    Rocklatan 0.02-0.005 % drop Administer 1 Drop to both eyes nightly. calcium-cholecalciferol, d3, (CALCIUM 600 + D) 600-125 mg-unit tab Take 1 Tablet by mouth daily. RESTASIS 0.05 % ophthalmic emulsion Administer 1 Drop to both eyes every twelve (12) hours. ascorbic acid, vitamin C, (VITAMIN C) 500 mg tablet Take 1,000 mg by mouth daily. MULTIVITAMINS PO Take 1 Tab by mouth daily. aspirin 81 mg chewable tablet Take 81 mg by mouth daily. Allergies   Allergen Reactions    Lipitor [Atorvastatin] Myalgia    Spironolactone Other (comments)     Dizziness and fatigue       Family History   Problem Relation Age of Onset    Hypertension Mother     OSTEOARTHRITIS Other      Social History     Tobacco Use    Smoking status: Never    Smokeless tobacco: Never   Substance Use Topics    Alcohol use: No     A comprehensive 5 year plan for medical care and screening exams was reviewed with pt and they received a copy of it.         Mickie Hair MD

## 2022-11-21 NOTE — PATIENT INSTRUCTIONS

## 2022-11-21 NOTE — PROGRESS NOTES
Patient is in the office today for a 3 month follow up. Patient states she feels tired and not able to do once she once did. Do you have an Advance Directive no  Do you want more information : information given     1. \"Have you been to the ER, urgent care clinic since your last visit? Hospitalized since your last visit? \" No    2. \"Have you seen or consulted any other health care providers outside of the [address] [place] [address] since your last visit? \" No     3. For patients aged 39-70: Has the patient had a colonoscopy / FIT/ Cologuard? NA - based on age      If the patient is female:    4. For patients aged 41-77: Has the patient had a mammogram within the past 2 years? NA - based on age or sex      11. For patients aged 21-65: Has the patient had a pap smear?  NA - based on age or sex

## 2022-11-23 NOTE — PROGRESS NOTES
Subjective:       Chief Complaint  The patient presents for follow up of diabetes, hypertension and high cholesterol. HPI  Trinity Mcrae is a 80 y.o. female seen for follow up of diabetes. Shealso has hypertension and hyperlipidemia. Diabetes borderline  controlled off medications for now,  hypertension  uncontrolled , no significant medication side effects noted, on current meds, which include Benicar 20 mg, Norvasc 5 mg, patient daughter will try  to monitor at home, compliance may be an issue with this 77-year-old lady. Hyperlipidemia, uncontrolled off Pravachol 40 mg which caused her to have myalgias. Diet and Lifestyle: generally follows a low fat low cholesterol diet, follows a diabetic diet regularly, sedentary    Home BP Monitoring: is not monitored at home on a regular basis    Diabetic Review of Systems - home glucose monitoring: is well controlled at home when she takes it 1x/day    Other symptoms and concerns: Pt's CKD stage 3b is stable on current meds. Patient is also followed by cardiology for hypertensive heart disease. Pt has chronic diastolic heart failure that is followed by cardiology and controlled on current meds. Pt has generalized arthritis for which she uses aleve with mild improvement. Patient also has mild to moderate dementia with her being confused at times as well as having a lot of paranoid thoughts. Patient's daughters are struggling to keep her at home. They want to place her in assisted living but patient is declining to go at this time. Current Outpatient Medications   Medication Sig    amLODIPine (NORVASC) 5 mg tablet Take 1 Tablet by mouth daily. metoprolol tartrate (LOPRESSOR) 25 mg tablet Take 1 Tablet by mouth two (2) times a day. acetaminophen (TYLENOL) 650 mg TbER Take 650 mg by mouth every eight (8) hours. olmesartan (BENICAR) 20 mg tablet Take 1 Tablet by mouth daily.     Rocklatan 0.02-0.005 % drop Administer 1 Drop to both eyes nightly. calcium-cholecalciferol, d3, (CALCIUM 600 + D) 600-125 mg-unit tab Take 1 Tablet by mouth daily. RESTASIS 0.05 % ophthalmic emulsion Administer 1 Drop to both eyes every twelve (12) hours. ascorbic acid, vitamin C, (VITAMIN C) 500 mg tablet Take 1,000 mg by mouth daily. MULTIVITAMINS PO Take 1 Tab by mouth daily. aspirin 81 mg chewable tablet Take 81 mg by mouth daily. No current facility-administered medications for this visit.              Review of Systems  Respiratory: negative for dyspnea on exertion  Cardiovascular: negative for chest pain    Objective:     Visit Vitals  BP (!) 190/84 (BP 1 Location: Right arm, BP Patient Position: Sitting, BP Cuff Size: Adult)   Pulse 91   Temp 98.4 °F (36.9 °C) (Temporal)   Resp 20   Ht 5' 1\" (1.549 m)   Wt 138 lb (62.6 kg)   SpO2 97%   BMI 26.07 kg/m²        General appearance - alert, well appearing, and in no distress  Neck - supple, no significant adenopathy, carotids upstroke normal bilaterally, no bruits  Chest - clear to auscultation, no wheezes, rales or rhonchi, symmetric air entry  Heart -normal S1, S2, no murmurs, rubs, clicks or gallops  Extremities -1+ bilateral pedal edema  Skin - normal coloration and turgor, no rashes, no suspicious skin lesions noted      Labs:   Lab Results   Component Value Date/Time    Hemoglobin A1c 6.6 (H) 11/14/2022 11:22 AM    Hemoglobin A1c 6.8 (H) 08/08/2022 11:14 AM    Hemoglobin A1c 6.5 (H) 03/24/2022 10:31 AM    Microalbumin/Creat ratio (mg/g creat) 65 (H) 03/24/2022 10:31 AM    Microalbumin,urine random 14.20 (H) 03/24/2022 10:31 AM    LDL, calculated 202.2 (H) 11/14/2022 11:22 AM    Creatinine 1.23 11/14/2022 11:22 AM      Lab Results   Component Value Date/Time    Cholesterol, total 311 (H) 11/14/2022 11:22 AM    HDL Cholesterol 71 (H) 11/14/2022 11:22 AM    LDL, calculated 202.2 (H) 11/14/2022 11:22 AM    Triglyceride 189 (H) 11/14/2022 11:22 AM    CHOL/HDL Ratio 4.4 11/14/2022 11:22 AM Lab Results   Component Value Date/Time    Alk. phosphatase 72 11/14/2022 11:22 AM    Bilirubin, direct 0.1 03/11/2016 10:45 AM     Lab Results   Component Value Date/Time    GFR est AA 39 (L) 08/08/2022 11:14 AM    GFR est non-AA 33 (L) 08/08/2022 11:14 AM    Creatinine 1.23 11/14/2022 11:22 AM    BUN 20 (H) 11/14/2022 11:22 AM    Sodium 139 11/14/2022 11:22 AM    Potassium 4.5 11/14/2022 11:22 AM    Chloride 105 11/14/2022 11:22 AM    CO2 27 11/14/2022 11:22 AM      Lab Results   Component Value Date/Time    Glucose 126 (H) 11/14/2022 11:22 AM            Assessment / Plan     Diabetes borderline controlled, off medications. Will continue to monitor  Hypertension  uncontrolled, on current meds, concerned about compliance, patient daughter to monitor blood pressure at home. Hyperlipidemia  uncontrolled off Pravachol which caused myalgias. ICD-10-CM ICD-9-CM    1. Medicare annual wellness visit, subsequent  Z00.00 V70.0       2. Essential hypertension  O66 844.7 METABOLIC PANEL, COMPREHENSIVE      3. Dyslipidemia  E78.5 272.4 LIPID PANEL      4. Type 2 diabetes mellitus with stage 3b chronic kidney disease, without long-term current use of insulin ([facility])  E11.22 250.40 HEMOGLOBIN A1C W/O EAG    N18.32 585.3       5. Late onset Alzheimer's dementia with behavioral disturbance ([facility] [address]  G30.1 331.0 Slowly progressing with family members having difficulty with her home. They will reconsider placing her in assisted living which would be the best option for everybody in my medical opinion    F02.818 294.11                Diabetic issues reviewed with her: diabetic diet discussed in detail, and low cholesterol diet, weight control and daily exercise discussed. Follow-up and Dispositions    Return in about 4 months (around 3/21/2023) for labs 1 week before. Reviewed plan of care. Patient has provided input and agrees with goals.

## 2022-12-06 ENCOUNTER — OFFICE VISIT (OUTPATIENT)
Dept: CARDIOLOGY CLINIC | Age: 87
End: 2022-12-06
Payer: MEDICARE

## 2022-12-06 VITALS
HEART RATE: 91 BPM | DIASTOLIC BLOOD PRESSURE: 84 MMHG | BODY MASS INDEX: 26.45 KG/M2 | SYSTOLIC BLOOD PRESSURE: 130 MMHG | OXYGEN SATURATION: 96 % | WEIGHT: 140 LBS

## 2022-12-06 DIAGNOSIS — I11.0 HYPERTENSIVE HEART DISEASE WITH CHRONIC DIASTOLIC CONGESTIVE HEART FAILURE (HCC): Primary | ICD-10-CM

## 2022-12-06 DIAGNOSIS — I50.32 HYPERTENSIVE HEART DISEASE WITH CHRONIC DIASTOLIC CONGESTIVE HEART FAILURE (HCC): Primary | ICD-10-CM

## 2022-12-20 NOTE — PROGRESS NOTES
Иван Delgadillo is in the office today for cardiovascular evaluation of her hypertensive heart disease/ diastolic dysfunction of the left ventricle. She has a history of  hypertension, dyslipidemia, and  chronic diastolic heart failure. She was followed by Dr. Willy Bustillo through the years     She became very bradycardic on beta-blocker therapy in the past necessitating discontinuation of the medication. She was hospitalized in [facility] in March of 2019 for evaluation of weakness in her legs. She was found to have  renal insufficiency with BUN/creatinine of 30 and 2.3. She  had an echocardiogram that demonstrated  normal left ventricular systolic function with moderate left atrial enlargement and moderate pulmonary hypertension with an estimated pulmonary artery pressure of 50 mmHg. She had a nuclear test completed on November 13, 2020 which demonstrated normal perfusion of the left ventricle with normal left ventricular function and an ejection fraction of 54%. She has had  problems with dizziness, but her symptoms are not very frequent or well described and she really denies any other cardiovascular complaints. In the office 10/19/2021 she said that she was \"weak \". The weakness \"comes and goes \". .  She reported occasional shortness of breath. She was particularly short of breath when she is tired. She  had no peripheral swelling. In the office today, she reported recent palpitations which she described as an \"extra beat \"sometimes. Plan; the patient continues  to have occasional intermittent weakness. She has had no chest pain or shortness of breath. She has had no dizziness, near syncope or syncope. She has had no falls. She does try to stay physically active, particularly for someone 80years old.    Her blood pressure was acceptable today at 138/84    She will turn 8 years old on 1/17/2023    PCP: Laura Carpenter MD      Past Medical History:   Diagnosis Date Arthritis of right knee     CAD (coronary artery disease), native coronary artery October 2010    mild disease    Cardiac cath 10/21/2010    dLM 20%. mLAD 25%. CX mild. pRCA 30%. EF 30-35%. Anterolateral, apical , diaphrag akin. Hypercontractile basal segments. Severe elev left-sided filling press. Cardiac echocardiogram 02/02/2011    EF 50-55%. Mild apical hypk. Gr 1 DDfx. RVSP 40-45. Mild LAE. Cardiac Holter monitor 06/10/2015    Sinus rhythm w/avg HR of 53 bpm (range 40-85 bpm). Occasional PACs. One 3-beat run of nonsustained SVT. Very few PVCs. One 3-beat run of nonsustained VT. Pt's HR was < 50 bpm for 8 hrs of the recorded time. No pauses noted. Two episodes of neck pain corresponded to sinus rhythm w/o ectopy. Cardiovascular renal duplex 12/03/2012    No renal artery stenosis bilaterally. Bilateral intrinsic/med disease. Patent bilateral renal veins. Multiple cysts on both kidneys. CKD (chronic kidney disease)     while on diuretics for difficult to control HTN    Diabetes mellitus type II 3/15/2010    Dyslipidemia 3/15/2010    Heart attack ([address]     2010    Heart failure, diastolic, chronic ([address]     History of heart attack     HTN (hypertension) 3/15/2010    Hypertensive heart disease     Loss of appetite     Osteopenia 7/13/2010    Other dyspnea and respiratory abnormality     Ringing in the ears     Stress-induced cardiomyopathy     EF 35% (LV angio 10/2010), then EF 50-55% (echo, Feb 2011)    Wears glasses        Past Surgical History:   Procedure Laterality Date    HX BLADDER SUSPENSION  2009    HX HYSTERECTOMY  1962       Current Outpatient Medications   Medication Sig    amLODIPine (NORVASC) 5 mg tablet Take 1 Tablet by mouth daily. metoprolol tartrate (LOPRESSOR) 25 mg tablet Take 1 Tablet by mouth two (2) times a day. olmesartan (BENICAR) 20 mg tablet Take 1 Tablet by mouth daily. Rocklatan 0.02-0.005 % drop Administer 1 Drop to both eyes nightly. calcium-cholecalciferol, d3, (CALCIUM 600 + D) 600-125 mg-unit tab Take 1 Tablet by mouth daily. RESTASIS 0.05 % ophthalmic emulsion Administer 1 Drop to both eyes every twelve (12) hours. ascorbic acid, vitamin C, (VITAMIN C) 500 mg tablet Take 1,000 mg by mouth daily. MULTIVITAMINS PO Take 1 Tab by mouth daily. aspirin 81 mg chewable tablet Take 81 mg by mouth daily. acetaminophen (TYLENOL) 650 mg TbER Take 650 mg by mouth every eight (8) hours. No current facility-administered medications for this visit. Allergies   Allergen Reactions    Lipitor [Atorvastatin] Myalgia    Spironolactone Other (comments)     Dizziness and fatigue       Social History:   Social History     Tobacco Use    Smoking status: Never    Smokeless tobacco: Never   Substance Use Topics    Alcohol use: No         Family history: family history includes Hypertension in her mother; OSTEOARTHRITIS in an other family member. Review of Systems:  Constitutional: Positive for fatigue. Negative for chills, fever and weight loss. Respiratory: Positive for shortness of breath. Negative for cough, sputum production and wheezing. Cardiovascular: Negative. Gastrointestinal: Positive for melena. Negative for abdominal pain, blood in stool, constipation, diarrhea, heartburn, nausea and vomiting. Musculoskeletal: Negative. Negative for back pain, falls, joint pain and neck pain. Neurological: Positive for dizziness.      Physical Exam:   The patient is an alert and oriented  Visit Vitals  /84 (BP 1 Location: Left upper arm, BP Patient Position: Sitting)   Pulse 91   Wt 63.5 kg (140 lb)   SpO2 96%   BMI 26.45 kg/m²      BP Readings from Last 3 Encounters:   12/06/22 130/84   11/21/22 (!) 190/84   08/30/22 (!) 160/80      Wt Readings from Last 3 Encounters:   12/06/22 63.5 kg (140 lb)   11/21/22 62.6 kg (138 lb)   08/15/22 62.6 kg (138 lb)     HEENT: Conjuctiva white and mucosa moist  NECK: Supple without masses, tenderness or thyromegaly. There was no jugular venous distention. Carotids are without bruits  CHEST: Symmetrical with good excursion. LUNGS: Clear to auscultation   HEART: Regular rate and rhythm. . There is a normal S1 and S2. There is a grade 2/6 MINI along the LSB. No diastolic murmurs, rubs, clicks, or gallops   ABDOMEN: Soft without masses, tenderness or organomegaly. EXTREMITIES: No  peripheral edema. INTEGUMENT: Skin is warm and dry   NEUROLOGICAL: The patient was oriented x3 with motor function grossly intact. Review of Data: See PMH and Cardiology and Imaging sections for cardiac testing  Lab Results   Component Value Date/Time    Cholesterol, total  231 (H) 10/21/2021 10:30 AM    HDL Cholesterol 64 (H) 10/21/2021 10:30 AM    LDL, calculated 148.8 (H) 10/21/2021 10:30 AM    Triglyceride 91 10/21/2021 10:30 AM    CHOL/HDL Ratio 3.6 10/21/2021 10:30 AM       12 Lead ECG. Sinus rhythm. Nondiagnostic ST and T wave variations. Hodan Cuellar MD  [facility]  [facility]  [facility] [place]  [address]    PLEASE NOTE:  This document has been produced using voice recognition software. Unrecognized errors in transcription may be present.

## 2023-01-19 ENCOUNTER — OFFICE VISIT (OUTPATIENT)
Dept: ORTHOPEDIC SURGERY | Age: 88
End: 2023-01-19
Payer: MEDICARE

## 2023-01-19 VITALS — HEIGHT: 61 IN | BODY MASS INDEX: 26.43 KG/M2 | WEIGHT: 140 LBS | RESPIRATION RATE: 14 BRPM

## 2023-01-19 DIAGNOSIS — M17.0 BILATERAL PRIMARY OSTEOARTHRITIS OF KNEE: Primary | ICD-10-CM

## 2023-01-19 DIAGNOSIS — M25.561 CHRONIC PAIN OF BOTH KNEES: ICD-10-CM

## 2023-01-19 DIAGNOSIS — G89.29 CHRONIC PAIN OF BOTH KNEES: ICD-10-CM

## 2023-01-19 DIAGNOSIS — M25.562 CHRONIC PAIN OF BOTH KNEES: ICD-10-CM

## 2023-01-19 RX ORDER — DICLOFENAC SODIUM 10 MG/G
4 GEL TOPICAL 4 TIMES DAILY
Qty: 100 G | Refills: 2 | Status: SHIPPED | OUTPATIENT
Start: 2023-01-19

## 2023-01-19 RX ORDER — BETAMETHASONE SODIUM PHOSPHATE AND BETAMETHASONE ACETATE 3; 3 MG/ML; MG/ML
12 INJECTION, SUSPENSION INTRA-ARTICULAR; INTRALESIONAL; INTRAMUSCULAR; SOFT TISSUE ONCE
Status: COMPLETED | OUTPATIENT
Start: 2023-01-19 | End: 2023-01-19

## 2023-01-19 RX ADMIN — BETAMETHASONE SODIUM PHOSPHATE AND BETAMETHASONE ACETATE 12 MG: 3; 3 INJECTION, SUSPENSION INTRA-ARTICULAR; INTRALESIONAL; INTRAMUSCULAR; SOFT TISSUE at 10:03

## 2023-01-19 NOTE — PROGRESS NOTES
Patient: Yonatan Sarabia                MRN: [id number]       SSN: [id number]  YOB: 1923        AGE: [place] y.o. SEX: female  Body mass index is 26.45 kg/m². PCP: Johnson Giraldo MD  01/19/23      Yonatan Sarabia presents today for bilateral knee pain and injection. Her last injections were performed June of 2022. Ms. Manisha Aragon states that her pain is all over her body and we discussed pain in the knees being alleviated by the injections. We also decided to prescribe Voltaren gel for other spots of arthritis pain. She is using a rollator walker with a seat and is accompanied by her daughter. Emanation today she is surprisingly sharp minded at age [place] there is no significant peripheral edema she does have extensive arthritis in the hands and the thumbs the hips rotate adequately both knees are in mild varus 1 or 2 degree fixed flexion deformity both feet are warm and well perfused although I cannot feel the dorsalis pedis pulse mild evidence of neuropathy no evidence for infection or DVT and and she is ambulating very well just with the walker itself. There was a discussion guarding surgery I would not recommend it for her with she is doing well with nonoperative measures therefore both knees injected she will return to see us in 3 to 4 months time for follow-up assessment is been an absolute pleasure to share in her care    I personally reviewed the previous 4-view x-rays of the bilateral knee, obtained 1/7/22. Advanced arthritis both knees, especially patellofemoral.     PLAN:   - Discussion regarding surgery, decided that it is not recommended at this time   - Injected bilateral knees at this visit.    - Prescribed Voltaren gel   - Return 3-4 months as needed for repeat injections    REVIEW OF SYSTEMS:      CON: negative  EYE: negative   ENT: negative  RESP: negative  GI:    negative   :  negative  MSK: Positive  A twelve point review of systems was completed, positives noted and all other systems were reviewed and are negative      IMilton M.D., have reviewed the history, physical, and have updated the allergic reactions for [facility]. TIME OUT performed immediately prior to the start of procedure:  Mela Wynn M.D., have performed the following reviews on [facility] prior to the start of the procedure:    - Patient was identified by name and date of birth  - Agreement on procedure being performed was verified  - Risks and benefits explained to the patient  - Patient was positioned for comfort  - Consent was signed and verified  - Patient was advised regarding risks of bruising, bleeding, infection and pain    Time: 10:00 AM     Body Part: intra-articular injection of bilateral  knees    Medication and Dose: 1mL Celestone preparation, i.e. 6 mg, and 3 mL 1% lidocaine, to each knee    Date of Procedure: 01/19/23    PROCEDURE PERFORMED BY : Trang Gillette M.D., [facility])    Provider Assisted by: Carole Marrow    Patient assisted by: self    Patient tolerated procedure well with no complications              Past Medical History:   Diagnosis Date    Arthritis of right knee     CAD (coronary artery disease), native coronary artery October 2010    mild disease    Cardiac cath 10/21/2010    dLM 20%. mLAD 25%. CX mild. pRCA 30%. EF 30-35%. Anterolateral, apical , diaphrag akin. Hypercontractile basal segments. Severe elev left-sided filling press. Cardiac echocardiogram 02/02/2011    EF 50-55%. Mild apical hypk. Gr 1 DDfx. RVSP 40-45. Mild LAE. Cardiac Holter monitor 06/10/2015    Sinus rhythm w/avg HR of 53 bpm (range 40-85 bpm). Occasional PACs. One 3-beat run of nonsustained SVT. Very few PVCs. One 3-beat run of nonsustained VT. Pt's HR was < 50 bpm for 8 hrs of the recorded time. No pauses noted. Two episodes of neck pain corresponded to sinus rhythm w/o ectopy.     Cardiovascular renal duplex 12/03/2012    No renal artery stenosis bilaterally. Bilateral intrinsic/med disease. Patent bilateral renal veins. Multiple cysts on both kidneys. CKD (chronic kidney disease)     while on diuretics for difficult to control HTN    Diabetes mellitus type II 3/15/2010    Dyslipidemia 3/15/2010    Heart attack ([facility] [address]     2010    Heart failure, diastolic, chronic ([facility] [address]     History of heart attack     HTN (hypertension) 3/15/2010    Hypertensive heart disease     Loss of appetite     Osteopenia 7/13/2010    Other dyspnea and respiratory abnormality     Ringing in the ears     Stress-induced cardiomyopathy     EF 35% (LV angio 10/2010), then EF 50-55% (echo, Feb 2011)    Wears glasses        Family History   Problem Relation Age of Onset    Hypertension Mother     OSTEOARTHRITIS Other        Current Outpatient Medications   Medication Sig Dispense Refill    amLODIPine (NORVASC) 5 mg tablet Take 1 Tablet by mouth daily. 90 Tablet 3    metoprolol tartrate (LOPRESSOR) 25 mg tablet Take 1 Tablet by mouth two (2) times a day. 180 Tablet 3    acetaminophen (TYLENOL) 650 mg TbER Take 650 mg by mouth every eight (8) hours. olmesartan (BENICAR) 20 mg tablet Take 1 Tablet by mouth daily. 90 Tablet 3    Rocklatan 0.02-0.005 % drop Administer 1 Drop to both eyes nightly. calcium-cholecalciferol, d3, (CALCIUM 600 + D) 600-125 mg-unit tab Take 1 Tablet by mouth daily. RESTASIS 0.05 % ophthalmic emulsion Administer 1 Drop to both eyes every twelve (12) hours. ascorbic acid, vitamin C, (VITAMIN C) 500 mg tablet Take 1,000 mg by mouth daily. MULTIVITAMINS PO Take 1 Tab by mouth daily. aspirin 81 mg chewable tablet Take 81 mg by mouth daily.          Allergies   Allergen Reactions    Lipitor [Atorvastatin] Myalgia    Spironolactone Other (comments)     Dizziness and fatigue       Past Surgical History:   Procedure Laterality Date    HX BLADDER SUSPENSION  2009    HX HYSTERECTOMY  1962       Social History     Socioeconomic History    Marital status:      Spouse name: Not on file    Number of children: Not on file    Years of education: Not on file    Highest education level: Not on file   Occupational History    Not on file   Tobacco Use    Smoking status: Never    Smokeless tobacco: Never   Substance and Sexual Activity    Alcohol use: No    Drug use: No    Sexual activity: Never   Other Topics Concern    Not on file   Social History Narrative    Not on file     Social Determinants of Health     Financial Resource Strain: Not on file   Food Insecurity: Not on file   Transportation Needs: Not on file   Physical Activity: Not on file   Stress: Not on file   Social Connections: Not on file   Intimate Partner Violence: Not on file   Housing Stability: Not on file       Visit Vitals  Resp 14   Ht 5' 1\" (1.549 m)   Wt 140 lb (63.5 kg)   BMI 26.45 kg/m²         PHYSICAL EXAMINATION:  GENERAL: Alert and oriented x3, in no acute distress, well-developed, well-nourished, afebrile. HEART: No JVD. EYES: No scleral icterus   NECK: No significant lymphadenopathy   LUNGS: No respiratory compromise or indrawing  ABDOMEN: Soft, non-tender, non-distended. Note: This note was completed using voice recognition software.  Any typographical/name errors or mistakes are unintentional.    Written by: Khanh Warren, as dictated by Shelbi Gallagher MD.    Electronically signed by: Tevin Anthony

## 2023-02-04 DIAGNOSIS — N18.32 TYPE 2 DIABETES MELLITUS WITH STAGE 3B CHRONIC KIDNEY DISEASE, WITHOUT LONG-TERM CURRENT USE OF INSULIN (HCC): Primary | ICD-10-CM

## 2023-02-04 DIAGNOSIS — E11.22 TYPE 2 DIABETES MELLITUS WITH STAGE 3B CHRONIC KIDNEY DISEASE, WITHOUT LONG-TERM CURRENT USE OF INSULIN (HCC): Primary | ICD-10-CM

## 2023-02-05 DIAGNOSIS — I10 ESSENTIAL HYPERTENSION: Primary | ICD-10-CM

## 2023-02-06 DIAGNOSIS — E78.5 DYSLIPIDEMIA: Primary | ICD-10-CM

## 2023-03-07 ENCOUNTER — HOSPITAL ENCOUNTER (OUTPATIENT)
Facility: HOSPITAL | Age: 88
Setting detail: SPECIMEN
Discharge: HOME | End: 2023-03-10
Payer: MEDICARE

## 2023-03-07 DIAGNOSIS — E11.22 TYPE 2 DIABETES MELLITUS WITH STAGE 3B CHRONIC KIDNEY DISEASE, WITHOUT LONG-TERM CURRENT USE OF INSULIN (HCC): ICD-10-CM

## 2023-03-07 DIAGNOSIS — E78.5 DYSLIPIDEMIA: ICD-10-CM

## 2023-03-07 DIAGNOSIS — N18.32 TYPE 2 DIABETES MELLITUS WITH STAGE 3B CHRONIC KIDNEY DISEASE, WITHOUT LONG-TERM CURRENT USE OF INSULIN (HCC): ICD-10-CM

## 2023-03-07 DIAGNOSIS — I10 ESSENTIAL HYPERTENSION: ICD-10-CM

## 2023-03-07 LAB
ALBUMIN SERPL-MCNC: 3.7 G/DL (ref 3.4–5)
ALBUMIN/GLOB SERPL: 1.2 (ref 0.8–1.7)
ALP SERPL-CCNC: 73 U/L (ref 45–117)
ALT SERPL-CCNC: 21 U/L (ref 13–56)
ANION GAP SERPL CALC-SCNC: 7 MMOL/L (ref 3–18)
AST SERPL-CCNC: 21 U/L (ref 10–38)
BILIRUB SERPL-MCNC: 0.5 MG/DL (ref 0.2–1)
BUN SERPL-MCNC: 28 MG/DL (ref 7–18)
BUN/CREAT SERPL: 21 (ref 12–20)
CALCIUM SERPL-MCNC: 10 MG/DL (ref 8.5–10.1)
CHLORIDE SERPL-SCNC: 107 MMOL/L (ref 100–111)
CHOLEST SERPL-MCNC: 317 MG/DL
CO2 SERPL-SCNC: 25 MMOL/L (ref 21–32)
CREAT SERPL-MCNC: 1.34 MG/DL (ref 0.6–1.3)
EST. AVERAGE GLUCOSE BLD GHB EST-MCNC: 140 MG/DL
GLOBULIN SER CALC-MCNC: 3 G/DL (ref 2–4)
GLUCOSE SERPL-MCNC: 149 MG/DL (ref 74–99)
HBA1C MFR BLD: 6.5 % (ref 4.2–5.6)
HDLC SERPL-MCNC: 67 MG/DL (ref 40–60)
HDLC SERPL: 4.7 (ref 0–5)
LDLC SERPL CALC-MCNC: 211.8 MG/DL (ref 0–100)
LIPID PANEL: ABNORMAL
POTASSIUM SERPL-SCNC: 4.6 MMOL/L (ref 3.5–5.5)
PROT SERPL-MCNC: 6.7 G/DL (ref 6.4–8.2)
SODIUM SERPL-SCNC: 139 MMOL/L (ref 136–145)
TRIGL SERPL-MCNC: 191 MG/DL
VLDLC SERPL CALC-MCNC: 38.2 MG/DL

## 2023-03-07 PROCEDURE — 80053 COMPREHEN METABOLIC PANEL: CPT

## 2023-03-07 PROCEDURE — 80061 LIPID PANEL: CPT

## 2023-03-07 PROCEDURE — 83036 HEMOGLOBIN GLYCOSYLATED A1C: CPT

## 2023-03-07 PROCEDURE — 36415 COLL VENOUS BLD VENIPUNCTURE: CPT

## 2023-03-14 ENCOUNTER — OFFICE VISIT (OUTPATIENT)
Age: 88
End: 2023-03-14
Payer: MEDICARE

## 2023-03-14 VITALS
HEIGHT: 61 IN | DIASTOLIC BLOOD PRESSURE: 83 MMHG | BODY MASS INDEX: 26.24 KG/M2 | RESPIRATION RATE: 18 BRPM | WEIGHT: 139 LBS | HEART RATE: 59 BPM | OXYGEN SATURATION: 96 % | TEMPERATURE: 98 F | SYSTOLIC BLOOD PRESSURE: 201 MMHG

## 2023-03-14 DIAGNOSIS — E78.2 MIXED HYPERLIPIDEMIA: ICD-10-CM

## 2023-03-14 DIAGNOSIS — N18.32 TYPE 2 DIABETES MELLITUS WITH STAGE 3B CHRONIC KIDNEY DISEASE, WITHOUT LONG-TERM CURRENT USE OF INSULIN (HCC): Primary | ICD-10-CM

## 2023-03-14 DIAGNOSIS — I11.0 HYPERTENSIVE HEART DISEASE WITH HEART FAILURE (HCC): ICD-10-CM

## 2023-03-14 DIAGNOSIS — E11.22 TYPE 2 DIABETES MELLITUS WITH STAGE 3B CHRONIC KIDNEY DISEASE, WITHOUT LONG-TERM CURRENT USE OF INSULIN (HCC): Primary | ICD-10-CM

## 2023-03-14 DIAGNOSIS — F02.818 DEMENTIA IN OTHER DISEASES CLASSIFIED ELSEWHERE, UNSPECIFIED SEVERITY, WITH OTHER BEHAVIORAL DISTURBANCE: ICD-10-CM

## 2023-03-14 DIAGNOSIS — I10 ESSENTIAL (PRIMARY) HYPERTENSION: ICD-10-CM

## 2023-03-14 PROCEDURE — 99213 OFFICE O/P EST LOW 20 MIN: CPT | Performed by: INTERNAL MEDICINE

## 2023-03-14 SDOH — ECONOMIC STABILITY: FOOD INSECURITY: WITHIN THE PAST 12 MONTHS, THE FOOD YOU BOUGHT JUST DIDN'T LAST AND YOU DIDN'T HAVE MONEY TO GET MORE.: NEVER TRUE

## 2023-03-14 SDOH — ECONOMIC STABILITY: HOUSING INSECURITY
IN THE LAST 12 MONTHS, WAS THERE A TIME WHEN YOU DID NOT HAVE A STEADY PLACE TO SLEEP OR SLEPT IN A SHELTER (INCLUDING NOW)?: NO

## 2023-03-14 SDOH — ECONOMIC STABILITY: INCOME INSECURITY: HOW HARD IS IT FOR YOU TO PAY FOR THE VERY BASICS LIKE FOOD, HOUSING, MEDICAL CARE, AND HEATING?: NOT HARD AT ALL

## 2023-03-14 SDOH — ECONOMIC STABILITY: FOOD INSECURITY: WITHIN THE PAST 12 MONTHS, YOU WORRIED THAT YOUR FOOD WOULD RUN OUT BEFORE YOU GOT MONEY TO BUY MORE.: NEVER TRUE

## 2023-03-14 ASSESSMENT — PATIENT HEALTH QUESTIONNAIRE - PHQ9
SUM OF ALL RESPONSES TO PHQ QUESTIONS 1-9: 0
SUM OF ALL RESPONSES TO PHQ QUESTIONS 1-9: 0
2. FEELING DOWN, DEPRESSED OR HOPELESS: 0
SUM OF ALL RESPONSES TO PHQ QUESTIONS 1-9: 0
1. LITTLE INTEREST OR PLEASURE IN DOING THINGS: 0
SUM OF ALL RESPONSES TO PHQ9 QUESTIONS 1 & 2: 0
SUM OF ALL RESPONSES TO PHQ QUESTIONS 1-9: 0

## 2023-03-14 NOTE — PROGRESS NOTES
Nancy Smith presents today for   Chief Complaint   Patient presents with    Cholesterol Problem    Hypertension    Diabetes     4 month follow up                  1. \"Have you been to the ER, urgent care clinic since your last visit? Hospitalized since your last visit? \" no    2. \"Have you seen or consulted any other health care providers outside of the [address] [place] [address] since your last visit? \" no     3. For patients aged 39-70: Has the patient had a colonoscopy / FIT/ Cologuard? NA - based on age      If the patient is female:    4. For patients aged 41-77: Has the patient had a mammogram within the past 2 years? NA - based on age or sex      11. For patients aged 21-65: Has the patient had a pap smear?  NA - based on age or sex

## 2023-03-17 NOTE — PROGRESS NOTES
Subjective:       Chief Complaint  The patient presents for follow up of diabetes, hypertension and high cholesterol. LISSETT King is a 80 y.o. female seen for follow up of diabetes. Shealso has hypertension and hyperlipidemia. Diabetes well  controlled off medications for now,  hypertension  uncontrolled , no significant medication side effects noted, on current meds, which include Benicar 20 mg, Norvasc 5 mg,  compliance may be an issue with this 8-year-old lady. Hyperlipidemia, uncontrolled off Pravachol 40 mg which caused her to have myalgias. Diet and Lifestyle: generally follows a low fat low cholesterol diet, follows a diabetic diet regularly, sedentary    Home BP Monitoring: is not monitored at home on a regular basis    Diabetic Review of Systems - home glucose monitoring: is well controlled at home when she takes it 1x/day    Other symptoms and concerns: Pt's CKD stage 3b is stable on current meds. Patient is also followed by cardiology for hypertensive heart disease. Pt has chronic diastolic heart failure that is followed by cardiology and controlled on current meds. Pt has generalized arthritis for which she uses aleve with mild improvement. Patient also has mild to moderate dementia with her being confused at times as well as having a lot of paranoid thoughts. Patient's daughters are struggling to keep her at home. They want to place her in assisted living but patient is declining to go at this time. Current Outpatient Medications   Medication Sig    Multiple Vitamin (MULTIVITAMINS PO) Take 1 tablet by mouth daily    acetaminophen (TYLENOL) 650 MG extended release tablet Take 650 mg by mouth in the morning and 650 mg at noon and 650 mg in the evening.     amLODIPine (NORVASC) 5 MG tablet Take 5 mg by mouth daily    ascorbic acid (VITAMIN C) 500 MG tablet Take 1,000 mg by mouth daily    aspirin 81 MG chewable tablet Take 81 mg by mouth daily    Calcium Carbonate-Vitamin D 600-3. 125 MG-MCG TABS Take 1 tablet by mouth daily    cycloSPORINE (RESTASIS) 0.05 % ophthalmic emulsion Apply 1 drop to eye in the morning and 1 drop in the evening. metoprolol tartrate (LOPRESSOR) 25 MG tablet Take 25 mg by mouth 2 times daily    netarsudil-Latanoprost (ROCKLATAN) 0.02-0.005 % ophthalmic solution Apply 1 drop to eye    olmesartan (BENICAR) 20 MG tablet Take 20 mg by mouth daily     No current facility-administered medications for this visit.              Review of Systems  Respiratory: negative for dyspnea on exertion  Cardiovascular: negative for chest pain    Objective:   Visit Vitals  BP (!) 201/83 (Site: Right Upper Arm, Position: Sitting, Cuff Size: Large Adult)   Pulse 59   Temp 98 °F (36.7 °C) (Temporal)   Resp 18   Ht 5' 1\" (1.549 m)   Wt 139 lb (63 kg)   SpO2 96%   BMI 26.26 kg/m²            General appearance - alert, well appearing, and in no distress  Neck - supple, no significant adenopathy, carotids upstroke normal bilaterally, no bruits  Chest - clear to auscultation, no wheezes, rales or rhonchi, symmetric air entry  Heart -normal S1, S2, no murmurs, rubs, clicks or gallops  Extremities -1+ bilateral pedal edema  Skin - normal coloration and turgor, no rashes, no suspicious skin lesions noted    CMP:    Lab Results   Component Value Date/Time     03/07/2023 10:47 AM    K 4.6 03/07/2023 10:47 AM     03/07/2023 10:47 AM    CO2 25 03/07/2023 10:47 AM    BUN 28 03/07/2023 10:47 AM    CREATININE 1.34 03/07/2023 10:47 AM    GFRAA 39 08/08/2022 11:14 AM    AGRATIO 1.3 11/14/2022 11:22 AM    LABGLOM 35 03/07/2023 10:47 AM    GLUCOSE 149 03/07/2023 10:47 AM    PROT 6.7 03/07/2023 10:47 AM    LABALBU 3.7 03/07/2023 10:47 AM    CALCIUM 10.0 03/07/2023 10:47 AM    BILITOT 0.5 03/07/2023 10:47 AM    ALKPHOS 73 03/07/2023 10:47 AM    ALKPHOS 72 11/14/2022 11:22 AM    AST 21 03/07/2023 10:47 AM    ALT 21 03/07/2023 10:47 AM     HgBA1c:    Lab Results   Component Value Date/Time    LABA1C 6.5 03/07/2023 10:47 AM     FLP:    Lab Results   Component Value Date/Time    TRIG 191 03/07/2023 10:47 AM    HDL 67 03/07/2023 10:47 AM    LDLCALC 211.8 03/07/2023 10:47 AM    LABVLDL 38.2 03/07/2023 10:47 AM     TSH:    Lab Results   Component Value Date/Time    TSH 0.86 01/29/2020 01:14 PM         Assessment / Plan     Diabetes borderline controlled, off medications. Will continue to monitor  Hypertension  uncontrolled, on current meds, concerned about compliance, patient daughter to monitor blood pressure at home.  Hyperlipidemia  uncontrolled off Pravachol which caused myalgias.      ICD-10-CM    1. Type 2 diabetes mellitus with stage 3b chronic kidney disease, without long-term current use of insulin (HCC)  E11.22 Hemoglobin A1C    N18.32       2. Essential (primary) hypertension  I10 Comprehensive Metabolic Panel      3. Mixed hyperlipidemia  E78.2 Lipid Panel      4. Dementia in other diseases classified elsewhere, unspecified severity, with other behavioral disturbance  F02.818 Patient continues to have a lot of conflict with her daughter and would do better in assisted living facility but she is resistant to going there.      5. Hypertensive heart disease with heart failure (HCC)  I11.0 Patient followed by cardiology.  She would benefit again from being in assisted living facility where they can monitor her blood pressure and her medications.                Diabetic issues reviewed with her: diabetic diet discussed in detail, and low cholesterol diet, weight control and daily exercise discussed.     Return in about 4 months (around 7/14/2023) for labs 1 week before.          Reviewed plan of care. Patient has provided input and agrees with goals.

## 2023-03-28 ENCOUNTER — TELEPHONE (OUTPATIENT)
Age: 88
End: 2023-03-28

## 2023-03-29 ENCOUNTER — TELEPHONE (OUTPATIENT)
Age: 88
End: 2023-03-29

## 2023-03-29 NOTE — TELEPHONE ENCOUNTER
----- Message from Mirna Max sent at 3/29/2023 10:28 AM EDT -----  Regarding: PT Pain - Brittany Ross  Can you please call patient's daughter eGr Li at [phone] - patient was having pain shooting from chest to leg, happened twice, is concerned

## 2023-03-29 NOTE — TELEPHONE ENCOUNTER
Patient daughter states the shooting pain started in chest area and then when down her leg. Reassured patient daughter that the symptom she was having is not coming from the heart and going down the leg. She would probably need an appointment with her PCP for further evaluation.      Patient daughter verbalized understanding

## 2023-04-20 ENCOUNTER — OFFICE VISIT (OUTPATIENT)
Age: 88
End: 2023-04-20

## 2023-04-20 VITALS — BODY MASS INDEX: 26.62 KG/M2 | WEIGHT: 141 LBS | HEIGHT: 61 IN

## 2023-04-20 DIAGNOSIS — G89.29 CHRONIC PAIN OF RIGHT KNEE: ICD-10-CM

## 2023-04-20 DIAGNOSIS — M25.562 CHRONIC PAIN OF LEFT KNEE: ICD-10-CM

## 2023-04-20 DIAGNOSIS — G89.29 CHRONIC PAIN OF LEFT KNEE: ICD-10-CM

## 2023-04-20 DIAGNOSIS — M25.561 CHRONIC PAIN OF RIGHT KNEE: ICD-10-CM

## 2023-04-20 DIAGNOSIS — M17.12 PRIMARY OSTEOARTHRITIS OF LEFT KNEE: Primary | ICD-10-CM

## 2023-04-20 DIAGNOSIS — M17.11 PRIMARY OSTEOARTHRITIS OF RIGHT KNEE: ICD-10-CM

## 2023-04-20 RX ORDER — BETAMETHASONE SODIUM PHOSPHATE AND BETAMETHASONE ACETATE 3; 3 MG/ML; MG/ML
6 INJECTION, SUSPENSION INTRA-ARTICULAR; INTRALESIONAL; INTRAMUSCULAR; SOFT TISSUE ONCE
Status: COMPLETED | OUTPATIENT
Start: 2023-04-20 | End: 2023-04-20

## 2023-04-20 RX ADMIN — BETAMETHASONE SODIUM PHOSPHATE AND BETAMETHASONE ACETATE 6 MG: 3; 3 INJECTION, SUSPENSION INTRA-ARTICULAR; INTRALESIONAL; INTRAMUSCULAR; SOFT TISSUE at 14:20

## 2023-04-20 NOTE — PROGRESS NOTES
Sexual Activity    Alcohol use: No    Drug use: No    Sexual activity: Not on file   Other Topics Concern    Not on file   Social History Narrative    Not on file     Social Determinants of Health     Financial Resource Strain: Low Risk     Difficulty of Paying Living Expenses: Not hard at all   Food Insecurity: No Food Insecurity    Worried About [address] in the Last Year: Never true    [address] in the Last Year: Never true   Transportation Needs: Unknown    Lack of Transportation (Medical): Not on file    Lack of Transportation (Non-Medical): No   Physical Activity: Not on file   Stress: Not on file   Social Connections: Not on file   Intimate Partner Violence: Not on file   Housing Stability: Unknown    Unable to Pay for Housing in the Last Year: Not on file    Number of Places Lived in the Last Year: Not on file    Unstable Housing in the Last Year: No       Ht 5' 1\" (1.549 m)   Wt 141 lb (64 kg)   BMI 26.64 kg/m²       PHYSICAL EXAMINATION:  GENERAL: Alert and oriented x3, in no acute distress, well-developed, well-nourished, afebrile. HEART: No JVD. EYES: No scleral icterus   NECK: No significant lymphadenopathy   LUNGS: No respiratory compromise or indrawing  ABDOMEN: Soft, non-tender, non-distended. Note: This note was completed using voice recognition software.  Any typographical/name errors or mistakes are unintentional.    Electronically signed by: Sandra Bowman MA

## 2023-05-08 RX ORDER — OLMESARTAN MEDOXOMIL 20 MG/1
TABLET ORAL
Qty: 90 TABLET | Refills: 3 | Status: SHIPPED | OUTPATIENT
Start: 2023-05-08

## 2023-05-12 ENCOUNTER — HOSPITAL ENCOUNTER (OUTPATIENT)
Facility: HOSPITAL | Age: 88
End: 2023-05-12
Payer: MEDICARE

## 2023-05-12 ENCOUNTER — OFFICE VISIT (OUTPATIENT)
Age: 88
End: 2023-05-12
Payer: MEDICARE

## 2023-05-12 VITALS
WEIGHT: 141 LBS | HEART RATE: 50 BPM | TEMPERATURE: 98.7 F | RESPIRATION RATE: 20 BRPM | BODY MASS INDEX: 26.62 KG/M2 | OXYGEN SATURATION: 98 % | HEIGHT: 61 IN | DIASTOLIC BLOOD PRESSURE: 77 MMHG | SYSTOLIC BLOOD PRESSURE: 193 MMHG

## 2023-05-12 DIAGNOSIS — E11.22 TYPE 2 DIABETES MELLITUS WITH STAGE 3B CHRONIC KIDNEY DISEASE, WITHOUT LONG-TERM CURRENT USE OF INSULIN (HCC): ICD-10-CM

## 2023-05-12 DIAGNOSIS — R06.02 SHORT OF BREATH ON EXERTION: ICD-10-CM

## 2023-05-12 DIAGNOSIS — N18.32 TYPE 2 DIABETES MELLITUS WITH STAGE 3B CHRONIC KIDNEY DISEASE, WITHOUT LONG-TERM CURRENT USE OF INSULIN (HCC): ICD-10-CM

## 2023-05-12 DIAGNOSIS — R53.82 CHRONIC FATIGUE: ICD-10-CM

## 2023-05-12 DIAGNOSIS — I50.32 CHRONIC DIASTOLIC CONGESTIVE HEART FAILURE (HCC): Primary | ICD-10-CM

## 2023-05-12 DIAGNOSIS — I50.32 CHRONIC DIASTOLIC CONGESTIVE HEART FAILURE (HCC): ICD-10-CM

## 2023-05-12 LAB
ALBUMIN SERPL-MCNC: 3.3 G/DL (ref 3.4–5)
ALBUMIN/GLOB SERPL: 1.1 (ref 0.8–1.7)
ALP SERPL-CCNC: 71 U/L (ref 45–117)
ALT SERPL-CCNC: 20 U/L (ref 13–56)
ANION GAP SERPL CALC-SCNC: 7 MMOL/L (ref 3–18)
AST SERPL-CCNC: 20 U/L (ref 10–38)
BASOPHILS # BLD: 0 K/UL (ref 0–0.1)
BASOPHILS NFR BLD: 1 % (ref 0–2)
BILIRUB SERPL-MCNC: 0.4 MG/DL (ref 0.2–1)
BUN SERPL-MCNC: 25 MG/DL (ref 7–18)
BUN/CREAT SERPL: 18 (ref 12–20)
CALCIUM SERPL-MCNC: 9.8 MG/DL (ref 8.5–10.1)
CHLORIDE SERPL-SCNC: 108 MMOL/L (ref 100–111)
CO2 SERPL-SCNC: 25 MMOL/L (ref 21–32)
CREAT SERPL-MCNC: 1.36 MG/DL (ref 0.6–1.3)
DIFFERENTIAL METHOD BLD: ABNORMAL
EOSINOPHIL # BLD: 0 K/UL (ref 0–0.4)
EOSINOPHIL NFR BLD: 0 % (ref 0–5)
ERYTHROCYTE [DISTWIDTH] IN BLOOD BY AUTOMATED COUNT: 13.4 % (ref 11.6–14.5)
GLOBULIN SER CALC-MCNC: 2.9 G/DL (ref 2–4)
GLUCOSE SERPL-MCNC: 107 MG/DL (ref 74–99)
HCT VFR BLD AUTO: 36.2 % (ref 35–45)
HGB BLD-MCNC: 11.7 G/DL (ref 12–16)
IMM GRANULOCYTES # BLD AUTO: 0 K/UL (ref 0–0.04)
IMM GRANULOCYTES NFR BLD AUTO: 0 % (ref 0–0.5)
LYMPHOCYTES # BLD: 1.4 K/UL (ref 0.9–3.6)
LYMPHOCYTES NFR BLD: 26 % (ref 21–52)
MCH RBC QN AUTO: 30.2 PG (ref 24–34)
MCHC RBC AUTO-ENTMCNC: 32.3 G/DL (ref 31–37)
MCV RBC AUTO: 93.5 FL (ref 78–100)
MONOCYTES # BLD: 0.4 K/UL (ref 0.05–1.2)
MONOCYTES NFR BLD: 7 % (ref 3–10)
NEUTS SEG # BLD: 3.6 K/UL (ref 1.8–8)
NEUTS SEG NFR BLD: 65 % (ref 40–73)
NRBC # BLD: 0 K/UL (ref 0–0.01)
NRBC BLD-RTO: 0 PER 100 WBC
NT PRO BNP: 256 PG/ML (ref 0–1800)
PLATELET # BLD AUTO: 272 K/UL (ref 135–420)
PMV BLD AUTO: 10 FL (ref 9.2–11.8)
POTASSIUM SERPL-SCNC: 4.8 MMOL/L (ref 3.5–5.5)
PROT SERPL-MCNC: 6.2 G/DL (ref 6.4–8.2)
RBC # BLD AUTO: 3.87 M/UL (ref 4.2–5.3)
SODIUM SERPL-SCNC: 140 MMOL/L (ref 136–145)
TSH SERPL DL<=0.05 MIU/L-ACNC: 0.73 UIU/ML (ref 0.36–3.74)
WBC # BLD AUTO: 5.4 K/UL (ref 4.6–13.2)

## 2023-05-12 PROCEDURE — 99214 OFFICE O/P EST MOD 30 MIN: CPT | Performed by: INTERNAL MEDICINE

## 2023-05-12 PROCEDURE — 85025 COMPLETE CBC W/AUTO DIFF WBC: CPT

## 2023-05-12 PROCEDURE — 71046 X-RAY EXAM CHEST 2 VIEWS: CPT

## 2023-05-12 PROCEDURE — 1090F PRES/ABSN URINE INCON ASSESS: CPT | Performed by: INTERNAL MEDICINE

## 2023-05-12 PROCEDURE — 1036F TOBACCO NON-USER: CPT | Performed by: INTERNAL MEDICINE

## 2023-05-12 PROCEDURE — 84443 ASSAY THYROID STIM HORMONE: CPT

## 2023-05-12 PROCEDURE — 83880 ASSAY OF NATRIURETIC PEPTIDE: CPT

## 2023-05-12 PROCEDURE — 36415 COLL VENOUS BLD VENIPUNCTURE: CPT

## 2023-05-12 PROCEDURE — G8417 CALC BMI ABV UP PARAM F/U: HCPCS | Performed by: INTERNAL MEDICINE

## 2023-05-12 PROCEDURE — 1123F ACP DISCUSS/DSCN MKR DOCD: CPT | Performed by: INTERNAL MEDICINE

## 2023-05-12 PROCEDURE — 80053 COMPREHEN METABOLIC PANEL: CPT

## 2023-05-12 PROCEDURE — 3044F HG A1C LEVEL LT 7.0%: CPT | Performed by: INTERNAL MEDICINE

## 2023-05-12 PROCEDURE — G8427 DOCREV CUR MEDS BY ELIG CLIN: HCPCS | Performed by: INTERNAL MEDICINE

## 2023-05-12 NOTE — PROGRESS NOTES
Nitza Drummond presents today for   Chief Complaint   Patient presents with    Fatigue     Weak legs for about 2 months    Shortness of Breath     When laying down flat    Daughter states she has been sleeping on and off more so lately        1. \"Have you been to the ER, urgent care clinic since your last visit? Hospitalized since your last visit? \" no    2. \"Have you seen or consulted any other health care providers outside of the [address] [place] [address] since your last visit? \" no     3. For patients aged 39-70: Has the patient had a colonoscopy / FIT/ Cologuard? NA - based on age      If the patient is female:    4. For patients aged 41-77: Has the patient had a mammogram within the past 2 years? NA - based on age or sex      11. For patients aged 21-65: Has the patient had a pap smear?  NA - based on age or sex

## 2023-05-19 ASSESSMENT — ENCOUNTER SYMPTOMS: SHORTNESS OF BREATH: 1

## 2023-05-22 ENCOUNTER — TELEPHONE (OUTPATIENT)
Age: 88
End: 2023-05-22

## 2023-05-22 NOTE — TELEPHONE ENCOUNTER
Patient daughter called in wanting a nurse to give her a call back in regards to her labs results. She also states that her mom was having trouble getting up this morning. So they not sure if its has to do with her labs or just from old age. Please Advise    Daughter can be reached at [phone]

## 2023-05-22 NOTE — TELEPHONE ENCOUNTER
----- Message from Carmencita Rizvi MD sent at 5/20/2023 11:30 AM EDT -----  Tell patient his/her CXR and blood work are normal for her age.

## 2023-05-22 NOTE — TELEPHONE ENCOUNTER
Her labs and CXR were normal for her age.  At 89 she will have good and bad days but if she has chest pain, shortness or breath or unusual weakness would call EMS or take to ER for further evaluation

## 2023-06-15 ENCOUNTER — OFFICE VISIT (OUTPATIENT)
Age: 88
End: 2023-06-15

## 2023-06-15 VITALS
HEART RATE: 51 BPM | SYSTOLIC BLOOD PRESSURE: 134 MMHG | OXYGEN SATURATION: 99 % | DIASTOLIC BLOOD PRESSURE: 82 MMHG | BODY MASS INDEX: 26.24 KG/M2 | HEIGHT: 61 IN | WEIGHT: 139 LBS

## 2023-06-15 DIAGNOSIS — I11.0 HYPERTENSIVE HEART DISEASE WITH HEART FAILURE (HCC): Primary | ICD-10-CM

## 2023-06-15 DIAGNOSIS — R00.2 PALPITATIONS: ICD-10-CM

## 2023-06-15 DIAGNOSIS — I50.32 CHRONIC DIASTOLIC (CONGESTIVE) HEART FAILURE (HCC): ICD-10-CM

## 2023-06-15 ASSESSMENT — PATIENT HEALTH QUESTIONNAIRE - PHQ9
SUM OF ALL RESPONSES TO PHQ QUESTIONS 1-9: 0
SUM OF ALL RESPONSES TO PHQ QUESTIONS 1-9: 0
2. FEELING DOWN, DEPRESSED OR HOPELESS: 0
SUM OF ALL RESPONSES TO PHQ9 QUESTIONS 1 & 2: 0
SUM OF ALL RESPONSES TO PHQ QUESTIONS 1-9: 0
SUM OF ALL RESPONSES TO PHQ QUESTIONS 1-9: 0
1. LITTLE INTEREST OR PLEASURE IN DOING THINGS: 0

## 2023-07-10 ENCOUNTER — HOSPITAL ENCOUNTER (OUTPATIENT)
Facility: HOSPITAL | Age: 88
Setting detail: SPECIMEN
Discharge: HOME | End: 2023-07-13
Payer: MEDICARE

## 2023-07-10 DIAGNOSIS — E11.22 TYPE 2 DIABETES MELLITUS WITH STAGE 3B CHRONIC KIDNEY DISEASE, WITHOUT LONG-TERM CURRENT USE OF INSULIN (HCC): ICD-10-CM

## 2023-07-10 DIAGNOSIS — E78.2 MIXED HYPERLIPIDEMIA: ICD-10-CM

## 2023-07-10 DIAGNOSIS — N18.32 TYPE 2 DIABETES MELLITUS WITH STAGE 3B CHRONIC KIDNEY DISEASE, WITHOUT LONG-TERM CURRENT USE OF INSULIN (HCC): ICD-10-CM

## 2023-07-10 DIAGNOSIS — I10 ESSENTIAL (PRIMARY) HYPERTENSION: ICD-10-CM

## 2023-07-10 LAB
ALBUMIN SERPL-MCNC: 3.5 G/DL (ref 3.4–5)
ALBUMIN/GLOB SERPL: 1.2 (ref 0.8–1.7)
ALP SERPL-CCNC: 70 U/L (ref 45–117)
ALT SERPL-CCNC: 19 U/L (ref 13–56)
ANION GAP SERPL CALC-SCNC: 7 MMOL/L (ref 3–18)
AST SERPL-CCNC: 22 U/L (ref 10–38)
BILIRUB SERPL-MCNC: 0.4 MG/DL (ref 0.2–1)
BUN SERPL-MCNC: 23 MG/DL (ref 7–18)
BUN/CREAT SERPL: 15 (ref 12–20)
CALCIUM SERPL-MCNC: 10.1 MG/DL (ref 8.5–10.1)
CHLORIDE SERPL-SCNC: 108 MMOL/L (ref 100–111)
CHOLEST SERPL-MCNC: 311 MG/DL
CO2 SERPL-SCNC: 27 MMOL/L (ref 21–32)
CREAT SERPL-MCNC: 1.49 MG/DL (ref 0.6–1.3)
EST. AVERAGE GLUCOSE BLD GHB EST-MCNC: 146 MG/DL
GLOBULIN SER CALC-MCNC: 2.9 G/DL (ref 2–4)
GLUCOSE SERPL-MCNC: 136 MG/DL (ref 74–99)
HBA1C MFR BLD: 6.7 % (ref 4.2–5.6)
HDLC SERPL-MCNC: 58 MG/DL (ref 40–60)
HDLC SERPL: 5.4 (ref 0–5)
LDLC SERPL CALC-MCNC: 214.6 MG/DL (ref 0–100)
LIPID PANEL: ABNORMAL
POTASSIUM SERPL-SCNC: 4.7 MMOL/L (ref 3.5–5.5)
PROT SERPL-MCNC: 6.4 G/DL (ref 6.4–8.2)
SODIUM SERPL-SCNC: 142 MMOL/L (ref 136–145)
TRIGL SERPL-MCNC: 192 MG/DL
VLDLC SERPL CALC-MCNC: 38.4 MG/DL

## 2023-07-10 PROCEDURE — 80053 COMPREHEN METABOLIC PANEL: CPT

## 2023-07-10 PROCEDURE — 36415 COLL VENOUS BLD VENIPUNCTURE: CPT

## 2023-07-10 PROCEDURE — 83036 HEMOGLOBIN GLYCOSYLATED A1C: CPT

## 2023-07-10 PROCEDURE — 80061 LIPID PANEL: CPT

## 2023-07-17 ENCOUNTER — OFFICE VISIT (OUTPATIENT)
Age: 88
End: 2023-07-17
Payer: MEDICARE

## 2023-07-17 VITALS
HEIGHT: 61 IN | SYSTOLIC BLOOD PRESSURE: 176 MMHG | OXYGEN SATURATION: 96 % | DIASTOLIC BLOOD PRESSURE: 68 MMHG | WEIGHT: 141 LBS | BODY MASS INDEX: 26.62 KG/M2 | TEMPERATURE: 97.5 F | RESPIRATION RATE: 20 BRPM | HEART RATE: 50 BPM

## 2023-07-17 DIAGNOSIS — N18.32 TYPE 2 DIABETES MELLITUS WITH STAGE 3B CHRONIC KIDNEY DISEASE, WITHOUT LONG-TERM CURRENT USE OF INSULIN (HCC): Primary | ICD-10-CM

## 2023-07-17 DIAGNOSIS — E11.22 TYPE 2 DIABETES MELLITUS WITH STAGE 3B CHRONIC KIDNEY DISEASE, WITHOUT LONG-TERM CURRENT USE OF INSULIN (HCC): Primary | ICD-10-CM

## 2023-07-17 DIAGNOSIS — I50.32 CHRONIC DIASTOLIC CONGESTIVE HEART FAILURE (HCC): ICD-10-CM

## 2023-07-17 DIAGNOSIS — E78.2 MIXED HYPERLIPIDEMIA: ICD-10-CM

## 2023-07-17 DIAGNOSIS — I10 ESSENTIAL (PRIMARY) HYPERTENSION: ICD-10-CM

## 2023-07-17 PROCEDURE — G8427 DOCREV CUR MEDS BY ELIG CLIN: HCPCS | Performed by: INTERNAL MEDICINE

## 2023-07-17 PROCEDURE — 1090F PRES/ABSN URINE INCON ASSESS: CPT | Performed by: INTERNAL MEDICINE

## 2023-07-17 PROCEDURE — 99211 OFF/OP EST MAY X REQ PHY/QHP: CPT | Performed by: INTERNAL MEDICINE

## 2023-07-17 PROCEDURE — G8417 CALC BMI ABV UP PARAM F/U: HCPCS | Performed by: INTERNAL MEDICINE

## 2023-07-17 PROCEDURE — 3044F HG A1C LEVEL LT 7.0%: CPT | Performed by: INTERNAL MEDICINE

## 2023-07-17 PROCEDURE — 1036F TOBACCO NON-USER: CPT | Performed by: INTERNAL MEDICINE

## 2023-07-17 PROCEDURE — 99214 OFFICE O/P EST MOD 30 MIN: CPT | Performed by: INTERNAL MEDICINE

## 2023-07-17 PROCEDURE — 1123F ACP DISCUSS/DSCN MKR DOCD: CPT | Performed by: INTERNAL MEDICINE

## 2023-07-17 RX ORDER — OLMESARTAN MEDOXOMIL 40 MG/1
40 TABLET ORAL DAILY
COMMUNITY
Start: 2023-07-17

## 2023-07-17 NOTE — PROGRESS NOTES
Manas Sherrie presents today for   Chief Complaint   Patient presents with    Diabetes    Hypertension     4 month follow up            1. \"Have you been to the ER, urgent care clinic since your last visit? Hospitalized since your last visit? \" no    2. \"Have you seen or consulted any other health care providers outside of the [address] [place] [address] since your last visit? \" no     3. For patients aged 43-73: Has the patient had a colonoscopy / FIT/ Cologuard? NA - based on age      If the patient is female:    4. For patients aged 43-66: Has the patient had a mammogram within the past 2 years? NA - based on age or sex      11. For patients aged 21-65: Has the patient had a pap smear?  NA - based on age or sex

## 2023-07-22 NOTE — PROGRESS NOTES
Subjective:       Chief Complaint  The patient presents for follow up of diabetes, hypertension and high cholesterol. LISSETT Acharya is a 80 y.o. female seen for follow up of diabetes. Shekatlyno has hypertension and hyperlipidemia. Diabetes well  controlled off medications. ,  hypertension  uncontrolled , no significant medication side effects noted, on current meds, which include Benicar 40 mg, Norvasc 5 mg,  compliance may be an issue with this 8-year-old lady. Daughter in law will make sure she is taking it. Hyperlipidemia, uncontrolled off Pravachol 40 mg which caused her to have myalgias. Would not purse any further treatment at age 80. Diet and Lifestyle: generally follows a low fat low cholesterol diet, follows a diabetic diet regularly, sedentary    Home BP Monitoring: is not monitored at home on a regular basis    Diabetic Review of Systems - home glucose monitoring: is well controlled at home when she takes it 1x/day    Other symptoms and concerns: Pt's CKD stage 3b is stable on current meds. Patient is also followed by cardiology for hypertensive heart disease. Pt has chronic diastolic heart failure that is followed by cardiology and controlled on current meds. Pt has generalized arthritis for which she uses aleve with mild improvement. Patient also has mild to moderate dementia with her being confused at times as well as having a lot of paranoid thoughts. Patient's daughters are struggling to keep her at home. They want to place her in assisted living but patient is declining to go at this time. Current Outpatient Medications   Medication Sig    olmesartan (BENICAR) 40 MG tablet Take 1 tablet by mouth daily    Multiple Vitamin (MULTIVITAMINS PO) Take 1 tablet by mouth daily    acetaminophen (TYLENOL) 650 MG extended release tablet Take 1 tablet by mouth in the morning and 1 tablet at noon and 1 tablet in the evening.     amLODIPine (NORVASC) 5 MG tablet Take

## 2023-07-27 ENCOUNTER — OFFICE VISIT (OUTPATIENT)
Age: 88
End: 2023-07-27

## 2023-07-27 VITALS
OXYGEN SATURATION: 97 % | BODY MASS INDEX: 26.43 KG/M2 | HEART RATE: 83 BPM | SYSTOLIC BLOOD PRESSURE: 150 MMHG | HEIGHT: 61 IN | WEIGHT: 140 LBS | DIASTOLIC BLOOD PRESSURE: 70 MMHG

## 2023-07-27 DIAGNOSIS — R00.2 PALPITATIONS: ICD-10-CM

## 2023-07-27 DIAGNOSIS — I11.0 HYPERTENSIVE HEART DISEASE WITH HEART FAILURE (HCC): Primary | ICD-10-CM

## 2023-07-27 DIAGNOSIS — I50.32 CHRONIC DIASTOLIC (CONGESTIVE) HEART FAILURE (HCC): ICD-10-CM

## 2023-07-27 ASSESSMENT — PATIENT HEALTH QUESTIONNAIRE - PHQ9
SUM OF ALL RESPONSES TO PHQ QUESTIONS 1-9: 0
1. LITTLE INTEREST OR PLEASURE IN DOING THINGS: 0
SUM OF ALL RESPONSES TO PHQ9 QUESTIONS 1 & 2: 0
2. FEELING DOWN, DEPRESSED OR HOPELESS: 0
SUM OF ALL RESPONSES TO PHQ QUESTIONS 1-9: 0

## 2023-07-27 NOTE — PROGRESS NOTES
Reddy Acharya presents today for   Chief Complaint   Patient presents with    Follow-up     4-6 weeks follow up       Reddy Acharya preferred language for health care discussion is english/other. Is someone accompanying this pt? bi    Is the patient using any DME equipment during OV? bi    Depression Screening:  Depression: Not at risk    PHQ-2 Score: 0        Learning Assessment:  Who is the primary learner? Patient    What is the preferred language for health care of the primary learner? ENGLISH    How does the primary learner prefer to learn new concepts? DEMONSTRATION    Answered By patient    Relationship to Learner SELF           Pt currently taking Anticoagulant therapy? no    Pt currently taking Antiplatelet therapy ? Aspirin 81mg      Coordination of Care:  1. Have you been to the ER, urgent care clinic since your last visit? Hospitalized since your last visit? no    2. Have you seen or consulted any other health care providers outside of the [address] [place] [address] since your last visit? Include any pap smears or colon screening.  no

## 2023-07-31 RX ORDER — AMLODIPINE BESYLATE 5 MG/1
TABLET ORAL
Qty: 90 TABLET | Refills: 3 | Status: SHIPPED | OUTPATIENT
Start: 2023-07-31

## 2023-08-08 NOTE — TELEPHONE ENCOUNTER
PCP:  SHUN PAGAN MD    Last refill per chart:    metoprolol tartrate (LOPRESSOR) 25 MG tablet 25 mg, Oral, 2 TIMES DAILY Edit       Summary: Take 1 tablet by mouth 2 times daily   Dose, Frequency: 25 mg, 2 TIMES DAILY  Start: 8/16/2022  Ord/Sold: 2/15/2023 (O)  Report  Taking:   Long-term:   Med Dose History       Patient Sig: Take 1 tablet by mouth 2 times daily       Ordered on: 2/15/2023        Future Appointments   Date Time Provider [address] [place] [address]   8/11/2023  2:15 PM Megan Fleming MD [facility] BS AMB   11/13/2023 11:05 AM [facility] LAB VISIT [facility] BS AMB   11/20/2023 11:00 AM Gael Zarate MD [facility] BS AMB   2/1/2024 11:20 AM Chuck Watt MD [facility] BS AMB

## 2023-10-18 ENCOUNTER — TELEPHONE (OUTPATIENT)
Age: 88
End: 2023-10-18

## 2023-10-18 PROBLEM — M54.2 CERVICALGIA: Status: ACTIVE | Noted: 2023-10-18

## 2023-10-18 NOTE — TELEPHONE ENCOUNTER
Nurse Triage call received from Buddy Renteriaman, daughter of Ms. Jackie Chowdary. Daughter states that her mother has been \"sick\" the last few nights. She says her mother has felt weak and tired and just \"sick\". I asked if she was nauseated and she said no just \"sick\". Daughter is under if her mother is running fever as she lives in assisted living and she is getting ready to go over to see her in a little bit. Daughter states that her mother is asking to go to the hospital but she doesn't know what to do and wants to know what Dr. Ashwini Garay recommends.

## 2023-10-18 NOTE — TELEPHONE ENCOUNTER
Spoke with daughter she states patient is complaining of left sided pain and states her heart feels like it is slowing down. Daughter was advised to take patient to the ER.

## 2023-10-18 NOTE — TELEPHONE ENCOUNTER
Spoke with [facility] ER physician Dr Derick Elizabeth at 740pm 10/18/23  Pt came in with atypical chest/neck pain  Xray, ekg and labs/trop neg; pr followed by Dr Faith Lima  Requesting close follow up in office

## 2023-11-09 ENCOUNTER — TELEPHONE (OUTPATIENT)
Age: 88
End: 2023-11-09

## 2023-11-09 NOTE — TELEPHONE ENCOUNTER
----- Message from Eliz Tovar MA sent at 11/9/2023 10:53 AM EST -----  Subject: Message to Provider    QUESTIONS  Information for Provider? Patients daughter Nirmala Valencia is calling stating that   her mother cant walk and they are wanting an order from wheelchair. They   are unsure of what specialty pharmacy she can go through. Patient is in   [address] [place] [address] assisted Living. Patient is suppose to get labs done on Monday but   they wont be able to get her to the office without a wheel chair. Please   advise   ---------------------------------------------------------------------------  --------------  Gabino Hernandez INFO  [phone]; OK to leave message on voicemail  ---------------------------------------------------------------------------  --------------  SCRIPT ANSWERS  Relationship to Patient?  Self

## 2023-11-13 ENCOUNTER — HOSPITAL ENCOUNTER (OUTPATIENT)
Facility: HOSPITAL | Age: 88
Setting detail: SPECIMEN
Discharge: HOME | End: 2023-11-16
Payer: MEDICARE

## 2023-11-13 DIAGNOSIS — N18.32 TYPE 2 DIABETES MELLITUS WITH STAGE 3B CHRONIC KIDNEY DISEASE, WITHOUT LONG-TERM CURRENT USE OF INSULIN (HCC): ICD-10-CM

## 2023-11-13 DIAGNOSIS — E11.22 TYPE 2 DIABETES MELLITUS WITH STAGE 3B CHRONIC KIDNEY DISEASE, WITHOUT LONG-TERM CURRENT USE OF INSULIN (HCC): ICD-10-CM

## 2023-11-13 DIAGNOSIS — I10 ESSENTIAL (PRIMARY) HYPERTENSION: ICD-10-CM

## 2023-11-13 DIAGNOSIS — E78.2 MIXED HYPERLIPIDEMIA: ICD-10-CM

## 2023-11-13 LAB
ALBUMIN SERPL-MCNC: 3.5 G/DL (ref 3.4–5)
ALBUMIN/GLOB SERPL: 1.1 (ref 0.8–1.7)
ALP SERPL-CCNC: 83 U/L (ref 45–117)
ALT SERPL-CCNC: 21 U/L (ref 13–56)
ANION GAP SERPL CALC-SCNC: 4 MMOL/L (ref 3–18)
AST SERPL-CCNC: 18 U/L (ref 10–38)
BILIRUB SERPL-MCNC: 0.4 MG/DL (ref 0.2–1)
BUN SERPL-MCNC: 24 MG/DL (ref 7–18)
BUN/CREAT SERPL: 16 (ref 12–20)
CALCIUM SERPL-MCNC: 9.9 MG/DL (ref 8.5–10.1)
CHLORIDE SERPL-SCNC: 109 MMOL/L (ref 100–111)
CHOLEST SERPL-MCNC: 274 MG/DL
CO2 SERPL-SCNC: 27 MMOL/L (ref 21–32)
CREAT SERPL-MCNC: 1.47 MG/DL (ref 0.6–1.3)
EST. AVERAGE GLUCOSE BLD GHB EST-MCNC: 143 MG/DL
GLOBULIN SER CALC-MCNC: 3.2 G/DL (ref 2–4)
GLUCOSE SERPL-MCNC: 144 MG/DL (ref 74–99)
HBA1C MFR BLD: 6.6 % (ref 4.2–5.6)
HDLC SERPL-MCNC: 67 MG/DL (ref 40–60)
HDLC SERPL: 4.1 (ref 0–5)
LDLC SERPL CALC-MCNC: 174.4 MG/DL (ref 0–100)
LIPID PANEL: ABNORMAL
POTASSIUM SERPL-SCNC: 4.7 MMOL/L (ref 3.5–5.5)
PROT SERPL-MCNC: 6.7 G/DL (ref 6.4–8.2)
SODIUM SERPL-SCNC: 140 MMOL/L (ref 136–145)
TRIGL SERPL-MCNC: 163 MG/DL
VLDLC SERPL CALC-MCNC: 32.6 MG/DL

## 2023-11-13 PROCEDURE — 36415 COLL VENOUS BLD VENIPUNCTURE: CPT

## 2023-11-13 PROCEDURE — 80061 LIPID PANEL: CPT

## 2023-11-13 PROCEDURE — 80053 COMPREHEN METABOLIC PANEL: CPT

## 2023-11-13 PROCEDURE — 83036 HEMOGLOBIN GLYCOSYLATED A1C: CPT

## 2023-11-20 ENCOUNTER — OFFICE VISIT (OUTPATIENT)
Age: 88
End: 2023-11-20
Payer: MEDICARE

## 2023-11-20 VITALS
DIASTOLIC BLOOD PRESSURE: 81 MMHG | SYSTOLIC BLOOD PRESSURE: 185 MMHG | RESPIRATION RATE: 20 BRPM | OXYGEN SATURATION: 95 % | BODY MASS INDEX: 26.24 KG/M2 | TEMPERATURE: 97.2 F | WEIGHT: 139 LBS | HEIGHT: 61 IN | HEART RATE: 64 BPM

## 2023-11-20 DIAGNOSIS — I10 ESSENTIAL (PRIMARY) HYPERTENSION: ICD-10-CM

## 2023-11-20 DIAGNOSIS — Z00.00 MEDICARE ANNUAL WELLNESS VISIT, SUBSEQUENT: Primary | ICD-10-CM

## 2023-11-20 DIAGNOSIS — I50.32 CHRONIC DIASTOLIC CONGESTIVE HEART FAILURE (HCC): ICD-10-CM

## 2023-11-20 DIAGNOSIS — N18.32 TYPE 2 DIABETES MELLITUS WITH STAGE 3B CHRONIC KIDNEY DISEASE, WITHOUT LONG-TERM CURRENT USE OF INSULIN (HCC): ICD-10-CM

## 2023-11-20 DIAGNOSIS — E11.22 TYPE 2 DIABETES MELLITUS WITH STAGE 3B CHRONIC KIDNEY DISEASE, WITHOUT LONG-TERM CURRENT USE OF INSULIN (HCC): ICD-10-CM

## 2023-11-20 DIAGNOSIS — E78.2 MIXED HYPERLIPIDEMIA: ICD-10-CM

## 2023-11-20 PROCEDURE — 1090F PRES/ABSN URINE INCON ASSESS: CPT | Performed by: INTERNAL MEDICINE

## 2023-11-20 PROCEDURE — G0439 PPPS, SUBSEQ VISIT: HCPCS | Performed by: INTERNAL MEDICINE

## 2023-11-20 PROCEDURE — 1123F ACP DISCUSS/DSCN MKR DOCD: CPT | Performed by: INTERNAL MEDICINE

## 2023-11-20 PROCEDURE — G8417 CALC BMI ABV UP PARAM F/U: HCPCS | Performed by: INTERNAL MEDICINE

## 2023-11-20 PROCEDURE — 99214 OFFICE O/P EST MOD 30 MIN: CPT | Performed by: INTERNAL MEDICINE

## 2023-11-20 PROCEDURE — G8427 DOCREV CUR MEDS BY ELIG CLIN: HCPCS | Performed by: INTERNAL MEDICINE

## 2023-11-20 PROCEDURE — G8484 FLU IMMUNIZE NO ADMIN: HCPCS | Performed by: INTERNAL MEDICINE

## 2023-11-20 PROCEDURE — 3044F HG A1C LEVEL LT 7.0%: CPT | Performed by: INTERNAL MEDICINE

## 2023-11-20 PROCEDURE — 1036F TOBACCO NON-USER: CPT | Performed by: INTERNAL MEDICINE

## 2023-11-20 ASSESSMENT — PATIENT HEALTH QUESTIONNAIRE - PHQ9
SUM OF ALL RESPONSES TO PHQ QUESTIONS 1-9: 0
SUM OF ALL RESPONSES TO PHQ9 QUESTIONS 1 & 2: 0
SUM OF ALL RESPONSES TO PHQ QUESTIONS 1-9: 0
2. FEELING DOWN, DEPRESSED OR HOPELESS: 0
SUM OF ALL RESPONSES TO PHQ QUESTIONS 1-9: 0
SUM OF ALL RESPONSES TO PHQ QUESTIONS 1-9: 0
1. LITTLE INTEREST OR PLEASURE IN DOING THINGS: 0

## 2023-11-20 ASSESSMENT — LIFESTYLE VARIABLES
HOW MANY STANDARD DRINKS CONTAINING ALCOHOL DO YOU HAVE ON A TYPICAL DAY: PATIENT DOES NOT DRINK
HOW OFTEN DO YOU HAVE A DRINK CONTAINING ALCOHOL: NEVER

## 2023-11-20 NOTE — PROGRESS NOTES
Obed Chase presents today for   Chief Complaint   Patient presents with    Diabetes    Hypertension    Cholesterol Problem    Congestive Heart Failure     4 month follow up     Medicare AWV       Patient states she can feel her heart beating fast when she tries to exercise. 1. \"Have you been to the ER, urgent care clinic since your last visit? Hospitalized since your last visit? \" no    2. \"Have you seen or consulted any other health care providers outside of the [address] [place] [address] since your last visit? \" no     3. For patients aged 43-73: Has the patient had a colonoscopy / FIT/ Cologuard? NA - based on age      If the patient is female:    4. For patients aged 43-66: Has the patient had a mammogram within the past 2 years? NA - based on age or sex      11. For patients aged 21-65: Has the patient had a pap smear?  NA - based on age or sex
Subjective:       Chief Complaint  The patient presents for follow up of diabetes, hypertension and high cholesterol. LISSETT Ramirez is a [address] 83 y.o. Cozette Flies female seen for follow up of diabetes. Caesar has hypertension and hyperlipidemia. Diabetes well  controlled off medications. ,  hypertension  uncontrolled , no significant medication side effects noted, on current meds, which include Benicar 40 mg, Norvasc 5 mg, and Lopressor 25 mg BID ,   compliance may be an issue with this 8-year-old lady. Daughter will make sure she is taking it. Patient now lives in an assisted living facility. Hyperlipidemia, uncontrolled off Pravachol 40 mg which caused her to have myalgias. Would not purse any further treatment at age [address] 83. Diet and Lifestyle: generally follows a low fat low cholesterol diet, follows a diabetic diet regularly, sedentary    Home BP Monitoring: is not monitored at home on a regular basis    Diabetic Review of Systems - home glucose monitoring: is well controlled at home when she takes it 1x/day    Other symptoms and concerns: Pt's CKD stage 3b is stable on current meds. Patient is also followed by cardiology for hypertensive heart disease. Pt has chronic diastolic heart failure that is followed by cardiology and controlled on current meds. Pt has generalized arthritis for which she uses aleve with mild improvement. Patient also has mild to moderate dementia with her being confused at times as well as having a lot of paranoid thoughts. Patient is not in an assisted living facility but is still considered independent and does not have help with her medications currently. Patient feels unsteady at times and has difficulty using her rollator walker for long periods of time. She is asking for some kind of help with device but not sure which she is referring to.   She will try and get a picture of it since she has another guest at the facility that uses the machine that she is
MG-MCG TABS Take 1 tablet by mouth daily Yes Automatic Reconciliation, Ar   cycloSPORINE (RESTASIS) 0.05 % ophthalmic emulsion Apply 1 drop to eye in the morning and 1 drop in the evening.  Yes Automatic Reconciliation, Ar       CareTeam (Including outside providers/suppliers regularly involved in providing care):   Patient Care Team:  Librado Beard MD as PCP - General  Avelino Roth, Kaylyn Davis MD as PCP - Empaneled Provider  Danell Najjar, RN as Ambulatory Care Manager     Reviewed and updated this visit:  Tobacco  Allergies  Meds  Problems  Med Hx  Surg Hx  Soc Hx  Fam Hx

## 2024-02-01 ENCOUNTER — TELEPHONE (OUTPATIENT)
Age: 89
End: 2024-02-01

## 2024-02-01 ENCOUNTER — OFFICE VISIT (OUTPATIENT)
Age: 89
End: 2024-02-01
Payer: MEDICARE

## 2024-02-01 ENCOUNTER — HOSPITAL ENCOUNTER (OUTPATIENT)
Facility: HOSPITAL | Age: 89
Discharge: HOME | End: 2024-02-01
Payer: MEDICARE

## 2024-02-01 VITALS
HEART RATE: 62 BPM | SYSTOLIC BLOOD PRESSURE: 166 MMHG | HEIGHT: 61 IN | BODY MASS INDEX: 24.02 KG/M2 | WEIGHT: 127.2 LBS | OXYGEN SATURATION: 100 % | RESPIRATION RATE: 18 BRPM | DIASTOLIC BLOOD PRESSURE: 79 MMHG | TEMPERATURE: 98.2 F

## 2024-02-01 DIAGNOSIS — I10 ESSENTIAL (PRIMARY) HYPERTENSION: Primary | ICD-10-CM

## 2024-02-01 DIAGNOSIS — R07.81 PLEURITIC CHEST PAIN: ICD-10-CM

## 2024-02-01 DIAGNOSIS — R06.09 DOE (DYSPNEA ON EXERTION): ICD-10-CM

## 2024-02-01 DIAGNOSIS — J40 BRONCHITIS: ICD-10-CM

## 2024-02-01 DIAGNOSIS — F02.818 DEMENTIA IN OTHER DISEASES CLASSIFIED ELSEWHERE, UNSPECIFIED SEVERITY, WITH OTHER BEHAVIORAL DISTURBANCE (HCC): ICD-10-CM

## 2024-02-01 DIAGNOSIS — R05.1 ACUTE COUGH: ICD-10-CM

## 2024-02-01 PROCEDURE — 1036F TOBACCO NON-USER: CPT | Performed by: INTERNAL MEDICINE

## 2024-02-01 PROCEDURE — G8427 DOCREV CUR MEDS BY ELIG CLIN: HCPCS | Performed by: INTERNAL MEDICINE

## 2024-02-01 PROCEDURE — G8420 CALC BMI NORM PARAMETERS: HCPCS | Performed by: INTERNAL MEDICINE

## 2024-02-01 PROCEDURE — 71046 X-RAY EXAM CHEST 2 VIEWS: CPT

## 2024-02-01 PROCEDURE — 1123F ACP DISCUSS/DSCN MKR DOCD: CPT | Performed by: INTERNAL MEDICINE

## 2024-02-01 PROCEDURE — 1090F PRES/ABSN URINE INCON ASSESS: CPT | Performed by: INTERNAL MEDICINE

## 2024-02-01 PROCEDURE — 99214 OFFICE O/P EST MOD 30 MIN: CPT | Performed by: INTERNAL MEDICINE

## 2024-02-01 PROCEDURE — G8484 FLU IMMUNIZE NO ADMIN: HCPCS | Performed by: INTERNAL MEDICINE

## 2024-02-01 RX ORDER — AZITHROMYCIN 250 MG/1
250 TABLET, FILM COATED ORAL SEE ADMIN INSTRUCTIONS
Qty: 6 TABLET | Refills: 0 | Status: SHIPPED | OUTPATIENT
Start: 2024-02-01 | End: 2024-02-06

## 2024-02-01 SDOH — ECONOMIC STABILITY: INCOME INSECURITY: IN THE LAST 12 MONTHS, WAS THERE A TIME WHEN YOU WERE NOT ABLE TO PAY THE MORTGAGE OR RENT ON TIME?: NO

## 2024-02-01 SDOH — HEALTH STABILITY: PHYSICAL HEALTH: ON AVERAGE, HOW MANY MINUTES DO YOU ENGAGE IN EXERCISE AT THIS LEVEL?: 0 MIN

## 2024-02-01 SDOH — ECONOMIC STABILITY: FOOD INSECURITY: WITHIN THE PAST 12 MONTHS, THE FOOD YOU BOUGHT JUST DIDN'T LAST AND YOU DIDN'T HAVE MONEY TO GET MORE.: NEVER TRUE

## 2024-02-01 SDOH — ECONOMIC STABILITY: TRANSPORTATION INSECURITY
IN THE PAST 12 MONTHS, HAS LACK OF TRANSPORTATION KEPT YOU FROM MEETINGS, WORK, OR FROM GETTING THINGS NEEDED FOR DAILY LIVING?: NO

## 2024-02-01 SDOH — ECONOMIC STABILITY: FOOD INSECURITY: WITHIN THE PAST 12 MONTHS, YOU WORRIED THAT YOUR FOOD WOULD RUN OUT BEFORE YOU GOT MONEY TO BUY MORE.: NEVER TRUE

## 2024-02-01 SDOH — ECONOMIC STABILITY: HOUSING INSECURITY: IN THE LAST 12 MONTHS, HOW MANY PLACES HAVE YOU LIVED?: 1

## 2024-02-01 SDOH — ECONOMIC STABILITY: TRANSPORTATION INSECURITY
IN THE PAST 12 MONTHS, HAS THE LACK OF TRANSPORTATION KEPT YOU FROM MEDICAL APPOINTMENTS OR FROM GETTING MEDICATIONS?: NO

## 2024-02-01 SDOH — HEALTH STABILITY: PHYSICAL HEALTH: ON AVERAGE, HOW MANY DAYS PER WEEK DO YOU ENGAGE IN MODERATE TO STRENUOUS EXERCISE (LIKE A BRISK WALK)?: 0 DAYS

## 2024-02-01 SDOH — ECONOMIC STABILITY: INCOME INSECURITY: HOW HARD IS IT FOR YOU TO PAY FOR THE VERY BASICS LIKE FOOD, HOUSING, MEDICAL CARE, AND HEATING?: NOT HARD AT ALL

## 2024-02-01 ASSESSMENT — SOCIAL DETERMINANTS OF HEALTH (SDOH)
IN A TYPICAL WEEK, HOW MANY TIMES DO YOU TALK ON THE PHONE WITH FAMILY, FRIENDS, OR NEIGHBORS?: MORE THAN THREE TIMES A WEEK
HOW OFTEN DO YOU GET TOGETHER WITH FRIENDS OR RELATIVES?: MORE THAN THREE TIMES A WEEK
WITHIN THE LAST YEAR, HAVE TO BEEN RAPED OR FORCED TO HAVE ANY KIND OF SEXUAL ACTIVITY BY YOUR PARTNER OR EX-PARTNER?: NO
HOW OFTEN DO YOU ATTENT MEETINGS OF THE CLUB OR ORGANIZATION YOU BELONG TO?: NEVER
HOW OFTEN DO YOU ATTEND CHURCH OR RELIGIOUS SERVICES?: MORE THAN 4 TIMES PER YEAR
WITHIN THE LAST YEAR, HAVE YOU BEEN HUMILIATED OR EMOTIONALLY ABUSED IN OTHER WAYS BY YOUR PARTNER OR EX-PARTNER?: NO
DO YOU BELONG TO ANY CLUBS OR ORGANIZATIONS SUCH AS CHURCH GROUPS UNIONS, FRATERNAL OR ATHLETIC GROUPS, OR SCHOOL GROUPS?: NO
WITHIN THE LAST YEAR, HAVE YOU BEEN KICKED, HIT, SLAPPED, OR OTHERWISE PHYSICALLY HURT BY YOUR PARTNER OR EX-PARTNER?: NO
WITHIN THE LAST YEAR, HAVE YOU BEEN AFRAID OF YOUR PARTNER OR EX-PARTNER?: NO

## 2024-02-01 ASSESSMENT — PATIENT HEALTH QUESTIONNAIRE - PHQ9
SUM OF ALL RESPONSES TO PHQ QUESTIONS 1-9: 2
SUM OF ALL RESPONSES TO PHQ9 QUESTIONS 1 & 2: 2
2. FEELING DOWN, DEPRESSED OR HOPELESS: 1
SUM OF ALL RESPONSES TO PHQ QUESTIONS 1-9: 2
1. LITTLE INTEREST OR PLEASURE IN DOING THINGS: 1

## 2024-02-01 NOTE — TELEPHONE ENCOUNTER
Patient was seen today in the office and her daughter was told to do a COVID test which came back positive.  Will treat her with Paxlovid and she will stay in isolation and her daughters apartment for about 5 days and then when she returns to her assisted living facility she will stay in her room for another 5 days.  Daughter knows to take her to the emergency room if she begins having significant shortness of breath or chest pain.

## 2024-02-01 NOTE — PROGRESS NOTES
Saranya Duarte presents today for No chief complaint on file.          \"Have you been to the ER, urgent care clinic since your last visit?  Hospitalized since your last visit?\"    NO    “Have you seen or consulted any other health care providers outside of [facility] since your last visit?”    YES - When: approximately ? ago.  Where and Why: Dr. Dash [facility].     Dr. [facility]       
management as well as physical therapy.    Respiratory ROS: negative for - shortness of breath  Cardiovascular ROS: negative for - chest pain       Objective   BP (!) 166/79 (Site: Right Upper Arm, Position: Sitting, Cuff Size: Medium Adult)   Pulse 62   Temp 98.2 °F (36.8 °C) (Temporal)   Resp 18   Ht 1.549 m (5' 0.98\")   Wt 57.7 kg (127 lb 3.2 oz)   SpO2 100%   BMI 24.05 kg/m²   Chest - wheezing noted left lung base  Heart - normal rate, regular rhythm, normal S1, S2, no murmurs, rubs, clicks or gallops  Abdomen - soft, nontender, nondistended, no masses or organomegaly       Current Outpatient Medications   Medication Sig    azithromycin (ZITHROMAX) 250 MG tablet Take 1 tablet by mouth See Admin Instructions for 5 days 500mg on day 1 followed by 250mg on days 2 - 5    metoprolol tartrate (LOPRESSOR) 25 MG tablet TAKE 1 TABLET BY MOUTH TWO TIMES A DAY.    amLODIPine (NORVASC) 5 MG tablet TAKE 1 TABLET BY MOUTH EVERY DAY    olmesartan (BENICAR) 40 MG tablet Take 1 tablet by mouth daily    Multiple Vitamin (MULTIVITAMINS PO) Take 1 tablet by mouth daily    acetaminophen (TYLENOL) 650 MG extended release tablet Take 1 tablet by mouth in the morning and 1 tablet at noon and 1 tablet in the evening.    ascorbic acid (VITAMIN C) 500 MG tablet Take 2 tablets by mouth daily    aspirin 81 MG chewable tablet Take 1 tablet by mouth daily    Calcium Carbonate-Vitamin D 600-3.125 MG-MCG TABS Take 1 tablet by mouth daily    cycloSPORINE (RESTASIS) 0.05 % ophthalmic emulsion Apply 1 drop to eye in the morning and 1 drop in the evening.     No current facility-administered medications for this visit.                 An electronic signature was used to authenticate this note.    --SHUN PAGAN MD

## 2024-02-02 ENCOUNTER — TELEPHONE (OUTPATIENT)
Age: 89
End: 2024-02-02

## 2024-02-02 NOTE — TELEPHONE ENCOUNTER
----- Message from Dax Humphreys MD sent at 2/2/2024  8:48 AM EST -----  Please notify daughter  that CXR does not show pneumonia

## 2024-02-02 NOTE — TELEPHONE ENCOUNTER
She just needs the Paxlovid now that we know she has COVID. She can just hold on to the antibiotic for now.

## 2024-02-02 NOTE — TELEPHONE ENCOUNTER
Spoke with daughter Kristine, she wanted to confirm if patient should take Paxlovid and antibiotic.

## 2024-02-03 ENCOUNTER — HOME HEALTH ADMISSION (OUTPATIENT)
Age: 89
End: 2024-02-03
Payer: MEDICARE

## 2024-02-03 RX ORDER — OLMESARTAN MEDOXOMIL 20 MG/1
TABLET ORAL
Qty: 90 TABLET | Refills: 3 | Status: SHIPPED | OUTPATIENT
Start: 2024-02-03

## 2024-02-05 ENCOUNTER — HOME CARE VISIT (OUTPATIENT)
Age: 89
End: 2024-02-05

## 2024-02-13 ENCOUNTER — HOME CARE VISIT (OUTPATIENT)
Age: 89
End: 2024-02-13

## 2024-02-13 PROCEDURE — G0299 HHS/HOSPICE OF RN EA 15 MIN: HCPCS

## 2024-02-13 PROCEDURE — 0221000100 HH NO PAY CLAIM PROCEDURE

## 2024-02-14 ENCOUNTER — HOME CARE VISIT (OUTPATIENT)
Age: 89
End: 2024-02-14

## 2024-02-14 VITALS
OXYGEN SATURATION: 100 % | DIASTOLIC BLOOD PRESSURE: 76 MMHG | SYSTOLIC BLOOD PRESSURE: 118 MMHG | HEART RATE: 50 BPM | TEMPERATURE: 97.9 F | RESPIRATION RATE: 16 BRPM

## 2024-02-14 PROCEDURE — G0151 HHCP-SERV OF PT,EA 15 MIN: HCPCS

## 2024-02-14 NOTE — HOME HEALTH
PMHx:   Essential (primary) hypertension  Bronchitis  Acute cough  Pleuritic chest pain  ANDRE (dyspnea on exertion)  Dementia in other diseases classified elsewhere, unspecified severity, with other behavioral disturbance (HCC)  SUBJECTIVE:Pt states she feels generally weak and a lot of the time she doesn't feel she has the energy to get to the dining room for her meals so she decide not to eat. Denies any falls or medication changes  LIVING SITUATION: New to living in an ILF in a studio apartment with elevator access  REQUIRES CAREGIVER ASSISTANCE FOR: Daughter assists with groceries, transportation to appointments. ILF assist with meals and laundry and housekeeping  PLOF: Emil.   MEDICATIONS REVIEWED AND UPDATED: No changes noted  NEXT MD APPT: TBD  ROM:B LE's WNL  STRENGTH: B LE's 4-/5  WOUNDS:NA  BED MOBILITY:VC for proper mechanics/safety d/t pt crawling into the bed on all fours with a small step stool  TRANSFERS:Sup with shower transfers- grab bars and shower chair in place  GAIT: X40ft with rollator walker and sup.  Gait characterized by flexed posture/leaning on walker  STAIRS:NT  BALANCE: Pt scored 17/28 on Tinetti Balance Assessment placing patient at high risk for falls.   PATIENT EDUCATION PROVIDED THIS VISIT: safety, HEP, walking, deep breathing, safer step stool with handle to get in/out of bed       PATIENT RESPONSE TO EDUCATION: Pt verbalized understanding but will need ongoing reminders for compliance/carryover  HEP consisting of:  1. Walking every hour during the day with rollator walker   2. STS X5 reps 3-5X/day  3. Deep breathing   Written instructions issued.  Patient/caregiver verbalized understanding.   PATIENT RESPONSE TO TREATMENT: Pt reported being tired after assessment  CONTINUED NEED FOR THE FOLLOWING SKILLS: HH PT is medically necessary to address pain, decreased strength, impaired bed mobility, decreased independence with functional transfers, impaired gait, impaired stair negotiation,

## 2024-02-16 ENCOUNTER — HOME CARE VISIT (OUTPATIENT)
Age: 89
End: 2024-02-16

## 2024-02-16 VITALS
DIASTOLIC BLOOD PRESSURE: 64 MMHG | RESPIRATION RATE: 17 BRPM | OXYGEN SATURATION: 99 % | HEART RATE: 60 BPM | SYSTOLIC BLOOD PRESSURE: 120 MMHG | TEMPERATURE: 97.6 F

## 2024-02-16 PROCEDURE — G0157 HHC PT ASSISTANT EA 15: HCPCS

## 2024-02-16 NOTE — HOME HEALTH
Skilled services/Home bound verification: Dx: HTN     Skilled Reason for admission/summary of clinical condition:  Medication compliance and education for  changes in previous medications safety concerns  .  This patient is homebound for the following reasons Requires considerable and taxing effort to leave the home  and Requires the assistance of 1 or more persons to leave the home .    Discussed with patient and caregiver(s) availability and willingness to provide care.    Primary caregiver is daughters available daily and is able to assist with fill med box, transportation to MD appointment and managing finances    Secondary caregiver is staff at facility available 24/7 and is able to assist with bathing, dressing, meal prep and setup, grocery shopping and household chores  .    Medications reconciled and all medications are available in the home this visit.      The following education was provided regarding medications:  caregivers instructed to  to continue to give patient medications as prescribed. patient aware to monitor for effectiveness and to notify staff of any adverse reactions to medications/any changes to medication regimen.    Medications  are effective at this time.      High risk medication teaching regarding anticoagulants, antiplatelets, antibiotics, antipsychotics, hypoglycemic agents, or opioid/narcotics performed (specify): .Aspirin is in a group of drugs called salicylates. It works by reducing substances in the body that cause pain, fever, and inflammation.Avoid drinking alcohol while you are taking aspirin products. Alcohol may increase your risk of stomach bleeding. Side effects include joint pain, drowsiness, headache, upset stomach, mild heartburn, nausea, vomiting, or diarrhea.        MD notified of any discrepancies/look a like medications/medication interactions NA.     Home health supplies by type and quantity ordered/delivered this visit include: none required    Patient education

## 2024-02-17 NOTE — HOME HEALTH
SUBJECTIVE: Pt denied medication changes, falls, or trips to ER since last visit. Stated she wants to be able to walk w/o any AD. \"I'm just weak.\"    CAREGIVER INVOLVEMENT/ASSISTANCE NEEDED FOR: A normal inability to leave home exists and a taxing and substantial effort is required. Unable to leave the home w/o rollator and (A) for safety d/t fall risk. Pt requires assist from family for IADLs and transportation to MD appts.     OBJECTIVE: See interventions.    PATIENT EDUCATION PROVIDED THIS VISIT: See interventions.    PATIENT RESPONSE TO EDUCATION PROVIDED: Pt verbalized understanding of transfer/gait training, HEP, and safety ed. Will required further teaching for health maintenance and fall prevention.    PATIENT RESPONSE TO TREATMENT: No Inc pain or SOB. Required short rest breaks d/t fatigue. Vitals remained stable w/ activity.     ASSESSMENT OF PROGRESS TOWARD GOALS: Transfers w/ supervision requiring BUE assist for push off. Amb Inc distance before requiring rest break. Unable to safely amb w/o rollator and supervision d/t impaired gait/balance. Able to return demo of therex post verbal/visual cues for correct technique.     PLAN FOR NEXT VISIT: Standing therex and balance training.    THE FOLLOWING DISCHARGE PLANNING WAS DISCUSSED WITH THE PATIENT/CAREGIVER: Discharge to self/caregiver under supervision of MD once PT goals are met or maximum benefits achieved in the HH setting.

## 2024-02-20 ENCOUNTER — HOME CARE VISIT (OUTPATIENT)
Age: 89
End: 2024-02-20

## 2024-02-20 VITALS
DIASTOLIC BLOOD PRESSURE: 80 MMHG | SYSTOLIC BLOOD PRESSURE: 140 MMHG | OXYGEN SATURATION: 99 % | HEART RATE: 60 BPM | RESPIRATION RATE: 17 BRPM | TEMPERATURE: 97.8 F

## 2024-02-20 PROCEDURE — G0157 HHC PT ASSISTANT EA 15: HCPCS

## 2024-02-20 NOTE — HOME HEALTH
SUBJECTIVE: Pt denied medication changes, falls, or trips to ER since last visit.       CAREGIVER INVOLVEMENT/ASSISTANCE NEEDED FOR: A normal inability to leave home exists and a taxing and substantial effort is required. Unable to leave the home w/o rollator and (A) for safety d/t fall risk. Pt requires assist from family for IADLs and transportation to MD appts.     OBJECTIVE: See interventions.    PATIENT EDUCATION PROVIDED THIS VISIT: See interventions.    PATIENT RESPONSE TO EDUCATION PROVIDED: Pt verbalized understanding. Will need further teaching for safety to reduce fall risk during transfers and amb.    PATIENT RESPONSE TO TREATMENT: Tolerated tx well w/o c/o pain or SOB. Required short rest breaks d/t fatigue. SpO2 98%, HR 62 bpm.     ASSESSMENT OF PROGRESS TOWARD GOALS: Unable to stand w/o use of BUEs for push off. Min cues needed to perform standing therex w/ correct tech to maximize strength in BLEs to improve functional mobility. Required close SBA-CGA for fall prevention during balance training activities.     PLAN FOR NEXT VISIT: Gait/balance training.    THE FOLLOWING DISCHARGE PLANNING WAS DISCUSSED WITH THE PATIENT/CAREGIVER: Discharge to self/caregiver under supervision of MD once PT goals are met or maximum benefits achieved in the HH setting.

## 2024-02-22 ENCOUNTER — HOME CARE VISIT (OUTPATIENT)
Age: 89
End: 2024-02-22

## 2024-02-22 VITALS
DIASTOLIC BLOOD PRESSURE: 62 MMHG | OXYGEN SATURATION: 99 % | TEMPERATURE: 97.8 F | SYSTOLIC BLOOD PRESSURE: 130 MMHG | HEART RATE: 64 BPM | RESPIRATION RATE: 16 BRPM

## 2024-02-22 PROCEDURE — G0157 HHC PT ASSISTANT EA 15: HCPCS

## 2024-02-22 NOTE — HOME HEALTH
SUBJECTIVE: Pt denied medication changes, falls, or trips to ER since last visit. \"I don't know what's going on today. I just feel like I can't get myself going to exercise.\"    CAREGIVER INVOLVEMENT/ASSISTANCE NEEDED FOR: A normal inability to leave home exists and a taxing and substantial effort is required. Unable to leave the home w/o rollator and (A) for safety d/t fall risk. Pt requires assist from family for IADLs and transportation to MD appts.     OBJECTIVE: See interventions.    PATIENT EDUCATION PROVIDED THIS VISIT: See interventions.    PATIENT RESPONSE TO EDUCATION PROVIDED: Pt verbalized understanding. Reinforcement needed to avoid WB into elbows on rollator. Partial teach-back of HEP.    PATIENT RESPONSE TO TREATMENT: Tolerated tx well w/o c/o pain, LOB or SOB. Fatigued but vitals remained stable and WNL.    ASSESSMENT OF PROGRESS TOWARD GOALS: Progressing towards PT goals. Amb Inc distance before requiring seated rest break, but only safe to do so using rollator w/ distant SBA-supervision. Remains high fall risk as evidenced by TU.3\" (using rollator).     PLAN FOR NEXT VISIT: Cont per POC for gait/balance training.    THE FOLLOWING DISCHARGE PLANNING WAS DISCUSSED WITH THE PATIENT/CAREGIVER: Discharge to self/caregiver under supervision of MD once PT goals are met or maximum benefits achieved in the HH setting.    -Laptop battery  during visit. Unable to obtain signature. Review PPV via [place] to confirm visit.

## 2024-02-24 ENCOUNTER — HOME CARE VISIT (OUTPATIENT)
Age: 89
End: 2024-02-24
Payer: MEDICARE

## 2024-02-28 ENCOUNTER — HOME CARE VISIT (OUTPATIENT)
Age: 89
End: 2024-02-28
Payer: MEDICARE

## 2024-02-28 VITALS
OXYGEN SATURATION: 97 % | DIASTOLIC BLOOD PRESSURE: 70 MMHG | SYSTOLIC BLOOD PRESSURE: 140 MMHG | TEMPERATURE: 97.6 F | HEART RATE: 58 BPM

## 2024-02-28 PROCEDURE — G0157 HHC PT ASSISTANT EA 15: HCPCS

## 2024-02-28 NOTE — HOME HEALTH
SUBJECTIVE: Pt denied medication changes, falls, or trips to ER since last visit. \"My hips are tired. I've been walking.\"     CAREGIVER INVOLVEMENT/ASSISTANCE NEEDED FOR: A normal inability to leave home exists and a taxing and substantial effort is required. Unable to leave the home w/o rollator and (A) for safety d/t fall risk. Pt requires assist from family for IADLs and transportation to MD appts.     OBJECTIVE: See interventions.    PATIENT EDUCATION PROVIDED THIS VISIT: See interventions.    PATIENT RESPONSE TO EDUCATION PROVIDED: Pt verbalized understanding. Improved safety w/ rollator by locking brakes prior to transfers. Needs further gait/balance training to improve techniques during amb to reduce risk of falls.    PATIENT RESPONSE TO TREATMENT: Tolerated tx well w/o c/o pain, LOB or SOB. Fatigued but vitals remained stable and WNL.    ASSESSMENT OF PROGRESS TOWARD GOALS: Progressing towards remaining PT goals. Completed 5xSTS in 30\" requiring BUE assist. Was able to perform floor transfer w/ SBA and min cueing for hand placement/sequencing. Requires rollator and supervision to safely amb. Progressing towards Inc amb distance before needing to sit (400'). Reminders w/ verbal cues required to improve posture and gait mechanics to reduce fall risk.    PLAN FOR NEXT VISIT: Cont per POC for gait/balance training.    THE FOLLOWING DISCHARGE PLANNING WAS DISCUSSED WITH THE PATIENT/CAREGIVER: Discharge to self/caregiver under supervision of MD once PT goals are met or maximum benefits achieved in the HH setting.

## 2024-03-01 ENCOUNTER — HOME CARE VISIT (OUTPATIENT)
Age: 89
End: 2024-03-01
Payer: MEDICARE

## 2024-03-01 VITALS
OXYGEN SATURATION: 97 % | TEMPERATURE: 97.6 F | RESPIRATION RATE: 16 BRPM | DIASTOLIC BLOOD PRESSURE: 70 MMHG | HEART RATE: 56 BPM | SYSTOLIC BLOOD PRESSURE: 140 MMHG

## 2024-03-01 VITALS
TEMPERATURE: 97.7 F | DIASTOLIC BLOOD PRESSURE: 82 MMHG | RESPIRATION RATE: 20 BRPM | SYSTOLIC BLOOD PRESSURE: 142 MMHG | HEART RATE: 61 BPM | OXYGEN SATURATION: 99 %

## 2024-03-01 PROCEDURE — G0299 HHS/HOSPICE OF RN EA 15 MIN: HCPCS

## 2024-03-01 PROCEDURE — G0157 HHC PT ASSISTANT EA 15: HCPCS

## 2024-03-01 ASSESSMENT — ENCOUNTER SYMPTOMS: HEMOPTYSIS: 0

## 2024-03-01 NOTE — HOME HEALTH
Skilled reason for visit: Disease med management for HTN/ SN discipline discharge     Caregiver involvement: Primary caregiver is daughters available daily and is able to assist with fill med box, transportation to MD appointment and managing finances    Secondary caregiver is staff at facility available 24/7 and is able to assist with bathing, dressing, meal prep and setup, grocery shopping and household chores.    Medications reviewed and all medications are available in the home this visit.    The following education was provided regarding medications:   patient to continue to take medications as prescribed. patient aware to monitor for effectiveness and to notify staff of any adverse reactions to medications/any changes to medication regimen.  .    MD notified of any discrepancies/look a-like medications/medication interactions: no discrepancies  Medications are effective at this time.      Home health supplies by type and quantity ordered/delivered this visit include: none required     Patient education provided this visit: SN instructed patient in energy conservation techniques that are ways to modify activities to prevent exhaustion, also explain that using these strategies to do the things you have to do may help you to have energy left over to do the things you want to do.        Sharps education provided: na    Patient level of understanding of education provided:  pt/caregiver verbalized understanding of all education provided this visit;repeated education back         Patient response to procedure performed:  tolerated final assessment well    Agency Progress toward goals: goals met     Patient's Progress towards personal goals: goals met     Home exercise program: follow up     Continued need for the following skills: Physical Therapy.    Plan for next visit: na    Patient and/or caregiver notified and agrees to changes in the Plan of Care: Yes.     The following discharge planning was discussed with

## 2024-03-01 NOTE — HOME HEALTH
SUBJECTIVE: Pt denied medication changes, falls, or trips to ER since last visit. \"I want to be able to walk outside to get some fresh air or go to my daughter's house.\"    CAREGIVER INVOLVEMENT/ASSISTANCE NEEDED FOR: A normal inability to leave home exists and a taxing and substantial effort is required. Unable to leave the home w/o rollator and (A) for safety d/t fall risk. Pt requires assist from family for IADLs and transportation to MD appts.     OBJECTIVE: See interventions.    PATIENT EDUCATION PROVIDED THIS VISIT: See interventions.    PATIENT RESPONSE TO EDUCATION PROVIDED: Pt verbalized understanding, but needs reinforcement of fall prevention strategies.    PATIENT RESPONSE TO TREATMENT: Tolerated tx well w/o c/o pain, LOB or SOB. Vitals remained stable w/ activity. Required seated rest breaks between activities for pacing.    ASSESSMENT OF PROGRESS TOWARD GOALS: Good progress towards goals. Amb up to 800' using rollator, but only safe to do so with supervision. Sit<>stand transfers w/ supervsion (previously SBA). CGA needed for fall prevention during dynamic balance training.     PLAN FOR NEXT VISIT: SUP visit w/ PT Edi.    THE FOLLOWING DISCHARGE PLANNING WAS DISCUSSED WITH THE PATIENT/CAREGIVER: Discharge to self/caregiver under supervision of MD once PT goals are met or maximum benefits achieved in the HH setting.

## 2024-03-04 ENCOUNTER — HOME CARE VISIT (OUTPATIENT)
Age: 89
End: 2024-03-04
Payer: MEDICARE

## 2024-03-04 VITALS
SYSTOLIC BLOOD PRESSURE: 130 MMHG | DIASTOLIC BLOOD PRESSURE: 64 MMHG | RESPIRATION RATE: 16 BRPM | HEART RATE: 54 BPM | OXYGEN SATURATION: 98 % | TEMPERATURE: 98 F

## 2024-03-04 PROCEDURE — G0151 HHCP-SERV OF PT,EA 15 MIN: HCPCS

## 2024-03-04 ASSESSMENT — ENCOUNTER SYMPTOMS: DYSPNEA ACTIVITY LEVEL: AFTER AMBULATING MORE THAN 20 FT

## 2024-03-04 NOTE — HOME HEALTH
SUBJECTIVE:Pt states she is tired today because she's been doing her walking as instructed. denies any falls or medication changes/confirmed by daughter.  CAREGIVER ASSISTANCE NEEDED FOR: ILF staff assists with meals and medications. Daughter assists with groceries and transportation to appointments  MEDICATIONS REVIEWED AND UPDATED: no changes noted  WOUNDS:NA  ROM: B LE's WNL  BED MOBILITY: independent which is improved from SBA/VC at initial evaluation.     TRANSFERS:Emil/Sup with STS. Improved from Sup at initial evaluation.   GAIT TRAINING performed in order to reduce gait impairments and reduce the risk for falls:  Tinetti improved from 17/20 to 20/28 decreasing her fall risk to medium.   BALANCE: No significant improvement in TUG score and pt remains high fall risk per 39.4 secs.     PATIENT/CAREGIVER EDUCATION PROVIDED THIS VISIT: Instructed pt to continue with her walking program to improve her endurance and ADL tolerance   RESPONSE TO EDUCATION PROVIDED: Pt verbalized understanding  RESPONSE TO TREATMETN PROVIDED: No c/o of pain and minimal c/o fatigue after STS test  HEP consisting of:  Seated/standing ther ex with pictures taped on wall and walking program  ASSESSMENT AND PROGRESS TOWARD GOALS:  Patient is progressing well toward goals and has not met goals for ambulation distance to dining ramirez and back for meals.  Patient continues to have deficits in endurance and would benefit from continued physical therapy with progression of gait training over multiple surfaces to improve safe mobility throughout ILF.  PLAN: COn't with general strengthening and endurance training progressing distances with gait training to improve safe mobility throughout ILF progressing to DC over next 2 weeks    DISCHARGE PLANNING DISCUSSED: Discharge to self and family under MD supervision once all goals have been met or patient has reached maximum potential.

## 2024-03-06 ENCOUNTER — HOME CARE VISIT (OUTPATIENT)
Age: 89
End: 2024-03-06
Payer: MEDICARE

## 2024-03-06 VITALS
RESPIRATION RATE: 16 BRPM | OXYGEN SATURATION: 98 % | TEMPERATURE: 97.5 F | DIASTOLIC BLOOD PRESSURE: 64 MMHG | HEART RATE: 58 BPM | SYSTOLIC BLOOD PRESSURE: 120 MMHG

## 2024-03-06 PROCEDURE — G0157 HHC PT ASSISTANT EA 15: HCPCS

## 2024-03-07 ENCOUNTER — HOME CARE VISIT (OUTPATIENT)
Age: 89
End: 2024-03-07
Payer: MEDICARE

## 2024-03-07 NOTE — HOME HEALTH
SUBJECTIVE: Pt denied medication changes, falls, or trips to ER since last visit. No new complaints.    CAREGIVER INVOLVEMENT/ASSISTANCE NEEDED FOR: A normal inability to leave home exists and a taxing and substantial effort is required. Unable to leave the home w/o rollator and (A) for safety d/t fall risk. Pt requires assist from family for IADLs and transportation to MD appts.     OBJECTIVE: See interventions.    PATIENT EDUCATION PROVIDED THIS VISIT: See interventions.    PATIENT RESPONSE TO EDUCATION PROVIDED: Pt verbalized understanding. Reminders needed to lock AD brakes prior to     PATIENT RESPONSE TO TREATMENT: Tolerated tx well w/o adverse effects. Vitals remained stable w/ activity.    ASSESSMENT OF PROGRESS TOWARD GOALS: Steady progress towards remaining PT goals. Transfers mod I using BUEs. Amb x1000' indoors over level surfaces requiring rollator and distant supervision. Reinforcement needed to implement strategies to improve gait mechanics/posture to reduce risk of falling. Required close SBA-CGA for fall prevention during dynamic balance training.     PLAN FOR NEXT VISIT: Cont per POC for balance training.    THE FOLLOWING DISCHARGE PLANNING WAS DISCUSSED WITH THE PATIENT/CAREGIVER: Discussed plan for upcoming PT discharge and procedure. NOMNC provided, pt in agreement, signed, uploaded to chart, and pt provided copy. Anticipated D/C date for 3/14/24 w/ PT Edi. Called and spoke w/ pt's dtr (Kristine) re: pt's progress and plan for D/C next week.

## 2024-03-11 ENCOUNTER — HOME CARE VISIT (OUTPATIENT)
Age: 89
End: 2024-03-11
Payer: MEDICARE

## 2024-03-11 VITALS
SYSTOLIC BLOOD PRESSURE: 140 MMHG | DIASTOLIC BLOOD PRESSURE: 70 MMHG | TEMPERATURE: 97.5 F | OXYGEN SATURATION: 98 % | HEART RATE: 56 BPM | RESPIRATION RATE: 17 BRPM

## 2024-03-11 PROCEDURE — G0157 HHC PT ASSISTANT EA 15: HCPCS

## 2024-03-11 NOTE — HOME HEALTH
SUBJECTIVE: Pt denied medication changes, falls, or trips to ER since last visit. No new complaints.    CAREGIVER INVOLVEMENT/ASSISTANCE NEEDED FOR: A normal inability to leave home exists and a taxing and substantial effort is required. Unable to leave the home w/o rollator and (A) for safety d/t fall risk. Pt requires assist from family for IADLs and transportation to MD appts.     OBJECTIVE: See interventions.    PATIENT EDUCATION PROVIDED THIS VISIT: See interventions.    PATIENT RESPONSE TO EDUCATION PROVIDED: Pt verbalized understanding.  Reinforcement needed to lock AD brakes prior to transfers d/t cognitive deficit.    PATIENT RESPONSE TO TREATMENT: Tolerated tx well w/o c/o pain or SOB. Vitals remained stable post exertion.    **Assessment and Summary of Care:  Patient's current functional status before discharge is as follows  Strength: not tested  ROM: BLEs WFL  Bed Mobility: Mod I  Transfers: Mod I requiring BUE assist  Gait/WC mobility: amb >1200' using rollator w/ supervision  Stairs: N/A (uses elevator)  Special Tests:   5xSTS: 17.8 seconds using BUEs (not tested at eval)  TU.1 seconds w/ rollator (not tested at eval)  Tinetti:  ( at eval)  Recommendations: Pt has met current PT goals, returned to baseline and no longer has skilled need therefore appropriate for D/C next visit.    PLAN FOR NEXT VISIT: Agency D/C w/ PT Renetta.    THE FOLLOWING DISCHARGE PLANNING WAS DISCUSSED WITH THE PATIENT/CAREGIVER: Upcoming D/C to self/family under MD supervision next visit.

## 2024-03-13 ENCOUNTER — HOSPITAL ENCOUNTER (OUTPATIENT)
Facility: HOSPITAL | Age: 89
Setting detail: SPECIMEN
Discharge: HOME | End: 2024-03-16
Payer: MEDICARE

## 2024-03-13 DIAGNOSIS — E78.2 MIXED HYPERLIPIDEMIA: ICD-10-CM

## 2024-03-13 DIAGNOSIS — E11.22 TYPE 2 DIABETES MELLITUS WITH STAGE 3B CHRONIC KIDNEY DISEASE, WITHOUT LONG-TERM CURRENT USE OF INSULIN (HCC): ICD-10-CM

## 2024-03-13 DIAGNOSIS — N18.32 TYPE 2 DIABETES MELLITUS WITH STAGE 3B CHRONIC KIDNEY DISEASE, WITHOUT LONG-TERM CURRENT USE OF INSULIN (HCC): ICD-10-CM

## 2024-03-13 DIAGNOSIS — I10 ESSENTIAL (PRIMARY) HYPERTENSION: ICD-10-CM

## 2024-03-13 LAB
ALBUMIN SERPL-MCNC: 3.5 G/DL (ref 3.4–5)
ALBUMIN/GLOB SERPL: 1.1 (ref 0.8–1.7)
ALP SERPL-CCNC: 79 U/L (ref 45–117)
ALT SERPL-CCNC: 19 U/L (ref 13–56)
ANION GAP SERPL CALC-SCNC: 7 MMOL/L (ref 3–18)
AST SERPL-CCNC: 21 U/L (ref 10–38)
BILIRUB SERPL-MCNC: 0.5 MG/DL (ref 0.2–1)
BUN SERPL-MCNC: 21 MG/DL (ref 7–18)
BUN/CREAT SERPL: 16 (ref 12–20)
CALCIUM SERPL-MCNC: 9.5 MG/DL (ref 8.5–10.1)
CHLORIDE SERPL-SCNC: 110 MMOL/L (ref 100–111)
CHOLEST SERPL-MCNC: 290 MG/DL
CO2 SERPL-SCNC: 27 MMOL/L (ref 21–32)
CREAT SERPL-MCNC: 1.28 MG/DL (ref 0.6–1.3)
EST. AVERAGE GLUCOSE BLD GHB EST-MCNC: 146 MG/DL
GLOBULIN SER CALC-MCNC: 3.1 G/DL (ref 2–4)
GLUCOSE SERPL-MCNC: 138 MG/DL (ref 74–99)
HBA1C MFR BLD: 6.7 % (ref 4.2–5.6)
HDLC SERPL-MCNC: 70 MG/DL (ref 40–60)
HDLC SERPL: 4.1 (ref 0–5)
LDLC SERPL CALC-MCNC: 190.6 MG/DL (ref 0–100)
LIPID PANEL: ABNORMAL
POTASSIUM SERPL-SCNC: 4.7 MMOL/L (ref 3.5–5.5)
PROT SERPL-MCNC: 6.6 G/DL (ref 6.4–8.2)
SODIUM SERPL-SCNC: 144 MMOL/L (ref 136–145)
TRIGL SERPL-MCNC: 147 MG/DL
VLDLC SERPL CALC-MCNC: 29.4 MG/DL

## 2024-03-13 PROCEDURE — 80061 LIPID PANEL: CPT

## 2024-03-13 PROCEDURE — 36415 COLL VENOUS BLD VENIPUNCTURE: CPT

## 2024-03-13 PROCEDURE — 80053 COMPREHEN METABOLIC PANEL: CPT

## 2024-03-13 PROCEDURE — 83036 HEMOGLOBIN GLYCOSYLATED A1C: CPT

## 2024-03-14 ENCOUNTER — HOME CARE VISIT (OUTPATIENT)
Age: 89
End: 2024-03-14
Payer: MEDICARE

## 2024-03-14 PROCEDURE — G0151 HHCP-SERV OF PT,EA 15 MIN: HCPCS

## 2024-03-15 VITALS
OXYGEN SATURATION: 98 % | DIASTOLIC BLOOD PRESSURE: 72 MMHG | HEART RATE: 50 BPM | SYSTOLIC BLOOD PRESSURE: 130 MMHG | RESPIRATION RATE: 17 BRPM | TEMPERATURE: 97.4 F

## 2024-03-15 ASSESSMENT — ENCOUNTER SYMPTOMS: DYSPNEA ACTIVITY LEVEL: AFTER AMBULATING MORE THAN 20 FT

## 2024-03-15 NOTE — HOME HEALTH
SUBJECTIVE: Pt states she is doing okay. States she has been doing her walking as instructed and just wished she felt stronger.  CAREGIVER INVOLVEMENT/ASSISTANCE NEEDED FOR: Pts family assists with transportation to appointments  HOME HEALTH SUPPLIES BY TYPE AND QUANTITY ORDERED/DELIVERED THIS VISIT INCLUDE: none  OBJECTIVE:  See interventions.  MEDICATIONS: Medications reconciled and all medications are available in the home this visit.  The following education was provided regarding medications, medication interactions, and look a like medications: Continue medication as prescribed by MD. Medications are effective at this time.  PATIENT RESPONSE TO TREATMENT:  No c/o pain or fatigue throughout assessement  PATIENT LEVEL OF UNDERSTANDING OF EDUCATION PROVIDED: Pt verbalized understanding of discharge. Pt verbalized understanding of importance of compliance with her walking program and HEP to maintain her current function. Pt able to return demonstration of HEP with help of her hadnouts taped to her wall.   ASSESSMENT OF PROGRESS TOWARD GOALS: Pt has met all established goals. Pt is ambulating independently with her rollator walker throughout her ILF. Pt is able to sfely get to/from dining room. Pt is Emil with her ADL's. Pt functional scores have improved decreasing her fall risk. TUG improved to 24.7 secs, Tinetti imptoved to 21/28 and her modified STS improved to 22.3 secs. No further skill needed at this time.   THE FOLLOWING DISCHARGE PLANNING WAS DISCUSSED WITH THE PATIENT/CAREGIVER: DC to family under MD supervision goals met.

## 2024-03-20 ENCOUNTER — TELEPHONE (OUTPATIENT)
Age: 89
End: 2024-03-20

## 2024-03-20 NOTE — TELEPHONE ENCOUNTER
Pt is currently having HH for physical therapy   Danielle -rehab director at [facility] stating pt's family is wanting to increase the amount of days for physical therapy and also add speech therapy and occupational therapy for patient ,  Please advise   Danielle [facility] - [phone]      Danielle is aware Dr Humphreys is out until 03/24

## 2024-04-01 ENCOUNTER — HOSPITAL ENCOUNTER (OUTPATIENT)
Facility: HOSPITAL | Age: 89
Setting detail: SPECIMEN
Discharge: HOME | End: 2024-04-04
Payer: MEDICARE

## 2024-04-01 DIAGNOSIS — N18.32 TYPE 2 DIABETES MELLITUS WITH STAGE 3B CHRONIC KIDNEY DISEASE, WITHOUT LONG-TERM CURRENT USE OF INSULIN (HCC): ICD-10-CM

## 2024-04-01 DIAGNOSIS — E11.22 TYPE 2 DIABETES MELLITUS WITH STAGE 3B CHRONIC KIDNEY DISEASE, WITHOUT LONG-TERM CURRENT USE OF INSULIN (HCC): ICD-10-CM

## 2024-04-01 LAB
CREAT UR-MCNC: 200 MG/DL (ref 30–125)
MICROALBUMIN UR-MCNC: 52.7 MG/DL (ref 0–3)
MICROALBUMIN/CREAT UR-RTO: 264 MG/G (ref 0–30)

## 2024-04-01 PROCEDURE — 82570 ASSAY OF URINE CREATININE: CPT

## 2024-04-01 PROCEDURE — 82043 UR ALBUMIN QUANTITATIVE: CPT

## 2024-05-13 ENCOUNTER — OFFICE VISIT (OUTPATIENT)
Age: 89
End: 2024-05-13
Payer: MEDICARE

## 2024-05-13 VITALS
HEART RATE: 50 BPM | WEIGHT: 137 LBS | OXYGEN SATURATION: 99 % | SYSTOLIC BLOOD PRESSURE: 138 MMHG | HEIGHT: 60 IN | BODY MASS INDEX: 26.9 KG/M2 | DIASTOLIC BLOOD PRESSURE: 74 MMHG

## 2024-05-13 DIAGNOSIS — I11.0 HYPERTENSIVE HEART DISEASE WITH HEART FAILURE (HCC): ICD-10-CM

## 2024-05-13 DIAGNOSIS — I50.32 CHRONIC DIASTOLIC (CONGESTIVE) HEART FAILURE (HCC): ICD-10-CM

## 2024-05-13 DIAGNOSIS — I10 ESSENTIAL (PRIMARY) HYPERTENSION: ICD-10-CM

## 2024-05-13 DIAGNOSIS — R00.2 PALPITATIONS: Primary | ICD-10-CM

## 2024-05-13 PROCEDURE — 93000 ELECTROCARDIOGRAM COMPLETE: CPT | Performed by: NURSE PRACTITIONER

## 2024-05-13 PROCEDURE — 1036F TOBACCO NON-USER: CPT | Performed by: NURSE PRACTITIONER

## 2024-05-13 PROCEDURE — 1123F ACP DISCUSS/DSCN MKR DOCD: CPT | Performed by: NURSE PRACTITIONER

## 2024-05-13 PROCEDURE — 1090F PRES/ABSN URINE INCON ASSESS: CPT | Performed by: NURSE PRACTITIONER

## 2024-05-13 PROCEDURE — G8417 CALC BMI ABV UP PARAM F/U: HCPCS | Performed by: NURSE PRACTITIONER

## 2024-05-13 PROCEDURE — G8427 DOCREV CUR MEDS BY ELIG CLIN: HCPCS | Performed by: NURSE PRACTITIONER

## 2024-05-13 PROCEDURE — 99214 OFFICE O/P EST MOD 30 MIN: CPT | Performed by: NURSE PRACTITIONER

## 2024-05-13 ASSESSMENT — ENCOUNTER SYMPTOMS
NAUSEA: 0
CONSTIPATION: 0
DIARRHEA: 0
ABDOMINAL DISTENTION: 0
VOMITING: 0
SHORTNESS OF BREATH: 1
WHEEZING: 0
COUGH: 0

## 2024-05-13 ASSESSMENT — PATIENT HEALTH QUESTIONNAIRE - PHQ9
SUM OF ALL RESPONSES TO PHQ QUESTIONS 1-9: 0
SUM OF ALL RESPONSES TO PHQ9 QUESTIONS 1 & 2: 0
SUM OF ALL RESPONSES TO PHQ QUESTIONS 1-9: 0
1. LITTLE INTEREST OR PLEASURE IN DOING THINGS: NOT AT ALL
2. FEELING DOWN, DEPRESSED OR HOPELESS: NOT AT ALL
SUM OF ALL RESPONSES TO PHQ QUESTIONS 1-9: 0
SUM OF ALL RESPONSES TO PHQ QUESTIONS 1-9: 0

## 2024-05-13 NOTE — PROGRESS NOTES
Saranya Teixeirar presents today for   Chief Complaint   Patient presents with    Follow-up     6 months    Dizziness    Shortness of Breath       Saranya Duarte preferred language for health care discussion is english/other.    Is someone accompanying this pt? no    Is the patient using any DME equipment during OV? no    Depression Screening:  Depression: Not at risk (5/13/2024)    PHQ-2     PHQ-2 Score: 0        Learning Assessment:  Who is the primary learner? Patient    What is the preferred language for health care of the primary learner? ENGLISH    How does the primary learner prefer to learn new concepts? DEMONSTRATION    Answered By patient    Relationship to Learner SELF           Pt currently taking Anticoagulant therapy? no    Pt currently taking Antiplatelet therapy ? aspirin      Coordination of Care:  1. Have you been to the ER, urgent care clinic since your last visit? Hospitalized since your last visit? no    2. Have you seen or consulted any other health care providers outside of the [facility] since your last visit? Include any pap smears or colon screening. no    
Occasional PACs.  One 3-beat run of nonsustained SVT.  Very few PVCs.  One 3-beat run of nonsustained VT.  Pt's HR was < 50 bpm for 8 hrs of the recorded time.  No pauses noted.  Two episodes of neck pain corresponded to sinus rhythm w/o ectopy.    HTN (hypertension) 3/15/2010    Hypertensive heart disease     Loss of appetite     Osteopenia 7/13/2010    Other dyspnea and respiratory abnormality     Renal artery stenosis (HCC) 12/03/2012    No renal artery stenosis bilaterally.  Bilateral intrinsic/med disease.  Patent bilateral renal veins.  Multiple cysts on both kidneys.    Ringing in the ears     S/P cardiac cath 10/21/2010    dLM 20%.  mLAD 25%.  CX mild.  pRCA 30%.  EF 30-35%.  Anterolateral, apical , diaphrag akin.  Hypercontractile basal segments.  Severe elev left-sided filling press.    Stress-induced cardiomyopathy     EF 35% (LV angio 10/2010), then EF 50-55% (echo, Feb 2011)    Wears glasses        PSH:  Past Surgical History:   Procedure Laterality Date    BLADDER SUSPENSION  2009    HYSTERECTOMY (CERVIX STATUS UNKNOWN)  1962       MEDS:  Current Outpatient Medications   Medication Sig Dispense Refill    olmesartan (BENICAR) 20 MG tablet TAKE 1 TABLET BY MOUTH EVERY DAY 90 tablet 3    metoprolol tartrate (LOPRESSOR) 25 MG tablet TAKE 1 TABLET BY MOUTH TWO TIMES A DAY. 180 tablet 3    amLODIPine (NORVASC) 5 MG tablet TAKE 1 TABLET BY MOUTH EVERY DAY 90 tablet 3    Multiple Vitamin (MULTIVITAMINS PO) Take 1 tablet by mouth daily      acetaminophen (TYLENOL) 650 MG extended release tablet Take 1 tablet by mouth in the morning and 1 tablet at noon and 1 tablet in the evening.      ascorbic acid (VITAMIN C) 500 MG tablet Take 2 tablets by mouth daily      aspirin 81 MG chewable tablet Take 1 tablet by mouth daily      Calcium Carbonate-Vitamin D 600-3.125 MG-MCG TABS Take 1 tablet by mouth daily      cycloSPORINE (RESTASIS) 0.05 % ophthalmic emulsion Apply 1 drop to eye in the morning and 1 drop in the

## 2024-07-25 ENCOUNTER — HOSPITAL ENCOUNTER (OUTPATIENT)
Facility: HOSPITAL | Age: 89
Setting detail: SPECIMEN
Discharge: HOME | End: 2024-07-25
Payer: MEDICARE

## 2024-07-25 ENCOUNTER — LAB (OUTPATIENT)
Facility: CLINIC | Age: 89
End: 2024-07-25

## 2024-07-25 DIAGNOSIS — E11.22 TYPE 2 DIABETES MELLITUS WITH STAGE 3B CHRONIC KIDNEY DISEASE, WITHOUT LONG-TERM CURRENT USE OF INSULIN (HCC): ICD-10-CM

## 2024-07-25 DIAGNOSIS — E78.2 MIXED HYPERLIPIDEMIA: ICD-10-CM

## 2024-07-25 DIAGNOSIS — I10 ESSENTIAL (PRIMARY) HYPERTENSION: ICD-10-CM

## 2024-07-25 DIAGNOSIS — N18.32 TYPE 2 DIABETES MELLITUS WITH STAGE 3B CHRONIC KIDNEY DISEASE, WITHOUT LONG-TERM CURRENT USE OF INSULIN (HCC): ICD-10-CM

## 2024-07-25 DIAGNOSIS — N18.32 TYPE 2 DIABETES MELLITUS WITH STAGE 3B CHRONIC KIDNEY DISEASE, WITHOUT LONG-TERM CURRENT USE OF INSULIN (HCC): Primary | ICD-10-CM

## 2024-07-25 DIAGNOSIS — E11.22 TYPE 2 DIABETES MELLITUS WITH STAGE 3B CHRONIC KIDNEY DISEASE, WITHOUT LONG-TERM CURRENT USE OF INSULIN (HCC): Primary | ICD-10-CM

## 2024-07-25 LAB
ALBUMIN SERPL-MCNC: 3.8 G/DL (ref 3.4–5)
ALBUMIN/GLOB SERPL: 1.4 (ref 0.8–1.7)
ALP SERPL-CCNC: 71 U/L (ref 45–117)
ALT SERPL-CCNC: 20 U/L (ref 13–56)
ANION GAP SERPL CALC-SCNC: 4 MMOL/L (ref 3–18)
AST SERPL-CCNC: 25 U/L (ref 10–38)
BILIRUB SERPL-MCNC: 0.6 MG/DL (ref 0.2–1)
BUN SERPL-MCNC: 24 MG/DL (ref 7–18)
BUN/CREAT SERPL: 20 (ref 12–20)
CALCIUM SERPL-MCNC: 9.5 MG/DL (ref 8.5–10.1)
CHLORIDE SERPL-SCNC: 110 MMOL/L (ref 100–111)
CHOLEST SERPL-MCNC: 317 MG/DL
CO2 SERPL-SCNC: 26 MMOL/L (ref 21–32)
CREAT SERPL-MCNC: 1.19 MG/DL (ref 0.6–1.3)
EST. AVERAGE GLUCOSE BLD GHB EST-MCNC: 134 MG/DL
GLOBULIN SER CALC-MCNC: 2.7 G/DL (ref 2–4)
GLUCOSE SERPL-MCNC: 133 MG/DL (ref 74–99)
HBA1C MFR BLD: 6.3 % (ref 4.2–5.6)
HDLC SERPL-MCNC: 71 MG/DL (ref 40–60)
HDLC SERPL: 4.5 (ref 0–5)
LDLC SERPL CALC-MCNC: 200.4 MG/DL (ref 0–100)
LIPID PANEL: ABNORMAL
POTASSIUM SERPL-SCNC: 4.6 MMOL/L (ref 3.5–5.5)
PROT SERPL-MCNC: 6.5 G/DL (ref 6.4–8.2)
SODIUM SERPL-SCNC: 140 MMOL/L (ref 136–145)
TRIGL SERPL-MCNC: 228 MG/DL
VLDLC SERPL CALC-MCNC: 45.6 MG/DL

## 2024-07-25 PROCEDURE — 80053 COMPREHEN METABOLIC PANEL: CPT

## 2024-07-25 PROCEDURE — 80061 LIPID PANEL: CPT

## 2024-07-25 PROCEDURE — 36415 COLL VENOUS BLD VENIPUNCTURE: CPT

## 2024-07-25 PROCEDURE — 83036 HEMOGLOBIN GLYCOSYLATED A1C: CPT

## 2024-07-29 RX ORDER — AMLODIPINE BESYLATE 5 MG/1
TABLET ORAL
Qty: 90 TABLET | Refills: 3 | Status: SHIPPED | OUTPATIENT
Start: 2024-07-29

## 2024-08-01 ENCOUNTER — OFFICE VISIT (OUTPATIENT)
Facility: CLINIC | Age: 89
End: 2024-08-01

## 2024-08-01 VITALS
TEMPERATURE: 97.9 F | BODY MASS INDEX: 24.74 KG/M2 | RESPIRATION RATE: 20 BRPM | DIASTOLIC BLOOD PRESSURE: 66 MMHG | HEART RATE: 58 BPM | HEIGHT: 60 IN | OXYGEN SATURATION: 97 % | WEIGHT: 126 LBS | SYSTOLIC BLOOD PRESSURE: 155 MMHG

## 2024-08-01 DIAGNOSIS — N18.32 TYPE 2 DIABETES MELLITUS WITH STAGE 3B CHRONIC KIDNEY DISEASE, WITHOUT LONG-TERM CURRENT USE OF INSULIN (HCC): Primary | ICD-10-CM

## 2024-08-01 DIAGNOSIS — E11.22 TYPE 2 DIABETES MELLITUS WITH STAGE 3B CHRONIC KIDNEY DISEASE, WITHOUT LONG-TERM CURRENT USE OF INSULIN (HCC): Primary | ICD-10-CM

## 2024-08-01 DIAGNOSIS — E78.2 MIXED HYPERLIPIDEMIA: ICD-10-CM

## 2024-08-01 DIAGNOSIS — I10 ESSENTIAL (PRIMARY) HYPERTENSION: ICD-10-CM

## 2024-08-01 DIAGNOSIS — I50.32 CHRONIC DIASTOLIC CONGESTIVE HEART FAILURE (HCC): ICD-10-CM

## 2024-08-01 NOTE — PROGRESS NOTES
Saranya Duarte presents today for   Chief Complaint   Patient presents with    Cholesterol Problem    Hypertension    Diabetes     4 month follow up     Rash     All over body            \"Have you been to the ER, urgent care clinic since your last visit?  Hospitalized since your last visit?\"    NO    “Have you seen or consulted any other health care providers outside of [facility] since your last visit?”    No      
metoprolol tartrate (LOPRESSOR) 25 MG tablet TAKE 1 TABLET BY MOUTH TWO TIMES A DAY.    Multiple Vitamin (MULTIVITAMINS PO) Take 1 tablet by mouth daily    acetaminophen (TYLENOL) 650 MG extended release tablet Take 1 tablet by mouth in the morning and 1 tablet at noon and 1 tablet in the evening.    ascorbic acid (VITAMIN C) 500 MG tablet Take 2 tablets by mouth daily    aspirin 81 MG chewable tablet Take 1 tablet by mouth daily    Calcium Carbonate-Vitamin D 600-3.125 MG-MCG TABS Take 1 tablet by mouth daily    cycloSPORINE (RESTASIS) 0.05 % ophthalmic emulsion Apply 1 drop to eye in the morning and 1 drop in the evening.     No current facility-administered medications for this visit.             Review of Systems  Respiratory: negative for dyspnea on exertion  Cardiovascular: negative for chest pain    Objective:   Visit Vitals  BP (!) 155/66 (Site: Right Upper Arm, Position: Sitting, Cuff Size: Large Adult)   Pulse 58   Temp 97.9 °F (36.6 °C) (Temporal)   Resp 20   Ht 1.524 m (5')   Wt 57.2 kg (126 lb)   SpO2 97%   BMI 24.61 kg/m²            General appearance - alert, well appearing, and in no distress  Neck - supple, no significant adenopathy, carotids upstroke normal bilaterally, no bruits  Chest - clear to auscultation, no wheezes, rales or rhonchi, symmetric air entry  Heart -normal S1, S2, 2/6 systolic murmur, no rubs, clicks or gallops  Extremities -trace + bilateral pedal edema  Skin - normal coloration and turgor, no rashes, no suspicious skin lesions noted    CMP:    Lab Results   Component Value Date/Time     07/25/2024 10:00 AM    K 4.6 07/25/2024 10:00 AM     07/25/2024 10:00 AM    CO2 26 07/25/2024 10:00 AM    BUN 24 07/25/2024 10:00 AM    CREATININE 1.19 07/25/2024 10:00 AM    GFRAA 44.0 10/18/2023 12:40 PM    AGRATIO 1.3 11/14/2022 11:22 AM    LABGLOM 41 07/25/2024 10:00 AM    LABGLOM 37 03/13/2024 09:35 AM    LABGLOM 39 11/14/2022 11:22 AM    GLUCOSE 133 07/25/2024 10:00 AM

## 2024-10-18 ENCOUNTER — APPOINTMENT (OUTPATIENT)
Facility: HOSPITAL | Age: 89
End: 2024-10-18
Payer: MEDICARE

## 2024-10-18 ENCOUNTER — HOSPITAL ENCOUNTER (OUTPATIENT)
Facility: HOSPITAL | Age: 89
Setting detail: OBSERVATION
Discharge: HOME HEALTH | End: 2024-10-20
Attending: EMERGENCY MEDICINE | Admitting: INTERNAL MEDICINE
Payer: MEDICARE

## 2024-10-18 DIAGNOSIS — G45.9 TIA (TRANSIENT ISCHEMIC ATTACK): Primary | ICD-10-CM

## 2024-10-18 PROBLEM — I25.2 HISTORY OF MI (MYOCARDIAL INFARCTION): Status: ACTIVE | Noted: 2024-10-18

## 2024-10-18 PROBLEM — E11.21 TYPE 2 DIABETES MELLITUS WITH NEPHROPATHY (HCC): Chronic | Status: ACTIVE | Noted: 2018-01-26

## 2024-10-18 PROBLEM — N18.30 CHRONIC RENAL DISEASE, STAGE III (HCC): Chronic | Status: ACTIVE | Noted: 2022-05-24

## 2024-10-18 PROBLEM — I25.2 HISTORY OF MI (MYOCARDIAL INFARCTION): Chronic | Status: ACTIVE | Noted: 2024-10-18

## 2024-10-18 PROBLEM — F02.818 DEMENTIA IN OTHER DISEASES CLASSIFIED ELSEWHERE, UNSPECIFIED SEVERITY, WITH OTHER BEHAVIORAL DISTURBANCE (HCC): Chronic | Status: ACTIVE | Noted: 2023-03-14

## 2024-10-18 LAB
ALBUMIN SERPL-MCNC: 3.4 G/DL (ref 3.4–5)
ALBUMIN/GLOB SERPL: 1.1 (ref 0.8–1.7)
ALP SERPL-CCNC: 85 U/L (ref 45–117)
ALT SERPL-CCNC: 21 U/L (ref 13–56)
ANION GAP SERPL CALC-SCNC: 3 MMOL/L (ref 3–18)
APPEARANCE UR: CLEAR
AST SERPL-CCNC: 24 U/L (ref 10–38)
BACTERIA URNS QL MICRO: NEGATIVE /HPF
BASOPHILS # BLD: 0 K/UL (ref 0–0.1)
BASOPHILS NFR BLD: 0 % (ref 0–2)
BILIRUB SERPL-MCNC: 0.4 MG/DL (ref 0.2–1)
BILIRUB UR QL: NEGATIVE
BUN SERPL-MCNC: 30 MG/DL (ref 7–18)
BUN/CREAT SERPL: 18 (ref 12–20)
CALCIUM SERPL-MCNC: 9.9 MG/DL (ref 8.5–10.1)
CHLORIDE SERPL-SCNC: 108 MMOL/L (ref 100–111)
CO2 SERPL-SCNC: 29 MMOL/L (ref 21–32)
COLOR UR: YELLOW
CREAT SERPL-MCNC: 1.7 MG/DL (ref 0.6–1.3)
CREAT UR-MCNC: 1.9 MG/DL (ref 0.6–1.3)
DIFFERENTIAL METHOD BLD: ABNORMAL
EOSINOPHIL # BLD: 0 K/UL (ref 0–0.4)
EOSINOPHIL NFR BLD: 1 % (ref 0–5)
EPITH CASTS URNS QL MICRO: NORMAL /LPF (ref 0–5)
ERYTHROCYTE [DISTWIDTH] IN BLOOD BY AUTOMATED COUNT: 13.4 % (ref 11.6–14.5)
GLOBULIN SER CALC-MCNC: 3.2 G/DL (ref 2–4)
GLUCOSE BLD STRIP.AUTO-MCNC: 112 MG/DL (ref 70–110)
GLUCOSE SERPL-MCNC: 109 MG/DL (ref 74–99)
GLUCOSE UR STRIP.AUTO-MCNC: NEGATIVE MG/DL
HCT VFR BLD AUTO: 37.6 % (ref 35–45)
HGB BLD-MCNC: 11.8 G/DL (ref 12–16)
HGB UR QL STRIP: NEGATIVE
IMM GRANULOCYTES # BLD AUTO: 0 K/UL (ref 0–0.04)
IMM GRANULOCYTES NFR BLD AUTO: 0 % (ref 0–0.5)
KETONES UR QL STRIP.AUTO: NEGATIVE MG/DL
LEUKOCYTE ESTERASE UR QL STRIP.AUTO: NEGATIVE
LYMPHOCYTES # BLD: 1.8 K/UL (ref 0.9–3.6)
LYMPHOCYTES NFR BLD: 31 % (ref 21–52)
MCH RBC QN AUTO: 29.3 PG (ref 24–34)
MCHC RBC AUTO-ENTMCNC: 31.4 G/DL (ref 31–37)
MCV RBC AUTO: 93.3 FL (ref 78–100)
MONOCYTES # BLD: 0.4 K/UL (ref 0.05–1.2)
MONOCYTES NFR BLD: 7 % (ref 3–10)
NEUTS SEG # BLD: 3.5 K/UL (ref 1.8–8)
NEUTS SEG NFR BLD: 61 % (ref 40–73)
NITRITE UR QL STRIP.AUTO: NEGATIVE
NRBC # BLD: 0 K/UL (ref 0–0.01)
NRBC BLD-RTO: 0 PER 100 WBC
PH UR STRIP: 7 (ref 5–8)
PLATELET # BLD AUTO: 264 K/UL (ref 135–420)
PMV BLD AUTO: 9.2 FL (ref 9.2–11.8)
POTASSIUM SERPL-SCNC: 5 MMOL/L (ref 3.5–5.5)
PROT SERPL-MCNC: 6.6 G/DL (ref 6.4–8.2)
PROT UR STRIP-MCNC: 30 MG/DL
RBC # BLD AUTO: 4.03 M/UL (ref 4.2–5.3)
RBC #/AREA URNS HPF: NORMAL /HPF (ref 0–5)
SODIUM SERPL-SCNC: 140 MMOL/L (ref 136–145)
SP GR UR REFRACTOMETRY: 1.02 (ref 1–1.03)
TROPONIN I SERPL HS-MCNC: 104 NG/L (ref 0–54)
UROBILINOGEN UR QL STRIP.AUTO: 0.2 EU/DL (ref 0.2–1)
WBC # BLD AUTO: 5.8 K/UL (ref 4.6–13.2)
WBC URNS QL MICRO: NORMAL /HPF (ref 0–5)

## 2024-10-18 PROCEDURE — 84484 ASSAY OF TROPONIN QUANT: CPT

## 2024-10-18 PROCEDURE — 99222 1ST HOSP IP/OBS MODERATE 55: CPT | Performed by: INTERNAL MEDICINE

## 2024-10-18 PROCEDURE — 82962 GLUCOSE BLOOD TEST: CPT

## 2024-10-18 PROCEDURE — 99222 1ST HOSP IP/OBS MODERATE 55: CPT | Performed by: PSYCHIATRY & NEUROLOGY

## 2024-10-18 PROCEDURE — 6360000004 HC RX CONTRAST MEDICATION: Performed by: EMERGENCY MEDICINE

## 2024-10-18 PROCEDURE — 2580000003 HC RX 258: Performed by: EMERGENCY MEDICINE

## 2024-10-18 PROCEDURE — 6370000000 HC RX 637 (ALT 250 FOR IP): Performed by: EMERGENCY MEDICINE

## 2024-10-18 PROCEDURE — 93005 ELECTROCARDIOGRAM TRACING: CPT | Performed by: EMERGENCY MEDICINE

## 2024-10-18 PROCEDURE — 81001 URINALYSIS AUTO W/SCOPE: CPT

## 2024-10-18 PROCEDURE — 80053 COMPREHEN METABOLIC PANEL: CPT

## 2024-10-18 PROCEDURE — G0378 HOSPITAL OBSERVATION PER HR: HCPCS

## 2024-10-18 PROCEDURE — 87086 URINE CULTURE/COLONY COUNT: CPT

## 2024-10-18 PROCEDURE — 70450 CT HEAD/BRAIN W/O DYE: CPT

## 2024-10-18 PROCEDURE — 99285 EMERGENCY DEPT VISIT HI MDM: CPT

## 2024-10-18 PROCEDURE — 70498 CT ANGIOGRAPHY NECK: CPT

## 2024-10-18 PROCEDURE — 85025 COMPLETE CBC W/AUTO DIFF WBC: CPT

## 2024-10-18 RX ORDER — ONDANSETRON 2 MG/ML
4 INJECTION INTRAMUSCULAR; INTRAVENOUS EVERY 6 HOURS PRN
Status: DISCONTINUED | OUTPATIENT
Start: 2024-10-18 | End: 2024-10-20 | Stop reason: HOSPADM

## 2024-10-18 RX ORDER — SODIUM CHLORIDE 0.9 % (FLUSH) 0.9 %
5-40 SYRINGE (ML) INJECTION EVERY 12 HOURS SCHEDULED
Status: DISCONTINUED | OUTPATIENT
Start: 2024-10-18 | End: 2024-10-20 | Stop reason: HOSPADM

## 2024-10-18 RX ORDER — DEXTROSE MONOHYDRATE 100 MG/ML
INJECTION, SOLUTION INTRAVENOUS CONTINUOUS PRN
Status: DISCONTINUED | OUTPATIENT
Start: 2024-10-18 | End: 2024-10-20 | Stop reason: HOSPADM

## 2024-10-18 RX ORDER — ASPIRIN 81 MG/1
81 TABLET ORAL
Status: COMPLETED | OUTPATIENT
Start: 2024-10-18 | End: 2024-10-18

## 2024-10-18 RX ORDER — POLYETHYLENE GLYCOL 3350 17 G/17G
17 POWDER, FOR SOLUTION ORAL DAILY PRN
Status: DISCONTINUED | OUTPATIENT
Start: 2024-10-18 | End: 2024-10-20 | Stop reason: HOSPADM

## 2024-10-18 RX ORDER — ASPIRIN 81 MG/1
81 TABLET, CHEWABLE ORAL DAILY
Status: DISCONTINUED | OUTPATIENT
Start: 2024-10-19 | End: 2024-10-20 | Stop reason: HOSPADM

## 2024-10-18 RX ORDER — MAGNESIUM SULFATE IN WATER 40 MG/ML
2000 INJECTION, SOLUTION INTRAVENOUS PRN
Status: DISCONTINUED | OUTPATIENT
Start: 2024-10-18 | End: 2024-10-20 | Stop reason: HOSPADM

## 2024-10-18 RX ORDER — 0.9 % SODIUM CHLORIDE 0.9 %
1000 INTRAVENOUS SOLUTION INTRAVENOUS ONCE
Status: COMPLETED | OUTPATIENT
Start: 2024-10-18 | End: 2024-10-18

## 2024-10-18 RX ORDER — MECOBALAMIN 5000 MCG
5 TABLET,DISINTEGRATING ORAL NIGHTLY PRN
Status: DISCONTINUED | OUTPATIENT
Start: 2024-10-18 | End: 2024-10-20 | Stop reason: HOSPADM

## 2024-10-18 RX ORDER — IODIXANOL 320 MG/ML
100 INJECTION, SOLUTION INTRAVASCULAR
Status: COMPLETED | OUTPATIENT
Start: 2024-10-18 | End: 2024-10-18

## 2024-10-18 RX ORDER — SODIUM CHLORIDE 0.9 % (FLUSH) 0.9 %
5-40 SYRINGE (ML) INJECTION PRN
Status: DISCONTINUED | OUTPATIENT
Start: 2024-10-18 | End: 2024-10-20 | Stop reason: HOSPADM

## 2024-10-18 RX ORDER — INSULIN LISPRO 100 [IU]/ML
0-8 INJECTION, SOLUTION INTRAVENOUS; SUBCUTANEOUS
Status: DISCONTINUED | OUTPATIENT
Start: 2024-10-18 | End: 2024-10-20 | Stop reason: HOSPADM

## 2024-10-18 RX ORDER — ACETAMINOPHEN 650 MG/1
650 SUPPOSITORY RECTAL EVERY 6 HOURS PRN
Status: DISCONTINUED | OUTPATIENT
Start: 2024-10-18 | End: 2024-10-20 | Stop reason: HOSPADM

## 2024-10-18 RX ORDER — GLUCAGON 1 MG
1 KIT INJECTION PRN
Status: DISCONTINUED | OUTPATIENT
Start: 2024-10-18 | End: 2024-10-20 | Stop reason: HOSPADM

## 2024-10-18 RX ORDER — ASPIRIN 300 MG/1
300 SUPPOSITORY RECTAL DAILY
Status: DISCONTINUED | OUTPATIENT
Start: 2024-10-19 | End: 2024-10-20

## 2024-10-18 RX ORDER — SODIUM CHLORIDE 9 MG/ML
INJECTION, SOLUTION INTRAVENOUS PRN
Status: DISCONTINUED | OUTPATIENT
Start: 2024-10-18 | End: 2024-10-20 | Stop reason: HOSPADM

## 2024-10-18 RX ORDER — ENOXAPARIN SODIUM 100 MG/ML
40 INJECTION SUBCUTANEOUS DAILY
Status: DISCONTINUED | OUTPATIENT
Start: 2024-10-19 | End: 2024-10-19

## 2024-10-18 RX ORDER — ONDANSETRON 4 MG/1
4 TABLET, ORALLY DISINTEGRATING ORAL EVERY 8 HOURS PRN
Status: DISCONTINUED | OUTPATIENT
Start: 2024-10-18 | End: 2024-10-20 | Stop reason: HOSPADM

## 2024-10-18 RX ORDER — IPRATROPIUM BROMIDE AND ALBUTEROL SULFATE 2.5; .5 MG/3ML; MG/3ML
1 SOLUTION RESPIRATORY (INHALATION) EVERY 4 HOURS PRN
Status: DISCONTINUED | OUTPATIENT
Start: 2024-10-18 | End: 2024-10-20 | Stop reason: HOSPADM

## 2024-10-18 RX ORDER — LABETALOL HYDROCHLORIDE 5 MG/ML
10 INJECTION, SOLUTION INTRAVENOUS EVERY 10 MIN PRN
Status: DISCONTINUED | OUTPATIENT
Start: 2024-10-18 | End: 2024-10-20 | Stop reason: HOSPADM

## 2024-10-18 RX ORDER — ATORVASTATIN CALCIUM 40 MG/1
80 TABLET, FILM COATED ORAL NIGHTLY
Status: DISCONTINUED | OUTPATIENT
Start: 2024-10-18 | End: 2024-10-20 | Stop reason: HOSPADM

## 2024-10-18 RX ORDER — POTASSIUM CHLORIDE 7.45 MG/ML
10 INJECTION INTRAVENOUS PRN
Status: DISCONTINUED | OUTPATIENT
Start: 2024-10-18 | End: 2024-10-20 | Stop reason: HOSPADM

## 2024-10-18 RX ORDER — ACETAMINOPHEN 325 MG/1
650 TABLET ORAL EVERY 6 HOURS PRN
Status: DISCONTINUED | OUTPATIENT
Start: 2024-10-18 | End: 2024-10-20 | Stop reason: HOSPADM

## 2024-10-18 RX ORDER — POTASSIUM CHLORIDE 1500 MG/1
40 TABLET, EXTENDED RELEASE ORAL PRN
Status: DISCONTINUED | OUTPATIENT
Start: 2024-10-18 | End: 2024-10-20 | Stop reason: HOSPADM

## 2024-10-18 RX ADMIN — IODIXANOL 100 ML: 320 INJECTION, SOLUTION INTRAVASCULAR at 17:19

## 2024-10-18 RX ADMIN — ASPIRIN 81 MG: 81 TABLET, COATED ORAL at 18:43

## 2024-10-18 RX ADMIN — SODIUM CHLORIDE 1000 ML: 9 INJECTION, SOLUTION INTRAVENOUS at 17:59

## 2024-10-18 ASSESSMENT — PAIN - FUNCTIONAL ASSESSMENT
PAIN_FUNCTIONAL_ASSESSMENT: PREVENTS OR INTERFERES SOME ACTIVE ACTIVITIES AND ADLS
PAIN_FUNCTIONAL_ASSESSMENT: PREVENTS OR INTERFERES SOME ACTIVE ACTIVITIES AND ADLS

## 2024-10-18 ASSESSMENT — PAIN DESCRIPTION - LOCATION
LOCATION: LEG
LOCATION: LEG

## 2024-10-18 ASSESSMENT — PAIN DESCRIPTION - ORIENTATION
ORIENTATION: RIGHT
ORIENTATION: RIGHT

## 2024-10-18 ASSESSMENT — PAIN DESCRIPTION - DESCRIPTORS
DESCRIPTORS: ACHING
DESCRIPTORS: ACHING

## 2024-10-18 ASSESSMENT — PAIN SCALES - GENERAL
PAINLEVEL_OUTOF10: 9
PAINLEVEL_OUTOF10: 0
PAINLEVEL_OUTOF10: 7

## 2024-10-18 NOTE — ED TRIAGE NOTES
Pt arrives via EMS from home c/o right leg pain. Pt family also endorses that pt had a couple min episode of slurred speech and loss of balance around 1230.

## 2024-10-18 NOTE — ED PROVIDER NOTES
HTN (hypertension) 3/15/2010    Hypertensive heart disease     Loss of appetite     Osteopenia 7/13/2010    Other dyspnea and respiratory abnormality     Renal artery stenosis (HCC) 12/03/2012    No renal artery stenosis bilaterally.  Bilateral intrinsic/med disease.  Patent bilateral renal veins.  Multiple cysts on both kidneys.    Ringing in the ears     S/P cardiac cath 10/21/2010    dLM 20%.  mLAD 25%.  CX mild.  pRCA 30%.  EF 30-35%.  Anterolateral, apical , diaphrag akin.  Hypercontractile basal segments.  Severe elev left-sided filling press.    Stress-induced cardiomyopathy     EF 35% (LV angio 10/2010), then EF 50-55% (echo, Feb 2011)    Wears glasses        Social Determinants affecting Dx or Tx: None    Records Reviewed (source and summary of external notes): Nursing Notes and Old Medical Records    Seen by pcp 8/1/24 for htn/hld    Critical Care    Performed by: Claribel Rene DO  Authorized by: Neel Daniels DO    Critical care provider statement:     Critical care time (minutes):  20    Critical care time was exclusive of:  Separately billable procedures and treating other patients    Critical care was necessary to treat or prevent imminent or life-threatening deterioration of the following conditions:  CNS failure or compromise    Critical care was time spent personally by me on the following activities:  Development of treatment plan with patient or surrogate, discussions with consultants, obtaining history from patient or surrogate, ordering and performing treatments and interventions, ordering and review of laboratory studies, ordering and review of radiographic studies and review of old charts      CLINICAL MANAGEMENT TOOLS:        Diagnosis     Clinical Impression:   1. TIA (transient ischemic attack)         Disposition: admit    Current Discharge Medication List           No follow-up provider specified.     Claribel Rene DO  Disclaimer: Sections of this note are dictated using

## 2024-10-19 ENCOUNTER — APPOINTMENT (OUTPATIENT)
Facility: HOSPITAL | Age: 89
End: 2024-10-19
Attending: INTERNAL MEDICINE
Payer: MEDICARE

## 2024-10-19 ENCOUNTER — APPOINTMENT (OUTPATIENT)
Facility: HOSPITAL | Age: 89
End: 2024-10-19
Payer: MEDICARE

## 2024-10-19 PROBLEM — M79.604 PAIN OF RIGHT LOWER EXTREMITY: Status: ACTIVE | Noted: 2024-10-19

## 2024-10-19 PROBLEM — R09.89 SUSPECTED CEREBROVASCULAR ACCIDENT (CVA): Status: ACTIVE | Noted: 2024-10-18

## 2024-10-19 PROBLEM — G45.9 TIA (TRANSIENT ISCHEMIC ATTACK): Status: ACTIVE | Noted: 2024-10-19

## 2024-10-19 PROBLEM — R47.81 SLURRED SPEECH: Status: ACTIVE | Noted: 2024-10-19

## 2024-10-19 LAB
ALBUMIN SERPL-MCNC: 3.1 G/DL (ref 3.4–5)
ALBUMIN/GLOB SERPL: 1.1 (ref 0.8–1.7)
ALP SERPL-CCNC: 66 U/L (ref 45–117)
ALT SERPL-CCNC: 18 U/L (ref 13–56)
ANION GAP SERPL CALC-SCNC: 4 MMOL/L (ref 3–18)
AST SERPL-CCNC: 19 U/L (ref 10–38)
BASOPHILS # BLD: 0 K/UL (ref 0–0.1)
BASOPHILS NFR BLD: 1 % (ref 0–2)
BILIRUB SERPL-MCNC: 0.3 MG/DL (ref 0.2–1)
BUN SERPL-MCNC: 25 MG/DL (ref 7–18)
BUN/CREAT SERPL: 19 (ref 12–20)
CALCIUM SERPL-MCNC: 9.3 MG/DL (ref 8.5–10.1)
CHLORIDE SERPL-SCNC: 109 MMOL/L (ref 100–111)
CHOLEST SERPL-MCNC: 278 MG/DL
CO2 SERPL-SCNC: 25 MMOL/L (ref 21–32)
CREAT SERPL-MCNC: 1.31 MG/DL (ref 0.6–1.3)
DIFFERENTIAL METHOD BLD: ABNORMAL
ECHO AO ROOT DIAM: 2.8 CM
ECHO AO ROOT INDEX: 1.74 CM/M2
ECHO AR MAX VEL PISA: 2.1 M/S
ECHO AV AREA PEAK VELOCITY: 1.6 CM2
ECHO AV AREA VTI: 1.7 CM2
ECHO AV AREA/BSA PEAK VELOCITY: 1 CM2/M2
ECHO AV AREA/BSA VTI: 1.1 CM2/M2
ECHO AV MEAN GRADIENT: 3 MMHG
ECHO AV MEAN VELOCITY: 0.8 M/S
ECHO AV PEAK GRADIENT: 6 MMHG
ECHO AV PEAK VELOCITY: 1.3 M/S
ECHO AV REGURGITANT PHT: 251.5 MILLISECOND
ECHO AV VELOCITY RATIO: 0.92
ECHO AV VTI: 22.1 CM
ECHO BSA: 1.65 M2
ECHO LA VOL A-L A4C: 32 ML (ref 22–52)
ECHO LA VOL MOD A4C: 30 ML (ref 22–52)
ECHO LA VOLUME INDEX A-L A4C: 20 ML/M2 (ref 16–34)
ECHO LA VOLUME INDEX MOD A4C: 19 ML/M2 (ref 16–34)
ECHO LV E' LATERAL VELOCITY: 7.1 CM/S
ECHO LV E' SEPTAL VELOCITY: 3 CM/S
ECHO LV EF PHYSICIAN: 60 %
ECHO LV FRACTIONAL SHORTENING: 28 % (ref 28–44)
ECHO LV INTERNAL DIMENSION DIASTOLE INDEX: 2.42 CM/M2
ECHO LV INTERNAL DIMENSION DIASTOLIC: 3.9 CM (ref 3.9–5.3)
ECHO LV INTERNAL DIMENSION SYSTOLIC INDEX: 1.74 CM/M2
ECHO LV INTERNAL DIMENSION SYSTOLIC: 2.8 CM
ECHO LV IVSD: 1.4 CM (ref 0.6–0.9)
ECHO LV MASS 2D: 190.4 G (ref 67–162)
ECHO LV MASS INDEX 2D: 118.3 G/M2 (ref 43–95)
ECHO LV POSTERIOR WALL DIASTOLIC: 1.3 CM (ref 0.6–0.9)
ECHO LV RELATIVE WALL THICKNESS RATIO: 0.67
ECHO LVOT AREA: 1.8 CM2
ECHO LVOT AV VTI INDEX: 1
ECHO LVOT DIAM: 1.5 CM
ECHO LVOT MEAN GRADIENT: 3 MMHG
ECHO LVOT PEAK GRADIENT: 6 MMHG
ECHO LVOT PEAK VELOCITY: 1.2 M/S
ECHO LVOT STROKE VOLUME INDEX: 24.2 ML/M2
ECHO LVOT SV: 39 ML
ECHO LVOT VTI: 22.1 CM
ECHO MV A VELOCITY: 1.12 M/S
ECHO MV E DECELERATION TIME (DT): 251.9 MS
ECHO MV E VELOCITY: 0.47 M/S
ECHO MV E/A RATIO: 0.42
ECHO MV E/E' LATERAL: 6.62
ECHO MV E/E' RATIO (AVERAGED): 11.14
ECHO MV E/E' SEPTAL: 15.67
ECHO PV AREA CONTINUITY EQ VELOCITY: 1.3 CM2
ECHO PV AREA VTI: 1.5 CM2
ECHO PV MAX VELOCITY: 1.2 M/S
ECHO PV MEAN GRADIENT: 2 MMHG
ECHO PV MEAN VELOCITY: 0.7 M/S
ECHO PV PEAK GRADIENT: 5 MMHG
ECHO PV VTI: 18.5 CM
ECHO QP:QS RATIO: 0.74
ECHO RV FREE WALL PEAK S': 15.1 CM/S
ECHO RV TAPSE: 1.8 CM (ref 1.7–?)
ECHO RVOT AREA: 2 CM2
ECHO RVOT DIAMETER: 1.6 CM
ECHO RVOT MEAN GRADIENT: 1 MMHG
ECHO RVOT PEAK GRADIENT: 2 MMHG
ECHO RVOT PEAK VELOCITY: 0.8 M/S
ECHO RVOT STROKE VOLUME: 28.9 ML
ECHO RVOT VTI: 14.4 CM
ECHO TV REGURGITANT MAX VELOCITY: 1.3 M/S
ECHO TV REGURGITANT PEAK GRADIENT: 7 MMHG
EOSINOPHIL # BLD: 0.1 K/UL (ref 0–0.4)
EOSINOPHIL NFR BLD: 2 % (ref 0–5)
ERYTHROCYTE [DISTWIDTH] IN BLOOD BY AUTOMATED COUNT: 13.2 % (ref 11.6–14.5)
EST. AVERAGE GLUCOSE BLD GHB EST-MCNC: 146 MG/DL
GLOBULIN SER CALC-MCNC: 2.8 G/DL (ref 2–4)
GLUCOSE BLD STRIP.AUTO-MCNC: 122 MG/DL (ref 70–110)
GLUCOSE BLD STRIP.AUTO-MCNC: 129 MG/DL (ref 70–110)
GLUCOSE BLD STRIP.AUTO-MCNC: 136 MG/DL (ref 70–110)
GLUCOSE BLD STRIP.AUTO-MCNC: 144 MG/DL (ref 70–110)
GLUCOSE BLD STRIP.AUTO-MCNC: 146 MG/DL (ref 70–110)
GLUCOSE SERPL-MCNC: 143 MG/DL (ref 74–99)
HBA1C MFR BLD: 6.7 % (ref 4.2–5.6)
HCT VFR BLD AUTO: 33.9 % (ref 35–45)
HDLC SERPL-MCNC: 64 MG/DL (ref 40–60)
HDLC SERPL: 4.3 (ref 0–5)
HGB BLD-MCNC: 10.7 G/DL (ref 12–16)
IMM GRANULOCYTES # BLD AUTO: 0 K/UL (ref 0–0.04)
IMM GRANULOCYTES NFR BLD AUTO: 0 % (ref 0–0.5)
LDLC SERPL CALC-MCNC: 181.8 MG/DL (ref 0–100)
LIPID PANEL: ABNORMAL
LYMPHOCYTES # BLD: 1.7 K/UL (ref 0.9–3.6)
LYMPHOCYTES NFR BLD: 33 % (ref 21–52)
MCH RBC QN AUTO: 29.3 PG (ref 24–34)
MCHC RBC AUTO-ENTMCNC: 31.6 G/DL (ref 31–37)
MCV RBC AUTO: 92.9 FL (ref 78–100)
MONOCYTES # BLD: 0.3 K/UL (ref 0.05–1.2)
MONOCYTES NFR BLD: 6 % (ref 3–10)
NEUTS SEG # BLD: 2.8 K/UL (ref 1.8–8)
NEUTS SEG NFR BLD: 58 % (ref 40–73)
NRBC # BLD: 0 K/UL (ref 0–0.01)
NRBC BLD-RTO: 0 PER 100 WBC
PLATELET # BLD AUTO: 260 K/UL (ref 135–420)
PMV BLD AUTO: 10.1 FL (ref 9.2–11.8)
POTASSIUM SERPL-SCNC: 4.2 MMOL/L (ref 3.5–5.5)
PROT SERPL-MCNC: 5.9 G/DL (ref 6.4–8.2)
RBC # BLD AUTO: 3.65 M/UL (ref 4.2–5.3)
SODIUM SERPL-SCNC: 138 MMOL/L (ref 136–145)
TRIGL SERPL-MCNC: 161 MG/DL
TROPONIN I SERPL HS-MCNC: 95 NG/L (ref 0–54)
TSH SERPL DL<=0.05 MIU/L-ACNC: 1.08 UIU/ML (ref 0.36–3.74)
VLDLC SERPL CALC-MCNC: 32.2 MG/DL
WBC # BLD AUTO: 4.9 K/UL (ref 4.6–13.2)

## 2024-10-19 PROCEDURE — 2580000003 HC RX 258: Performed by: INTERNAL MEDICINE

## 2024-10-19 PROCEDURE — 97162 PT EVAL MOD COMPLEX 30 MIN: CPT

## 2024-10-19 PROCEDURE — 92610 EVALUATE SWALLOWING FUNCTION: CPT

## 2024-10-19 PROCEDURE — 83036 HEMOGLOBIN GLYCOSYLATED A1C: CPT

## 2024-10-19 PROCEDURE — 80061 LIPID PANEL: CPT

## 2024-10-19 PROCEDURE — 6360000002 HC RX W HCPCS: Performed by: INTERNAL MEDICINE

## 2024-10-19 PROCEDURE — 97116 GAIT TRAINING THERAPY: CPT

## 2024-10-19 PROCEDURE — 93321 DOPPLER ECHO F-UP/LMTD STD: CPT | Performed by: INTERNAL MEDICINE

## 2024-10-19 PROCEDURE — G0378 HOSPITAL OBSERVATION PER HR: HCPCS

## 2024-10-19 PROCEDURE — 51798 US URINE CAPACITY MEASURE: CPT

## 2024-10-19 PROCEDURE — 80053 COMPREHEN METABOLIC PANEL: CPT

## 2024-10-19 PROCEDURE — 82962 GLUCOSE BLOOD TEST: CPT

## 2024-10-19 PROCEDURE — 94761 N-INVAS EAR/PLS OXIMETRY MLT: CPT

## 2024-10-19 PROCEDURE — 96372 THER/PROPH/DIAG INJ SC/IM: CPT

## 2024-10-19 PROCEDURE — 92526 ORAL FUNCTION THERAPY: CPT

## 2024-10-19 PROCEDURE — 6370000000 HC RX 637 (ALT 250 FOR IP): Performed by: INTERNAL MEDICINE

## 2024-10-19 PROCEDURE — 93321 DOPPLER ECHO F-UP/LMTD STD: CPT

## 2024-10-19 PROCEDURE — 36415 COLL VENOUS BLD VENIPUNCTURE: CPT

## 2024-10-19 PROCEDURE — 85025 COMPLETE CBC W/AUTO DIFF WBC: CPT

## 2024-10-19 PROCEDURE — 84484 ASSAY OF TROPONIN QUANT: CPT

## 2024-10-19 PROCEDURE — 99232 SBSQ HOSP IP/OBS MODERATE 35: CPT | Performed by: INTERNAL MEDICINE

## 2024-10-19 PROCEDURE — 93308 TTE F-UP OR LMTD: CPT | Performed by: INTERNAL MEDICINE

## 2024-10-19 PROCEDURE — 84443 ASSAY THYROID STIM HORMONE: CPT

## 2024-10-19 PROCEDURE — 93325 DOPPLER ECHO COLOR FLOW MAPG: CPT | Performed by: INTERNAL MEDICINE

## 2024-10-19 PROCEDURE — 70551 MRI BRAIN STEM W/O DYE: CPT

## 2024-10-19 PROCEDURE — 99232 SBSQ HOSP IP/OBS MODERATE 35: CPT | Performed by: PSYCHIATRY & NEUROLOGY

## 2024-10-19 RX ORDER — AMLODIPINE BESYLATE 10 MG/1
10 TABLET ORAL DAILY
Status: DISCONTINUED | OUTPATIENT
Start: 2024-10-19 | End: 2024-10-20 | Stop reason: HOSPADM

## 2024-10-19 RX ORDER — ENOXAPARIN SODIUM 100 MG/ML
30 INJECTION SUBCUTANEOUS DAILY
Status: DISCONTINUED | OUTPATIENT
Start: 2024-10-20 | End: 2024-10-20 | Stop reason: HOSPADM

## 2024-10-19 RX ORDER — METHOCARBAMOL 500 MG/1
750 TABLET, FILM COATED ORAL 3 TIMES DAILY PRN
Status: DISCONTINUED | OUTPATIENT
Start: 2024-10-19 | End: 2024-10-20 | Stop reason: HOSPADM

## 2024-10-19 RX ORDER — LOSARTAN POTASSIUM 50 MG/1
50 TABLET ORAL DAILY
Status: DISCONTINUED | OUTPATIENT
Start: 2024-10-19 | End: 2024-10-20

## 2024-10-19 RX ADMIN — LOSARTAN POTASSIUM 50 MG: 50 TABLET, FILM COATED ORAL at 15:27

## 2024-10-19 RX ADMIN — SODIUM CHLORIDE, PRESERVATIVE FREE 10 ML: 5 INJECTION INTRAVENOUS at 21:06

## 2024-10-19 RX ADMIN — SODIUM CHLORIDE, PRESERVATIVE FREE 10 ML: 5 INJECTION INTRAVENOUS at 08:30

## 2024-10-19 RX ADMIN — AMLODIPINE BESYLATE 10 MG: 10 TABLET ORAL at 12:28

## 2024-10-19 RX ADMIN — ATORVASTATIN CALCIUM 80 MG: 40 TABLET, FILM COATED ORAL at 21:02

## 2024-10-19 RX ADMIN — ASPIRIN 81 MG CHEWABLE TABLET 81 MG: 81 TABLET CHEWABLE at 08:30

## 2024-10-19 RX ADMIN — ENOXAPARIN SODIUM 40 MG: 100 INJECTION SUBCUTANEOUS at 08:30

## 2024-10-19 ASSESSMENT — PAIN SCALES - GENERAL
PAINLEVEL_OUTOF10: 0

## 2024-10-19 NOTE — H&P
History and Physical    Patient: Saranya Duarte MRN: [id number]  SSN: [id number]    YOB: 1923  Age: 89 y.o.  Sex: female      Subjective:      Saranya Duarte is a 89 y.o. female who presents to [facility] ER with complaint of Aphasia, Right-Sided Weakness, and Right Leg Pain.  Patient reports that she was feeling a sensation in her right leg that was strange and got worse with walking and weight-bearing.  The issue was otherwise sensory in terms of pain and Patient describes it as a \"rising, twisting\" pain like someone was \"twisting a knife.\"  Patient reports that the pain ran up the inside of her distal right leg and then the lateral aspect of her right thigh.  [facility] and was referred to [facility] ER.  However, Patient was not admitted for these sensations, but rather for what was perceived to be slurred speech.  Patient is fluent and speaks well, but often her responses become repetitive and overly narrative.    In [facility] ER, Patient is noted to have Temperature 98.8°F, Heart Rate 47 bpm to 52 bpm, Respiration Rate 22 bpm, Blood Pressure 116/51 mm Hg to 191/66 mm Hg, SpO2 99% on Room Air, WBC 5.8 K/uL, Hgb 11.8 g/dL, Neut% 61%, Neut# 3.5 K/uL, UA (-), Na+ 140 mmol/L, K+ 5.0 mmol/L, BUN 30 mg/dL, Creatinine 1.70 mg/dL (Baseline Creatinine ~1.20-1.45 mg/dL?), eGFR 26, Glucose 109 mg/dL, Albumin 3.4 g/dL, and Troponin 104 ng/L..  CT Head w/o Contrast has been read by Radiologist to show \"1.  No definite CT evidence of acute intracranial abnormality. If there is ongoing clinical concern, consider MRI for further evaluation.  2.  CTA findings to be dictated separately.  3.  Chronic findings as above.\"  CTA Head & Neck w/ Contrast has PRELIMINARY read by Radiologist showing \"1.  No evidence for large vessel occlusion.  2.  Heterogeneous enlargement of the left lobe of the thyroid gland with multiple sizable nodules/masses with rightward deviation

## 2024-10-19 NOTE — ED NOTES
Report given to 4 [facility] RN, pt to be transferred to floor via transport. Care relinquished at this time.

## 2024-10-19 NOTE — CONSULTS
Consult Note            Date:10/18/2024        Patient Name:Saranya Duarte     YOB: 1923     Age:89 y.o.    Consult to Neurology  Consult performed by: Marcos Ahmadi MD  Consult ordered by: Neel Daniels DO          Chief Complaint     Chief Complaint   Patient presents with    Leg Pain     Right      Aphasia    Extremity Weakness     Right side          History Obtained From   Patient, chart review    History of Present Illness   According to ER note, patient had episode of right side weakness/slurred speech, went to urgent care, and sent to ER.  Patient states she had back pain going down the right leg prior to arrival but sx now improved.  Patient with no complaints currently.    Past Medical History     Past Medical History:   Diagnosis Date    Arthritis of right knee     CAD (coronary artery disease), native coronary artery October 2010    mild disease    CKD (chronic kidney disease)     while on diuretics for difficult to control HTN    Dyslipidemia 3/15/2010    Echocardiogram abnormal 02/02/2011    EF 50-55%.  Mild apical hypk.  Gr 1 DDfx.  RVSP 40-45.  Mild LAE.      Heart attack (HCC)     2010    Heart failure, diastolic, chronic ([facility])     History of heart attack     Holter monitor, abnormal 06/10/2015    Sinus rhythm w/avg HR of 53 bpm (range 40-85 bpm).  Occasional PACs.  One 3-beat run of nonsustained SVT.  Very few PVCs.  One 3-beat run of nonsustained VT.  Pt's HR was < 50 bpm for 8 hrs of the recorded time.  No pauses noted.  Two episodes of neck pain corresponded to sinus rhythm w/o ectopy.    HTN (hypertension) 3/15/2010    Hypertensive heart disease     Loss of appetite     Osteopenia 7/13/2010    Other dyspnea and respiratory abnormality     Renal artery stenosis (HCC) 12/03/2012    No renal artery stenosis bilaterally.  Bilateral intrinsic/med disease.  Patent bilateral renal veins.  Multiple cysts on both kidneys.    Ringing in the ears     S/P cardiac cath 10/21/2010

## 2024-10-20 VITALS
BODY MASS INDEX: 27.79 KG/M2 | OXYGEN SATURATION: 98 % | HEIGHT: 60 IN | RESPIRATION RATE: 17 BRPM | SYSTOLIC BLOOD PRESSURE: 111 MMHG | WEIGHT: 141.54 LBS | DIASTOLIC BLOOD PRESSURE: 66 MMHG | HEART RATE: 96 BPM | TEMPERATURE: 98.6 F

## 2024-10-20 PROBLEM — R09.89 SUSPECTED CEREBROVASCULAR ACCIDENT (CVA): Status: RESOLVED | Noted: 2024-10-18 | Resolved: 2024-10-20

## 2024-10-20 LAB
ANION GAP SERPL CALC-SCNC: 5 MMOL/L (ref 3–18)
BACTERIA SPEC CULT: NORMAL
BASOPHILS # BLD: 0 K/UL (ref 0–0.1)
BASOPHILS NFR BLD: 1 % (ref 0–2)
BUN SERPL-MCNC: 25 MG/DL (ref 7–18)
BUN/CREAT SERPL: 17 (ref 12–20)
CALCIUM SERPL-MCNC: 9.3 MG/DL (ref 8.5–10.1)
CHLORIDE SERPL-SCNC: 112 MMOL/L (ref 100–111)
CO2 SERPL-SCNC: 25 MMOL/L (ref 21–32)
CREAT SERPL-MCNC: 1.45 MG/DL (ref 0.6–1.3)
DIFFERENTIAL METHOD BLD: ABNORMAL
EKG ATRIAL RATE: 49 BPM
EKG DIAGNOSIS: NORMAL
EKG P AXIS: 59 DEGREES
EKG P-R INTERVAL: 196 MS
EKG Q-T INTERVAL: 466 MS
EKG QRS DURATION: 78 MS
EKG QTC CALCULATION (BAZETT): 420 MS
EKG R AXIS: 20 DEGREES
EKG T AXIS: 60 DEGREES
EKG VENTRICULAR RATE: 49 BPM
EOSINOPHIL # BLD: 0.1 K/UL (ref 0–0.4)
EOSINOPHIL NFR BLD: 2 % (ref 0–5)
ERYTHROCYTE [DISTWIDTH] IN BLOOD BY AUTOMATED COUNT: 13.3 % (ref 11.6–14.5)
GLUCOSE BLD STRIP.AUTO-MCNC: 130 MG/DL (ref 70–110)
GLUCOSE BLD STRIP.AUTO-MCNC: 132 MG/DL (ref 70–110)
GLUCOSE SERPL-MCNC: 134 MG/DL (ref 74–99)
HCT VFR BLD AUTO: 35.3 % (ref 35–45)
HGB BLD-MCNC: 11.2 G/DL (ref 12–16)
IMM GRANULOCYTES # BLD AUTO: 0 K/UL (ref 0–0.04)
IMM GRANULOCYTES NFR BLD AUTO: 0 % (ref 0–0.5)
LYMPHOCYTES # BLD: 1.6 K/UL (ref 0.9–3.6)
LYMPHOCYTES NFR BLD: 31 % (ref 21–52)
MCH RBC QN AUTO: 29.4 PG (ref 24–34)
MCHC RBC AUTO-ENTMCNC: 31.7 G/DL (ref 31–37)
MCV RBC AUTO: 92.7 FL (ref 78–100)
MONOCYTES # BLD: 0.4 K/UL (ref 0.05–1.2)
MONOCYTES NFR BLD: 8 % (ref 3–10)
NEUTS SEG # BLD: 3 K/UL (ref 1.8–8)
NEUTS SEG NFR BLD: 58 % (ref 40–73)
NRBC # BLD: 0 K/UL (ref 0–0.01)
NRBC BLD-RTO: 0 PER 100 WBC
PLATELET # BLD AUTO: 249 K/UL (ref 135–420)
PMV BLD AUTO: 10 FL (ref 9.2–11.8)
POTASSIUM SERPL-SCNC: 4.5 MMOL/L (ref 3.5–5.5)
RBC # BLD AUTO: 3.81 M/UL (ref 4.2–5.3)
SERVICE CMNT-IMP: NORMAL
SODIUM SERPL-SCNC: 142 MMOL/L (ref 136–145)
WBC # BLD AUTO: 5.2 K/UL (ref 4.6–13.2)

## 2024-10-20 PROCEDURE — G0378 HOSPITAL OBSERVATION PER HR: HCPCS

## 2024-10-20 PROCEDURE — 96372 THER/PROPH/DIAG INJ SC/IM: CPT

## 2024-10-20 PROCEDURE — 80048 BASIC METABOLIC PNL TOTAL CA: CPT

## 2024-10-20 PROCEDURE — 97530 THERAPEUTIC ACTIVITIES: CPT

## 2024-10-20 PROCEDURE — 93010 ELECTROCARDIOGRAM REPORT: CPT | Performed by: INTERNAL MEDICINE

## 2024-10-20 PROCEDURE — 85025 COMPLETE CBC W/AUTO DIFF WBC: CPT

## 2024-10-20 PROCEDURE — 97166 OT EVAL MOD COMPLEX 45 MIN: CPT

## 2024-10-20 PROCEDURE — 6370000000 HC RX 637 (ALT 250 FOR IP): Performed by: INTERNAL MEDICINE

## 2024-10-20 PROCEDURE — 82962 GLUCOSE BLOOD TEST: CPT

## 2024-10-20 PROCEDURE — 36415 COLL VENOUS BLD VENIPUNCTURE: CPT

## 2024-10-20 PROCEDURE — 99239 HOSP IP/OBS DSCHRG MGMT >30: CPT | Performed by: INTERNAL MEDICINE

## 2024-10-20 PROCEDURE — 94761 N-INVAS EAR/PLS OXIMETRY MLT: CPT

## 2024-10-20 PROCEDURE — 6360000002 HC RX W HCPCS: Performed by: INTERNAL MEDICINE

## 2024-10-20 RX ORDER — LOSARTAN POTASSIUM 50 MG/1
100 TABLET ORAL DAILY
Status: DISCONTINUED | OUTPATIENT
Start: 2024-10-21 | End: 2024-10-20 | Stop reason: HOSPADM

## 2024-10-20 RX ORDER — ASPIRIN 81 MG/1
81 TABLET, CHEWABLE ORAL DAILY
Qty: 30 TABLET | Refills: 3 | Status: SHIPPED | OUTPATIENT
Start: 2024-10-21

## 2024-10-20 RX ORDER — ATORVASTATIN CALCIUM 80 MG/1
80 TABLET, FILM COATED ORAL NIGHTLY
Qty: 30 TABLET | Refills: 3 | Status: SHIPPED | OUTPATIENT
Start: 2024-10-20

## 2024-10-20 RX ADMIN — ASPIRIN 81 MG CHEWABLE TABLET 81 MG: 81 TABLET CHEWABLE at 08:16

## 2024-10-20 RX ADMIN — ENOXAPARIN SODIUM 30 MG: 100 INJECTION SUBCUTANEOUS at 08:16

## 2024-10-20 RX ADMIN — AMLODIPINE BESYLATE 10 MG: 10 TABLET ORAL at 08:16

## 2024-10-20 RX ADMIN — LOSARTAN POTASSIUM 50 MG: 50 TABLET, FILM COATED ORAL at 08:16

## 2024-10-20 ASSESSMENT — PAIN SCALES - GENERAL
PAINLEVEL_OUTOF10: 0

## 2024-10-20 NOTE — PLAN OF CARE
Problem: Chronic Conditions and Co-morbidities  Goal: Patient's chronic conditions and co-morbidity symptoms are monitored and maintained or improved  10/19/2024 2156 by Kristy Nichols RN  Outcome: Progressing  10/19/2024 1842 by Regan Dudley RN  Outcome: Progressing  Flowsheets (Taken 10/19/2024 0839)  Care Plan - Patient's Chronic Conditions and Co-Morbidity Symptoms are Monitored and Maintained or Improved:   Monitor and assess patient's chronic conditions and comorbid symptoms for stability, deterioration, or improvement   Collaborate with multidisciplinary team to address chronic and comorbid conditions and prevent exacerbation or deterioration     Problem: Discharge Planning  Goal: Discharge to home or other facility with appropriate resources  10/19/2024 2156 by Kristy Nichols RN  Outcome: Progressing  10/19/2024 1842 by Regan Dudley RN  Outcome: Progressing  Flowsheets (Taken 10/19/2024 0839)  Discharge to home or other facility with appropriate resources: Identify barriers to discharge with patient and caregiver     Problem: Pain  Goal: Verbalizes/displays adequate comfort level or baseline comfort level  10/19/2024 2156 by Kristy Nichols RN  Outcome: Progressing  10/19/2024 1842 by Regan Dudley RN  Outcome: Progressing     Problem: Safety - Adult  Goal: Free from fall injury  10/19/2024 2156 by Kristy Nichols RN  Outcome: Progressing  Note: Bed at lowest level, bed rails up, bed alarm on, safety education done  10/19/2024 1842 by Regan Dudley RN  Outcome: Progressing  Flowsheets (Taken 10/19/2024 0839)  Free From Fall Injury: Instruct family/caregiver on patient safety     Problem: ABCDS Injury Assessment  Goal: Absence of physical injury  10/19/2024 2156 by Kristy Nichols RN  Outcome: Progressing  10/19/2024 1842 by Regan Dudley RN  Outcome: Progressing  Flowsheets (Taken 10/19/2024 0839)  Absence of Physical Injury: Implement safety measures based 
  Problem: Chronic Conditions and Co-morbidities  Goal: Patient's chronic conditions and co-morbidity symptoms are monitored and maintained or improved  Problem: Discharge Planning  Goal: Discharge to home or other facility with appropriate resources  10/20/2024 1400 by Rubi Do RN  Outcome: Progressing  Flowsheets (Taken 10/20/2024 0821)  Discharge to home or other facility with appropriate resources:   Identify barriers to discharge with patient and caregiver   Arrange for needed discharge resources and transportation as appropriate   Identify discharge learning needs (meds, wound care, etc)     Problem: Pain  Goal: Verbalizes/displays adequate comfort level or baseline comfort level  10/20/2024 1400 by Rubi Do RN  Outcome: Progressing  Flowsheets (Taken 10/20/2024 0800)  Verbalizes/displays adequate comfort level or baseline comfort level:   Encourage patient to monitor pain and request assistance   Assess pain using appropriate pain scale   Administer analgesics based on type and severity of pain and evaluate response     Problem: Safety - Adult  Goal: Free from fall injury  10/20/2024 1400 by Rubi Do RN  Outcome: Progressing     Problem: Skin/Tissue Integrity  Goal: Absence of new skin breakdown  10/20/2024 1400 by Rubi Do RN  Outcome: Progressing     Problem: Musculoskeletal - Adult  Goal: Return mobility to safest level of function  10/20/2024 1400 by Rubi Do RN  Outcome: Progressing  Flowsheets (Taken 10/20/2024 0821)  Return Mobility to Safest Level of Function:   Assess patient stability and activity tolerance for standing, transferring and ambulating with or without assistive devices   Assist with transfers and ambulation using safe patient handling equipment as needed     Problem: Musculoskeletal - Adult  Goal: Return ADL status to a safe level of function  10/20/2024 1400 by Rubi Do RN  Outcome: Progressing  Flowsheets (Taken 10/20/2024 0821)  Return ADL Status to a Safe 
with decrease cognition- poor historian, lives with daughter Kristine in a house. ? Uses AD at home- unclear. Patient reports that she is able to walk, get to [employer].   10/19/2024 0959 by Roseanna Gardner, PT  Outcome: Progressing     Problem: Skin/Tissue Integrity  Goal: Absence of new skin breakdown  Description: 1.  Monitor for areas of redness and/or skin breakdown  2.  Assess vascular access sites hourly  3.  Every 4-6 hours minimum:  Change oxygen saturation probe site  4.  Every 4-6 hours:  If on nasal continuous positive airway pressure, respiratory therapy assess nares and determine need for appliance change or resting period.  Outcome: Progressing     
History  Lives With: Family (daughter Kristine)  Has the patient had two or more falls in the past year or any fall with injury in the past year?: Unknown  Ambulation Assistance: Independent  Transfer Assistance: Independent    Daily Assessment:   See above     Orientation:  Person, Place, and Time    Oral Assessment:  Oral Motor   Labial: No impairment  Dentition: Natural  Lingual: No impairment  Velum: No Impairment  Mandible: No impairment        P.O. Trials:   See above          DYSPHAGIA DIAGNOSIS: Dysphagia Diagnosis: Swallow function appears WFL    PAIN:  Intensity Pre-treatment: 0/10   Intensity Post-treatment: 0/10    After treatment:   []            Patient left in no apparent distress sitting up in chair  [x]            Patient left in no apparent distress in bed  [x]            Call bell left within reach  [x]            Nursing notified  [x]            Family present  []            Caregiver present  []            Bed alarm activated    COMMUNICATION/EDUCATION:   [x]            Aspiration precautions; swallow safety; compensatory techniques provided via demonstration, verbalization and teach back of comprehension  [x]         Patient/family have participated as able in goal setting and plan of care.  []            Patient/family agree to work toward stated goals and plan of care.  []            Patient understands intent and goals of therapy, neutral about participation.  []            Patient unable to participate in goal setting/plan of care secondary to cognition, hearing/vision deficits; education ongoing with interdisciplinary staff   []            Handout regarding diet recommendations and thickener instructions provided.  []         Posted safety precautions in patient's room.    Thank you for this referral,    Danita Roberts, SLP      
Decreased step clearance     Therapeutic Exercises/Neuromuscular Re-education:     Pain:  Intensity Pre-treatment: 0/10   Intensity Post-treatment: 0/10  Scale: Numeric Rating Scale  Location: Right Leg  Quality:  denies pain today  Intervention(s):  NA       Activity Tolerance:   Activity Tolerance: Patient tolerated evaluation without incident  Please refer to the flowsheet for vital signs taken during this treatment.    After treatment:   []         Patient left in no apparent distress sitting up in chair  [x]         Patient left in no apparent distress in bed  [x]         Call bell left within reach  []         Nursing notified  []         Caregiver present  [x]         Bed alarm activated  []         SCDs applied    COMMUNICATION/EDUCATION:   Patient Education  Education Given To: Patient  Education Provided: Role of Therapy;Fall Prevention Strategies;Transfer Training  Education Method: Demonstration;Verbal;Teach Back  Barriers to Learning: Cognition  Education Outcome: Verbalized understanding;Demonstrated understanding;Continued education needed    Thank you for this referral.  Roseanna Gardner, PT  Minutes: 24      Eval Complexity: Decision Making: Medium Complexity    
in chair  [x] Patient left in no apparent distress in bed  [x] Call bell left within reach  [x] Nursing notified  [] Caregiver present  [] Bed alarm activated    COMMUNICATION/EDUCATION:   Patient Education  Education Given To: Patient  Education Provided: Role of Therapy;Plan of Care;ADL Adaptive Strategies;Transfer Training;Energy Conservation;Mobility Training;Fall Prevention Strategies  Education Method: Teach Back  Barriers to Learning: Cognition  Education Outcome: Continued education needed    Thank you for this referral.  Loren Mena OT  Minutes: 20    Eval Complexity: Decision Making: Medium Complexity

## 2024-10-20 NOTE — CARE COORDINATION
Met with Patient and Daughter at bedside. CM introduces self and explains role. Patient is alert and oriented x Self. [employer] verified. Daughter, Kristine, agrees to complete initial  assessment.     Patient is medically ready with a discharge order .    DCP: Return home with [facility] ( Referrals out will need follow up tomorrow: Choices are :1) [facility], 2) [facility] &3) [facility]) and supportive Daughter. Patient is established with needed DME. Daughter will transport patient via POV at the time of discharge.     No further CM needs identified. CM will remain available should a need arise.      10/20/24 1526   Service Assessment   Patient Orientation Alert and Oriented;Person   Cognition Alert   History Provided By Patient   Primary Caregiver Self   Accompanied By/Relationship DaughterKristine   Support Systems Children;Family Members;/   Patient's Healthcare Decision Maker is: Legal Next of Kin   PCP Verified by CM Yes   Last Visit to PCP Within last 3 months   Prior Functional Level Assistance with the following:;Bathing;Dressing;Toileting;Cooking;Housework;Shopping;Mobility;Other (see comment)  (Transportation)   Current Functional Level Assistance with the following:;Bathing;Dressing;Toileting;Cooking;Housework;Shopping;Mobility;Other (see comment)  (Transportation)   Can patient return to prior living arrangement Yes   Ability to make needs known: Good   Family able to assist with home care needs: Yes   Would you like for me to discuss the discharge plan with any other family members/significant others, and if so, who? Yes  (My Daughter, Kristine)   Financial Resources Medicare;Other (Comment)  ([employer])   Community Resources None   Social/Functional History   Lives With Daughter   Type of Home Apartment   Home Layout One level   Home Access Level entry   Bathroom Shower/Tub Walk-in shower;Shower chair with back   Bathroom Toilet Standard   Bathroom Equipment Built-in shower seat   Bathroom

## 2024-10-20 NOTE — CARE COORDINATION
10/20/24 1526   IMM Letter   Observation Status Letter date given: 10/20/24   Observation Status Letter time given: 1526   Observation Status Letter given to Patient/Family/Significant other/Guardian/POA/by: Elba Garcia RN CM, Copy to Patient, Original on Hard Chart.

## 2024-10-20 NOTE — DISCHARGE SUMMARY
Insturctions which I have personally completed and reviewed.      Disposition:     [] Home with family     [x] HH PT/RN   [] SNF/NH   [] Inpatient Rehab/RIGO  Condition at Discharge:  Stable    Follow up with:   PCP : Dax Humphreys MD        >30 minutes spent coordinating this discharge (review instructions/follow-up, prescriptions, preparing report for sign off)    Signed:  Rajesh Escobedo MD  10/20/2024  2:39 PM

## 2024-10-20 NOTE — PROGRESS NOTES
Pharmacist Review and Automatic Dose Adjustment of Prophylactic Enoxaparin    *Review reason for admission/hospital problem list*    The reviewing pharmacist has made an adjustment to the ordered enoxaparin dose or converted to UFH per the approved [facility] protocol and table as identified below.        Saranya Duarte is a 89 y.o. female.     Recent Labs     10/18/24  1710 10/18/24  1716 10/19/24  0145   CREATININE 1.70* 1.9* 1.31*       Estimated Creatinine Clearance: 19 mL/min (A) (based on SCr of 1.31 mg/dL (H)).    Height:   Ht Readings from Last 1 Encounters:   10/19/24 1.524 m (5')     Weight:  Wt Readings from Last 1 Encounters:   10/19/24 64.2 kg (141 lb 8.6 oz)               Plan: Based upon the patient's weight and renal function, the ordered enoxaparin dose of 40mg daily has been changed/converted to 30mg daily      Thank you,  ESTUARDO ONTIVEROS, [facility]    
 patient is not retaining urine per bladder scan.  
4 Eyes Skin Assessment     NAME:  Saranya Duarte  YOB: 1923  MEDICAL RECORD NUMBER:  [id number]    The patient is being assessed for  Shift Handoff    I agree that at least one RN has performed a thorough Head to Toe Skin Assessment on the patient. ALL assessment sites listed below have been assessed.      Areas assessed by both nurses:    Head, Face, Ears, Shoulders, Back, Chest, Arms, Elbows, Hands, Sacrum. Buttock, Coccyx, Ischium, Legs. Feet and Heels, Under Medical Devices , and Other          Does the Patient have a Wound? No noted wound(s)       Lawrence Prevention initiated by RN: Yes  Wound Care Orders initiated by RN: No    Pressure Injury (Stage 3,4, Unstageable, DTI, NWPT, and Complex wounds) if present, place Wound referral order by RN under : No    New Ostomies, if present place, Ostomy referral order under : No     Nurse 1 eSignature: Electronically signed by Regan Dudley RN on 10/19/24 at 6:42 PM EDT    **SHARE this note so that the co-signing nurse can place an eSignature**    Nurse 2 eSignature: Electronically signed by Kristy Nichols RN on 10/19/24 at 11:12 PM EDT   
Discharge instructions reviewed with pt and pt's daughter. Understanding verbalized.     Pt discharged in stable condition via wheel chair to be transported home by daughter  
Hospitalist Progress Note    NAME:  Saranya Duarte   :   1923   MRN:   [id number]     Date/Time:  10/19/2024  Subjective:   Chief Complaint:  aphasia;  TIA    Patient's speech is back to normal weakness is improved.  Back to baseline.    Discussed with the daughter patient does not take any aspirin or statin at home.    Await PT OT eval.    Resume diet as speech back to normal      Review of Systems:  Y  N       Y  N  []   [x]    Fever/chills                                               []   [x]    Chest Pain  []   [x]    Cough                                                       []   [x]    Diarrhea   []   [x]    Sputum                                                     []   [x]    Constipation  []   [x]    SOB/ANDRE                                                []   []    Nausea/Vomit  []   [x]    Abd Pain                                                    []   []    Tolerating PT  []   [x]    Dysuria                                                      []   []    Tolerating Diet     []  Unable to obtain  ROS due to  []  mental status change  []  sedated   []  intubated     Past Med History and Social history reviewed. No changes.   [x]  Current Medication list and allergies reviewed*    Objective:   Vitals:  BP (!) 192/83   Pulse 68   Temp 98.7 °F (37.1 °C) (Oral)   Resp 17   Ht 1.524 m (5')   Wt 64.2 kg (141 lb 8 oz)   SpO2 94%   BMI 27.63 kg/m²   Temp (24hrs), Av.2 °F (36.8 °C), Min:97.5 °F (36.4 °C), Max:98.8 °F (37.1 °C)      Last 24hr Input/Output:    Intake/Output Summary (Last 24 hours) at 10/19/2024 1131  Last data filed at 10/19/2024 0843  Gross per 24 hour   Intake 1150.07 ml   Output --   Net 1150.07 ml        PHYSICAL EXAM:  Gen:  No distress    HEENT:   [x]   red/pink conjunctivae []  pale conjunctivae                  PERRL  []  yes  []  no        hearing intact to voice []  yes  []  No               [x]  moist or  []  dry  Mucosa  NECK:   supple [x]  yes  []  no      masses []  
INTERIM UPDATE - 1744 EST on 10/18/2024    [facility] ER Physician calls requesting admission for a 89-year-old female who presents with complaint of Slurred Speech.    [facility] ER Physician reports that CT Head w/o Contrast was negative and CTA Head and Neck was negative for Large Vessel Occlusion (a.k.a. LVO).    Plan:  Will place Patient on [facility] Telemetry Unit for management of Transient Slurred Speech with concern for Transient Ischemic Accident (a.k.a. TIA).  
Latest /74. MD is aware. MD said will wait for MRI result and will treat pt accordingly.  
Occupational Therapy  Orders received, chart reviewed and OT evaluation attempted. This patient is currently having a bedside echo. Will follow up later as appropriate for this patient. Negrita Barnes, OTR/L  
TRANSFER - IN REPORT:    Verbal report received from *** on Saranya Duarte  being received from *** for {Transfer Care:12377}      Report consisted of patient's Situation, Background, Assessment and   Recommendations(SBAR).     Information from the following report(s) {SBAR Reports List:54318} was reviewed with the receiving nurse.    Opportunity for questions and clarification was provided.      Assessment completed upon patient's arrival to unit and care assumed.     4 Eyes Skin Assessment     NAME:  Saranya Duarte  YOB: 1923  MEDICAL RECORD NUMBER:  [id number]    The patient is being assessed for  {Reason for Assessment:68961}    I agree that at least one RN has performed a thorough Head to Toe Skin Assessment on the patient. ALL assessment sites listed below have been assessed.      Areas assessed by both nurses:    {Pressure Areas Assessed:36761}        Does the Patient have a Wound? {Action Wound:97132}       Lawrence Prevention initiated by RN: {YES/NO:19726}  Wound Care Orders initiated by RN: {YES/NO:19726}    Pressure Injury (Stage 3,4, Unstageable, DTI, NWPT, and Complex wounds) if present, place Wound referral order by RN under : {YES/NO:19726}    New Ostomies, if present place, Ostomy referral order under : {YES/NO:19726}     Nurse 1 eSignature: {Esignature:[id number]}    **SHARE this note so that the co-signing nurse can place an eSignature**    Nurse 2 eSignature: {Esignature:[id number]}    
original.  10/21/22 STEMI CATH ([facility]) EF 30%, akinesis of AL apial and diaphragmatic walls with mild MR. 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 TIA-sx improved, likely with some underlying dementia.  CT head neg for large vessel occlusion. CTA head/neck neg for LVA. No recurrent episodes since admission. Tele monitor. Echo unrevealing.  PT/ot eval. Asa/statin.  Risk factor modification.  Dispo planning per primary team.  Will sign off. Pls call with further questions.  Thank you for allowing me to participate in the care of your patient.         Electronically signed by Marcos Ahmadi MD on 10/19/24 at 9:55 PM EDT

## 2024-10-20 NOTE — DISCHARGE INSTRUCTIONS
DISCHARGE SUMMARY from Nurse    PATIENT INSTRUCTIONS:    What to do at Home:  Recommended activity: activity as tolerated    If you experience any of the following symptoms slurred speech, worsening weakness, sudden changes to vision, facial droop, please follow up with primary care provider or emergency department.    *  Please give a list of your current medications to your Primary Care Provider.    *  Please update this list whenever your medications are discontinued, doses are      changed, or new medications (including over-the-counter products) are added.    *  Please carry medication information at all times in case of emergency situations.    These are general instructions for a healthy lifestyle:    No smoking/ No tobacco products/ Avoid exposure to second hand smoke  Surgeon General's Warning:  Quitting smoking now greatly reduces serious risk to your health.    Obesity, smoking, and sedentary lifestyle greatly increases your risk for illness    A healthy diet, regular physical exercise & weight monitoring are important for maintaining a healthy lifestyle    You may be retaining fluid if you have a history of heart failure or if you experience any of the following symptoms:  Weight gain of 3 pounds or more overnight or 5 pounds in a week, increased swelling in our hands or feet or shortness of breath while lying flat in bed.  Please call your doctor as soon as you notice any of these symptoms; do not wait until your next office visit.        The discharge information has been reviewed with the patient and daughter.  The patient and daughter verbalized understanding.  Discharge medications reviewed with the patient and daughter and appropriate educational materials and side effects teaching were provided.  ___________________________________________________________________________________________________________________________________

## 2024-10-21 ENCOUNTER — TELEPHONE (OUTPATIENT)
Facility: CLINIC | Age: 89
End: 2024-10-21

## 2024-10-21 ENCOUNTER — CARE COORDINATION (OUTPATIENT)
Facility: CLINIC | Age: 89
End: 2024-10-21

## 2024-10-21 DIAGNOSIS — R47.81 SLURRED SPEECH: Primary | ICD-10-CM

## 2024-10-21 PROCEDURE — 1111F DSCHRG MED/CURRENT MED MERGE: CPT | Performed by: INTERNAL MEDICINE

## 2024-10-21 NOTE — TELEPHONE ENCOUNTER
Pt daughter called in [facility] prescribed lipitor for Pt but daughter is unsure if she should give medication to Pt she is not sure if she is allergic to is and is req a callback from clinical.     Please advise.

## 2024-10-21 NOTE — TELEPHONE ENCOUNTER
Would not take the Lipitor 80 mg since she had muscle pain with it in the past at at 89 yrs would try to avoid this potential side effect.

## 2024-10-21 NOTE — TELEPHONE ENCOUNTER
Jesusita with [facility] will be giving care to pt    Pt was discharged from the hospital 10/20/2021    [facility] stated that there was cause for concern that the pt may have had a stroke but after hospitalization, it was ruled the pt did not have a stroke    [facility] - Jesusita - contact - [phone]

## 2024-10-21 NOTE — CARE COORDINATION
Care Transitions Note    Initial Call - Call within 2 business days of discharge: Yes    Patient Current Location:  [place]    Care Transition Nurse contacted the family, Ms. Kristine Chaudhary   by telephone to perform post hospital discharge assessment, verified name and  as identifiers. Provided introduction to self, and explanation of the Care Transition Nurse role.     Kristine Chaudhary noted to be  listed on patient's PHI form on file     Patient: Saranya Duarte    Patient : 1923   MRN: [id number]        Reason for Admission, per chart review:   - Aphasia   - Right sided weakness   - Right leg pain         Discharge Date: 10/20/24  RURS: No data recorded    Last Discharge Facility       Date Complaint Diagnosis Description Type Department Provider    10/18/24 Leg Pain; Aphasia; Extremity Weakness TIA (transient ischemic attack) ED to Hosp-Admission (Discharged) (ADMITTED) [id number] Shah MD; Anju...            Was this an external facility discharge? No    Additional needs identified to be addressed with provider   No needs identified             Method of communication with provider: none.    Patients top risk factors for readmission:   Patient over the age of 80     Interventions to address risk factors:   The Initial Care Transitions outreach was completed with patient's family member.    Care Summary Note:   Patient discharged from [facility] to home with home health.   Home Health Care agency:  Per chart review, [facility].     Family reports:   - Patient is feeling good   - Patient is ready to walk      Care Transition Nurse reviewed discharge instructions, medical action plan, and red flags with family. The family was given an opportunity to ask questions;  no questions at this time  . The family verbalized understanding.   Were discharge instructions available to patient? Yes.   Reviewed appropriate site of care based on symptoms and resources available to patient

## 2024-10-23 ENCOUNTER — OFFICE VISIT (OUTPATIENT)
Facility: CLINIC | Age: 89
End: 2024-10-23

## 2024-10-23 VITALS
HEIGHT: 60 IN | OXYGEN SATURATION: 100 % | SYSTOLIC BLOOD PRESSURE: 165 MMHG | TEMPERATURE: 97.4 F | BODY MASS INDEX: 27.09 KG/M2 | WEIGHT: 138 LBS | RESPIRATION RATE: 20 BRPM | DIASTOLIC BLOOD PRESSURE: 68 MMHG | HEART RATE: 59 BPM

## 2024-10-23 DIAGNOSIS — E78.2 MIXED HYPERLIPIDEMIA: ICD-10-CM

## 2024-10-23 DIAGNOSIS — N18.32 TYPE 2 DIABETES MELLITUS WITH STAGE 3B CHRONIC KIDNEY DISEASE, WITHOUT LONG-TERM CURRENT USE OF INSULIN (HCC): Primary | ICD-10-CM

## 2024-10-23 DIAGNOSIS — Z09 HOSPITAL DISCHARGE FOLLOW-UP: ICD-10-CM

## 2024-10-23 DIAGNOSIS — E11.22 TYPE 2 DIABETES MELLITUS WITH STAGE 3B CHRONIC KIDNEY DISEASE, WITHOUT LONG-TERM CURRENT USE OF INSULIN (HCC): Primary | ICD-10-CM

## 2024-10-23 DIAGNOSIS — I10 ESSENTIAL (PRIMARY) HYPERTENSION: ICD-10-CM

## 2024-10-23 NOTE — PROGRESS NOTES
Saranya Duarte presents today for   Chief Complaint   Patient presents with    Follow-Up from Hospital     MV           \"Have you been to the ER, urgent care clinic since your last visit?  Hospitalized since your last visit?\"    Yes, MV    “Have you seen or consulted any other health care providers outside of [facility] since your last visit?”    NO

## 2024-10-23 NOTE — PROGRESS NOTES
Post-Discharge Transitional Care  Follow Up      Saranya Duarte   YOB: 1923    Date of Office Visit:  10/23/2024  Date of Hospital Admission: 10/18/24  Date of Hospital Discharge: 10/20/24  Risk of hospital readmission (high >=14%. Medium >=10%) :No data recorded    Care management risk score Rising risk (score 2-5) and Complex Care (Scores >=6): No Risk Score On File     Non face to face  following discharge, date last encounter closed (first attempt may have been earlier): 10/21/2024    Call initiated 2 business days of discharge: Yes    ASSESSMENT/PLAN:     ICD-10-CM    1. Type 2 diabetes mellitus with stage 3b chronic kidney disease, without long-term current use of insulin (HCC)  E11.22 Controlled currently with diet    N18.32       2. Essential (primary) hypertension  I10 Uncontrolled.  Will continue Lopressor 25 mg twice daily, Norvasc 5 mg daily and can increase olmesartan from 20 to 40 mg to get SBP<140      3. Mixed hyperlipidemia  E78.2 Uncontrolled but at age 89 the potential risk of a statin causing myalgias, weakness and falls does not outweigh the benefit especially with  no evidence of stroke on MRI.  Patient's daughter is in agreement who is with her today      4. Hospital discharge follow-up  Z09 [place] DISCHARGE MEDS RECONCILED W/ CURRENT OUTPATIENT MED LIST           Medical Decision Making: moderate complexity  Return if symptoms worsen or fail to improve.           Subjective:   HPI:  Follow up of Hospital problems/diagnosis(es): Transient slurred speech, hypertension, diabetes, high cholesterol    Inpatient course: Discharge summary reviewed- see chart.    Interval history/Current status: Patient had an episode of slurred speech that lasted less than 5 minutes according to her daughter with associated severe pain in her right leg.  [facility] to [facility] emergency room for further evaluation she had a CT and eventually MRI of her brain which showed mild

## 2024-10-28 ENCOUNTER — CARE COORDINATION (OUTPATIENT)
Facility: CLINIC | Age: 89
End: 2024-10-28

## 2024-10-28 NOTE — CARE COORDINATION
Care Transitions Note    Follow Up Call     Patient Current Location:  Home: [address] [place] [address], [address]  [facility] [address]    [facility] Care Coordinator contacted the family, Kristine Chaudhary, (PHI form verified; on file) by telephone. Verified name and  as identifiers.    Additional needs identified to be addressed with provider   No needs identified                 Method of communication with provider: none.    Care Summary Note:   Spoke with patient's daughter Kristine Chaudhary.  Daughter states her mother is doing ok.  Daughter states she has not checked her mother's b/p as of yet.  Daughter states patient has HH.        Advance Care Planning:   Does patient have an Advance Directive: reviewed and current.    Medication Review:  Medications changed since last call, reviewed today.     Remote Patient Monitoring:  Offered patient enrollment in the Remote Patient Monitoring (RPM) program for in-home monitoring: Yes, but did not enroll at this time: family decline at this time . Daughter states she would like to follow regimen given by pcp but will consider RPM if b/p doesn't stabilize.     Assessments:  No changes since last call    Follow Up Appointment:   Reviewed upcoming appointment(s). and DAY[facility] appointment attended as scheduled   Future Appointments         Provider Specialty Dept Phone    2024 10:40 AM Rodrigo Ayala MD Cardiology [phone]    2024 10:15 AM [facility] LAB VISIT Internal Medicine [phone]    2024 9:20 AM Dax Humphreys MD Internal Medicine [phone]            [facility] Care Coordinator provided contact information.  Plan for follow-up call in 6-10 days based on severity of symptoms and risk factors.  Plan for next call: symptom management-how is b/p  Assess for any needs      Kristy Ballesteros LPN

## 2024-11-04 ENCOUNTER — CARE COORDINATION (OUTPATIENT)
Facility: CLINIC | Age: 89
End: 2024-11-04

## 2024-11-04 NOTE — CARE COORDINATION
Care Transitions Note    Follow Up Call     Patient Current Location:  [place]    Care Transition Nurse contacted the patient by telephone. Verified name and  as identifiers.    CTN spoke with Ms. Kristine Chaudhary, who is listed on the PHI form, on file.     Additional needs identified to be addressed with provider   No needs identified                 Method of communication with provider: none.    Care Summary Note:   Patient's family reports:   - Patient is doing good   - Blood pressure fluctuates, sometimes it is up and sometimes it is down.  - Home Health care staff members are coming out to visit with patient.       Plan of care updates since last contact:  Family member referred to the following resources as needed:   - Medical providers   - after hours providers   - home health care staff   - ED as needed        Medication Review:  Family member advises that she has been give instructions regarding patient's medication management.          Assessments:  No changes since last call  Family member reports patient is living with her, Ms. Kristine Chaudhary.      Follow Up Appointment:     Future Appointments         Provider Specialty Dept Phone    2024 10:40 AM Rodrigo Ayala MD Cardiology [phone]    2024 10:15 AM [facility] LAB VISIT Internal Medicine [phone]    2024 9:20 AM Dax Humphreys MD Internal Medicine [phone]            Care Transition Nurse provided contact information.  Plan for follow up in 6-10 days based on severity of symptoms and risk factors.  Plan for next call:   Follow up with condition of patient   Follow up with blood pressure   Assist/ offer referrals as needed       Roselyn Robin RN

## 2024-11-07 ENCOUNTER — OFFICE VISIT (OUTPATIENT)
Age: 89
End: 2024-11-07

## 2024-11-07 VITALS
WEIGHT: 132 LBS | BODY MASS INDEX: 25.91 KG/M2 | HEIGHT: 60 IN | HEART RATE: 40 BPM | DIASTOLIC BLOOD PRESSURE: 72 MMHG | OXYGEN SATURATION: 95 % | SYSTOLIC BLOOD PRESSURE: 132 MMHG

## 2024-11-07 DIAGNOSIS — R00.2 PALPITATIONS: Primary | ICD-10-CM

## 2024-11-07 DIAGNOSIS — I50.32 CHRONIC DIASTOLIC (CONGESTIVE) HEART FAILURE (HCC): ICD-10-CM

## 2024-11-07 DIAGNOSIS — I11.0 HYPERTENSIVE HEART DISEASE WITH HEART FAILURE (HCC): ICD-10-CM

## 2024-11-07 RX ORDER — BRINZOLAMIDE/BRIMONIDINE TARTRATE 10; 2 MG/ML; MG/ML
1 SUSPENSION/ DROPS OPHTHALMIC 2 TIMES DAILY
COMMUNITY
Start: 2024-10-09

## 2024-11-07 RX ORDER — TRAVOPROST OPHTHALMIC SOLUTION 0.04 MG/ML
1 SOLUTION OPHTHALMIC NIGHTLY
COMMUNITY
Start: 2024-10-03

## 2024-11-07 ASSESSMENT — PATIENT HEALTH QUESTIONNAIRE - PHQ9
SUM OF ALL RESPONSES TO PHQ QUESTIONS 1-9: 0
2. FEELING DOWN, DEPRESSED OR HOPELESS: NOT AT ALL
1. LITTLE INTEREST OR PLEASURE IN DOING THINGS: NOT AT ALL
SUM OF ALL RESPONSES TO PHQ9 QUESTIONS 1 & 2: 0

## 2024-11-07 NOTE — PROGRESS NOTES
Saranya Duarte presents today for   Chief Complaint   Patient presents with    Follow-up       Saranya Duarte preferred language for health care discussion is english/other.    Is someone accompanying this pt? no    Is the patient using any DME equipment during OV? no    Depression Screening:  Depression: Not at risk (11/7/2024)    PHQ-2     PHQ-2 Score: 0        Learning Assessment:  Who is the primary learner? Patient    What is the preferred language for health care of the primary learner? ENGLISH    How does the primary learner prefer to learn new concepts? DEMONSTRATION    Answered By patient    Relationship to Learner SELF           Pt currently taking Anticoagulant therapy? no    Pt currently taking Antiplatelet therapy ? aspirin      Coordination of Care:  1. Have you been to the ER, urgent care clinic since your last visit? Hospitalized since your last visit? no    2. Have you seen or consulted any other health care providers outside of the [facility] since your last visit? Include any pap smears or colon screening. no

## 2024-11-14 ENCOUNTER — CARE COORDINATION (OUTPATIENT)
Facility: CLINIC | Age: 89
End: 2024-11-14

## 2024-11-14 NOTE — CARE COORDINATION
Care Transitions Note    Follow Up Call     Patient Current Location:  Home: [address] [place] [address], [address]  [facility] [address]    [facility] Care Coordinator contacted the patient by telephone. Verified name and  as identifiers.    Additional needs identified to be addressed with provider   No needs identified                 Method of communication with provider: none.    Care Summary Note:   Spoke with patient's daughter.  Daughter states her mother is doing fine.  Daughter states patient's b/p is up and down.  Discussed the benefits of RPM.  HH is completed.  Patient followed up with cardiology on 24.      Advance Care Planning:   Does patient have an Advance Directive: reviewed during previous call, see note. .    Medication Review:  No changes since last call.       Assessments:  No changes since last call    Follow Up Appointment:   Reviewed upcoming appointment(s).  Future Appointments         Provider Specialty Dept Phone    2024 10:15 AM [facility] LAB VISIT Internal Medicine [phone]    2024 9:20 AM Dax Humphreys MD Internal Medicine [phone]    2025 11:20 AM Rodrigo Ayala MD Cardiology [phone]            LP[facility] Care Coordinator provided contact information.  Plan for follow-up call in 6-10 days based on severity of symptoms and risk factors.  Plan for next call:  final call      Kristy Ballesteros LPN

## 2024-11-19 NOTE — PROGRESS NOTES
Saranya is in the office today for cardiovascular reevaluation of her hypertensive heart disease/ diastolic dysfunction of the left ventricle and sinus bradycardia. She has a history of  hypertension, dyslipidemia, and  chronic diastolic heart failure.  She was followed by Dr. Cuellar through the years     She became very bradycardic on beta-blocker therapy in the past necessitating discontinuation of the medication.  She continues on the metoprolol at 25 mg twice daily.     She was hospitalized in [facility] in March of 2019 for evaluation of weakness in her legs. She was found to have  renal insufficiency with BUN/creatinine of 30 and 2.3.  She  had an echocardiogram that demonstrated  normal left ventricular systolic function with moderate left atrial enlargement and moderate pulmonary hypertension with an estimated pulmonary artery pressure of 50 mmHg.      She had a nuclear test completed on November 13, 2020 which demonstrated normal perfusion of the left ventricle with normal left ventricular function and an ejection fraction of 54%.     In the office 10/19/2021 she said that she was \"weak \". The weakness \"comes and goes \". .  She reported occasional shortness of breath.  She was particularly short of breath when she was tired.    She  had no peripheral swelling.     In the office on 6/15/2023, she once again reported recent palpitations which she described as an \"extra beat \"sometimes.  A Holter monitor was ordered and completed on 7/7/2023.  The average heart rate was 51 bpm.  Minimum was 33 maximum was 99 bpm.  There was no A-fib or flutter.  She had 1 short beat of SVT lasting 10 beats.  It was regular.  She did not list any abnormal symptoms in her diary.    Plan; in the office today, her blood pressure was 132/72.  She has had no chest pain or shortness of breath.  She has had no dizziness, near syncope or syncope. She has had no falls. She does try to stay physically

## 2024-11-20 ENCOUNTER — CARE COORDINATION (OUTPATIENT)
Facility: CLINIC | Age: 89
End: 2024-11-20

## 2024-11-20 NOTE — CARE COORDINATION
Care Transitions Note    Final Call     Patient Current Location:  [place]    Care Transition Nurse contacted the family, Kristine Chaudhary  by telephone. Verified name and  as identifiers.    Kristine Chaudhary, is listed on the PHI form on file.     Patient graduated from the Care Transitions program on 2024 .  Patient/family has the ability to self manage at this time..        Handoff:   Patient was not referred to the ACM team due to family declined services.     Care Summary Note:   Family reports:   - Patient is doing well.   - No needs at this time     CTN reviewed general medical action plan with family member to include:   - medical providers   - after hours providers   - ED as needed.     Assessments:  No changes since last call    Upcoming Appointments:    Future Appointments         Provider Specialty Dept Phone    2024 10:15 AM [facility] LAB VISIT Internal Medicine [phone]    2024 9:20 AM Dax Humphreys MD Internal Medicine [phone]    2025 11:20 AM Rodrigo Ayala MD Cardiology [phone]            Patient's family member  has agreed to contact primary care provider and/or specialist for any further questions, concerns, or needs.    Roselyn Robin RN

## 2024-12-06 ENCOUNTER — HOSPITAL ENCOUNTER (OUTPATIENT)
Facility: HOSPITAL | Age: 88
Setting detail: SPECIMEN
Discharge: HOME | End: 2024-12-09
Payer: MEDICARE

## 2024-12-06 DIAGNOSIS — E78.2 MIXED HYPERLIPIDEMIA: ICD-10-CM

## 2024-12-06 DIAGNOSIS — E11.22 TYPE 2 DIABETES MELLITUS WITH STAGE 3B CHRONIC KIDNEY DISEASE, WITHOUT LONG-TERM CURRENT USE OF INSULIN (HCC): ICD-10-CM

## 2024-12-06 DIAGNOSIS — I10 ESSENTIAL (PRIMARY) HYPERTENSION: ICD-10-CM

## 2024-12-06 DIAGNOSIS — N18.32 TYPE 2 DIABETES MELLITUS WITH STAGE 3B CHRONIC KIDNEY DISEASE, WITHOUT LONG-TERM CURRENT USE OF INSULIN (HCC): ICD-10-CM

## 2024-12-06 LAB
ALBUMIN SERPL-MCNC: 3.4 G/DL (ref 3.4–5)
ALBUMIN/GLOB SERPL: 1.1 (ref 0.8–1.7)
ALP SERPL-CCNC: 76 U/L (ref 45–117)
ALT SERPL-CCNC: 17 U/L (ref 13–56)
ANION GAP SERPL CALC-SCNC: 8 MMOL/L (ref 3–18)
AST SERPL-CCNC: 26 U/L (ref 10–38)
BILIRUB SERPL-MCNC: 0.5 MG/DL (ref 0.2–1)
BUN SERPL-MCNC: 23 MG/DL (ref 7–18)
BUN/CREAT SERPL: 15 (ref 12–20)
CALCIUM SERPL-MCNC: 10 MG/DL (ref 8.5–10.1)
CHLORIDE SERPL-SCNC: 109 MMOL/L (ref 100–111)
CHOLEST SERPL-MCNC: 297 MG/DL
CO2 SERPL-SCNC: 22 MMOL/L (ref 21–32)
CREAT SERPL-MCNC: 1.58 MG/DL (ref 0.6–1.3)
EST. AVERAGE GLUCOSE BLD GHB EST-MCNC: 143 MG/DL
GLOBULIN SER CALC-MCNC: 3.2 G/DL (ref 2–4)
GLUCOSE SERPL-MCNC: 143 MG/DL (ref 74–99)
HBA1C MFR BLD: 6.6 % (ref 4.2–5.6)
HDLC SERPL-MCNC: 70 MG/DL (ref 40–60)
HDLC SERPL: 4.2 (ref 0–5)
LDLC SERPL CALC-MCNC: 194.4 MG/DL (ref 0–100)
LIPID PANEL: ABNORMAL
POTASSIUM SERPL-SCNC: 4.6 MMOL/L (ref 3.5–5.5)
PROT SERPL-MCNC: 6.6 G/DL (ref 6.4–8.2)
SODIUM SERPL-SCNC: 139 MMOL/L (ref 136–145)
TRIGL SERPL-MCNC: 163 MG/DL
VLDLC SERPL CALC-MCNC: 32.6 MG/DL

## 2024-12-06 PROCEDURE — 83036 HEMOGLOBIN GLYCOSYLATED A1C: CPT

## 2024-12-06 PROCEDURE — 80053 COMPREHEN METABOLIC PANEL: CPT

## 2024-12-06 PROCEDURE — 80061 LIPID PANEL: CPT

## 2024-12-06 PROCEDURE — 36415 COLL VENOUS BLD VENIPUNCTURE: CPT

## 2024-12-13 ENCOUNTER — OFFICE VISIT (OUTPATIENT)
Facility: CLINIC | Age: 88
End: 2024-12-13

## 2024-12-13 VITALS
HEART RATE: 55 BPM | WEIGHT: 137 LBS | OXYGEN SATURATION: 98 % | RESPIRATION RATE: 20 BRPM | HEIGHT: 60 IN | DIASTOLIC BLOOD PRESSURE: 73 MMHG | TEMPERATURE: 97.8 F | BODY MASS INDEX: 26.9 KG/M2 | SYSTOLIC BLOOD PRESSURE: 170 MMHG

## 2024-12-13 DIAGNOSIS — I10 ESSENTIAL (PRIMARY) HYPERTENSION: ICD-10-CM

## 2024-12-13 DIAGNOSIS — E78.2 MIXED HYPERLIPIDEMIA: ICD-10-CM

## 2024-12-13 DIAGNOSIS — N18.32 TYPE 2 DIABETES MELLITUS WITH STAGE 3B CHRONIC KIDNEY DISEASE, WITHOUT LONG-TERM CURRENT USE OF INSULIN (HCC): ICD-10-CM

## 2024-12-13 DIAGNOSIS — I50.32 CHRONIC DIASTOLIC CONGESTIVE HEART FAILURE (HCC): ICD-10-CM

## 2024-12-13 DIAGNOSIS — Z00.00 MEDICARE ANNUAL WELLNESS VISIT, SUBSEQUENT: Primary | ICD-10-CM

## 2024-12-13 DIAGNOSIS — E11.22 TYPE 2 DIABETES MELLITUS WITH STAGE 3B CHRONIC KIDNEY DISEASE, WITHOUT LONG-TERM CURRENT USE OF INSULIN (HCC): ICD-10-CM

## 2024-12-13 PROBLEM — G45.9 TIA (TRANSIENT ISCHEMIC ATTACK): Status: RESOLVED | Noted: 2024-10-19 | Resolved: 2024-12-13

## 2024-12-13 RX ORDER — OLMESARTAN MEDOXOMIL 40 MG/1
40 TABLET ORAL DAILY
Qty: 90 TABLET | Refills: 3 | Status: SHIPPED | OUTPATIENT
Start: 2024-12-13

## 2024-12-13 ASSESSMENT — PATIENT HEALTH QUESTIONNAIRE - PHQ9
SUM OF ALL RESPONSES TO PHQ QUESTIONS 1-9: 0
SUM OF ALL RESPONSES TO PHQ QUESTIONS 1-9: 0
2. FEELING DOWN, DEPRESSED OR HOPELESS: NOT AT ALL
SUM OF ALL RESPONSES TO PHQ9 QUESTIONS 1 & 2: 0
SUM OF ALL RESPONSES TO PHQ QUESTIONS 1-9: 0
SUM OF ALL RESPONSES TO PHQ QUESTIONS 1-9: 0
1. LITTLE INTEREST OR PLEASURE IN DOING THINGS: NOT AT ALL

## 2024-12-13 ASSESSMENT — LIFESTYLE VARIABLES: HOW OFTEN DO YOU HAVE A DRINK CONTAINING ALCOHOL: NEVER

## 2024-12-13 NOTE — PATIENT INSTRUCTIONS
adult-strength or 2 to 4 low-dose aspirin. Wait for an ambulance. Do not try to drive yourself.  Watch closely for changes in your health, and be sure to contact your doctor if you have any problems.  Where can you learn more?  Go to https://www.Highlight.net/patientEd and enter F075 to learn more about \"A Healthy Heart: Care Instructions.\"  Current as of: June 24, 2023  Content Version: 14.2  © 2024 [employer].   Care instructions adapted under license by [employer]. If you have questions about a medical condition or this instruction, always ask your healthcare professional. [employer], [employer] disclaims any warranty or liability for your use of this information.      Personalized Preventive Plan for Saranya Duarte - 12/13/2024  Medicare offers a range of preventive health benefits. Some of the tests and screenings are paid in full while other may be subject to a deductible, co-insurance, and/or copay.    Some of these benefits include a comprehensive review of your medical history including lifestyle, illnesses that may run in your family, and various assessments and screenings as appropriate.    After reviewing your medical record and screening and assessments performed today your provider may have ordered immunizations, labs, imaging, and/or referrals for you.  A list of these orders (if applicable) as well as your Preventive Care list are included within your After Visit Summary for your review.    Other Preventive Recommendations:    A preventive eye exam performed by an eye specialist is recommended every 1-2 years to screen for glaucoma; cataracts, macular degeneration, and other eye disorders.  A preventive dental visit is recommended every 6 months.  Try to get at least 150 minutes of exercise per week or 10,000 steps per day on a pedometer .  Order or download the FREE \"Exercise & Physical Activity: Your Everyday Guide\" from [employer]. Call 1-297.418.3271 or

## 2024-12-13 NOTE — PROGRESS NOTES
Medicare Annual Wellness Visit    Saranya Duarte is here for Diabetes, Hypertension, Cholesterol Problem (4 month follow up ), and Medicare AWV    Assessment & Plan   Medicare annual wellness visit, subsequent  Type 2 diabetes mellitus with stage 3b chronic kidney disease, without long-term current use of insulin (HCC)  -     CBC with Auto Differential; Future  -     Hemoglobin A1C; Future  Essential (primary) hypertension  -     Comprehensive Metabolic Panel; Future  Mixed hyperlipidemia  -     Lipid Panel; Future  Chronic diastolic congestive heart failure (HCC)  Recommendations for Preventive Services Due: see orders and patient instructions/AVS.  Recommended screening schedule for the next 5-10 years is provided to the patient in written form: see Patient Instructions/AVS.     Return in about 4 months (around 4/13/2025) for labs 1 week before.     Subjective       Patient's complete Health Risk Assessment and screening values have been reviewed and are found in Flowsheets. The following problems were reviewed today and where indicated follow up appointments were made and/or referrals ordered.    Positive Risk Factor Screenings with Interventions:              Inactivity:  On average, how many days per week do you engage in moderate to strenuous exercise (like a brisk walk)?: 0 days (!) Abnormal  On average, how many minutes do you engage in exercise at this level?: 0 min  Interventions:  Patient declined any further interventions or treatment      Dentist Screen:  Have you seen the dentist within the past year?: (!) No    Intervention:  Advised to schedule with their dentist                      Objective   Vitals:    12/13/24 0931 12/13/24 0936   BP: (!) 150/59 (!) 170/73   Site: Left Upper Arm Right Upper Arm   Position: Sitting Sitting   Cuff Size: Large Adult Large Adult   Pulse: 55 55   Resp: 20    Temp: 97.8 °F (36.6 °C)    TempSrc: Temporal    SpO2: 98%    Weight: 62.1 kg (137 lb)    Height: 1.524 m (5') 
Saranya Duarte presents today for   Chief Complaint   Patient presents with    Diabetes    Hypertension    Cholesterol Problem     4 month follow up     Medicare AWV           \"Have you been to the ER, urgent care clinic since your last visit?  Hospitalized since your last visit?\"    NO    “Have you seen or consulted any other health care providers outside of [facility] since your last visit?”    Yes, Dr. Pierson.             
12/06/2024 10:25 AM    BILITOT 0.5 12/06/2024 10:25 AM    ALKPHOS 76 12/06/2024 10:25 AM    ALKPHOS 72 11/14/2022 11:22 AM    AST 26 12/06/2024 10:25 AM    ALT 17 12/06/2024 10:25 AM     HgBA1c:    Lab Results   Component Value Date/Time    LABA1C 6.6 12/06/2024 10:25 AM     FLP:    Lab Results   Component Value Date/Time    TRIG 163 12/06/2024 10:25 AM    HDL 70 12/06/2024 10:25 AM     TSH:    Lab Results   Component Value Date/Time    TSH 1.08 10/19/2024 01:45 AM         Assessment / Plan     Diabetes well controlled, off medications. Will continue to monitor  Hypertension  uncontrolled, on current meds, concerned about compliance, patient daughter to monitor blood pressure.  Will go ahead and increase to taking Benicar 40 mg daily on a regular basis and continue Norvasc 5 mg daily as well as Lopressor 25 mg twice a day  Hyperlipidemia  uncontrolled off Pravachol which caused myalgias.    ICD-10-CM        ICD-10-CM    1. Medicare annual wellness visit, subsequent  Z00.00       2. Type 2 diabetes mellitus with stage 3b chronic kidney disease, without long-term current use of insulin (HCC)  E11.22 CBC with Auto Differential    N18.32 Hemoglobin A1C      3. Essential (primary) hypertension  I10 Comprehensive Metabolic Panel      4. Mixed hyperlipidemia  E78.2 Lipid Panel      5. Chronic diastolic congestive heart failure (HCC)  I50.32 Currently stable patient followed by cardiology.  Patient doing very well for her age at 89                  Diabetic issues reviewed with her: diabetic diet discussed in detail, and low cholesterol diet, weight control and daily exercise discussed.     Return in about 4 months (around 4/13/2025) for labs 1 week before.          Reviewed plan of care. Patient has provided input and agrees with goals.

## 2025-01-06 ENCOUNTER — TELEPHONE (OUTPATIENT)
Facility: CLINIC | Age: 89
End: 2025-01-06

## 2025-01-06 RX ORDER — METOPROLOL TARTRATE 25 MG/1
25 TABLET, FILM COATED ORAL 2 TIMES DAILY
Qty: 180 TABLET | Refills: 3 | Status: SHIPPED | OUTPATIENT
Start: 2025-01-06

## 2025-01-06 NOTE — TELEPHONE ENCOUNTER
Pt's daughter called the pharmacy for a refill of metoprolol tartrate (LOPRESSOR) 25 MG tablet.   I told daughter that pt should have refills on it. Pt stated that [facility] told her she doesn't have refills. Please review and send new script if needed to   [facility]/pharmacy #[id number] - [place], [place] - [address] [phone]

## 2025-04-08 ENCOUNTER — HOSPITAL ENCOUNTER (OUTPATIENT)
Facility: HOSPITAL | Age: 89
Setting detail: SPECIMEN
Discharge: HOME | End: 2025-04-11
Payer: MEDICARE

## 2025-04-08 DIAGNOSIS — I10 ESSENTIAL (PRIMARY) HYPERTENSION: ICD-10-CM

## 2025-04-08 DIAGNOSIS — E11.22 TYPE 2 DIABETES MELLITUS WITH STAGE 3B CHRONIC KIDNEY DISEASE, WITHOUT LONG-TERM CURRENT USE OF INSULIN (HCC): ICD-10-CM

## 2025-04-08 DIAGNOSIS — N18.32 TYPE 2 DIABETES MELLITUS WITH STAGE 3B CHRONIC KIDNEY DISEASE, WITHOUT LONG-TERM CURRENT USE OF INSULIN (HCC): ICD-10-CM

## 2025-04-08 DIAGNOSIS — E78.2 MIXED HYPERLIPIDEMIA: ICD-10-CM

## 2025-04-08 LAB
ALBUMIN SERPL-MCNC: 3.2 G/DL (ref 3.4–5)
ALBUMIN/GLOB SERPL: 1 (ref 0.8–1.7)
ALP SERPL-CCNC: 81 U/L (ref 45–117)
ALT SERPL-CCNC: 16 U/L (ref 13–56)
ANION GAP SERPL CALC-SCNC: 6 MMOL/L (ref 3–18)
AST SERPL-CCNC: 20 U/L (ref 10–38)
BASOPHILS # BLD: 0.04 K/UL (ref 0–0.1)
BASOPHILS NFR BLD: 0.8 % (ref 0–2)
BILIRUB SERPL-MCNC: 0.4 MG/DL (ref 0.2–1)
BUN SERPL-MCNC: 26 MG/DL (ref 7–18)
BUN/CREAT SERPL: 15 (ref 12–20)
CALCIUM SERPL-MCNC: 10 MG/DL (ref 8.5–10.1)
CHLORIDE SERPL-SCNC: 106 MMOL/L (ref 100–111)
CHOLEST SERPL-MCNC: 307 MG/DL
CO2 SERPL-SCNC: 26 MMOL/L (ref 21–32)
CREAT SERPL-MCNC: 1.69 MG/DL (ref 0.6–1.3)
DIFFERENTIAL METHOD BLD: ABNORMAL
EOSINOPHIL # BLD: 0.08 K/UL (ref 0–0.4)
EOSINOPHIL NFR BLD: 1.6 % (ref 0–5)
ERYTHROCYTE [DISTWIDTH] IN BLOOD BY AUTOMATED COUNT: 13.7 % (ref 11.6–14.5)
EST. AVERAGE GLUCOSE BLD GHB EST-MCNC: 140 MG/DL
GLOBULIN SER CALC-MCNC: 3.2 G/DL (ref 2–4)
GLUCOSE SERPL-MCNC: 137 MG/DL (ref 74–99)
HBA1C MFR BLD: 6.5 % (ref 4.2–5.6)
HCT VFR BLD AUTO: 37 % (ref 35–45)
HDLC SERPL-MCNC: 64 MG/DL (ref 40–60)
HDLC SERPL: 4.8 (ref 0–5)
HGB BLD-MCNC: 11.5 G/DL (ref 12–16)
IMM GRANULOCYTES # BLD AUTO: 0.02 K/UL (ref 0–0.04)
IMM GRANULOCYTES NFR BLD AUTO: 0.4 % (ref 0–0.5)
LDLC SERPL CALC-MCNC: 208 MG/DL (ref 0–100)
LIPID PANEL: ABNORMAL
LYMPHOCYTES # BLD: 1.8 K/UL (ref 0.9–3.6)
LYMPHOCYTES NFR BLD: 35.2 % (ref 21–52)
MCH RBC QN AUTO: 29.3 PG (ref 24–34)
MCHC RBC AUTO-ENTMCNC: 31.1 G/DL (ref 31–37)
MCV RBC AUTO: 94.1 FL (ref 78–100)
MONOCYTES # BLD: 0.34 K/UL (ref 0.05–1.2)
MONOCYTES NFR BLD: 6.7 % (ref 3–10)
NEUTS SEG # BLD: 2.83 K/UL (ref 1.8–8)
NEUTS SEG NFR BLD: 55.3 % (ref 40–73)
NRBC # BLD: 0 K/UL (ref 0–0.01)
NRBC BLD-RTO: 0 PER 100 WBC
PLATELET # BLD AUTO: 273 K/UL (ref 135–420)
PMV BLD AUTO: 10.3 FL (ref 9.2–11.8)
POTASSIUM SERPL-SCNC: 4.4 MMOL/L (ref 3.5–5.5)
PROT SERPL-MCNC: 6.4 G/DL (ref 6.4–8.2)
RBC # BLD AUTO: 3.93 M/UL (ref 4.2–5.3)
SODIUM SERPL-SCNC: 138 MMOL/L (ref 136–145)
TRIGL SERPL-MCNC: 175 MG/DL
VLDLC SERPL CALC-MCNC: 35 MG/DL
WBC # BLD AUTO: 5.1 K/UL (ref 4.6–13.2)

## 2025-04-08 PROCEDURE — 80053 COMPREHEN METABOLIC PANEL: CPT

## 2025-04-08 PROCEDURE — 36415 COLL VENOUS BLD VENIPUNCTURE: CPT

## 2025-04-08 PROCEDURE — 85025 COMPLETE CBC W/AUTO DIFF WBC: CPT

## 2025-04-08 PROCEDURE — 80061 LIPID PANEL: CPT

## 2025-04-08 PROCEDURE — 83036 HEMOGLOBIN GLYCOSYLATED A1C: CPT

## 2025-04-15 ENCOUNTER — OFFICE VISIT (OUTPATIENT)
Facility: CLINIC | Age: 89
End: 2025-04-15
Payer: MEDICARE

## 2025-04-15 VITALS
HEART RATE: 4 BPM | WEIGHT: 137 LBS | RESPIRATION RATE: 18 BRPM | TEMPERATURE: 97.4 F | SYSTOLIC BLOOD PRESSURE: 160 MMHG | BODY MASS INDEX: 26.9 KG/M2 | DIASTOLIC BLOOD PRESSURE: 60 MMHG | HEIGHT: 60 IN | OXYGEN SATURATION: 97 %

## 2025-04-15 DIAGNOSIS — E78.2 MIXED HYPERLIPIDEMIA: ICD-10-CM

## 2025-04-15 DIAGNOSIS — F02.818 DEMENTIA IN OTHER DISEASES CLASSIFIED ELSEWHERE, UNSPECIFIED SEVERITY, WITH OTHER BEHAVIORAL DISTURBANCE (HCC): ICD-10-CM

## 2025-04-15 DIAGNOSIS — I10 ESSENTIAL (PRIMARY) HYPERTENSION: ICD-10-CM

## 2025-04-15 DIAGNOSIS — E11.22 TYPE 2 DIABETES MELLITUS WITH STAGE 3B CHRONIC KIDNEY DISEASE, WITHOUT LONG-TERM CURRENT USE OF INSULIN (HCC): Primary | ICD-10-CM

## 2025-04-15 DIAGNOSIS — N18.32 TYPE 2 DIABETES MELLITUS WITH STAGE 3B CHRONIC KIDNEY DISEASE, WITHOUT LONG-TERM CURRENT USE OF INSULIN (HCC): Primary | ICD-10-CM

## 2025-04-15 DIAGNOSIS — I50.32 CHRONIC DIASTOLIC CONGESTIVE HEART FAILURE (HCC): ICD-10-CM

## 2025-04-15 PROCEDURE — 99214 OFFICE O/P EST MOD 30 MIN: CPT | Performed by: INTERNAL MEDICINE

## 2025-04-15 PROCEDURE — 1123F ACP DISCUSS/DSCN MKR DOCD: CPT | Performed by: INTERNAL MEDICINE

## 2025-04-15 PROCEDURE — G8427 DOCREV CUR MEDS BY ELIG CLIN: HCPCS | Performed by: INTERNAL MEDICINE

## 2025-04-15 PROCEDURE — 1036F TOBACCO NON-USER: CPT | Performed by: INTERNAL MEDICINE

## 2025-04-15 PROCEDURE — 1090F PRES/ABSN URINE INCON ASSESS: CPT | Performed by: INTERNAL MEDICINE

## 2025-04-15 PROCEDURE — G8417 CALC BMI ABV UP PARAM F/U: HCPCS | Performed by: INTERNAL MEDICINE

## 2025-04-15 PROCEDURE — 1126F AMNT PAIN NOTED NONE PRSNT: CPT | Performed by: INTERNAL MEDICINE

## 2025-04-15 PROCEDURE — 1160F RVW MEDS BY RX/DR IN RCRD: CPT | Performed by: INTERNAL MEDICINE

## 2025-04-15 PROCEDURE — 3044F HG A1C LEVEL LT 7.0%: CPT | Performed by: INTERNAL MEDICINE

## 2025-04-15 PROCEDURE — 1159F MED LIST DOCD IN RCRD: CPT | Performed by: INTERNAL MEDICINE

## 2025-04-15 ASSESSMENT — PATIENT HEALTH QUESTIONNAIRE - PHQ9
SUM OF ALL RESPONSES TO PHQ QUESTIONS 1-9: 0
2. FEELING DOWN, DEPRESSED OR HOPELESS: NOT AT ALL
SUM OF ALL RESPONSES TO PHQ QUESTIONS 1-9: 0
SUM OF ALL RESPONSES TO PHQ QUESTIONS 1-9: 0
1. LITTLE INTEREST OR PLEASURE IN DOING THINGS: NOT AT ALL
SUM OF ALL RESPONSES TO PHQ QUESTIONS 1-9: 0

## 2025-04-15 NOTE — PROGRESS NOTES
Subjective:       Chief Complaint  The patient presents for follow up of diabetes, hypertension and high cholesterol.        HPI  Saranya Duarte is a 89 y.o.. female seen for follow up of diabetes.   Shealso has hypertension and hyperlipidemia. Diabetes well  controlled off medications.,  hypertension  uncontrolled , no significant medication side effects noted, on current meds, which include Benicar 40 mg    Norvasc 5 mg, and Lopressor 25 mg BID ,   compliance may be an issue with this 89-year-old lady.  Hyperlipidemia, uncontrolled off Pravachol 40 mg which caused her to have myalgias.  Would not purse any further treatment at age 89.  Patient has some mild dehydration and will try and increase her water intake.      Diet and Lifestyle: generally follows a low fat low cholesterol diet, follows a diabetic diet regularly, sedentary    Home BP Monitoring: is not monitored at home on a regular basis    Diabetic Review of Systems - home glucose monitoring: is well controlled at home when she takes it 1x/day    Other symptoms and concerns: Pt's CKD stage 3b is stable on current meds.    Patient is also followed by cardiology for hypertensive heart disease.      Pt has chronic diastolic heart failure that is followed by cardiology and controlled on current meds.     Pt has generalized arthritis for which she uses tylenol  with mild improvement.     Patient also has mild to moderate dementia with her being confused at times as well as having a lot of paranoid thoughts.  Patient is now living with her daughter.    Current Outpatient Medications   Medication Sig    metoprolol tartrate (LOPRESSOR) 25 MG tablet Take 1 tablet by mouth 2 times daily    olmesartan (BENICAR) 40 MG tablet Take 1 tablet by mouth daily    Travoprost, TOYIN Free, (TRAVATAN Z) 0.004 % SOLN ophthalmic solution Place 1 drop into both eyes at bedtime    SIMBRINZA 1-0.2 % SUSP Place 1 drop into both eyes in the morning and at bedtime    aspirin 81 MG

## 2025-04-15 NOTE — PROGRESS NOTES
Saranya Duarte presents today for   Chief Complaint   Patient presents with    Diabetes    Hypertension    Cholesterol Problem    Congestive Heart Failure     4 month follow up     Dizziness           \"Have you been to the ER, urgent care clinic since your last visit?  Hospitalized since your last visit?\"    No    “Have you seen or consulted any other health care providers outside of [facility] since your last visit?”    NO

## 2025-05-08 ENCOUNTER — OFFICE VISIT (OUTPATIENT)
Age: 89
End: 2025-05-08
Payer: MEDICARE

## 2025-05-08 VITALS
SYSTOLIC BLOOD PRESSURE: 146 MMHG | HEIGHT: 60 IN | BODY MASS INDEX: 26.9 KG/M2 | WEIGHT: 137 LBS | DIASTOLIC BLOOD PRESSURE: 52 MMHG | OXYGEN SATURATION: 94 % | HEART RATE: 43 BPM

## 2025-05-08 DIAGNOSIS — R00.2 PALPITATIONS: ICD-10-CM

## 2025-05-08 DIAGNOSIS — I11.0 HYPERTENSIVE HEART DISEASE WITH HEART FAILURE (HCC): ICD-10-CM

## 2025-05-08 DIAGNOSIS — I50.32 CHRONIC DIASTOLIC (CONGESTIVE) HEART FAILURE (HCC): Primary | ICD-10-CM

## 2025-05-08 PROCEDURE — 93000 ELECTROCARDIOGRAM COMPLETE: CPT | Performed by: INTERNAL MEDICINE

## 2025-05-08 PROCEDURE — 1036F TOBACCO NON-USER: CPT | Performed by: INTERNAL MEDICINE

## 2025-05-08 PROCEDURE — 1090F PRES/ABSN URINE INCON ASSESS: CPT | Performed by: INTERNAL MEDICINE

## 2025-05-08 PROCEDURE — 1123F ACP DISCUSS/DSCN MKR DOCD: CPT | Performed by: INTERNAL MEDICINE

## 2025-05-08 PROCEDURE — 1126F AMNT PAIN NOTED NONE PRSNT: CPT | Performed by: INTERNAL MEDICINE

## 2025-05-08 PROCEDURE — G8427 DOCREV CUR MEDS BY ELIG CLIN: HCPCS | Performed by: INTERNAL MEDICINE

## 2025-05-08 PROCEDURE — 99214 OFFICE O/P EST MOD 30 MIN: CPT | Performed by: INTERNAL MEDICINE

## 2025-05-08 PROCEDURE — G8417 CALC BMI ABV UP PARAM F/U: HCPCS | Performed by: INTERNAL MEDICINE

## 2025-05-08 RX ORDER — AMLODIPINE BESYLATE 10 MG/1
10 TABLET ORAL DAILY
COMMUNITY
End: 2025-05-08 | Stop reason: SDUPTHER

## 2025-05-08 ASSESSMENT — PATIENT HEALTH QUESTIONNAIRE - PHQ9
SUM OF ALL RESPONSES TO PHQ QUESTIONS 1-9: 0
2. FEELING DOWN, DEPRESSED OR HOPELESS: NOT AT ALL
SUM OF ALL RESPONSES TO PHQ QUESTIONS 1-9: 0
1. LITTLE INTEREST OR PLEASURE IN DOING THINGS: NOT AT ALL

## 2025-05-08 NOTE — PATIENT INSTRUCTIONS
Medication Decreasing :  Metoprolol Tartrate (Lopressor) 12.5 mg - Take 1/2 of the 25 mg tablet by mouth twice a day    Medication Increasing :  Amlodipine (Norvasc) 10 mg - Take 1 tablet by mouth once a day

## 2025-05-11 RX ORDER — AMLODIPINE BESYLATE 10 MG/1
10 TABLET ORAL DAILY
Qty: 90 TABLET | Refills: 3 | Status: SHIPPED | OUTPATIENT
Start: 2025-05-11

## 2025-05-15 NOTE — PROGRESS NOTES
Saranya Duarte presents today for   Chief Complaint   Patient presents with    Follow-up       Saranya Duarte preferred language for health care discussion is english/other.    Is someone accompanying this pt? no    Is the patient using any DME equipment during OV? no    Depression Screening:  Depression: Not at risk (4/15/2025)    PHQ-2     PHQ-2 Score: 0        Learning Assessment:  Who is the primary learner? Patient    What is the preferred language for health care of the primary learner? ENGLISH    How does the primary learner prefer to learn new concepts? DEMONSTRATION    Answered By patient    Relationship to Learner SELF           Pt currently taking Anticoagulant therapy? no    Pt currently taking Antiplatelet therapy ? aspirin      Coordination of Care:  1. Have you been to the ER, urgent care clinic since your last visit? Hospitalized since your last visit? no    2. Have you seen or consulted any other health care providers outside of the [facility] since your last visit? Include any pap smears or colon screening. no    
ventricular systolic function. EF by visual approximation is 60%. Left ventricle size is normal. Increased wall thickness. Normal wall motion. Grade I diastolic dysfunction with normal LAP.    Aortic Valve: Trileaflet valve. Thickened cusps.    Pericardium: Pericardial effusion present. No indication of cardiac tamponade.    Image quality is adequate. Procedure performed with the patient in a supine position.    Signed by: Con South Sr., MD on 10/19/2024  6:52 PM    11/13/20 11/13/2020  5:24 PM, 11/13/2020 12:00 AM (Final)    Narrative  This is a summary report. The complete report is available in the patient's medical record. If you cannot access the medical record, please contact the sending organization for a detailed fax or copy.    · Left ventricular perfusion is normal.  · Gated SPECT: Left ventricular function post-stress was normal. Calculated ejection fraction is 54%. There is no evidence of transient ischemic dilation (TID).The TID ratio is 0.8.  · Myocardial perfusion imaging supports a low risk stress test.    Signed by: Elliot Matthew MD on 11/13/2020  5:24 PM, Signed by: Unknown Provider Result on 11/13/2020 12:00 AM

## 2025-07-10 ENCOUNTER — OFFICE VISIT (OUTPATIENT)
Age: 89
End: 2025-07-10
Payer: MEDICARE

## 2025-07-10 VITALS
WEIGHT: 136 LBS | HEART RATE: 69 BPM | DIASTOLIC BLOOD PRESSURE: 72 MMHG | HEIGHT: 60 IN | SYSTOLIC BLOOD PRESSURE: 148 MMHG | OXYGEN SATURATION: 95 % | BODY MASS INDEX: 26.7 KG/M2

## 2025-07-10 DIAGNOSIS — I10 PRIMARY HYPERTENSION: Primary | ICD-10-CM

## 2025-07-10 PROCEDURE — 1036F TOBACCO NON-USER: CPT | Performed by: INTERNAL MEDICINE

## 2025-07-10 PROCEDURE — 1090F PRES/ABSN URINE INCON ASSESS: CPT | Performed by: INTERNAL MEDICINE

## 2025-07-10 PROCEDURE — G8427 DOCREV CUR MEDS BY ELIG CLIN: HCPCS | Performed by: INTERNAL MEDICINE

## 2025-07-10 PROCEDURE — G8417 CALC BMI ABV UP PARAM F/U: HCPCS | Performed by: INTERNAL MEDICINE

## 2025-07-10 PROCEDURE — 1126F AMNT PAIN NOTED NONE PRSNT: CPT | Performed by: INTERNAL MEDICINE

## 2025-07-10 PROCEDURE — 1123F ACP DISCUSS/DSCN MKR DOCD: CPT | Performed by: INTERNAL MEDICINE

## 2025-07-10 PROCEDURE — 99214 OFFICE O/P EST MOD 30 MIN: CPT | Performed by: INTERNAL MEDICINE

## 2025-07-10 ASSESSMENT — PATIENT HEALTH QUESTIONNAIRE - PHQ9
1. LITTLE INTEREST OR PLEASURE IN DOING THINGS: NOT AT ALL
2. FEELING DOWN, DEPRESSED OR HOPELESS: NOT AT ALL
SUM OF ALL RESPONSES TO PHQ QUESTIONS 1-9: 0

## 2025-07-10 NOTE — PROGRESS NOTES
Saranya Duarte presents today for   Chief Complaint   Patient presents with    Follow-up       Saranya Duarte preferred language for health care discussion is english/other.    Is someone accompanying this pt? no    Is the patient using any DME equipment during OV? no    Depression Screening:  Depression: Not at risk (7/10/2025)    PHQ-2     PHQ-2 Score: 0        Learning Assessment:  Who is the primary learner? Patient    What is the preferred language for health care of the primary learner? ENGLISH    How does the primary learner prefer to learn new concepts? DEMONSTRATION    Answered By patient    Relationship to Learner SELF           Pt currently taking Anticoagulant therapy? no    Pt currently taking Antiplatelet therapy ? aspirin      Coordination of Care:  1. Have you been to the ER, urgent care clinic since your last visit? Hospitalized since your last visit? no    2. Have you seen or consulted any other health care providers outside of the [facility] since your last visit? Include any pap smears or colon screening. no

## 2025-07-18 NOTE — PROGRESS NOTES
[facility] Follow-Up Note    Assessment/Plan:     Assessment & Plan  1. Right shoulder pain:  - Continue taking Tylenol Arthritis daily with food.  - Apply Salonpas cream containing lidocaine on the shoulder and neck.  - Use Voltaren as an alternative topical treatment.  - Apply moist heat or use a heating pad on the affected area.  - Consider changing the pillow or using an additional pillow for better support.    2. Blood pressure management:  - Blood pressure has improved significantly.  Patient is presently taking amlodipine 10 mg daily, metoprolol as previously prescribed and Benicar 40 mg daily  - Continue taking metoprolol, half a pill in the morning and one pill at night.  (25 mg)    3.  Elevated hemoglobin A1c, 6.5, she will be following up with Dr. Humphreys    History of Present Illness  The patient presents for blood pressure management and right shoulder discomfort.    She reports experiencing pain in the right shoulder, which she suspects might be due to arthritis. The pain extends down the arm but does not cause any weakness. The pain is intermittent and worsens with movement. She has been managing the pain with Tylenol Arthritis, which provides some relief. Additionally, she uses a cream prescribed by another doctor for itching, which she finds helpful. She previously used Voltaren for leg issues, which have since resolved, and is considering resuming the use of Voltaren for her shoulder pain. She occasionally applies a heating pad to the affected area.    Her blood pressure has improved. She is currently taking half a 25 mg tablet of metoprolol in the morning and one full tablet at night in addition to her other antihypertensives.     Current Outpatient Medications   Medication Sig Dispense Refill    amLODIPine (NORVASC) 10 MG tablet Take 1 tablet by mouth daily 90 tablet 3    metoprolol tartrate (LOPRESSOR) 25 MG tablet Take 1 tablet by mouth 2 times daily (Patient taking differently:

## 2025-08-19 ENCOUNTER — HOSPITAL ENCOUNTER (OUTPATIENT)
Facility: HOSPITAL | Age: 89
Setting detail: SPECIMEN
Discharge: HOME | End: 2025-08-22
Payer: MEDICARE

## 2025-08-19 DIAGNOSIS — I10 ESSENTIAL (PRIMARY) HYPERTENSION: ICD-10-CM

## 2025-08-19 DIAGNOSIS — N18.32 TYPE 2 DIABETES MELLITUS WITH STAGE 3B CHRONIC KIDNEY DISEASE, WITHOUT LONG-TERM CURRENT USE OF INSULIN (HCC): ICD-10-CM

## 2025-08-19 DIAGNOSIS — E11.22 TYPE 2 DIABETES MELLITUS WITH STAGE 3B CHRONIC KIDNEY DISEASE, WITHOUT LONG-TERM CURRENT USE OF INSULIN (HCC): ICD-10-CM

## 2025-08-19 LAB
ALBUMIN SERPL-MCNC: 3.4 G/DL (ref 3.4–5)
ALBUMIN/GLOB SERPL: 1.2 (ref 0.8–1.7)
ALP SERPL-CCNC: 77 U/L (ref 45–117)
ALT SERPL-CCNC: 12 U/L (ref 10–35)
ANION GAP SERPL CALC-SCNC: 12 MMOL/L (ref 3–18)
AST SERPL-CCNC: 24 U/L (ref 10–38)
BASOPHILS # BLD: 0.04 K/UL (ref 0–0.1)
BASOPHILS NFR BLD: 0.8 % (ref 0–2)
BILIRUB SERPL-MCNC: 0.4 MG/DL (ref 0.2–1)
BUN SERPL-MCNC: 16 MG/DL (ref 6–23)
BUN/CREAT SERPL: 10 (ref 12–20)
CALCIUM SERPL-MCNC: 9.8 MG/DL (ref 8.5–10.1)
CHLORIDE SERPL-SCNC: 106 MMOL/L (ref 98–107)
CO2 SERPL-SCNC: 24 MMOL/L (ref 21–32)
CREAT SERPL-MCNC: 1.61 MG/DL (ref 0.6–1.3)
DIFFERENTIAL METHOD BLD: ABNORMAL
EOSINOPHIL # BLD: 0.06 K/UL (ref 0–0.4)
EOSINOPHIL NFR BLD: 1.2 % (ref 0–5)
ERYTHROCYTE [DISTWIDTH] IN BLOOD BY AUTOMATED COUNT: 14.2 % (ref 11.6–14.5)
EST. AVERAGE GLUCOSE BLD GHB EST-MCNC: 141 MG/DL
GLOBULIN SER CALC-MCNC: 2.8 G/DL (ref 2–4)
GLUCOSE SERPL-MCNC: 126 MG/DL (ref 74–108)
HBA1C MFR BLD: 6.5 % (ref 4.2–5.6)
HCT VFR BLD AUTO: 35.3 % (ref 35–45)
HGB BLD-MCNC: 11 G/DL (ref 12–16)
IMM GRANULOCYTES # BLD AUTO: 0.01 K/UL (ref 0–0.04)
IMM GRANULOCYTES NFR BLD AUTO: 0.2 % (ref 0–0.5)
LYMPHOCYTES # BLD: 1.42 K/UL (ref 0.9–3.6)
LYMPHOCYTES NFR BLD: 28.6 % (ref 21–52)
MCH RBC QN AUTO: 28.2 PG (ref 24–34)
MCHC RBC AUTO-ENTMCNC: 31.2 G/DL (ref 31–37)
MCV RBC AUTO: 90.5 FL (ref 78–100)
MONOCYTES # BLD: 0.29 K/UL (ref 0.05–1.2)
MONOCYTES NFR BLD: 5.8 % (ref 3–10)
NEUTS SEG # BLD: 3.15 K/UL (ref 1.8–8)
NEUTS SEG NFR BLD: 63.4 % (ref 40–73)
NRBC # BLD: 0 K/UL (ref 0–0.01)
NRBC BLD-RTO: 0 PER 100 WBC
PLATELET # BLD AUTO: 296 K/UL (ref 135–420)
PMV BLD AUTO: 9.5 FL (ref 9.2–11.8)
POTASSIUM SERPL-SCNC: 4.6 MMOL/L (ref 3.5–5.5)
PROT SERPL-MCNC: 6.2 G/DL (ref 6.4–8.2)
RBC # BLD AUTO: 3.9 M/UL (ref 4.2–5.3)
SODIUM SERPL-SCNC: 142 MMOL/L (ref 136–145)
WBC # BLD AUTO: 5 K/UL (ref 4.6–13.2)

## 2025-08-19 PROCEDURE — 85025 COMPLETE CBC W/AUTO DIFF WBC: CPT

## 2025-08-19 PROCEDURE — 80053 COMPREHEN METABOLIC PANEL: CPT

## 2025-08-19 PROCEDURE — 36415 COLL VENOUS BLD VENIPUNCTURE: CPT

## 2025-08-19 PROCEDURE — 83036 HEMOGLOBIN GLYCOSYLATED A1C: CPT

## 2025-08-26 ENCOUNTER — OFFICE VISIT (OUTPATIENT)
Facility: CLINIC | Age: 89
End: 2025-08-26

## 2025-08-26 VITALS
WEIGHT: 135 LBS | OXYGEN SATURATION: 95 % | TEMPERATURE: 98.3 F | DIASTOLIC BLOOD PRESSURE: 80 MMHG | BODY MASS INDEX: 26.5 KG/M2 | RESPIRATION RATE: 20 BRPM | SYSTOLIC BLOOD PRESSURE: 152 MMHG | HEART RATE: 72 BPM | HEIGHT: 60 IN

## 2025-08-26 DIAGNOSIS — I10 ESSENTIAL (PRIMARY) HYPERTENSION: ICD-10-CM

## 2025-08-26 DIAGNOSIS — N18.32 TYPE 2 DIABETES MELLITUS WITH STAGE 3B CHRONIC KIDNEY DISEASE, WITHOUT LONG-TERM CURRENT USE OF INSULIN (HCC): Primary | ICD-10-CM

## 2025-08-26 DIAGNOSIS — G89.29 CHRONIC LEFT SHOULDER PAIN: ICD-10-CM

## 2025-08-26 DIAGNOSIS — F02.818 DEMENTIA IN OTHER DISEASES CLASSIFIED ELSEWHERE, UNSPECIFIED SEVERITY, WITH OTHER BEHAVIORAL DISTURBANCE (HCC): ICD-10-CM

## 2025-08-26 DIAGNOSIS — E78.2 MIXED HYPERLIPIDEMIA: ICD-10-CM

## 2025-08-26 DIAGNOSIS — E11.22 TYPE 2 DIABETES MELLITUS WITH STAGE 3B CHRONIC KIDNEY DISEASE, WITHOUT LONG-TERM CURRENT USE OF INSULIN (HCC): Primary | ICD-10-CM

## 2025-08-26 DIAGNOSIS — M25.512 CHRONIC LEFT SHOULDER PAIN: ICD-10-CM

## 2025-08-26 RX ORDER — PREDNISONE 10 MG/1
TABLET ORAL
Qty: 1 EACH | Refills: 0 | Status: SHIPPED | OUTPATIENT
Start: 2025-08-26

## (undated) DEVICE — MEDIA CONTRAST 10ML 200MG/ML 41%

## (undated) DEVICE — BANDAGE ADH W0.75XL3IN UNIV WVN FAB NAT GEN USE STRP N ADH

## (undated) DEVICE — Device: Brand: MEDEX

## (undated) DEVICE — SYR 10ML LUER LOK 1/5ML GRAD --

## (undated) DEVICE — SET EPI 18GA L3.5IN TUOHY NDL W/ 20GA CLS TIP NYL CATH